# Patient Record
Sex: MALE | Race: WHITE | Employment: OTHER | URBAN - METROPOLITAN AREA
[De-identification: names, ages, dates, MRNs, and addresses within clinical notes are randomized per-mention and may not be internally consistent; named-entity substitution may affect disease eponyms.]

---

## 2017-01-02 ENCOUNTER — TELEPHONE (OUTPATIENT)
Dept: UROLOGY | Facility: CLINIC | Age: 67
End: 2017-01-02

## 2017-01-02 NOTE — TELEPHONE ENCOUNTER
Pt calling stating that jesus has been trying to contact us to verify a medication. As of today, I see no fax and have received no call from Jesus about a Rx. I have asked the pt to speak with his pharmacy as he could have spoken to one of our others nurses, or, have them contact us directly so we can assist him. Carola Parker,A

## 2017-03-03 ENCOUNTER — TRANSFERRED RECORDS (OUTPATIENT)
Dept: FAMILY MEDICINE | Facility: CLINIC | Age: 67
End: 2017-03-03

## 2017-04-25 DIAGNOSIS — R09.81 CONGESTION OF PARANASAL SINUS: ICD-10-CM

## 2017-04-25 RX ORDER — FLUTICASONE PROPIONATE 50 MCG
1-2 SPRAY, SUSPENSION (ML) NASAL DAILY
Qty: 20 G | Refills: 3 | Status: SHIPPED | OUTPATIENT
Start: 2017-04-25 | End: 2018-01-18

## 2017-05-23 ENCOUNTER — OFFICE VISIT (OUTPATIENT)
Dept: FAMILY MEDICINE | Facility: CLINIC | Age: 67
End: 2017-05-23

## 2017-05-23 DIAGNOSIS — L03.317 CELLULITIS OF BUTTOCK: Primary | ICD-10-CM

## 2017-05-23 PROCEDURE — 99213 OFFICE O/P EST LOW 20 MIN: CPT | Performed by: FAMILY MEDICINE

## 2017-05-23 NOTE — MR AVS SNAPSHOT
After Visit Summary   5/23/2017    Hugo Prince    MRN: [id number]           Patient Information     Date Of Birth          1950        Visit Information        Provider Department      5/23/2017 9:30 AM Pascual Roche MD [facility]        Today's Diagnoses     Cellulitis of buttock    -  1       Follow-ups after your visit        Who to contact     If you have questions or need follow up information about today's clinic visit or your schedule please contact [facility] directly at [phone].  Normal or non-critical lab and imaging results will be communicated to you by [employer], letter or phone within 4 business days after the clinic has received the results. If you do not hear from us within 7 days, please contact the clinic through Spring[employer] or phone. If you have a critical or abnormal lab result, we will notify you by phone as soon as possible.  Submit refill requests through [employer] or call your pharmacy and they will forward the refill request to us. Please allow 3 business days for your refill to be completed.          Additional Information About Your Visit        MyChart Information     [employer] gives you secure access to your electronic health record. If you see a primary care provider, you can also send messages to your care team and make appointments. If you have questions, please call your primary care clinic.  If you do not have a primary care provider, please call [phone] and they will assist you.        Care EveryWhere ID     This is your Care EveryWhere ID. This could be used by other organizations to access your [place] medical records  [id number]        Your Vitals Were     Pulse                   94            Blood Pressure from Last 3 Encounters:   05/24/17 142/70   12/22/16 (!) 144/92   03/23/16 152/80    Weight from Last 3 Encounters:   12/22/16 131.1 kg (289 lb)   03/23/16 130.6 kg (288 lb)   12/02/15 130.2 kg (287 lb)               Today, you had the following     No orders found for display       Primary Care Provider Office Phone # Fax #    Pascual Roche [phone]       [facility] [address]  [facility] [address]        Thank you!     Thank you for choosing [facility]  for your care. Our goal is always to provide you with excellent care. Hearing back from our patients is one way we can continue to improve our services. Please take a few minutes to complete the written survey that you may receive in the mail after your visit with us. Thank you!             Your Updated Medication List - Protect others around you: Learn how to safely use, store and throw away your medicines at www.disposemymeds.org.          This list is accurate as of: 5/23/17 11:59 PM.  Always use your most recent med list.                   Brand Name Dispense Instructions for use    doxycycline 100 MG tablet    VIBRA-TABS    10 tablet    Take 1 tablet (100 mg) by mouth 2 times daily       fluticasone 50 MCG/ACT spray    FLONASE    20 g    Spray 1-2 sprays into both nostrils daily       IBUPROFEN PO      Take 200 mg by mouth every 4 hours as needed for moderate pain       tadalafil 5 MG tablet    CIALIS    30 tablet    Take 1 tablet (5 mg) by mouth as needed for erectile dysfunction       TYLENOL EX ST ARTHRITIS PAIN 500 MG tablet   Generic drug:  acetaminophen      Take 1,000 mg by mouth 3 times daily

## 2017-05-24 VITALS — DIASTOLIC BLOOD PRESSURE: 70 MMHG | SYSTOLIC BLOOD PRESSURE: 142 MMHG | HEART RATE: 94 BPM

## 2017-05-24 RX ORDER — DOXYCYCLINE HYCLATE 100 MG
100 TABLET ORAL 2 TIMES DAILY
Qty: 10 TABLET | Refills: 0 | Status: ON HOLD | COMMUNITY
Start: 2017-05-24 | End: 2019-07-15

## 2017-05-24 NOTE — PROGRESS NOTES
3 days ago had wood tick on right buttock, now inflamed and sore.  OBJECTIVE: /70  Pulse 94 6 cm of redness and induration, tender right upper buttock  (L03.317) Cellulitis of buttock  (primary encounter diagnosis)  Comment:   Plan: doxycycline (VIBRA-TABS) 100 MG tablet

## 2017-07-24 ENCOUNTER — TRANSFERRED RECORDS (OUTPATIENT)
Dept: FAMILY MEDICINE | Facility: CLINIC | Age: 67
End: 2017-07-24

## 2017-07-28 ENCOUNTER — TRANSFERRED RECORDS (OUTPATIENT)
Dept: FAMILY MEDICINE | Facility: CLINIC | Age: 67
End: 2017-07-28

## 2017-08-11 ENCOUNTER — TRANSFERRED RECORDS (OUTPATIENT)
Dept: FAMILY MEDICINE | Facility: CLINIC | Age: 67
End: 2017-08-11

## 2017-08-29 ENCOUNTER — TRANSFERRED RECORDS (OUTPATIENT)
Dept: FAMILY MEDICINE | Facility: CLINIC | Age: 67
End: 2017-08-29

## 2017-09-15 ENCOUNTER — TRANSFERRED RECORDS (OUTPATIENT)
Dept: FAMILY MEDICINE | Facility: CLINIC | Age: 67
End: 2017-09-15

## 2017-10-18 ENCOUNTER — TRANSFERRED RECORDS (OUTPATIENT)
Dept: FAMILY MEDICINE | Facility: CLINIC | Age: 67
End: 2017-10-18

## 2017-10-24 ENCOUNTER — TRANSFERRED RECORDS (OUTPATIENT)
Dept: FAMILY MEDICINE | Facility: CLINIC | Age: 67
End: 2017-10-24

## 2017-10-30 DIAGNOSIS — Z87.442 HISTORY OF RENAL CALCULI: Primary | ICD-10-CM

## 2017-12-06 ENCOUNTER — TRANSFERRED RECORDS (OUTPATIENT)
Dept: FAMILY MEDICINE | Facility: CLINIC | Age: 67
End: 2017-12-06

## 2017-12-12 ENCOUNTER — TRANSFERRED RECORDS (OUTPATIENT)
Dept: FAMILY MEDICINE | Facility: CLINIC | Age: 67
End: 2017-12-12

## 2017-12-27 ENCOUNTER — OFFICE VISIT (OUTPATIENT)
Dept: UROLOGY | Facility: CLINIC | Age: 67
End: 2017-12-27
Payer: COMMERCIAL

## 2017-12-27 ENCOUNTER — HOSPITAL ENCOUNTER (OUTPATIENT)
Dept: GENERAL RADIOLOGY | Facility: CLINIC | Age: 67
Discharge: HOME | End: 2017-12-27
Attending: UROLOGY | Admitting: UROLOGY
Payer: MEDICARE

## 2017-12-27 VITALS
SYSTOLIC BLOOD PRESSURE: 124 MMHG | WEIGHT: 289 LBS | HEIGHT: 70 IN | BODY MASS INDEX: 41.37 KG/M2 | DIASTOLIC BLOOD PRESSURE: 68 MMHG | HEART RATE: 80 BPM

## 2017-12-27 DIAGNOSIS — N52.9 ERECTILE DYSFUNCTION, UNSPECIFIED ERECTILE DYSFUNCTION TYPE: ICD-10-CM

## 2017-12-27 DIAGNOSIS — N20.0 CALCULUS OF KIDNEY: ICD-10-CM

## 2017-12-27 DIAGNOSIS — Z87.442 HISTORY OF RENAL CALCULI: ICD-10-CM

## 2017-12-27 DIAGNOSIS — Z80.42 FAMILY HISTORY OF PROSTATE CANCER: Primary | ICD-10-CM

## 2017-12-27 PROCEDURE — 74010 XR KUB: CPT | Mod: 52

## 2017-12-27 PROCEDURE — 99214 OFFICE O/P EST MOD 30 MIN: CPT | Performed by: UROLOGY

## 2017-12-27 PROCEDURE — 36415 COLL VENOUS BLD VENIPUNCTURE: CPT | Performed by: UROLOGY

## 2017-12-27 PROCEDURE — 84153 ASSAY OF PSA TOTAL: CPT | Performed by: UROLOGY

## 2017-12-27 RX ORDER — VARDENAFIL HYDROCHLORIDE 10 MG/1
10 TABLET ORAL DAILY PRN
Qty: 12 TABLET | Refills: 11 | Status: ON HOLD | OUTPATIENT
Start: 2017-12-27 | End: 2019-07-17

## 2017-12-27 ASSESSMENT — PAIN SCALES - GENERAL: PAINLEVEL: MILD PAIN (2)

## 2017-12-27 NOTE — MR AVS SNAPSHOT
After Visit Summary   12/27/2017    Hugo Prince    MRN: [id number]           Patient Information     Date Of Birth          1950        Visit Information        Provider Department      12/27/2017 11:00 AM Dima Desai MD [facility] [place]        Today's Diagnoses     Family history of prostate cancer    -  1    Erectile dysfunction, unspecified erectile dysfunction type        Calculus of kidney           Follow-ups after your visit        Follow-up notes from your care team     Return in about 1 year (around 12/27/2018) for KUB, PSA, Physical Exam.      Future tests that were ordered for you today     Open Future Orders        Priority Expected Expires Ordered    XR KUB [[id number]] Routine 12/27/2017 12/27/2018 12/27/2017            Who to contact     If you have questions or need follow up information about today's clinic visit or your schedule please contact [facility] [place] directly at [phone].  Normal or non-critical lab and imaging results will be communicated to you by [employer], letter or phone within 4 business days after the clinic has received the results. If you do not hear from us within 7 days, please contact the clinic through [employer] or phone. If you have a critical or abnormal lab result, we will notify you by phone as soon as possible.  Submit refill requests through [email or web address] or call your pharmacy and they will forward the refill request to us. Please allow 3 business days for your refill to be completed.          Additional Information About Your Visit        MyChart Information     [email or web address] gives you secure access to your electronic health record. If you see a primary care provider, you can also send messages to your care team and make appointments. If you have questions, please call your primary care clinic.  If you do not have a primary care provider, please call [phone] and they will  "assist you.        Care EveryWhere ID     This is your Care EveryWhere ID. This could be used by other organizations to access your [place] medical records  [id number]        Your Vitals Were     Pulse Height BMI (Body Mass Index)             80 1.778 m (5' 10\") 41.47 kg/m2          Blood Pressure from Last 3 Encounters:   12/27/17 124/68   05/24/17 142/70   12/22/16 (!) 144/92    Weight from Last 3 Encounters:   12/27/17 131.1 kg (289 lb)   12/22/16 131.1 kg (289 lb)   03/23/16 130.6 kg (288 lb)              We Performed the Following     PSA Diag Urologic Phys [[id number]]          Today's Medication Changes          These changes are accurate as of: 12/27/17 11:34 AM.  If you have any questions, ask your nurse or doctor.               These medicines have changed or have updated prescriptions.        Dose/Directions    * LEVITRA PO   This may have changed:  Another medication with the same name was added. Make sure you understand how and when to take each.   Changed by:  Dima Desai MD        Dose:  10 mg   Take 10 mg by mouth   Refills:  0       * vardenafil 10 MG tablet   Commonly known as:  LEVITRA   This may have changed:  You were already taking a medication with the same name, and this prescription was added. Make sure you understand how and when to take each.   Used for:  Family history of prostate cancer, Erectile dysfunction, unspecified erectile dysfunction type   Changed by:  Dima Desai MD        Dose:  10 mg   Take 1 tablet (10 mg) by mouth daily as needed 60 min prior to sex. Do not use with nitroglycerin, terazosin or doxazosin.   Quantity:  12 tablet   Refills:  11       * Notice:  This list has 2 medication(s) that are the same as other medications prescribed for you. Read the directions carefully, and ask your doctor or other care provider to review them with you.         Where to get your medicines      Some of these will need a paper prescription and others can be bought " over the counter.  Ask your nurse if you have questions.     Bring a paper prescription for each of these medications     vardenafil 10 MG tablet                Primary Care Provider Office Phone # Fax #    Pascual Roche [phone] [address] [facility] [address]        Equal Access to Services     MARCO ANTONIO IVAN : Hadii aad ku hadasho Soomaali, waaxda luqadaha, qaybta kaalmada adeegyada, waxay idiin hayaan adeeg khrobsh la'shashan . So [facility] [phone].    ATENCIÓN: Si habla español, tiene a maldonado disposición servicios gratuitos de asistencia lingüística. Navid al [phone].    We comply with applicable federal civil rights laws and [place] laws. We do not discriminate on the basis of race, color, national origin, age, disability, sex, sexual orientation, or gender identity.            Thank you!     Thank you for choosing [facility] [place]  for your care. Our goal is always to provide you with excellent care. Hearing back from our patients is one way we can continue to improve our services. Please take a few minutes to complete the written survey that you may receive in the mail after your visit with us. Thank you!             Your Updated Medication List - Protect others around you: Learn how to safely use, store and throw away your medicines at www.disposemymeds.org.          This list is accurate as of: 12/27/17 11:34 AM.  Always use your most recent med list.                   Brand Name Dispense Instructions for use Diagnosis    doxycycline 100 MG tablet    VIBRA-TABS    10 tablet    Take 1 tablet (100 mg) by mouth 2 times daily    Cellulitis of buttock       fluticasone 50 MCG/ACT spray    FLONASE    20 g    Spray 1-2 sprays into both nostrils daily    Congestion of paranasal sinus       IBUPROFEN PO      Take 200 mg by mouth every 4 hours as needed for moderate pain        * LEVITRA PO      Take 10 mg by mouth        * vardenafil 10 MG  tablet    LEVITRA    12 tablet    Take 1 tablet (10 mg) by mouth daily as needed 60 min prior to sex. Do not use with nitroglycerin, terazosin or doxazosin.    Family history of prostate cancer, Erectile dysfunction, unspecified erectile dysfunction type       TYLENOL EX ST ARTHRITIS PAIN 500 MG tablet   Generic drug:  acetaminophen      Take 1,000 mg by mouth 3 times daily        * Notice:  This list has 2 medication(s) that are the same as other medications prescribed for you. Read the directions carefully, and ask your doctor or other care provider to review them with you.

## 2017-12-27 NOTE — NURSING NOTE
Chief Complaint   Patient presents with     Clinic Care Coordination - Follow-up     History of Prostate Cancer      Patricia Zuleta LPN

## 2017-12-27 NOTE — LETTER
12/27/2017       RE: Hugo Prince  [address]  [place] [address]     Dear Colleague,    Thank you for referring your patient, Hugo Prince, to the [facility] [place] at [facility]. Please see a copy of my visit note below.    History: This is a great pleasure to see this very pleasant 67-year-old gentleman in follow-up consultation today.  He has a significant previous history of recurrent urinary stone disease and there is a strong family history of prostate cancer.  At the present time his general health is stable.  It  The PSA today is 1.5.  He is no major voiding symptoms.  In the last year he had no further episodes of renal colic.  We know that he has a significant stone burden particularly in the right kidney.  KUB was repeated today.    Past Medical History:   Diagnosis Date     Atopic dermatitis      Esophageal reflux     Gastroesophageal reflux     Other and unspecified disc disorder of unspecified region     Intervertebral disc disorders     Personal history of colonic polyps     Colon polyps     S/P lateral meniscectomy of left knee      Tick bite 4/20/15    amoxicillin treated     Unspecified sleep apnea     Sleep apnea     Urinary calculus, unspecified     Renal stones     Vitamin D deficiency 9-27-09       Social History     Social History     Marital status:      Spouse name: N/A     Number of children: N/A     Years of education: N/A     Social History Main Topics     Smoking status: Passive Smoke Exposure - Never Smoker     Types: Cigars     Smokeless tobacco: Never Used      Comment: has cigars when it is warm out     Alcohol use 7.0 oz/week     14 drink(s) per week     Drug use: No     Sexual activity: Not Asked     Other Topics Concern     None     Social History Narrative       Past Surgical History:   Procedure Laterality Date     ARTHROPLASTY KNEE Left 1/7/2015    Procedure:  ARTHROPLASTY KNEE;  Surgeon: Agustin Saenz MD;  Location: [facility]     ARTHROSCOPY KNEE RT/LT  6-20-11     COLONOSCOPY  12/09     EXTRACORPOREAL SHOCK WAVE LITHOTRIPSY (ESWL)  1/20/2012    Procedure:EXTRACORPOREAL SHOCK WAVE LITHOTRIPSY (ESWL); LEFT EXTRACORPOREAL SHOCK WAVE LITHOTRIPSY ; Surgeon:ANJUM BUNDY; Location:[facility] OR     EXTRACORPOREAL SHOCK WAVE LITHOTRIPSY, CYSTOSCOPY, INSERT STENT URETER(S), COMBINED  2/3/2012    Procedure:COMBINED EXTRACORPOREAL SHOCK WAVE LITHOTRIPSY, CYSTOSCOPY, INSERT STENT URETER(S); CYSTOSCOPY, RIGHT STENT; Surgeon:ANJUM BUNDY; Location:[facility] OR     FUSION LUMBAR ANTERIOR, FUSION LUMBAR POSTERIOR ONE LEVEL, COMBINED  11-1-10    L4-5     LASER HOLMIUM LITHOTRIPSY URETER(S), INSERT STENT, COMBINED  2/7/2012    Procedure:COMBINED CYSTOSCOPY, URETEROSCOPY, LASER HOLMIUM LITHOTRIPSY URETER(S), INSERT STENT; CYSTOSCOPY, RIGHT URETEROSCOPY, RIGHT RETROGRADES,  STONE EXTRACTION WITH HOLMIUM LASER AND RIGHT URETERAL STENT EXCHANGE ; Surgeon:DEEPTI MELVIN; Location:[facility] OR     LITHOTRIPSY      Lithotrypsy     SEPTOPLASTY         Family History   Problem Relation Age of Onset     Breast Cancer Mother      Melanoma Father      Prostate Cancer Father      C.A.D. Paternal Grandfather 72         Current Outpatient Prescriptions:      Vardenafil HCl (LEVITRA PO), Take 10 mg by mouth, Disp: , Rfl:      vardenafil (LEVITRA) 10 MG tablet, Take 1 tablet (10 mg) by mouth daily as needed 60 min prior to sex. Do not use with nitroglycerin, terazosin or doxazosin., Disp: 12 tablet, Rfl: 11     doxycycline (VIBRA-TABS) 100 MG tablet, Take 1 tablet (100 mg) by mouth 2 times daily, Disp: 10 tablet, Rfl: 0     fluticasone (FLONASE) 50 MCG/ACT spray, Spray 1-2 sprays into both nostrils daily, Disp: 20 g, Rfl: 3     IBUPROFEN PO, Take 200 mg by mouth every 4 hours as needed for moderate pain, Disp: , Rfl:      acetaminophen (TYLENOL) 500 MG tablet, Take 1,000 mg by mouth 3 times  "daily , Disp: , Rfl:     10 point ROS of systems including Constitutional, Eyes, Respiratory, Cardiovascular, Gastroenterology, Genitourinary, Integumentary, Muscularskeletal, Psychiatric were all negative except for pertinent positives noted in my HPI.    Examination:   /68 (BP Location: Left arm)  Pulse 80  Ht 1.778 m (5' 10\")  Wt 131.1 kg (289 lb)  BMI 41.47 kg/m2  General Impression: Very pleasant gentleman in no acute distress, well-oriented in time place and person  Mental Status: Normal.  HEENT.  There is no clinical evidence of jaundice and mucous membranes are normal  Skin: Skin is normal to examination  Respiratory System: The respiratory cycle is normal  Lymph Nodes: Not examined  Back/Flank Tenderness: There is no flank tenderness  Cardiovascular System: Not examined  Abdominal Examination: Not examined  Extremities: Not examined  Genitial: Not examined  Rectal Examination: Good sphincter tone, normal perianal sensation.  Smooth rectal mucosa without hemorrhoids or fissures.  Smooth soft and mildly enlarged prostate without evidence of tenderness, bogginess or nodules.  Seminal vesicles.  Not palpable.  Perineum is otherwise normal to examination  Neurologic System: There are no focal abnormal clinical neurological signs in the central, or peripheral nervous systems    Impression: We discussed a number of issues.  1.  His PSA today is 1.5, it was 0.98, 1 year ago, there is a significant family history of prostate cancer, but digital rectal examination today is quite benign.  I am reassured and reassured the patient that there was minimal evidence of any sinister findings and the prostate at this time.  2.  He has a history of recurrent urinary stone disease, does have a significant stone burden particularly on the right kidney where there was a 1.5 cm stone in the lower pole calyx of the right kidney in addition to other smaller stones in both kidneys.  I have however carefully reviewed these " "films and see very little evidence of any change in size and location of the stones at the present time.  The patient has had shockwave treatment on a number of occasions in the past and is very keen not to have further treatment unless absolutely necessary.  We did discuss this in detail today.  I'm content to continue to observe the stones and repeat a KUB in a year although I given him Instructions to contact me promptly should he develop any significant symptoms such as flank pain or gross hematuria.  3.  He did have questions about erectile dysfunction and I was able to refill his prescription today for Levitra 10 mg on a p.r.n. basis.    I did discuss all of these issues very careful detail with the patient today.  I answered all his questions    Plan: 1 year for PSA, bladder scan, symptom score, examination and KUB    Time: 25 minutes with greater than 50% spent in discussion and consultation    \"This dictation was performed with voice recognition software and may contain errors,  omissions and inadvertent word substitution.\"    Again, thank you for allowing me to participate in the care of your patient.      Sincerely,    iDma Desai MD  "

## 2017-12-27 NOTE — PROGRESS NOTES
History: This is a great pleasure to see this very pleasant 67-year-old gentleman in follow-up consultation today.  He has a significant previous history of recurrent urinary stone disease and there is a strong family history of prostate cancer.  At the present time his general health is stable.  It  The PSA today is 1.5.  He is no major voiding symptoms.  In the last year he had no further episodes of renal colic.  We know that he has a significant stone burden particularly in the right kidney.  KUB was repeated today.    Past Medical History:   Diagnosis Date     Atopic dermatitis      Esophageal reflux     Gastroesophageal reflux     Other and unspecified disc disorder of unspecified region     Intervertebral disc disorders     Personal history of colonic polyps     Colon polyps     S/P lateral meniscectomy of left knee      Tick bite 4/20/15    amoxicillin treated     Unspecified sleep apnea     Sleep apnea     Urinary calculus, unspecified     Renal stones     Vitamin D deficiency 9-27-09       Social History     Social History     Marital status:      Spouse name: N/A     Number of children: N/A     Years of education: N/A     Social History Main Topics     Smoking status: Passive Smoke Exposure - Never Smoker     Types: Cigars     Smokeless tobacco: Never Used      Comment: has cigars when it is warm out     Alcohol use 7.0 oz/week     14 drink(s) per week     Drug use: No     Sexual activity: Not Asked     Other Topics Concern     None     Social History Narrative       Past Surgical History:   Procedure Laterality Date     ARTHROPLASTY KNEE Left 1/7/2015    Procedure: ARTHROPLASTY KNEE;  Surgeon: Agustin Saenz MD;  Location:  OR     ARTHROSCOPY KNEE RT/LT  6-20-11     COLONOSCOPY  12/09     EXTRACORPOREAL SHOCK WAVE LITHOTRIPSY (ESWL)  1/20/2012    Procedure:EXTRACORPOREAL SHOCK WAVE LITHOTRIPSY (ESWL); LEFT EXTRACORPOREAL SHOCK WAVE LITHOTRIPSY ; Surgeon:ANJUM BUNDY; Location:  "OR     EXTRACORPOREAL SHOCK WAVE LITHOTRIPSY, CYSTOSCOPY, INSERT STENT URETER(S), COMBINED  2/3/2012    Procedure:COMBINED EXTRACORPOREAL SHOCK WAVE LITHOTRIPSY, CYSTOSCOPY, INSERT STENT URETER(S); CYSTOSCOPY, RIGHT STENT; Surgeon:ANJUM BUNDY; Location:[facility] OR     FUSION LUMBAR ANTERIOR, FUSION LUMBAR POSTERIOR ONE LEVEL, COMBINED  11-1-10    L4-5     LASER HOLMIUM LITHOTRIPSY URETER(S), INSERT STENT, COMBINED  2/7/2012    Procedure:COMBINED CYSTOSCOPY, URETEROSCOPY, LASER HOLMIUM LITHOTRIPSY URETER(S), INSERT STENT; CYSTOSCOPY, RIGHT URETEROSCOPY, RIGHT RETROGRADES,  STONE EXTRACTION WITH HOLMIUM LASER AND RIGHT URETERAL STENT EXCHANGE ; Surgeon:DEEPTI MELVIN; Location:[facility] OR     LITHOTRIPSY      Lithotrypsy     SEPTOPLASTY         Family History   Problem Relation Age of Onset     Breast Cancer Mother      Melanoma Father      Prostate Cancer Father      C.A.D. Paternal Grandfather 72         Current Outpatient Prescriptions:      Vardenafil HCl (LEVITRA PO), Take 10 mg by mouth, Disp: , Rfl:      vardenafil (LEVITRA) 10 MG tablet, Take 1 tablet (10 mg) by mouth daily as needed 60 min prior to sex. Do not use with nitroglycerin, terazosin or doxazosin., Disp: 12 tablet, Rfl: 11     doxycycline (VIBRA-TABS) 100 MG tablet, Take 1 tablet (100 mg) by mouth 2 times daily, Disp: 10 tablet, Rfl: 0     fluticasone (FLONASE) 50 MCG/ACT spray, Spray 1-2 sprays into both nostrils daily, Disp: 20 g, Rfl: 3     IBUPROFEN PO, Take 200 mg by mouth every 4 hours as needed for moderate pain, Disp: , Rfl:      acetaminophen (TYLENOL) 500 MG tablet, Take 1,000 mg by mouth 3 times daily , Disp: , Rfl:     10 point ROS of systems including Constitutional, Eyes, Respiratory, Cardiovascular, Gastroenterology, Genitourinary, Integumentary, Muscularskeletal, Psychiatric were all negative except for pertinent positives noted in my HPI.    Examination:   /68 (BP Location: Left arm)  Pulse 80  Ht 1.778 m (5' 10\")  " Wt 131.1 kg (289 lb)  BMI 41.47 kg/m2  General Impression: Very pleasant gentleman in no acute distress, well-oriented in time place and person  Mental Status: Normal.  HEENT.  There is no clinical evidence of jaundice and mucous membranes are normal  Skin: Skin is normal to examination  Respiratory System: The respiratory cycle is normal  Lymph Nodes: Not examined  Back/Flank Tenderness: There is no flank tenderness  Cardiovascular System: Not examined  Abdominal Examination: Not examined  Extremities: Not examined  Genitial: Not examined  Rectal Examination: Good sphincter tone, normal perianal sensation.  Smooth rectal mucosa without hemorrhoids or fissures.  Smooth soft and mildly enlarged prostate without evidence of tenderness, bogginess or nodules.  Seminal vesicles.  Not palpable.  Perineum is otherwise normal to examination  Neurologic System: There are no focal abnormal clinical neurological signs in the central, or peripheral nervous systems    Impression: We discussed a number of issues.  1.  His PSA today is 1.5, it was 0.98, 1 year ago, there is a significant family history of prostate cancer, but digital rectal examination today is quite benign.  I am reassured and reassured the patient that there was minimal evidence of any sinister findings and the prostate at this time.  2.  He has a history of recurrent urinary stone disease, does have a significant stone burden particularly on the right kidney where there was a 1.5 cm stone in the lower pole calyx of the right kidney in addition to other smaller stones in both kidneys.  I have however carefully reviewed these films and see very little evidence of any change in size and location of the stones at the present time.  The patient has had shockwave treatment on a number of occasions in the past and is very keen not to have further treatment unless absolutely necessary.  We did discuss this in detail today.  I'm content to continue to observe the  "stones and repeat a KUB in a year although I given him Instructions to contact me promptly should he develop any significant symptoms such as flank pain or gross hematuria.  3.  He did have questions about erectile dysfunction and I was able to refill his prescription today for Levitra 10 mg on a p.r.n. basis.    I did discuss all of these issues very careful detail with the patient today.  I answered all his questions    Plan: 1 year for PSA, bladder scan, symptom score, examination and KUB    Time: 25 minutes with greater than 50% spent in discussion and consultation    \"This dictation was performed with voice recognition software and may contain errors,  omissions and inadvertent word substitution.\"      "

## 2017-12-29 LAB — PSA SERPL-MCNC: 1.5 NG/ML (ref 0–4)

## 2018-01-18 DIAGNOSIS — R09.81 CONGESTION OF PARANASAL SINUS: ICD-10-CM

## 2018-01-18 NOTE — TELEPHONE ENCOUNTER
fluticasone (FLONASE) 50 MCG/ACT spray   LAST  Med check 12/2/15   last labs(for RX) 3/23/16  Next  appt scheduled =  None- due  Najma Nunn MA

## 2018-01-19 RX ORDER — FLUTICASONE PROPIONATE 50 MCG
SPRAY, SUSPENSION (ML) NASAL
Qty: 16 G | Refills: 0 | Status: SHIPPED | OUTPATIENT
Start: 2018-01-19 | End: 2018-03-14

## 2018-01-30 ENCOUNTER — TRANSFERRED RECORDS (OUTPATIENT)
Dept: FAMILY MEDICINE | Facility: CLINIC | Age: 68
End: 2018-01-30

## 2018-02-12 ENCOUNTER — TRANSFERRED RECORDS (OUTPATIENT)
Dept: FAMILY MEDICINE | Facility: CLINIC | Age: 68
End: 2018-02-12

## 2018-03-09 ENCOUNTER — TRANSFERRED RECORDS (OUTPATIENT)
Dept: FAMILY MEDICINE | Facility: CLINIC | Age: 68
End: 2018-03-09

## 2018-03-14 ENCOUNTER — OFFICE VISIT (OUTPATIENT)
Dept: FAMILY MEDICINE | Facility: CLINIC | Age: 68
End: 2018-03-14

## 2018-03-14 VITALS
RESPIRATION RATE: 16 BRPM | HEART RATE: 72 BPM | OXYGEN SATURATION: 96 % | SYSTOLIC BLOOD PRESSURE: 134 MMHG | DIASTOLIC BLOOD PRESSURE: 88 MMHG | WEIGHT: 299 LBS | BODY MASS INDEX: 42.9 KG/M2

## 2018-03-14 DIAGNOSIS — E53.8 VITAMIN B12 DEFICIENCY (NON ANEMIC): Primary | ICD-10-CM

## 2018-03-14 DIAGNOSIS — R09.81 CONGESTION OF PARANASAL SINUS: ICD-10-CM

## 2018-03-14 DIAGNOSIS — Z13.1 SCREENING FOR DIABETES MELLITUS: ICD-10-CM

## 2018-03-14 DIAGNOSIS — H61.22 IMPACTED CERUMEN OF LEFT EAR: ICD-10-CM

## 2018-03-14 DIAGNOSIS — Z11.59 ENCOUNTER FOR HCV SCREENING TEST FOR LOW RISK PATIENT: ICD-10-CM

## 2018-03-14 DIAGNOSIS — Z13.220 LIPID SCREENING: ICD-10-CM

## 2018-03-14 DIAGNOSIS — R73.09 ELEVATED GLUCOSE LEVEL: ICD-10-CM

## 2018-03-14 DIAGNOSIS — E66.01 MORBID OBESITY (H): ICD-10-CM

## 2018-03-14 PROCEDURE — 36415 COLL VENOUS BLD VENIPUNCTURE: CPT | Performed by: FAMILY MEDICINE

## 2018-03-14 PROCEDURE — 69209 REMOVE IMPACTED EAR WAX UNI: CPT | Performed by: FAMILY MEDICINE

## 2018-03-14 PROCEDURE — 99213 OFFICE O/P EST LOW 20 MIN: CPT | Mod: 25 | Performed by: FAMILY MEDICINE

## 2018-03-14 RX ORDER — FLUTICASONE PROPIONATE 50 MCG
SPRAY, SUSPENSION (ML) NASAL
Qty: 16 G | Refills: 0 | Status: SHIPPED | OUTPATIENT
Start: 2018-03-14 | End: 2018-06-29

## 2018-03-14 NOTE — MR AVS SNAPSHOT
After Visit Summary   3/14/2018    Hugo Prince    MRN: [id number]           Patient Information     Date Of Birth          1950        Visit Information        Provider Department      3/14/2018 8:15 AM Pascual Roche MD [facility]        Today's Diagnoses     Vitamin B12 deficiency (non anemic)    -  1    Congestion of paranasal sinus        Screening for diabetes mellitus        Lipid screening        Encounter for HCV screening test for low risk patient        Impacted cerumen of left ear        Morbid obesity (H)           Follow-ups after your visit        Who to contact     If you have questions or need follow up information about today's clinic visit or your schedule please contact [facility] directly at [phone].  Normal or non-critical lab and imaging results will be communicated to you by [employer], letter or phone within 4 business days after the clinic has received the results. If you do not hear from us within 7 days, please contact the clinic through [employer] or phone. If you have a critical or abnormal lab result, we will notify you by phone as soon as possible.  Submit refill requests through [employer] or call your pharmacy and they will forward the refill request to us. Please allow 3 business days for your refill to be completed.          Additional Information About Your Visit        MyChart Information     [employer] gives you secure access to your electronic health record. If you see a primary care provider, you can also send messages to your care team and make appointments. If you have questions, please call your primary care clinic.  If you do not have a primary care provider, please call [phone] and they will assist you.        Care EveryWhere ID     This is your Care EveryWhere ID. This could be used by other organizations to access your [facility] medical records  [id number]        Your Vitals Were     Pulse Respirations Pulse  Oximetry BMI (Body Mass Index)          72 16 96% 42.9 kg/m2         Blood Pressure from Last 3 Encounters:   03/14/18 134/88   12/27/17 124/68   05/24/17 142/70    Weight from Last 3 Encounters:   03/14/18 135.6 kg (299 lb)   12/27/17 131.1 kg (289 lb)   12/22/16 131.1 kg (289 lb)              We Performed the Following     Glucose  Serum ([facility])     HCV Antibody ([facility])     Lipid Panel ([facility])     Removal Impacted Cerumen Irrigation Unilateral ([facility])     VENOUS COLLECTION     Vitamin B12 ([facility])          Today's Medication Changes          These changes are accurate as of 3/14/18 12:28 PM.  If you have any questions, ask your nurse or doctor.               These medicines have changed or have updated prescriptions.        Dose/Directions    fluticasone 50 MCG/ACT spray   Commonly known as:  FLONASE   This may have changed:  See the new instructions.   Used for:  Congestion of paranasal sinus   Changed by:  Pascual Roche MD        SPRAY 1-2 SPRAYS INTO BOTH NOSTRILS DAILY   Quantity:  16 g   Refills:  0            Where to get your medicines      These medications were sent to [id number] [address]  [address], [place] [address]     Phone:  [phone]     fluticasone 50 MCG/ACT spray                Primary Care Provider Office Phone # Fax #    Pascual Roche [phone] [address] [facility] [address]        Equal Access to Services     MARCO ANTONIO IVAN AH: Hadii aad ku hadasho Soomaali, waaxda luqadaha, qaybta kaalmada adeegyada, waxay idiin hayshashan ky hardy . So [facility] [phone].    ATENCIÓN: Si habla español, tiene a maldonado disposición servicios gratuitos de asistencia lingüística. Llame al [phone].    We comply with applicable federal civil rights laws and [place] laws. We do not discriminate on the basis of race, color, national origin, age, disability, sex, sexual orientation, or gender  identity.            Thank you!     Thank you for choosing [facility]  for your care. Our goal is always to provide you with excellent care. Hearing back from our patients is one way we can continue to improve our services. Please take a few minutes to complete the written survey that you may receive in the mail after your visit with us. Thank you!             Your Updated Medication List - Protect others around you: Learn how to safely use, store and throw away your medicines at www.disposemymeds.org.          This list is accurate as of 3/14/18 12:28 PM.  Always use your most recent med list.                   Brand Name Dispense Instructions for use Diagnosis    doxycycline 100 MG tablet    VIBRA-TABS    10 tablet    Take 1 tablet (100 mg) by mouth 2 times daily    Cellulitis of buttock       fluticasone 50 MCG/ACT spray    FLONASE    16 g    SPRAY 1-2 SPRAYS INTO BOTH NOSTRILS DAILY    Congestion of paranasal sinus       IBUPROFEN PO      Take 200 mg by mouth every 4 hours as needed for moderate pain        * LEVITRA PO      Take 10 mg by mouth        * vardenafil 10 MG tablet    LEVITRA    12 tablet    Take 1 tablet (10 mg) by mouth daily as needed 60 min prior to sex. Do not use with nitroglycerin, terazosin or doxazosin.    Family history of prostate cancer, Erectile dysfunction, unspecified erectile dysfunction type       TYLENOL EX ST ARTHRITIS PAIN 500 MG tablet   Generic drug:  acetaminophen      Take 1,000 mg by mouth 3 times daily        * Notice:  This list has 2 medication(s) that are the same as other medications prescribed for you. Read the directions carefully, and ask your doctor or other care provider to review them with you.

## 2018-03-14 NOTE — PROGRESS NOTES
Here for screening.   Feeling well, but trying to lose weight. He has lost 8# since starting a month ago. History of slightly elevated glucoses in the past.   He has had low B12 in past, on replacement.   Left ear is plugged.  Patient Active Problem List   Diagnosis     Kidney stone     Sleep apnea     Impacted cerumen     ED (erectile dysfunction)     Preventative health care     Health Care Home     GERD (gastroesophageal reflux disease)     Elevated cholesterol     S/P total knee replacement     Dermatitis     Physical deconditioning     Knee pain     DVT prophylaxis     DJD (degenerative joint disease) of knee     Anemia due to blood loss, acute     Edema     S/P knee surgery     Chronic pharyngitis and nasopharyngitis(472)     Morbid obesity (H)      Current Outpatient Prescriptions   Medication     fluticasone (FLONASE) 50 MCG/ACT spray     Vardenafil HCl (LEVITRA PO)     vardenafil (LEVITRA) 10 MG tablet     doxycycline (VIBRA-TABS) 100 MG tablet     IBUPROFEN PO     acetaminophen (TYLENOL) 500 MG tablet     No current facility-administered medications for this visit.        ROS denies CP, SOB.  /88  Pulse 72  Resp 16  Wt 135.6 kg (299 lb)  SpO2 96%  BMI 42.9 kg/m2 NAD he is alert, insightful. Good mood. Left EAC occluded with cerumen.  (E53.8) Vitamin B12 deficiency (non anemic)  (primary encounter diagnosis)  Comment:   Plan: Vitamin B12 ([facility]), VENOUS COLLECTION            (R09.81) Congestion of paranasal sinus  Comment:   Plan: fluticasone (FLONASE) 50 MCG/ACT spray            (Z13.1) Screening for diabetes mellitus  Comment:   Plan: Glucose  Serum ([facility]), VENOUS COLLECTION            (Z13.220) Lipid screening  Comment:   Plan: Lipid Panel ([facility])            (Z11.59) Encounter for HCV screening test for low risk patient  Comment:   Plan: HCV Antibody ([facility])            (H61.22) Impacted cerumen of left ear  Comment:   Plan: Removal Impacted Cerumen Irrigation Unilateral         (Ear  [facility]minder)            (E66.01) Morbid obesity (H)  Comment:   Plan: continue with diet and increased walking

## 2018-03-15 LAB
CHOLEST SERPL-MCNC: 211 MG/DL (ref 100–199)
GLUCOSE SERPL-MCNC: 123 MG/DL (ref 65–99)
HCV AB SERPL QL IA: <0.1 S/CO RATIO (ref 0–0.9)
HDLC SERPL-MCNC: 40 MG/DL
LDL/HDL RATIO: 3.3 RATIO UNITS (ref 0–3.6)
LDLC SERPL CALC-MCNC: 132 MG/DL (ref 0–99)
TRIGL SERPL-MCNC: 193 MG/DL (ref 0–149)
VIT B12 SERPL-MCNC: 777 PG/ML (ref 232–1245)
VLDLC SERPL CALC-MCNC: 39 MG/DL (ref 5–40)

## 2018-03-16 ENCOUNTER — TRANSFERRED RECORDS (OUTPATIENT)
Dept: FAMILY MEDICINE | Facility: CLINIC | Age: 68
End: 2018-03-16

## 2018-03-20 LAB — HBA1C MFR BLD: 5.9 % (ref 4.8–5.6)

## 2018-03-20 NOTE — PROGRESS NOTES
These results are fine, mildly elevated glucose, should be rechecked in a year. Let me know if you have questions.  Dr. Roche

## 2018-06-29 DIAGNOSIS — R09.81 CONGESTION OF PARANASAL SINUS: ICD-10-CM

## 2018-06-29 RX ORDER — FLUTICASONE PROPIONATE 50 MCG
SPRAY, SUSPENSION (ML) NASAL
Qty: 16 G | Refills: 0 | Status: SHIPPED | OUTPATIENT
Start: 2018-06-29 | End: 2018-09-14

## 2018-06-29 NOTE — TELEPHONE ENCOUNTER
fluticasone (FLONASE) 50 MCG/ACT spray   LAST  Med check 3/14/18   last labs(for RX) 3/19/18  Next  appt scheduled =  none  Najma Nunn MA

## 2018-09-12 ENCOUNTER — TRANSFERRED RECORDS (OUTPATIENT)
Dept: FAMILY MEDICINE | Facility: CLINIC | Age: 68
End: 2018-09-12

## 2018-09-14 DIAGNOSIS — R09.81 CONGESTION OF PARANASAL SINUS: ICD-10-CM

## 2018-09-14 RX ORDER — FLUTICASONE PROPIONATE 50 MCG
SPRAY, SUSPENSION (ML) NASAL
Qty: 16 G | Refills: 0 | Status: SHIPPED | OUTPATIENT
Start: 2018-09-14 | End: 2018-10-13

## 2018-09-19 ENCOUNTER — TRANSFERRED RECORDS (OUTPATIENT)
Dept: FAMILY MEDICINE | Facility: CLINIC | Age: 68
End: 2018-09-19

## 2018-09-25 ENCOUNTER — TRANSFERRED RECORDS (OUTPATIENT)
Dept: FAMILY MEDICINE | Facility: CLINIC | Age: 68
End: 2018-09-25

## 2018-09-27 ENCOUNTER — OFFICE VISIT (OUTPATIENT)
Dept: FAMILY MEDICINE | Facility: CLINIC | Age: 68
End: 2018-09-27

## 2018-09-27 VITALS
RESPIRATION RATE: 16 BRPM | WEIGHT: 298 LBS | HEART RATE: 59 BPM | BODY MASS INDEX: 42.76 KG/M2 | SYSTOLIC BLOOD PRESSURE: 167 MMHG | DIASTOLIC BLOOD PRESSURE: 95 MMHG

## 2018-09-27 DIAGNOSIS — R03.0 ELEVATED BLOOD PRESSURE READING WITHOUT DIAGNOSIS OF HYPERTENSION: Primary | ICD-10-CM

## 2018-09-27 PROCEDURE — 99213 OFFICE O/P EST LOW 20 MIN: CPT | Performed by: NURSE PRACTITIONER

## 2018-09-27 RX ORDER — DIAZEPAM 10 MG
TABLET ORAL
Refills: 0 | COMMUNITY
Start: 2018-09-23 | End: 2019-06-05

## 2018-09-27 NOTE — PROGRESS NOTES
SUBJECTIVE:   Hugo Prince is a 68 year old male who presents to clinic today for the following health issues: Was having high BP at home on machine so came in. Recently had nerve root injection tues at [facility] and BP always goes up after that. /85 after injection at office. BP 20 points different than our machine at home. Doing PT 2x/week. Injection was above infusion point and it is working. No BP meds taken, only advil.       Hypertension Follow-up      Outpatient blood pressures are being checked at home and BID.  Results are 160-140/98-78. BP cuff wrong size, too small.     Low Salt Diet: not using salt- not monitoring salt       Amount of exercise or physical activity: 2-3 days/week for an average of 45-60 minutes- bikes, walks, fishes, gardens & yolie    Problems taking medications regularly: No    Medication side effects: none    Diet: eats yoguart, juice, fruit/vegetables regularly 3-4 servings, protein - daily. Coffee - fresh ground - 2 cups in am        Problem list and histories reviewed & adjusted, as indicated.  Additional history: as documented    BP Readings from Last 3 Encounters:   09/27/18 (!) 167/95   03/14/18 134/88   12/27/17 124/68    Wt Readings from Last 3 Encounters:   09/27/18 135.2 kg (298 lb)   03/14/18 135.6 kg (299 lb)   12/27/17 131.1 kg (289 lb)                    Reviewed and updated as needed this visit by clinical staff       Reviewed and updated as needed this visit by Provider         ROS:  Constitutional, HEENT, cardiovascular, pulmonary, gi and gu systems are negative, except as otherwise noted.    OBJECTIVE:     BP (!) 167/95 (BP Location: Right arm, Patient Position: Sitting, Cuff Size: Adult Regular)  Pulse 59  Resp 16  Wt 135.2 kg (298 lb)  BMI 42.76 kg/m2  Body mass index is 42.76 kg/(m^2).  GENERAL: healthy, alert and no distress  NECK: no adenopathy, no asymmetry, masses, or scars and thyroid normal to palpation  RESP: lungs clear to  auscultation - no rales, rhonchi or wheezes  CV: regular rate and rhythm, normal S1 S2, no S3 or S4, no murmur, click or rub, no peripheral edema and peripheral pulses strong  MS: no gross musculoskeletal defects noted, no edema  SKIN: no suspicious lesions or rashes  NEURO: Normal strength and tone, mentation intact and speech normal  PSYCH: mentation appears normal, affect normal/bright    Diagnostic Test Results:  none     ASSESSMENT/PLAN:     1. Elevated blood pressure reading without diagnosis of hypertension  Take BP on larger cuff always on left arm, if elevated BP at home let us know.       FUTURE APPOINTMENTS:       - Follow-up for annual visit or as needed  Work on weight loss  Regular exercise    DEMETRI Watson [facility]

## 2018-09-27 NOTE — MR AVS SNAPSHOT
After Visit Summary   9/27/2018    Hugo Prince    MRN: [id number]           Patient Information     Date Of Birth          1950        Visit Information        Provider Department      9/27/2018 10:15 AM Missy Patel APRN CNP [facility]        Today's Diagnoses     Elevated blood pressure reading without diagnosis of hypertension    -  1       Follow-ups after your visit        Follow-up notes from your care team     Return if symptoms worsen or fail to improve.      Who to contact     If you have questions or need follow up information about today's clinic visit or your schedule please contact [facility] directly at [phone].  Normal or non-critical lab and imaging results will be communicated to you by [employer], letter or phone within 4 business days after the clinic has received the results. If you do not hear from us within 7 days, please contact the clinic through [employer] or phone. If you have a critical or abnormal lab result, we will notify you by phone as soon as possible.  Submit refill requests through [employer] or call your pharmacy and they will forward the refill request to us. Please allow 3 business days for your refill to be completed.          Additional Information About Your Visit        MyChart Information     [employer] gives you secure access to your electronic health record. If you see a primary care provider, you can also send messages to your care team and make appointments. If you have questions, please call your primary care clinic.  If you do not have a primary care provider, please call [phone] and they will assist you.        Care EveryWhere ID     This is your Care EveryWhere ID. This could be used by other organizations to access your [facility] medical records  [id number]        Your Vitals Were     Pulse Respirations BMI (Body Mass Index)             59 16 42.76 kg/m2          Blood Pressure from Last 3 Encounters:    09/27/18 (!) 167/95   03/14/18 134/88   12/27/17 124/68    Weight from Last 3 Encounters:   09/27/18 135.2 kg (298 lb)   03/14/18 135.6 kg (299 lb)   12/27/17 131.1 kg (289 lb)              Today, you had the following     No orders found for display       Primary Care Provider Office Phone # Fax #    Pascual oRche [phone] [address]  [facility] [address]        Equal Access to Services     [facility]: Hadii aad ku hadasho Soomaali, waaxda luqadaha, qaybta kaalmada adeegyafrederick, zulma hardy . So [facility] [phone].    ATENCIÓN: Si habla español, tiene a maldonado disposición servicios gratuitos de asistencia lingüística. Llame al [phone].    We comply with applicable federal civil rights laws and [place] laws. We do not discriminate on the basis of race, color, national origin, age, disability, sex, sexual orientation, or gender identity.            Thank you!     Thank you for choosing [facility]  for your care. Our goal is always to provide you with excellent care. Hearing back from our patients is one way we can continue to improve our services. Please take a few minutes to complete the written survey that you may receive in the mail after your visit with us. Thank you!             Your Updated Medication List - Protect others around you: Learn how to safely use, store and throw away your medicines at www.disposemymeds.org.          This list is accurate as of 9/27/18 11:59 PM.  Always use your most recent med list.                   Brand Name Dispense Instructions for use Diagnosis    diazepam 10 MG tablet    VALIUM     For MRI  Procedure        doxycycline 100 MG tablet    VIBRA-TABS    10 tablet    Take 100 mg by mouth 2 times daily Chronic tick bites    Cellulitis of buttock       fluticasone 50 MCG/ACT spray    FLONASE    16 g    SPRAY 1-2 SPRAYS INTO BOTH NOSTRILS DAILY    Congestion of paranasal sinus       IBUPROFEN PO       Take 400 mg by mouth every 6 hours as needed for moderate pain        * LEVITRA PO      Take 10 mg by mouth        * vardenafil 10 MG tablet    LEVITRA    12 tablet    Take 1 tablet (10 mg) by mouth daily as needed 60 min prior to sex. Do not use with nitroglycerin, terazosin or doxazosin.    Family history of prostate cancer, Erectile dysfunction, unspecified erectile dysfunction type       order for DME      Equipment being ordered: CPAP        TYLENOL EX ST ARTHRITIS PAIN 500 MG tablet   Generic drug:  acetaminophen      Take 500 mg by mouth At Bedtime        UNABLE TO FIND      MEDICATION NAME: Nerve root injection  L3-4 Last done for back pain 9/25/18        * Notice:  This list has 2 medication(s) that are the same as other medications prescribed for you. Read the directions carefully, and ask your doctor or other care provider to review them with you.

## 2018-10-13 DIAGNOSIS — R09.81 CONGESTION OF PARANASAL SINUS: ICD-10-CM

## 2018-10-14 RX ORDER — FLUTICASONE PROPIONATE 50 MCG
SPRAY, SUSPENSION (ML) NASAL
Qty: 16 G | Refills: 0 | Status: SHIPPED | OUTPATIENT
Start: 2018-10-14 | End: 2019-04-22

## 2018-10-30 ENCOUNTER — TRANSFERRED RECORDS (OUTPATIENT)
Dept: FAMILY MEDICINE | Facility: CLINIC | Age: 68
End: 2018-10-30

## 2018-11-02 ENCOUNTER — TRANSFERRED RECORDS (OUTPATIENT)
Dept: FAMILY MEDICINE | Facility: CLINIC | Age: 68
End: 2018-11-02

## 2018-12-14 DIAGNOSIS — Z80.42 FAMILY HISTORY OF PROSTATE CANCER: Primary | ICD-10-CM

## 2018-12-14 DIAGNOSIS — Z85.46 PERSONAL HISTORY OF MALIGNANT NEOPLASM OF PROSTATE: ICD-10-CM

## 2018-12-14 DIAGNOSIS — Z12.5 SCREENING FOR PROSTATE CANCER: ICD-10-CM

## 2018-12-14 DIAGNOSIS — N20.0 KIDNEY STONE: ICD-10-CM

## 2018-12-17 ENCOUNTER — OFFICE VISIT (OUTPATIENT)
Dept: UROLOGY | Facility: CLINIC | Age: 68
End: 2018-12-17
Payer: COMMERCIAL

## 2018-12-17 ENCOUNTER — HOSPITAL ENCOUNTER (OUTPATIENT)
Dept: GENERAL RADIOLOGY | Facility: CLINIC | Age: 68
Discharge: HOME | End: 2018-12-17
Attending: UROLOGY | Admitting: UROLOGY
Payer: MEDICARE

## 2018-12-17 VITALS
SYSTOLIC BLOOD PRESSURE: 132 MMHG | DIASTOLIC BLOOD PRESSURE: 80 MMHG | HEIGHT: 70 IN | WEIGHT: 300 LBS | OXYGEN SATURATION: 95 % | HEART RATE: 64 BPM | BODY MASS INDEX: 42.95 KG/M2

## 2018-12-17 DIAGNOSIS — N20.0 KIDNEY STONE: ICD-10-CM

## 2018-12-17 DIAGNOSIS — Z85.46 PERSONAL HISTORY OF MALIGNANT NEOPLASM OF PROSTATE: ICD-10-CM

## 2018-12-17 DIAGNOSIS — N20.0 CALCULUS OF KIDNEY: ICD-10-CM

## 2018-12-17 DIAGNOSIS — Z80.42 FAMILY HISTORY OF PROSTATE CANCER: ICD-10-CM

## 2018-12-17 LAB
ALBUMIN UR-MCNC: NEGATIVE MG/DL
ANION GAP SERPL CALCULATED.3IONS-SCNC: 7 MMOL/L (ref 3–14)
APPEARANCE UR: CLEAR
BILIRUB UR QL STRIP: NEGATIVE
BUN SERPL-MCNC: 21 MG/DL (ref 7–30)
CALCIUM SERPL-MCNC: 9.3 MG/DL (ref 8.5–10.1)
CHLORIDE SERPL-SCNC: 107 MMOL/L (ref 94–109)
CO2 SERPL-SCNC: 25 MMOL/L (ref 20–32)
COLOR UR AUTO: YELLOW
CREAT SERPL-MCNC: 1.09 MG/DL (ref 0.66–1.25)
GFR SERPL CREATININE-BSD FRML MDRD: 67 ML/MIN/1.7M2
GLUCOSE SERPL-MCNC: 113 MG/DL (ref 70–99)
GLUCOSE UR STRIP-MCNC: NEGATIVE MG/DL
HGB UR QL STRIP: ABNORMAL
KETONES UR STRIP-MCNC: ABNORMAL MG/DL
LEUKOCYTE ESTERASE UR QL STRIP: ABNORMAL
NITRATE UR QL: NEGATIVE
PH UR STRIP: 5.5 PH (ref 5–7)
POTASSIUM SERPL-SCNC: 4.5 MMOL/L (ref 3.4–5.3)
PSA SERPL-MCNC: 1.6 NG/ML (ref 0–4)
RESIDUAL VOLUME (RV) (EXTERNAL): 9
SODIUM SERPL-SCNC: 139 MMOL/L (ref 133–144)
SOURCE: ABNORMAL
SP GR UR STRIP: 1.02 (ref 1–1.03)
UROBILINOGEN UR STRIP-ACNC: 0.2 EU/DL (ref 0.2–1)

## 2018-12-17 PROCEDURE — 81003 URINALYSIS AUTO W/O SCOPE: CPT | Performed by: UROLOGY

## 2018-12-17 PROCEDURE — 84153 ASSAY OF PSA TOTAL: CPT | Performed by: UROLOGY

## 2018-12-17 PROCEDURE — 51798 US URINE CAPACITY MEASURE: CPT | Performed by: UROLOGY

## 2018-12-17 PROCEDURE — 99215 OFFICE O/P EST HI 40 MIN: CPT | Mod: 25 | Performed by: UROLOGY

## 2018-12-17 PROCEDURE — 36415 COLL VENOUS BLD VENIPUNCTURE: CPT | Performed by: UROLOGY

## 2018-12-17 PROCEDURE — 74019 RADEX ABDOMEN 2 VIEWS: CPT

## 2018-12-17 PROCEDURE — 80048 BASIC METABOLIC PNL TOTAL CA: CPT | Performed by: UROLOGY

## 2018-12-17 RX ORDER — CYANOCOBALAMIN 1000 UG/ML
1 INJECTION, SOLUTION INTRAMUSCULAR; SUBCUTANEOUS
Status: ON HOLD | COMMUNITY
End: 2019-07-15

## 2018-12-17 RX ORDER — VARDENAFIL HYDROCHLORIDE 10 MG/1
10 TABLET ORAL DAILY PRN
Qty: 12 TABLET | Refills: 11 | Status: SHIPPED | OUTPATIENT
Start: 2018-12-17 | End: 2019-05-08

## 2018-12-17 RX ORDER — TAMSULOSIN HYDROCHLORIDE 0.4 MG/1
0.4 CAPSULE ORAL DAILY
Qty: 30 CAPSULE | Refills: 1 | Status: SHIPPED | OUTPATIENT
Start: 2018-12-17 | End: 2019-07-02

## 2018-12-17 ASSESSMENT — MIFFLIN-ST. JEOR: SCORE: 2127.04

## 2018-12-17 ASSESSMENT — PAIN SCALES - GENERAL: PAINLEVEL: NO PAIN (0)

## 2018-12-17 NOTE — PROGRESS NOTES
History: It is a great pleasure to see this pleasant 68-year-old gentleman in follow-up consultation today.  He has a number of significant clinical issues  1.  He has a history of urinary stone disease, with a significant stone burden, and he had a ureteroscopy about 3 years ago for an upper right ureteral stone.  We have been following other stones in both kidneys since then which have been stable so far.  He has no symptomatic complaints such as hematuria or flank pain at this time.  The most recent serum creatinine 3 years ago was normal.  2.  Family history of prostate cancer which is a very significant family history but so far we have found no evidence of any findings which would be of concern and I repeating the test today.  3.  He has a history of sexual dysfunction and we have been treating that with Levitra with success.  4.  He is now weighing 300 pounds.  5.  He has a significant history of low back problems and has had surgery on the spine on a number of occasions    Past Medical History:   Diagnosis Date     Atopic dermatitis      Esophageal reflux     Gastroesophageal reflux     Other and unspecified disc disorder of unspecified region     Intervertebral disc disorders     Personal history of colonic polyps     Colon polyps     S/P lateral meniscectomy of left knee      Tick bite 4/20/15    amoxicillin treated     Unspecified sleep apnea     Sleep apnea     Urinary calculus, unspecified     Renal stones     Vitamin D deficiency 9-27-09       Social History     Socioeconomic History     Marital status:      Spouse name: None     Number of children: None     Years of education: None     Highest education level: None   Social Needs     Financial resource strain: None     Food insecurity - worry: None     Food insecurity - inability: None     Transportation needs - medical: None     Transportation needs - non-medical: None   Occupational History     None   Tobacco Use     Smoking status: Passive  Smoke Exposure - Never Smoker     Smokeless tobacco: Never Used     Tobacco comment: has cigars when it is warm out   Substance and Sexual Activity     Alcohol use: Yes     Alcohol/week: 7.0 oz     Types: 14 Standard drinks or equivalent per week     Drug use: No     Sexual activity: None   Other Topics Concern     Parent/sibling w/ CABG, MI or angioplasty before 65F 55M? Not Asked   Social History Narrative     None       Past Surgical History:   Procedure Laterality Date     ARTHROPLASTY KNEE Left 1/7/2015    Procedure: ARTHROPLASTY KNEE;  Surgeon: Agustin Saenz MD;  Location: [facility]     ARTHROSCOPY KNEE RT/LT  6-20-11     COLONOSCOPY  12/09     CYSTOSCOPY       EXTRACORPOREAL SHOCK WAVE LITHOTRIPSY (ESWL)  1/20/2012    Procedure:EXTRACORPOREAL SHOCK WAVE LITHOTRIPSY (ESWL); LEFT EXTRACORPOREAL SHOCK WAVE LITHOTRIPSY ; Surgeon:ANJUM BUNDY; Location:[facility] OR     EXTRACORPOREAL SHOCK WAVE LITHOTRIPSY, CYSTOSCOPY, INSERT STENT URETER(S), COMBINED  2/3/2012    Procedure:COMBINED EXTRACORPOREAL SHOCK WAVE LITHOTRIPSY, CYSTOSCOPY, INSERT STENT URETER(S); CYSTOSCOPY, RIGHT STENT; Surgeon:ANJUM BUNDY; Location:[facility] OR     FUSION LUMBAR ANTERIOR, FUSION LUMBAR POSTERIOR ONE LEVEL, COMBINED  11-1-10    L4-5     LASER HOLMIUM LITHOTRIPSY URETER(S), INSERT STENT, COMBINED  2/7/2012    Procedure:COMBINED CYSTOSCOPY, URETEROSCOPY, LASER HOLMIUM LITHOTRIPSY URETER(S), INSERT STENT; CYSTOSCOPY, RIGHT URETEROSCOPY, RIGHT RETROGRADES,  STONE EXTRACTION WITH HOLMIUM LASER AND RIGHT URETERAL STENT EXCHANGE ; Surgeon:DEEPTI MELVIN; Location:[facility] OR     LITHOTRIPSY      Lithotrypsy     SEPTOPLASTY       VASECTOMY         Family History   Problem Relation Age of Onset     Melanoma Father      Prostate Cancer Father      Breast Cancer Mother      C.A.D. Paternal Grandfather 72         Current Outpatient Medications:      acetaminophen (TYLENOL) 500 MG tablet, Take 500 mg by mouth At Bedtime , Disp: , Rfl:  "     fluticasone (FLONASE) 50 MCG/ACT spray, SPRAY 1-2 SPRAYS INTO BOTH NOSTRILS DAILY., Disp: 16 g, Rfl: 0     tamsulosin (FLOMAX) 0.4 MG capsule, Take 1 capsule (0.4 mg) by mouth daily, Disp: 30 capsule, Rfl: 1     vardenafil (LEVITRA) 10 MG tablet, Take 1 tablet (10 mg) by mouth daily as needed, Disp: 12 tablet, Rfl: 11     vitamin B-12 (CYANOCOBALAMIN) 1000 MCG/ML injection, Inject 1 mL into the muscle every 30 days, Disp: , Rfl:      diazepam (VALIUM) 10 MG tablet, For MRI  Procedure, Disp: , Rfl: 0     doxycycline (VIBRA-TABS) 100 MG tablet, Take 100 mg by mouth 2 times daily Chronic tick bites, Disp: 10 tablet, Rfl: 0     IBUPROFEN PO, Take 400 mg by mouth every 6 hours as needed for moderate pain , Disp: , Rfl:      order for DME, Equipment being ordered: CPAP, Disp: , Rfl:      UNABLE TO FIND, MEDICATION NAME: Nerve root injection  L3-4 Last done for back pain 9/25/18, Disp: , Rfl:      vardenafil (LEVITRA) 10 MG tablet, Take 1 tablet (10 mg) by mouth daily as needed 60 min prior to sex. Do not use with nitroglycerin, terazosin or doxazosin. (Patient not taking: Reported on 12/17/2018), Disp: 12 tablet, Rfl: 11     Vardenafil HCl (LEVITRA PO), Take 10 mg by mouth, Disp: , Rfl:     10 point ROS of systems including Constitutional, Eyes, Respiratory, Cardiovascular, Gastroenterology, Genitourinary, Integumentary, Muscularskeletal, Psychiatric were all negative except for pertinent positives noted in my HPI.    Examination:   /80 (BP Location: Left arm, Patient Position: Sitting, Cuff Size: Adult Large)   Pulse 64   Ht 1.778 m (5' 10\")   Wt 136.1 kg (300 lb)   SpO2 95%   BMI 43.05 kg/m    General Impression: Pleasant gentleman in no acute distress, well oriented in time place and person  Mental Status: Normal.  HEENT.  There is no clinical evidence of jaundice in the mucous membranes are normal  Skin: The skin is otherwise normal to examination  Respiratory System: The respiratory cycle is " normal  Lymph Nodes: Not examined  Back/Flank Tenderness: There is no back or flank tenderness  Cardiovascular System: There is no significant pitting peripheral edema  Abdominal Examination: Moderately obese abdomen with no other remarkable findings  Extremities: Extremities otherwise unremarkable  Genitial: Not examined  Rectal Examination: Good sphincter tone, normal perianal sensation.  Smooth rectal mucosa without hemorrhoids or fissures.  Smooth soft mildly enlarged prostate but without evidence of tenderness, bogginess or nodules.  Seminal vesicles.  Not palpable.  Perineum otherwise normal to examination  Neurologic System: There are no focal abnormal clinical neurological signs in the central, or peripheral nervous systems    Impression: Situation.  I have carefully reviewed his recent KUB, and compared this to previous x-rays.  This has not yet been reported but my review indicates that has been an enlargement which is quite small of the most significant stone in the lower pole of the right kidney is also other stones in each kidney appear to be a little larger as well.  The stone burden is significant as the largest stone is 1.6 cm in diameter.  I like to arrange for a CT scan abdomen and pelvis without contrast and I did check a serum creatinine to reassess the situation at this point.  If the stones are enlarging we may need to consider some form of intervention.  The patient has been seen and followed by a nephrologist.  With regard to intervention, ESWL may be a challenge because of his weight which is 300 pounds, ureteroscopy would probably not be indicated because of the rather high stone burden particular in the right kidney and we may need to consider PCNL at least for the right kidney.  I did have a careful discussion with him about all of these issues.  We will reassess the situation with the CT scan of the serum creatinine and then have further discussions about the situation.  2.  There is a  "significant family history of prostate cancer, however, the PSA as noted below  Results for AZEEM MONTERO (MRN [id number]) as of 12/17/2018 11:04   Ref. Range 12/22/2016 14:14 12/27/2017 10:53 12/17/2018 09:13   PSA Diag Urologic Phys Latest Ref Range: 0.00 - 4.00 ng/mL 0.97 1.50 1.60   Is low and stable.  Digital rectal examination indicates slightly enlarged but benign feeling prostate.  These results are very reassuring.  Given the family history which we should continue to monitor the prostate gland with an annual PSA but I am reassuring the patient that all signs point to very little evidence of active prostate cancer at this time.  3.  We discussed his needs regarding sexual dysfunction and Levitra has been very satisfactory.  I am refilling Levitra 10 mg to be taken on a as needed basis today.  4.  We did have a discussion about his weight which is of concern to me because it really makes it very hard to consider ESWL which may be a good option for treatment of stones if we decide any treatment.  He will give this consideration.    I did have a very careful discussion about all of these issues today.  I will be meeting again in the near future following the CT scan.  I answered many questions    Plan: CT scan abdomen and pelvis without contrast and serum creatinine (basic panel) and see me after    Time: 40 minutes.  Greater than 50% spent in discussion and consultation bladder number of key urologic issues as noted above with extensive discussion    \"This dictation was performed with voice recognition software and may contain errors,  omissions and inadvertent word substitution.\"    "

## 2018-12-17 NOTE — LETTER
12/17/2018       RE: Hugo Prince  [address]  [facility] [address]     Dear Colleague,    Thank you for referring your patient, Hugo Prince, to the [facility] at [facility]. Please see a copy of my visit note below.    History: It is a great pleasure to see this pleasant 68-year-old gentleman in follow-up consultation today.  He has a number of significant clinical issues  1.  He has a history of urinary stone disease, with a significant stone burden, and he had a ureteroscopy about 3 years ago for an upper right ureteral stone.  We have been following other stones in both kidneys since then which have been stable so far.  He has no symptomatic complaints such as hematuria or flank pain at this time.  The most recent serum creatinine 3 years ago was normal.  2.  Family history of prostate cancer which is a very significant family history but so far we have found no evidence of any findings which would be of concern and I repeating the test today.  3.  He has a history of sexual dysfunction and we have been treating that with Levitra with success.  4.  He is now weighing 300 pounds.  5.  He has a significant history of low back problems and has had surgery on the spine on a number of occasions    Past Medical History:   Diagnosis Date     Atopic dermatitis      Esophageal reflux     Gastroesophageal reflux     Other and unspecified disc disorder of unspecified region     Intervertebral disc disorders     Personal history of colonic polyps     Colon polyps     S/P lateral meniscectomy of left knee      Tick bite 4/20/15    amoxicillin treated     Unspecified sleep apnea     Sleep apnea     Urinary calculus, unspecified     Renal stones     Vitamin D deficiency 9-27-09       Social History     Socioeconomic History     Marital status:      Spouse name: None     Number of children: None     Years of  education: None     Highest education level: None   Social Needs     Financial resource strain: None     Food insecurity - worry: None     Food insecurity - inability: None     Transportation needs - medical: None     Transportation needs - non-medical: None   Occupational History     None   Tobacco Use     Smoking status: Passive Smoke Exposure - Never Smoker     Smokeless tobacco: Never Used     Tobacco comment: has cigars when it is warm out   Substance and Sexual Activity     Alcohol use: Yes     Alcohol/week: 7.0 oz     Types: 14 Standard drinks or equivalent per week     Drug use: No     Sexual activity: None   Other Topics Concern     Parent/sibling w/ CABG, MI or angioplasty before 65F 55M? Not Asked   Social History Narrative     None       Past Surgical History:   Procedure Laterality Date     ARTHROPLASTY KNEE Left 1/7/2015    Procedure: ARTHROPLASTY KNEE;  Surgeon: Agustin Saenz MD;  Location:  OR     ARTHROSCOPY KNEE RT/LT  6-20-11     COLONOSCOPY  12/09     CYSTOSCOPY       EXTRACORPOREAL SHOCK WAVE LITHOTRIPSY (ESWL)  1/20/2012    Procedure:EXTRACORPOREAL SHOCK WAVE LITHOTRIPSY (ESWL); LEFT EXTRACORPOREAL SHOCK WAVE LITHOTRIPSY ; Surgeon:ANJUM BUNDY; Location: OR     EXTRACORPOREAL SHOCK WAVE LITHOTRIPSY, CYSTOSCOPY, INSERT STENT URETER(S), COMBINED  2/3/2012    Procedure:COMBINED EXTRACORPOREAL SHOCK WAVE LITHOTRIPSY, CYSTOSCOPY, INSERT STENT URETER(S); CYSTOSCOPY, RIGHT STENT; Surgeon:ANJUM BUNDY; Location: OR     FUSION LUMBAR ANTERIOR, FUSION LUMBAR POSTERIOR ONE LEVEL, COMBINED  11-1-10    L4-5     LASER HOLMIUM LITHOTRIPSY URETER(S), INSERT STENT, COMBINED  2/7/2012    Procedure:COMBINED CYSTOSCOPY, URETEROSCOPY, LASER HOLMIUM LITHOTRIPSY URETER(S), INSERT STENT; CYSTOSCOPY, RIGHT URETEROSCOPY, RIGHT RETROGRADES,  STONE EXTRACTION WITH HOLMIUM LASER AND RIGHT URETERAL STENT EXCHANGE ; Surgeon:DEEPTI MELVIN; Location: OR     LITHOTRIPSY       "Lithotrypsy     SEPTOPLASTY       VASECTOMY       Family History   Problem Relation Age of Onset     Melanoma Father      Prostate Cancer Father      Breast Cancer Mother      C.A.D. Paternal Grandfather 72     Current Outpatient Medications:      acetaminophen (TYLENOL) 500 MG tablet, Take 500 mg by mouth At Bedtime , Disp: , Rfl:      fluticasone (FLONASE) 50 MCG/ACT spray, SPRAY 1-2 SPRAYS INTO BOTH NOSTRILS DAILY., Disp: 16 g, Rfl: 0     tamsulosin (FLOMAX) 0.4 MG capsule, Take 1 capsule (0.4 mg) by mouth daily, Disp: 30 capsule, Rfl: 1     vardenafil (LEVITRA) 10 MG tablet, Take 1 tablet (10 mg) by mouth daily as needed, Disp: 12 tablet, Rfl: 11     vitamin B-12 (CYANOCOBALAMIN) 1000 MCG/ML injection, Inject 1 mL into the muscle every 30 days, Disp: , Rfl:      diazepam (VALIUM) 10 MG tablet, For MRI  Procedure, Disp: , Rfl: 0     doxycycline (VIBRA-TABS) 100 MG tablet, Take 100 mg by mouth 2 times daily Chronic tick bites, Disp: 10 tablet, Rfl: 0     IBUPROFEN PO, Take 400 mg by mouth every 6 hours as needed for moderate pain , Disp: , Rfl:      order for DME, Equipment being ordered: CPAP, Disp: , Rfl:      UNABLE TO FIND, MEDICATION NAME: Nerve root injection  L3-4 Last done for back pain 9/25/18, Disp: , Rfl:      vardenafil (LEVITRA) 10 MG tablet, Take 1 tablet (10 mg) by mouth daily as needed 60 min prior to sex. Do not use with nitroglycerin, terazosin or doxazosin. (Patient not taking: Reported on 12/17/2018), Disp: 12 tablet, Rfl: 11     Vardenafil HCl (LEVITRA PO), Take 10 mg by mouth, Disp: , Rfl:     Examination:   /80 (BP Location: Left arm, Patient Position: Sitting, Cuff Size: Adult Large)   Pulse 64   Ht 1.778 m (5' 10\")   Wt 136.1 kg (300 lb)   SpO2 95%   BMI 43.05 kg/m     General Impression: Pleasant gentleman in no acute distress, well oriented in time place and person  Mental Status: Normal.  HEENT.  There is no clinical evidence of jaundice in the mucous membranes are " normal  Skin: The skin is otherwise normal to examination  Respiratory System: The respiratory cycle is normal  Lymph Nodes: Not examined  Back/Flank Tenderness: There is no back or flank tenderness  Cardiovascular System: There is no significant pitting peripheral edema  Abdominal Examination: Moderately obese abdomen with no other remarkable findings  Extremities: Extremities otherwise unremarkable  Genitial: Not examined  Rectal Examination: Good sphincter tone, normal perianal sensation.  Smooth rectal mucosa without hemorrhoids or fissures.  Smooth soft mildly enlarged prostate but without evidence of tenderness, bogginess or nodules.  Seminal vesicles.  Not palpable.  Perineum otherwise normal to examination  Neurologic System: There are no focal abnormal clinical neurological signs in the central, or peripheral nervous systems    Impression: Situation.  I have carefully reviewed his recent KUB, and compared this to previous x-rays.  This has not yet been reported but my review indicates that has been an enlargement which is quite small of the most significant stone in the lower pole of the right kidney is also other stones in each kidney appear to be a little larger as well.  The stone burden is significant as the largest stone is 1.6 cm in diameter.  I like to arrange for a CT scan abdomen and pelvis without contrast and I did check a serum creatinine to reassess the situation at this point.  If the stones are enlarging we may need to consider some form of intervention.  The patient has been seen and followed by a nephrologist.  With regard to intervention, ESWL may be a challenge because of his weight which is 300 pounds, ureteroscopy would probably not be indicated because of the rather high stone burden particular in the right kidney and we may need to consider PCNL at least for the right kidney.  I did have a careful discussion with him about all of these issues.  We will reassess the situation with the  "CT scan of the serum creatinine and then have further discussions about the situation.  2.  There is a significant family history of prostate cancer, however, the PSA as noted below  Results for AZEEM MONTERO (MRN [id number]) as of 12/17/2018 11:04   Ref. Range 12/22/2016 14:14 12/27/2017 10:53 12/17/2018 09:13   PSA Diag Urologic Phys Latest Ref Range: 0.00 - 4.00 ng/mL 0.97 1.50 1.60   Is low and stable.  Digital rectal examination indicates slightly enlarged but benign feeling prostate.  These results are very reassuring.  Given the family history which we should continue to monitor the prostate gland with an annual PSA but I am reassuring the patient that all signs point to very little evidence of active prostate cancer at this time.  3.  We discussed his needs regarding sexual dysfunction and Levitra has been very satisfactory.  I am refilling Levitra 10 mg to be taken on a as needed basis today.  4.  We did have a discussion about his weight which is of concern to me because it really makes it very hard to consider ESWL which may be a good option for treatment of stones if we decide any treatment.  He will give this consideration.    I did have a very careful discussion about all of these issues today.  I will be meeting again in the near future following the CT scan.  I answered many questions    Plan: CT scan abdomen and pelvis without contrast and serum creatinine (basic panel) and see me after    Time: 40 minutes.  Greater than 50% spent in discussion and consultation bladder number of key urologic issues as noted above with extensive discussion    \"This dictation was performed with voice recognition software and may contain errors,  omissions and inadvertent word substitution.\"    Again, thank you for allowing me to participate in the care of your patient.      Sincerely,    Dima Desai MD      "

## 2018-12-17 NOTE — NURSING NOTE
Chief Complaint   Patient presents with     Clinic Care Coordination - Follow-up     Pt here for same day PSA   PVR is mL  Eunice Grissom, CECE

## 2019-01-04 DIAGNOSIS — N20.0 KIDNEY STONE: Primary | ICD-10-CM

## 2019-01-08 ENCOUNTER — OFFICE VISIT (OUTPATIENT)
Dept: UROLOGY | Facility: CLINIC | Age: 69
End: 2019-01-08
Payer: MEDICARE

## 2019-01-08 ENCOUNTER — HOSPITAL ENCOUNTER (OUTPATIENT)
Dept: CT IMAGING | Facility: CLINIC | Age: 69
Discharge: HOME | End: 2019-01-08
Attending: UROLOGY | Admitting: UROLOGY
Payer: MEDICARE

## 2019-01-08 VITALS
DIASTOLIC BLOOD PRESSURE: 80 MMHG | HEIGHT: 70 IN | WEIGHT: 300 LBS | OXYGEN SATURATION: 94 % | BODY MASS INDEX: 42.95 KG/M2 | HEART RATE: 75 BPM | SYSTOLIC BLOOD PRESSURE: 140 MMHG

## 2019-01-08 DIAGNOSIS — N20.0 KIDNEY STONE: Primary | ICD-10-CM

## 2019-01-08 DIAGNOSIS — Z85.46 PERSONAL HISTORY OF MALIGNANT NEOPLASM OF PROSTATE: ICD-10-CM

## 2019-01-08 DIAGNOSIS — N20.0 KIDNEY STONE: ICD-10-CM

## 2019-01-08 PROCEDURE — 74176 CT ABD & PELVIS W/O CONTRAST: CPT

## 2019-01-08 PROCEDURE — 99213 OFFICE O/P EST LOW 20 MIN: CPT | Performed by: UROLOGY

## 2019-01-08 ASSESSMENT — PAIN SCALES - GENERAL: PAINLEVEL: NO PAIN (0)

## 2019-01-08 ASSESSMENT — MIFFLIN-ST. JEOR: SCORE: 2127.04

## 2019-01-08 NOTE — NURSING NOTE
Chief Complaint   Patient presents with     RECHECK     Pt is here to review the imaging.      Cristin Mosher CMA

## 2019-01-08 NOTE — LETTER
1/8/2019       RE: Hugo Prince  [address]  [facility] [address]     Dear Colleague,    Thank you for referring your patient, Hugo Prince, to the [facility] at [facility]. Please see a copy of my visit note below.    It is a great pleasure to see this very pleasant 68-year-old gentleman in follow-up consultation today.  He has had a long history of recurrent urinary stone disease, and now has a significant stone burden on each kidney but is currently asymptomatic.  We have had discussions about management of the stones in the past and I decided to get a CT scan to determine the precise size of the stones in the location more accurately.  He does have a weight problem and is around 300 pounds which would preclude ESWL at the present time.  CT ABDOMEN AND PELVIS WITHOUT CONTRAST   1/8/2019 2:29 PM      HISTORY: Flank pain, stone disease suspected. Kidney stone. Personal  history of malignant neoplasm of prostate.     TECHNIQUE: Noncontrast CT abdomen and pelvis was performed. Radiation  dose for this scan was reduced using automated exposure control,  adjustment of the mA and/or kV according to patient size, or iterative  reconstruction technique.     COMPARISON: 1/30/2012     FINDINGS:   Lung bases: Lung bases are clear. No pleural effusion or pericardial  effusion.     Kidneys: Noncontrast images show multiple nonobstructing stones in the  inferior pole of the left kidney. There is a 7 mm stone, an 8 mm stone  and a 1 cm stone. Multiple nonobstructing stones are seen in the right  kidney, there is a 7 mm stone, 7 mm stone and 9 mm stone. No  hydronephrosis or hydroureter. No ureteral calculi. No bladder  calculi.     Abdomen: Evaluation of the solid organ parenchyma is limited without  the use of intravenous contrast. There is diffuse low-attenuation  throughout the liver, suggesting hepatic steatosis. The  unenhanced  appearance of the spleen, adrenal glands, gallbladder and pancreas  show no focal abnormality. No intrahepatic or extrahepatic biliary  dilatation. No intraperitoneal free air or free fluid.     Small and large bowel are normal in caliber without evidence of  obstruction. Bladder is normal. No abdominal or pelvic  lymphadenopathy. No abdominal aortic aneurysm.     Bones: No suspicious bony lesions. Postoperative changes of spinal  fusion at L4-L5.                                                                      IMPRESSION:  1. Bilateral nonobstructing renal calculi. The largest on the left  measures 1.0 cm. The largest on the right measures 0.9 cm.  2. No hydronephrosis or hydroureter. No ureteral or bladder calculi.  3. Diffuse low-attenuation throughout the liver, consistent with  hepatic steatosis.     DALE MCCLOUD, DO    I have carefully reviewed these films with the patient.  I am pleased to see that some of the stones are a little smaller than I had previously been led to believe by the KUB in the past.  There is no hydronephrosis.  There is no evidence of stones in the ureter or the bladder.    I carefully discussed the situation with the patient today and if we were considering treatment at the present time ESWL would still be precluded unless he can drop his weight a little.  Given the stone burden in the kidney, ureteroscopy with holmium laser will also be non-optimal for trying to remove all the stones in the kidney.  In my opinion if we were to consider surgery the best way to guarantee removing the stones were good by percutaneous nephrolithotomy which I did carefully explained to him though there would be challenges for him given his size even by doing PCNL in addition he would be lying prone on the operating table which may be would be part of an issue for the anesthesiologist although we have frequently done people of this size without major problems.  After we carefully  discussed this we had decided not to consider treatment of the stones at present as they are asymptomatic, I would like to repeat a KUB in 6 months MS is getting symptoms, but I have told him again about the possibility of reducing his weight to make ESWL more feasible if possible.  I discussed the entire situation with the patient in detail today.  I answered all his questions    Plan.  KUB in 6 months with examination    Time.  15 minutes with greater than 50% in discussion and consultation    Again, thank you for allowing me to participate in the care of your patient.      Sincerely,    Dima Desai MD

## 2019-01-08 NOTE — PROGRESS NOTES
It is a great pleasure to see this very pleasant 68-year-old gentleman in follow-up consultation today.  He has had a long history of recurrent urinary stone disease, and now has a significant stone burden on each kidney but is currently asymptomatic.  We have had discussions about management of the stones in the past and I decided to get a CT scan to determine the precise size of the stones in the location more accurately.  He does have a weight problem and is around 300 pounds which would preclude ESWL at the present time.  CT ABDOMEN AND PELVIS WITHOUT CONTRAST   1/8/2019 2:29 PM      HISTORY: Flank pain, stone disease suspected. Kidney stone. Personal  history of malignant neoplasm of prostate.     TECHNIQUE: Noncontrast CT abdomen and pelvis was performed. Radiation  dose for this scan was reduced using automated exposure control,  adjustment of the mA and/or kV according to patient size, or iterative  reconstruction technique.     COMPARISON: 1/30/2012     FINDINGS:   Lung bases: Lung bases are clear. No pleural effusion or pericardial  effusion.     Kidneys: Noncontrast images show multiple nonobstructing stones in the  inferior pole of the left kidney. There is a 7 mm stone, an 8 mm stone  and a 1 cm stone. Multiple nonobstructing stones are seen in the right  kidney, there is a 7 mm stone, 7 mm stone and 9 mm stone. No  hydronephrosis or hydroureter. No ureteral calculi. No bladder  calculi.     Abdomen: Evaluation of the solid organ parenchyma is limited without  the use of intravenous contrast. There is diffuse low-attenuation  throughout the liver, suggesting hepatic steatosis. The unenhanced  appearance of the spleen, adrenal glands, gallbladder and pancreas  show no focal abnormality. No intrahepatic or extrahepatic biliary  dilatation. No intraperitoneal free air or free fluid.     Small and large bowel are normal in caliber without evidence of  obstruction. Bladder is normal. No abdominal or  pelvic  lymphadenopathy. No abdominal aortic aneurysm.     Bones: No suspicious bony lesions. Postoperative changes of spinal  fusion at L4-L5.                                                                      IMPRESSION:  1. Bilateral nonobstructing renal calculi. The largest on the left  measures 1.0 cm. The largest on the right measures 0.9 cm.  2. No hydronephrosis or hydroureter. No ureteral or bladder calculi.  3. Diffuse low-attenuation throughout the liver, consistent with  hepatic steatosis.     DALE MCCLOUD, DO    I have carefully reviewed these films with the patient.  I am pleased to see that some of the stones are a little smaller than I had previously been led to believe by the KUB in the past.  There is no hydronephrosis.  There is no evidence of stones in the ureter or the bladder.    I carefully discussed the situation with the patient today and if we were considering treatment at the present time ESWL would still be precluded unless he can drop his weight a little.  Given the stone burden in the kidney, ureteroscopy with holmium laser will also be non-optimal for trying to remove all the stones in the kidney.  In my opinion if we were to consider surgery the best way to guarantee removing the stones were good by percutaneous nephrolithotomy which I did carefully explained to him though there would be challenges for him given his size even by doing PCNL in addition he would be lying prone on the operating table which may be would be part of an issue for the anesthesiologist although we have frequently done people of this size without major problems.  After we carefully discussed this we had decided not to consider treatment of the stones at present as they are asymptomatic, I would like to repeat a KUB in 6 months MS is getting symptoms, but I have told him again about the possibility of reducing his weight to make ESWL more feasible if possible.  I discussed the entire situation with the  patient in detail today.  I answered all his questions    Plan.  KUB in 6 months with examination    Time.  15 minutes with greater than 50% in discussion and consultation

## 2019-04-22 DIAGNOSIS — R09.81 CONGESTION OF PARANASAL SINUS: ICD-10-CM

## 2019-04-22 RX ORDER — FLUTICASONE PROPIONATE 50 MCG
SPRAY, SUSPENSION (ML) NASAL
Qty: 16 G | Refills: 0 | Status: ON HOLD | OUTPATIENT
Start: 2019-04-22 | End: 2019-07-15

## 2019-05-08 ENCOUNTER — OFFICE VISIT (OUTPATIENT)
Dept: FAMILY MEDICINE | Facility: CLINIC | Age: 69
End: 2019-05-08

## 2019-05-08 VITALS
WEIGHT: 303 LBS | OXYGEN SATURATION: 95 % | DIASTOLIC BLOOD PRESSURE: 86 MMHG | BODY MASS INDEX: 43.48 KG/M2 | HEART RATE: 67 BPM | RESPIRATION RATE: 16 BRPM | SYSTOLIC BLOOD PRESSURE: 144 MMHG

## 2019-05-08 DIAGNOSIS — E66.01 MORBID OBESITY (H): ICD-10-CM

## 2019-05-08 DIAGNOSIS — R06.02 SHORTNESS OF BREATH: ICD-10-CM

## 2019-05-08 DIAGNOSIS — J02.9 ACUTE SORE THROAT: Primary | ICD-10-CM

## 2019-05-08 PROCEDURE — 99214 OFFICE O/P EST MOD 30 MIN: CPT | Performed by: FAMILY MEDICINE

## 2019-05-08 RX ORDER — AMOXICILLIN 500 MG/1
500 CAPSULE ORAL 3 TIMES DAILY
Qty: 21 CAPSULE | Refills: 0 | Status: SHIPPED | OUTPATIENT
Start: 2019-05-08 | End: 2019-06-05

## 2019-05-08 NOTE — PROGRESS NOTES
SUBJECTIVE:  Hugo Prince is a 69 year old male with a chief complaint of sore throat.  Onset of symptoms was 5 day(s) ago.    Course of illness: sudden onset and constant.  Severity moderate  Current and Associated symptoms: chest congestion and tightness. Feels like when he's swallowing it burns and it's coming up his throat. Coughing from the burn in his esophagus.   Treatment measures tried include Ludens cough drops.  Predisposing factors include None.    Has been short of breath increasingly so lately, and is concerned that it's more than just being out of shape.   No major ongoing chest pain.   No past hx of htn meds, dm2, smoking but has been dx with elevated cholesterol in the past    Past Medical History:   Diagnosis Date     Atopic dermatitis      Esophageal reflux     Gastroesophageal reflux     Other and unspecified disc disorder of unspecified region     Intervertebral disc disorders     Personal history of colonic polyps     Colon polyps     S/P lateral meniscectomy of left knee      Tick bite 4/20/15    amoxicillin treated     Unspecified sleep apnea     Sleep apnea     Urinary calculus, unspecified     Renal stones     Vitamin D deficiency 9-27-09     Current Outpatient Medications   Medication Sig Dispense Refill     acetaminophen (TYLENOL) 500 MG tablet Take 500 mg by mouth At Bedtime        diazepam (VALIUM) 10 MG tablet For MRI  Procedure  0     doxycycline (VIBRA-TABS) 100 MG tablet Take 100 mg by mouth 2 times daily Chronic tick bites 10 tablet 0     fluticasone (FLONASE) 50 MCG/ACT nasal spray SPRAY 1-2 SPRAYS INTO BOTH NOSTRILS DAILY. 16 g 0     IBUPROFEN PO Take 400 mg by mouth every 6 hours as needed for moderate pain        order for DME Equipment being ordered: CPAP       tamsulosin (FLOMAX) 0.4 MG capsule Take 1 capsule (0.4 mg) by mouth daily 30 capsule 1     UNABLE TO FIND MEDICATION NAME: Nerve root injection   L3-4  Last done for back pain 9/25/18       vardenafil (LEVITRA)  10 MG tablet Take 1 tablet (10 mg) by mouth daily as needed 12 tablet 11     vardenafil (LEVITRA) 10 MG tablet Take 1 tablet (10 mg) by mouth daily as needed 60 min prior to sex. Do not use with nitroglycerin, terazosin or doxazosin. 12 tablet 11     Vardenafil HCl (LEVITRA PO) Take 10 mg by mouth       vitamin B-12 (CYANOCOBALAMIN) 1000 MCG/ML injection Inject 1 mL into the muscle every 30 days       Social History     Tobacco Use     Smoking status: Passive Smoke Exposure - Never Smoker     Smokeless tobacco: Never Used     Tobacco comment: has cigars when it is warm out   Substance Use Topics     Alcohol use: Yes     Alcohol/week: 7.0 oz     Types: 14 Standard drinks or equivalent per week       ROS:  CONSTITUTIONAL:NEGATIVE for fever, chills, change in weight  INTEGUMENTARY/SKIN: NEGATIVE for worrisome rashes, moles or lesions    OBJECTIVE:   /86   Pulse 67   Resp 16   Wt 137.4 kg (303 lb)   SpO2 95%   BMI 43.48 kg/m    GENERAL APPEARANCE: mild distress and over weight  EYES: EOMI,  PERRL, conjunctiva clear  HENT: TM's normal bilaterally and oral mucous membranes moist, marked erythema noted  NECK: supple, non-tender to palpation, no adenopathy noted  RESP: lungs clear to auscultation - no rales, rhonchi or wheezes  CV: regular rates and rhythm, normal S1 S2, no murmur noted  ABDOMEN:  soft, nontender, no HSM or masses and bowel sounds normal  SKIN: no suspicious lesions or rashes    ASSESSMENT:     PLAN:   1. Morbid obesity (H)  Must lose weight but will check heart first given sob and size      2. Acute sore throat  Will cover for strep  - amoxicillin (AMOXIL) 500 MG capsule; Take 1 capsule (500 mg) by mouth 3 times daily  Dispense: 21 capsule; Refill: 0    3. Shortness of breath    - NM Exercise stress test; Future   See orders in epic.   Symptomatic treat with gargles, lozenges, and OTC analgesic as needed. Follow-up with primary clinic if not improving.  Advisement given that patient will be  contagious for the next 24-48 hours after antibiotics initiated

## 2019-05-22 ENCOUNTER — TELEPHONE (OUTPATIENT)
Dept: FAMILY MEDICINE | Facility: CLINIC | Age: 69
End: 2019-05-22

## 2019-05-22 DIAGNOSIS — F40.240 CLAUSTROPHOBIA: Primary | ICD-10-CM

## 2019-05-22 RX ORDER — DIAZEPAM 5 MG
TABLET ORAL
Qty: 2 TABLET | Refills: 0 | COMMUNITY
Start: 2019-05-22 | End: 2019-06-05

## 2019-05-22 NOTE — TELEPHONE ENCOUNTER
Patient calls reporting could not complete nuclear stress test yesterday due to back pain and claustrophobia. Requests valium to take prior to test to tolerate.   Plan: per , ok for diazepam 5mg #2 tabs sig: take one tab 30 minutes prior to procedure. May repeat if needed.   Rx phoned to pharmacy and patient informed.  Sherry Case RN

## 2019-06-04 ENCOUNTER — HOSPITAL ENCOUNTER (OUTPATIENT)
Dept: CARDIOLOGY | Facility: CLINIC | Age: 69
Discharge: HOME | End: 2019-06-04
Attending: FAMILY MEDICINE | Admitting: FAMILY MEDICINE
Payer: MEDICARE

## 2019-06-04 VITALS — SYSTOLIC BLOOD PRESSURE: 154 MMHG | DIASTOLIC BLOOD PRESSURE: 84 MMHG

## 2019-06-04 DIAGNOSIS — R06.02 SHORTNESS OF BREATH: ICD-10-CM

## 2019-06-04 PROCEDURE — 25000128 H RX IP 250 OP 636: Performed by: INTERNAL MEDICINE

## 2019-06-04 PROCEDURE — 93018 CV STRESS TEST I&R ONLY: CPT | Performed by: INTERNAL MEDICINE

## 2019-06-04 PROCEDURE — 93017 CV STRESS TEST TRACING ONLY: CPT

## 2019-06-04 PROCEDURE — 93016 CV STRESS TEST SUPVJ ONLY: CPT | Performed by: INTERNAL MEDICINE

## 2019-06-04 PROCEDURE — 34300033 ZZH RX 343: Performed by: FAMILY MEDICINE

## 2019-06-04 PROCEDURE — 78452 HT MUSCLE IMAGE SPECT MULT: CPT | Mod: 26 | Performed by: INTERNAL MEDICINE

## 2019-06-04 PROCEDURE — A9502 TC99M TETROFOSMIN: HCPCS | Performed by: FAMILY MEDICINE

## 2019-06-04 RX ORDER — ACYCLOVIR 200 MG/1
0-1 CAPSULE ORAL
Status: DISCONTINUED | OUTPATIENT
Start: 2019-06-04 | End: 2019-06-05 | Stop reason: HOSPADM

## 2019-06-04 RX ORDER — ALBUTEROL SULFATE 90 UG/1
2 AEROSOL, METERED RESPIRATORY (INHALATION) EVERY 5 MIN PRN
Status: DISCONTINUED | OUTPATIENT
Start: 2019-06-04 | End: 2019-06-05 | Stop reason: HOSPADM

## 2019-06-04 RX ORDER — AMINOPHYLLINE 25 MG/ML
50-100 INJECTION, SOLUTION INTRAVENOUS
Status: DISCONTINUED | OUTPATIENT
Start: 2019-06-04 | End: 2019-06-05 | Stop reason: HOSPADM

## 2019-06-04 RX ORDER — REGADENOSON 0.08 MG/ML
0.4 INJECTION, SOLUTION INTRAVENOUS ONCE
Status: COMPLETED | OUTPATIENT
Start: 2019-06-04 | End: 2019-06-04

## 2019-06-04 RX ADMIN — TETROFOSMIN 9.76 MCI.: 1.38 INJECTION, POWDER, LYOPHILIZED, FOR SOLUTION INTRAVENOUS at 09:56

## 2019-06-04 RX ADMIN — TETROFOSMIN 30.5 MCI.: 1.38 INJECTION, POWDER, LYOPHILIZED, FOR SOLUTION INTRAVENOUS at 11:44

## 2019-06-04 RX ADMIN — REGADENOSON 0.4 MG: 0.08 INJECTION, SOLUTION INTRAVENOUS at 11:40

## 2019-06-05 ENCOUNTER — OFFICE VISIT (OUTPATIENT)
Dept: FAMILY MEDICINE | Facility: CLINIC | Age: 69
End: 2019-06-05

## 2019-06-05 VITALS
RESPIRATION RATE: 20 BRPM | BODY MASS INDEX: 43.33 KG/M2 | DIASTOLIC BLOOD PRESSURE: 82 MMHG | OXYGEN SATURATION: 96 % | SYSTOLIC BLOOD PRESSURE: 138 MMHG | HEART RATE: 75 BPM | WEIGHT: 302 LBS

## 2019-06-05 DIAGNOSIS — R07.89 ATYPICAL CHEST PAIN: Primary | ICD-10-CM

## 2019-06-05 PROCEDURE — 99213 OFFICE O/P EST LOW 20 MIN: CPT | Performed by: FAMILY MEDICINE

## 2019-06-05 NOTE — PROGRESS NOTES
SUBJECTIVE:  Hugo Prince is a 69 year old male who presents to the office with the CC of chest pain.  Patient complains of chest pain.  The pain is characterized as moderate tightness located left chest without radiation to the left arm. Symptoms began several week(s) ago, intermittent episodes lasting  A few minutes.  Pain is associated with no sob or cough.  Pain is exacerbated by exertion.  Pain is relieved by rest.  Cardiac risk factors: obesity    Past Medical History:   Diagnosis Date     Atopic dermatitis      Esophageal reflux     Gastroesophageal reflux     Other and unspecified disc disorder of unspecified region     Intervertebral disc disorders     Personal history of colonic polyps     Colon polyps     S/P lateral meniscectomy of left knee      Tick bite 4/20/15    amoxicillin treated     Unspecified sleep apnea     Sleep apnea     Urinary calculus, unspecified     Renal stones     Vitamin D deficiency 9-27-09         Current Outpatient Medications:      acetaminophen (TYLENOL) 500 MG tablet, Take 500 mg by mouth At Bedtime , Disp: , Rfl:      amoxicillin (AMOXIL) 500 MG capsule, Take 1 capsule (500 mg) by mouth 3 times daily, Disp: 21 capsule, Rfl: 0     diazepam (VALIUM) 10 MG tablet, For MRI  Procedure, Disp: , Rfl: 0     diazepam (VALIUM) 5 MG tablet, Take one tablet 30 minutes prior to procedure. May repeat if needed., Disp: 2 tablet, Rfl: 0     doxycycline (VIBRA-TABS) 100 MG tablet, Take 100 mg by mouth 2 times daily Chronic tick bites, Disp: 10 tablet, Rfl: 0     fluticasone (FLONASE) 50 MCG/ACT nasal spray, SPRAY 1-2 SPRAYS INTO BOTH NOSTRILS DAILY., Disp: 16 g, Rfl: 0     IBUPROFEN PO, Take 400 mg by mouth every 6 hours as needed for moderate pain , Disp: , Rfl:      order for DME, Equipment being ordered: CPAP, Disp: , Rfl:      tamsulosin (FLOMAX) 0.4 MG capsule, Take 1 capsule (0.4 mg) by mouth daily, Disp: 30 capsule, Rfl: 1     UNABLE TO FIND, MEDICATION NAME: Nerve root  injection  L3-4 Last done for back pain 9/25/18, Disp: , Rfl:      vardenafil (LEVITRA) 10 MG tablet, Take 1 tablet (10 mg) by mouth daily as needed 60 min prior to sex. Do not use with nitroglycerin, terazosin or doxazosin., Disp: 12 tablet, Rfl: 11     Vardenafil HCl (LEVITRA PO), Take 10 mg by mouth, Disp: , Rfl:      vitamin B-12 (CYANOCOBALAMIN) 1000 MCG/ML injection, Inject 1 mL into the muscle every 30 days, Disp: , Rfl:   No current facility-administered medications for this visit.     Social History     Tobacco Use     Smoking status: Passive Smoke Exposure - Never Smoker     Smokeless tobacco: Never Used     Tobacco comment: has cigars when it is warm out   Substance Use Topics     Alcohol use: Yes     Alcohol/week: 7.0 oz     Types: 14 Standard drinks or equivalent per week     ROS:CONSTITUTIONAL:NEGATIVE for fever, chills, change in weight  INTEGUMENTARY/SKIN: NEGATIVE for worrisome rashes, moles or lesions    EXAM:  /82   Pulse 75   Resp 20   Wt 137 kg (302 lb)   SpO2 96%   BMI 43.33 kg/m    GENERAL APPEARANCE: healthy, alert and no distress  EYES: EOMI,  PERRL, conjunctiva clear  HENT: ear canals and TM's normal.  Nose and mouth without ulcers, erythema or lesions  NECK: supple, nontender, no lymphadenopathy  RESP: lungs clear to auscultation - no rales, rhonchi or wheezes  CV: regular rates and rhythm, normal S1 S2, no murmur noted  NEURO: Normal strength and tone, sensory exam grossly normal,  normal speech and mentation  SKIN: no suspicious lesions or rashes     Assessment  / IMPRESSION  1. Atypical chest pain  Neg stress eval.    Reassure   Deconditioning likely    See back for increased sx etc

## 2019-07-02 ENCOUNTER — OFFICE VISIT (OUTPATIENT)
Dept: FAMILY MEDICINE | Facility: CLINIC | Age: 69
End: 2019-07-02

## 2019-07-02 VITALS
OXYGEN SATURATION: 94 % | SYSTOLIC BLOOD PRESSURE: 132 MMHG | RESPIRATION RATE: 20 BRPM | TEMPERATURE: 98.7 F | BODY MASS INDEX: 43.4 KG/M2 | HEART RATE: 93 BPM | WEIGHT: 302.5 LBS | DIASTOLIC BLOOD PRESSURE: 78 MMHG

## 2019-07-02 DIAGNOSIS — R05.9 COUGH: Primary | ICD-10-CM

## 2019-07-02 PROCEDURE — 71046 X-RAY EXAM CHEST 2 VIEWS: CPT | Performed by: FAMILY MEDICINE

## 2019-07-02 PROCEDURE — 99214 OFFICE O/P EST MOD 30 MIN: CPT | Performed by: FAMILY MEDICINE

## 2019-07-02 NOTE — PROGRESS NOTES
SUBJECTIVE:   Hugo Prince is a 69 year old male presenting with a chief complaint of runny nose, cough - non-productive, shortness of breath and chest pressure/pain.  Onset of symptoms was 8 week(s) ago.  Course of illness is same.    Severity moderate  Current and Associated symptoms: fatigue  Treatment measures tried include Tylenol/Ibuprofen.  Predisposing factors include sleep apnea.    Past Medical History:   Diagnosis Date     Atopic dermatitis      Esophageal reflux     Gastroesophageal reflux     Other and unspecified disc disorder of unspecified region     Intervertebral disc disorders     Personal history of colonic polyps     Colon polyps     S/P lateral meniscectomy of left knee      Tick bite 4/20/15    amoxicillin treated     Unspecified sleep apnea     Sleep apnea     Urinary calculus, unspecified     Renal stones     Vitamin D deficiency 9-27-09     Current Outpatient Medications   Medication Sig Dispense Refill     acetaminophen (TYLENOL) 500 MG tablet Take 500 mg by mouth At Bedtime        doxycycline (VIBRA-TABS) 100 MG tablet Take 100 mg by mouth 2 times daily Chronic tick bites 10 tablet 0     fluticasone (FLONASE) 50 MCG/ACT nasal spray SPRAY 1-2 SPRAYS INTO BOTH NOSTRILS DAILY. 16 g 0     IBUPROFEN PO Take 400 mg by mouth every 6 hours as needed for moderate pain        order for DME Equipment being ordered: CPAP       tamsulosin (FLOMAX) 0.4 MG capsule Take 1 capsule (0.4 mg) by mouth daily 30 capsule 1     UNABLE TO FIND MEDICATION NAME: Nerve root injection   L3-4  Last done for back pain 9/25/18       vardenafil (LEVITRA) 10 MG tablet Take 1 tablet (10 mg) by mouth daily as needed 60 min prior to sex. Do not use with nitroglycerin, terazosin or doxazosin. 12 tablet 11     Vardenafil HCl (LEVITRA PO) Take 10 mg by mouth       vitamin B-12 (CYANOCOBALAMIN) 1000 MCG/ML injection Inject 1 mL into the muscle every 30 days       Social History     Tobacco Use     Smoking status: Passive  Smoke Exposure - Never Smoker     Smokeless tobacco: Never Used     Tobacco comment: has cigars when it is warm out   Substance Use Topics     Alcohol use: Yes     Alcohol/week: 7.0 oz     Types: 14 Standard drinks or equivalent per week       ROS:  CONSTITUTIONAL:NEGATIVE  for sweats and weight loss  INTEGUMENTARY/SKIN: NEGATIVE for worrisome rashes, moles or lesions  EYES: NEGATIVE for vision changes or irritation  ENT/MOUTH: NEGATIVE for bleeding gums    OBJECTIVE:  Wt 137.2 kg (302 lb 8 oz)   BMI 43.40 kg/m    GENERAL APPEARANCE: mild distress and over weight  EYES: EOMI,  PERRL, conjunctiva clear  HENT: ear canals and TM's normal.  Nose and mouth without ulcers, erythema or lesions  NECK: supple, nontender, no lymphadenopathy  RESP: lungs clear to auscultation - no rales, rhonchi or wheezes  CV: regular rates and rhythm, normal S1 S2, no murmur noted  ABDOMEN:  soft, nontender, no HSM or masses and bowel sounds normal  NEURO: Normal strength and tone, sensory exam grossly normal,  normal speech and mentation  SKIN: no suspicious lesions or rashes    ASSESSMENT:  1. Cough  I have personally reviewed the images and find nothing acute.  Radiology to review xrays and I will communicate new findings  Will obtain PFTs  - X-ray Chest 2 vws*  - Referral to [facility] - Pulmonology     Greater than 25 minutes spent with patient discussing risks and benefits and management with possible outcomes regarding this issue with greater than 50% in counseling for medical decision making and coordination of care.

## 2019-07-08 ENCOUNTER — HOSPITAL ENCOUNTER (OUTPATIENT)
Dept: GENERAL RADIOLOGY | Facility: CLINIC | Age: 69
Discharge: HOME | End: 2019-07-08
Attending: UROLOGY | Admitting: UROLOGY
Payer: MEDICARE

## 2019-07-08 ENCOUNTER — OFFICE VISIT (OUTPATIENT)
Dept: UROLOGY | Facility: CLINIC | Age: 69
End: 2019-07-08
Payer: MEDICARE

## 2019-07-08 VITALS
HEART RATE: 70 BPM | OXYGEN SATURATION: 95 % | WEIGHT: 302 LBS | BODY MASS INDEX: 43.23 KG/M2 | SYSTOLIC BLOOD PRESSURE: 104 MMHG | DIASTOLIC BLOOD PRESSURE: 78 MMHG | HEIGHT: 70 IN

## 2019-07-08 DIAGNOSIS — N20.0 CALCULUS OF KIDNEY: Primary | ICD-10-CM

## 2019-07-08 DIAGNOSIS — N20.0 KIDNEY STONE: ICD-10-CM

## 2019-07-08 DIAGNOSIS — E66.01 MORBID OBESITY (H): ICD-10-CM

## 2019-07-08 PROCEDURE — 99214 OFFICE O/P EST MOD 30 MIN: CPT | Performed by: UROLOGY

## 2019-07-08 PROCEDURE — 74019 RADEX ABDOMEN 2 VIEWS: CPT

## 2019-07-08 ASSESSMENT — MIFFLIN-ST. JEOR: SCORE: 2131.11

## 2019-07-08 ASSESSMENT — PAIN SCALES - GENERAL: PAINLEVEL: NO PAIN (0)

## 2019-07-08 NOTE — PROGRESS NOTES
History: This is a great pleasure to see this very pleasant 69-year-old gentleman in follow-up consultation today  He has represented a significant clinical challenge.  He has a long history of recurrent urinary stone disease, and when last seen 6 months ago we had a significant stone burden in each kidney but currently not getting pain.  He does however have a history of problems with the lumbar spine having had a previous spinal fusion and ongoing patient of a spinal fusion and ongoing pain with sciatica.  He is not observed blood in the urine, there is no history of urine infection.  We did repeat a KUB today.  KUB July 8, 2019 8:51 AM      HISTORY: Kidney stone.                                                                      IMPRESSION: Respiratory motion artifact is noted. Bilateral flank  calcifications, though blurred, are probably unchanged from  12/17/2018. The largest is in the right lower pole and measures 1.6  cm. Lower pelvic calcifications adjacent to the midline also appear  unchanged and may be prostatic in origin.    This does show the stones in each kidney with a significant stone burden on the right side greater than the left.  The patient also weighs 302 pounds, but he has an active lifestyle and travels a lot  Past Medical History:   Diagnosis Date     Atopic dermatitis      Esophageal reflux     Gastroesophageal reflux     Other and unspecified disc disorder of unspecified region     Intervertebral disc disorders     Personal history of colonic polyps     Colon polyps     S/P lateral meniscectomy of left knee      Tick bite 4/20/15    amoxicillin treated     Unspecified sleep apnea     Sleep apnea     Urinary calculus, unspecified     Renal stones     Vitamin D deficiency 9-27-09       Social History     Socioeconomic History     Marital status:      Spouse name: None     Number of children: None     Years of education: None     Highest education level: None   Occupational History      None   Social Needs     Financial resource strain: None     Food insecurity:     Worry: None     Inability: None     Transportation needs:     Medical: None     Non-medical: None   Tobacco Use     Smoking status: Passive Smoke Exposure - Never Smoker     Smokeless tobacco: Never Used     Tobacco comment: has cigars when it is warm out   Substance and Sexual Activity     Alcohol use: Yes     Alcohol/week: 7.0 oz     Types: 14 Standard drinks or equivalent per week     Drug use: No     Sexual activity: None   Lifestyle     Physical activity:     Days per week: None     Minutes per session: None     Stress: None   Relationships     Social connections:     Talks on phone: None     Gets together: None     Attends Religion service: None     Active member of club or organization: None     Attends meetings of clubs or organizations: None     Relationship status: None     Intimate partner violence:     Fear of current or ex partner: None     Emotionally abused: None     Physically abused: None     Forced sexual activity: None   Other Topics Concern     Parent/sibling w/ CABG, MI or angioplasty before 65F 55M? Not Asked   Social History Narrative     None       Past Surgical History:   Procedure Laterality Date     ARTHROPLASTY KNEE Left 1/7/2015    Procedure: ARTHROPLASTY KNEE;  Surgeon: Agustin Saenz MD;  Location:  OR     ARTHROSCOPY KNEE RT/LT  6-20-11     COLONOSCOPY  12/09     CYSTOSCOPY       EXTRACORPOREAL SHOCK WAVE LITHOTRIPSY (ESWL)  1/20/2012    Procedure:EXTRACORPOREAL SHOCK WAVE LITHOTRIPSY (ESWL); LEFT EXTRACORPOREAL SHOCK WAVE LITHOTRIPSY ; Surgeon:ANJUM BUNDY; Location: OR     EXTRACORPOREAL SHOCK WAVE LITHOTRIPSY, CYSTOSCOPY, INSERT STENT URETER(S), COMBINED  2/3/2012    Procedure:COMBINED EXTRACORPOREAL SHOCK WAVE LITHOTRIPSY, CYSTOSCOPY, INSERT STENT URETER(S); CYSTOSCOPY, RIGHT STENT; Surgeon:ANJUM BUNDY; Location: OR     FUSION LUMBAR ANTERIOR, FUSION LUMBAR  POSTERIOR ONE LEVEL, COMBINED  11-1-10    L4-5     LASER HOLMIUM LITHOTRIPSY URETER(S), INSERT STENT, COMBINED  2/7/2012    Procedure:COMBINED CYSTOSCOPY, URETEROSCOPY, LASER HOLMIUM LITHOTRIPSY URETER(S), INSERT STENT; CYSTOSCOPY, RIGHT URETEROSCOPY, RIGHT RETROGRADES,  STONE EXTRACTION WITH HOLMIUM LASER AND RIGHT URETERAL STENT EXCHANGE ; Surgeon:DEEPTI MELVIN; Location:[facility]     LITHOTRIPSY      Lithotrypsy     SEPTOPLASTY       VASECTOMY         Family History   Problem Relation Age of Onset     Melanoma Father      Prostate Cancer Father      Cancer Father         neck cancer from radiation at work (urology)     Breast Cancer Mother      C.A.D. Paternal Grandfather 72         Current Outpatient Medications:      IBUPROFEN PO, Take 400 mg by mouth every 6 hours as needed for moderate pain , Disp: , Rfl:      vardenafil (LEVITRA) 10 MG tablet, Take 1 tablet (10 mg) by mouth daily as needed 60 min prior to sex. Do not use with nitroglycerin, terazosin or doxazosin., Disp: 12 tablet, Rfl: 11     Vardenafil HCl (LEVITRA PO), Take 10 mg by mouth, Disp: , Rfl:      vitamin B-12 (CYANOCOBALAMIN) 1000 MCG/ML injection, Inject 1 mL into the muscle every 30 days, Disp: , Rfl:      acetaminophen (TYLENOL) 500 MG tablet, Take 500 mg by mouth At Bedtime , Disp: , Rfl:      doxycycline (VIBRA-TABS) 100 MG tablet, Take 100 mg by mouth 2 times daily Chronic tick bites, Disp: 10 tablet, Rfl: 0     fluticasone (FLONASE) 50 MCG/ACT nasal spray, SPRAY 1-2 SPRAYS INTO BOTH NOSTRILS DAILY. (Patient not taking: Reported on 7/8/2019), Disp: 16 g, Rfl: 0     order for DME, Equipment being ordered: CPAP, Disp: , Rfl:      UNABLE TO FIND, MEDICATION NAME: Nerve root injection  L3-4 Last done for back pain 9/25/18, Disp: , Rfl:     10 point ROS of systems including Constitutional, Eyes, Respiratory, Cardiovascular, Gastroenterology, Genitourinary, Integumentary, Muscularskeletal, Psychiatric were all negative except for pertinent  "positives noted in my HPI.    Examination:   /78   Pulse 70   Ht 1.778 m (5' 10\")   Wt 137 kg (302 lb)   SpO2 95%   BMI 43.33 kg/m    General Impression: Very pleasant gentleman is not in distress today and is well oriented in time place and person  Mental Status: Normal.  HEENT.  There is no clinical evidence of jaundice in the mucous membranes are normal  Skin: Skin is otherwise normal to examination  Respiratory System: Respiratory cycle is normal  Lymph Nodes: Not examined  Back/Flank Tenderness: There is no flank tenderness over the kidneys  Cardiovascular System: There is no significant peripheral edema  Abdominal Examination: Grossly obese abdomen  Extremities: Extremities otherwise unremarkable  Genitial: Not examined  Rectal Examination: Not examined  Neurologic System: There are no focal abnormal clinical neurological signs and central, peripheral nerve systems    Impression: I had a very careful review of both the previous x-rays including previous KUB and CT scans as well as today's KUB.  We then had a lengthy discussion about how we should proceed.  The stone burden in the right kidney in particular is considerable.  He weighs 302 pounds  We had a discussion about how we would manage these should these need treating.  And in my opinion, both these weight would preclude ESWL on the right kidney and the location of the stones and volume of stones would be such that I would not consider ESWL as the best option.  In addition I would not consider ureteroscopy with holmium laser appropriate either for the same reasons excluding his weight.  If the stones in the right kidney are going to be treated he should have a percutaneous nephrolithotomy which would be feasible although somewhat challenging with amount of his weight.  On the left kidney the stone burden is less, and if he lost some weight it would be possible to consider ESWL for the stones in the left kidney at some point.    However after " "careful discussion it was clear that the stones are not causing symptoms in the moment, for that reason I do not think there is any urgency to initiate treatment and I would suggest we repeat a KUB in 6 months unless there is evidence of clinical symptoms developing before then.  I have briefed him in detail about all of the issues we discussed above.  I answered many questions    Plan: 6 months for KUB and examination    Time: This was an extended discussion today and 25 minutes was required for consultation and discussion    \"This dictation was performed with voice recognition software and may contain errors,  omissions and inadvertent word substitution.\"      "

## 2019-07-08 NOTE — LETTER
7/8/2019       RE: Hugo Prince  [address]  [place] [id number]     Dear Colleague,    Thank you for referring your patient, Hugo Prince, to the [facility] at [facility]. Please see a copy of my visit note below.    History: This is a great pleasure to see this very pleasant 69-year-old gentleman in follow-up consultation today  He has represented a significant clinical challenge.  He has a long history of recurrent urinary stone disease, and when last seen 6 months ago we had a significant stone burden in each kidney but currently not getting pain.  He does however have a history of problems with the lumbar spine having had a previous spinal fusion and ongoing patient of a spinal fusion and ongoing pain with sciatica.  He is not observed blood in the urine, there is no history of urine infection.  We did repeat a KUB today.  KUB July 8, 2019 8:51 AM      HISTORY: Kidney stone.                                                                      IMPRESSION: Respiratory motion artifact is noted. Bilateral flank  calcifications, though blurred, are probably unchanged from  12/17/2018. The largest is in the right lower pole and measures 1.6  cm. Lower pelvic calcifications adjacent to the midline also appear  unchanged and may be prostatic in origin.    This does show the stones in each kidney with a significant stone burden on the right side greater than the left.  The patient also weighs 302 pounds, but he has an active lifestyle and travels a lot  Past Medical History:   Diagnosis Date     Atopic dermatitis      Esophageal reflux     Gastroesophageal reflux     Other and unspecified disc disorder of unspecified region     Intervertebral disc disorders     Personal history of colonic polyps     Colon polyps     S/P lateral meniscectomy of left knee      Tick bite 4/20/15    amoxicillin treated     Unspecified  sleep apnea     Sleep apnea     Urinary calculus, unspecified     Renal stones     Vitamin D deficiency 9-27-09       Social History     Socioeconomic History     Marital status:      Spouse name: None     Number of children: None     Years of education: None     Highest education level: None   Occupational History     None   Social Needs     Financial resource strain: None     Food insecurity:     Worry: None     Inability: None     Transportation needs:     Medical: None     Non-medical: None   Tobacco Use     Smoking status: Passive Smoke Exposure - Never Smoker     Smokeless tobacco: Never Used     Tobacco comment: has cigars when it is warm out   Substance and Sexual Activity     Alcohol use: Yes     Alcohol/week: 7.0 oz     Types: 14 Standard drinks or equivalent per week     Drug use: No     Sexual activity: None   Lifestyle     Physical activity:     Days per week: None     Minutes per session: None     Stress: None   Relationships     Social connections:     Talks on phone: None     Gets together: None     Attends Restorationist service: None     Active member of club or organization: None     Attends meetings of clubs or organizations: None     Relationship status: None     Intimate partner violence:     Fear of current or ex partner: None     Emotionally abused: None     Physically abused: None     Forced sexual activity: None   Other Topics Concern     Parent/sibling w/ CABG, MI or angioplasty before 65F 55M? Not Asked   Social History Narrative     None       Past Surgical History:   Procedure Laterality Date     ARTHROPLASTY KNEE Left 1/7/2015    Procedure: ARTHROPLASTY KNEE;  Surgeon: Agustin Saenz MD;  Location: [facility] OR     ARTHROSCOPY KNEE RT/LT  6-20-11     COLONOSCOPY  12/09     CYSTOSCOPY       EXTRACORPOREAL SHOCK WAVE LITHOTRIPSY (ESWL)  1/20/2012    Procedure:EXTRACORPOREAL SHOCK WAVE LITHOTRIPSY (ESWL); LEFT EXTRACORPOREAL SHOCK WAVE LITHOTRIPSY ; Surgeon:ANJUM BUNDY;  Location:[facility] OR     EXTRACORPOREAL SHOCK WAVE LITHOTRIPSY, CYSTOSCOPY, INSERT STENT URETER(S), COMBINED  2/3/2012    Procedure:COMBINED EXTRACORPOREAL SHOCK WAVE LITHOTRIPSY, CYSTOSCOPY, INSERT STENT URETER(S); CYSTOSCOPY, RIGHT STENT; Surgeon:ANJUM BUNDY; Location:[facility] OR     FUSION LUMBAR ANTERIOR, FUSION LUMBAR POSTERIOR ONE LEVEL, COMBINED  11-1-10    L4-5     LASER HOLMIUM LITHOTRIPSY URETER(S), INSERT STENT, COMBINED  2/7/2012    Procedure:COMBINED CYSTOSCOPY, URETEROSCOPY, LASER HOLMIUM LITHOTRIPSY URETER(S), INSERT STENT; CYSTOSCOPY, RIGHT URETEROSCOPY, RIGHT RETROGRADES,  STONE EXTRACTION WITH HOLMIUM LASER AND RIGHT URETERAL STENT EXCHANGE ; Surgeon:DEEPTI MELVIN; Location:[facility] OR     LITHOTRIPSY      Lithotrypsy     SEPTOPLASTY       VASECTOMY         Family History   Problem Relation Age of Onset     Melanoma Father      Prostate Cancer Father      Cancer Father         neck cancer from radiation at work (urology)     Breast Cancer Mother      C.A.D. Paternal Grandfather 72         Current Outpatient Medications:      IBUPROFEN PO, Take 400 mg by mouth every 6 hours as needed for moderate pain , Disp: , Rfl:      vardenafil (LEVITRA) 10 MG tablet, Take 1 tablet (10 mg) by mouth daily as needed 60 min prior to sex. Do not use with nitroglycerin, terazosin or doxazosin., Disp: 12 tablet, Rfl: 11     Vardenafil HCl (LEVITRA PO), Take 10 mg by mouth, Disp: , Rfl:      vitamin B-12 (CYANOCOBALAMIN) 1000 MCG/ML injection, Inject 1 mL into the muscle every 30 days, Disp: , Rfl:      acetaminophen (TYLENOL) 500 MG tablet, Take 500 mg by mouth At Bedtime , Disp: , Rfl:      doxycycline (VIBRA-TABS) 100 MG tablet, Take 100 mg by mouth 2 times daily Chronic tick bites, Disp: 10 tablet, Rfl: 0     fluticasone (FLONASE) 50 MCG/ACT nasal spray, SPRAY 1-2 SPRAYS INTO BOTH NOSTRILS DAILY. (Patient not taking: Reported on 7/8/2019), Disp: 16 g, Rfl: 0     order for DME, Equipment being ordered: CPAP, Disp:  ", Rfl:      UNABLE TO FIND, MEDICATION NAME: Nerve root injection  L3-4 Last done for back pain 9/25/18, Disp: , Rfl:     10 point ROS of systems including Constitutional, Eyes, Respiratory, Cardiovascular, Gastroenterology, Genitourinary, Integumentary, Muscularskeletal, Psychiatric were all negative except for pertinent positives noted in my HPI.    Examination:   /78   Pulse 70   Ht 1.778 m (5' 10\")   Wt 137 kg (302 lb)   SpO2 95%   BMI 43.33 kg/m     General Impression: Very pleasant gentleman is not in distress today and is well oriented in time place and person  Mental Status: Normal.  HEENT.  There is no clinical evidence of jaundice in the mucous membranes are normal  Skin: Skin is otherwise normal to examination  Respiratory System: Respiratory cycle is normal  Lymph Nodes: Not examined  Back/Flank Tenderness: There is no flank tenderness over the kidneys  Cardiovascular System: There is no significant peripheral edema  Abdominal Examination: Grossly obese abdomen  Extremities: Extremities otherwise unremarkable  Genitial: Not examined  Rectal Examination: Not examined  Neurologic System: There are no focal abnormal clinical neurological signs and central, peripheral nerve systems    Impression: I had a very careful review of both the previous x-rays including previous KUB and CT scans as well as today's KUB.  We then had a lengthy discussion about how we should proceed.  The stone burden in the right kidney in particular is considerable.  He weighs 302 pounds  We had a discussion about how we would manage these should these need treating.  And in my opinion, both these weight would preclude ESWL on the right kidney and the location of the stones and volume of stones would be such that I would not consider ESWL as the best option.  In addition I would not consider ureteroscopy with holmium laser appropriate either for the same reasons excluding his weight.  If the stones in the right kidney are " "going to be treated he should have a percutaneous nephrolithotomy which would be feasible although somewhat challenging with amount of his weight.  On the left kidney the stone burden is less, and if he lost some weight it would be possible to consider ESWL for the stones in the left kidney at some point.    However after careful discussion it was clear that the stones are not causing symptoms in the moment, for that reason I do not think there is any urgency to initiate treatment and I would suggest we repeat a KUB in 6 months unless there is evidence of clinical symptoms developing before then.  I have briefed him in detail about all of the issues we discussed above.  I answered many questions    Plan: 6 months for KUB and examination    Time: This was an extended discussion today and 25 minutes was required for consultation and discussion    \"This dictation was performed with voice recognition software and may contain errors,  omissions and inadvertent word substitution.\"        Again, thank you for allowing me to participate in the care of your patient.      Sincerely,    Dima Desai MD      "

## 2019-07-08 NOTE — NURSING NOTE
"Chief Complaint   Patient presents with     Kidney Stone Related     Pt is here due to kidney stone had KUB prior       Patient Active Problem List   Diagnosis     Kidney stone     Sleep apnea     ED (erectile dysfunction)     Health Care Home     GERD (gastroesophageal reflux disease)     Elevated cholesterol     S/P total knee replacement     Dermatitis     Physical deconditioning     Knee pain     DJD (degenerative joint disease) of knee     Anemia due to blood loss, acute     Edema     S/P knee surgery     Chronic pharyngitis and nasopharyngitis(472)     Morbid obesity (H)       Allergies   Allergen Reactions     Chlorthalidone GI Disturbance     Compazine Other (See Comments)     PSYCHOTIC     Erythromycin Nausea and Vomiting     Flomax [Tamsulosin Hcl]      FLUSHING, NASAl CONGESTION     Prochlorperazine      LW Reaction: anxiety     Seasonal Allergies        /78   Pulse 70   Ht 1.778 m (5' 10\")   Wt 137 kg (302 lb)   SpO2 95%   BMI 43.33 kg/m            Xu Russell, EMT-B  7/8/2019         "

## 2019-07-09 ENCOUNTER — TRANSFERRED RECORDS (OUTPATIENT)
Dept: FAMILY MEDICINE | Facility: CLINIC | Age: 69
End: 2019-07-09

## 2019-07-11 ENCOUNTER — TELEPHONE (OUTPATIENT)
Dept: FAMILY MEDICINE | Facility: CLINIC | Age: 69
End: 2019-07-11

## 2019-07-11 DIAGNOSIS — R06.00 DYSPNEA: ICD-10-CM

## 2019-07-11 DIAGNOSIS — R06.09 DOE (DYSPNEA ON EXERTION): Primary | ICD-10-CM

## 2019-07-11 NOTE — TELEPHONE ENCOUNTER
reviewed PFT's done 7/9/19 which are essentially normal. Given patient's continued symptoms of CABALLERO and at time waking during night with dyspnea,  recommends CTA to rule out PE as well as cardiology consult. Did have negative nuclear stress test 6/4/19.   Patient informed and referrals faxed to [facility] and [facility]. They will call patient to schedule but also gave patient their numbers.  Sherry Case RN       yes

## 2019-07-15 ENCOUNTER — APPOINTMENT (OUTPATIENT)
Dept: CARDIOLOGY | Facility: CLINIC | Age: 69
DRG: 247 | End: 2019-07-15
Attending: INTERNAL MEDICINE
Payer: MEDICARE

## 2019-07-15 ENCOUNTER — HOSPITAL ENCOUNTER (INPATIENT)
Facility: CLINIC | Age: 69
LOS: 2 days | Discharge: HOME | DRG: 247 | End: 2019-07-17
Attending: EMERGENCY MEDICINE | Admitting: HOSPITALIST
Payer: MEDICARE

## 2019-07-15 ENCOUNTER — APPOINTMENT (OUTPATIENT)
Dept: CT IMAGING | Facility: CLINIC | Age: 69
DRG: 247 | End: 2019-07-15
Attending: EMERGENCY MEDICINE
Payer: MEDICARE

## 2019-07-15 DIAGNOSIS — I21.4 NSTEMI (NON-ST ELEVATED MYOCARDIAL INFARCTION) (H): Primary | ICD-10-CM

## 2019-07-15 DIAGNOSIS — R07.9 CHEST PAIN, UNSPECIFIED TYPE: ICD-10-CM

## 2019-07-15 DIAGNOSIS — N20.1 URETERAL STONE: ICD-10-CM

## 2019-07-15 LAB
ALBUMIN UR-MCNC: 10 MG/DL
ANION GAP SERPL CALCULATED.3IONS-SCNC: 11 MMOL/L (ref 3–14)
APPEARANCE UR: CLEAR
BASOPHILS # BLD AUTO: 0 10E9/L (ref 0–0.2)
BASOPHILS NFR BLD AUTO: 0.3 %
BILIRUB UR QL STRIP: NEGATIVE
BUN SERPL-MCNC: 20 MG/DL (ref 7–30)
CALCIUM SERPL-MCNC: 8.9 MG/DL (ref 8.5–10.1)
CHLORIDE SERPL-SCNC: 105 MMOL/L (ref 94–109)
CHOLEST SERPL-MCNC: 191 MG/DL
CO2 SERPL-SCNC: 24 MMOL/L (ref 20–32)
COLOR UR AUTO: YELLOW
CREAT SERPL-MCNC: 0.99 MG/DL (ref 0.66–1.25)
DIFFERENTIAL METHOD BLD: NORMAL
EOSINOPHIL # BLD AUTO: 0.2 10E9/L (ref 0–0.7)
EOSINOPHIL NFR BLD AUTO: 2.7 %
ERYTHROCYTE [DISTWIDTH] IN BLOOD BY AUTOMATED COUNT: 12.7 % (ref 10–15)
GFR SERPL CREATININE-BSD FRML MDRD: 76 ML/MIN/{1.73_M2}
GLUCOSE SERPL-MCNC: 152 MG/DL (ref 70–99)
GLUCOSE UR STRIP-MCNC: NEGATIVE MG/DL
HCT VFR BLD AUTO: 48.5 % (ref 40–53)
HDLC SERPL-MCNC: 35 MG/DL
HGB BLD-MCNC: 16.5 G/DL (ref 13.3–17.7)
HGB UR QL STRIP: ABNORMAL
IMM GRANULOCYTES # BLD: 0.1 10E9/L (ref 0–0.4)
IMM GRANULOCYTES NFR BLD: 0.9 %
INTERPRETATION ECG - MUSE: NORMAL
KETONES UR STRIP-MCNC: NEGATIVE MG/DL
LDLC SERPL CALC-MCNC: 133 MG/DL
LEUKOCYTE ESTERASE UR QL STRIP: NEGATIVE
LMWH PPP CHRO-ACNC: 0.24 IU/ML
LYMPHOCYTES # BLD AUTO: 3 10E9/L (ref 0.8–5.3)
LYMPHOCYTES NFR BLD AUTO: 38.9 %
MCH RBC QN AUTO: 32.6 PG (ref 26.5–33)
MCHC RBC AUTO-ENTMCNC: 34 G/DL (ref 31.5–36.5)
MCV RBC AUTO: 96 FL (ref 78–100)
MONOCYTES # BLD AUTO: 0.7 10E9/L (ref 0–1.3)
MONOCYTES NFR BLD AUTO: 8.9 %
MUCOUS THREADS #/AREA URNS LPF: PRESENT /LPF
NEUTROPHILS # BLD AUTO: 3.7 10E9/L (ref 1.6–8.3)
NEUTROPHILS NFR BLD AUTO: 48.3 %
NITRATE UR QL: NEGATIVE
NONHDLC SERPL-MCNC: 156 MG/DL
NRBC # BLD AUTO: 0 10*3/UL
NRBC BLD AUTO-RTO: 0 /100
PH UR STRIP: 5.5 PH (ref 5–7)
PLATELET # BLD AUTO: 202 10E9/L (ref 150–450)
POTASSIUM SERPL-SCNC: 3.7 MMOL/L (ref 3.4–5.3)
RBC # BLD AUTO: 5.06 10E12/L (ref 4.4–5.9)
RBC #/AREA URNS AUTO: 8 /HPF (ref 0–2)
SODIUM SERPL-SCNC: 140 MMOL/L (ref 133–144)
SOURCE: ABNORMAL
SP GR UR STRIP: 1.01 (ref 1–1.03)
SQUAMOUS #/AREA URNS AUTO: <1 /HPF (ref 0–1)
TRIGL SERPL-MCNC: 113 MG/DL
TROPONIN I SERPL-MCNC: 0.02 UG/L (ref 0–0.04)
TROPONIN I SERPL-MCNC: 10.66 UG/L (ref 0–0.04)
TROPONIN I SERPL-MCNC: 12.36 UG/L (ref 0–0.04)
TROPONIN I SERPL-MCNC: 3.6 UG/L (ref 0–0.04)
TROPONIN I SERPL-MCNC: 6.83 UG/L (ref 0–0.04)
UROBILINOGEN UR STRIP-MCNC: NORMAL MG/DL (ref 0–2)
WBC # BLD AUTO: 7.7 10E9/L (ref 4–11)
WBC #/AREA URNS AUTO: 1 /HPF (ref 0–5)

## 2019-07-15 PROCEDURE — 84484 ASSAY OF TROPONIN QUANT: CPT | Performed by: EMERGENCY MEDICINE

## 2019-07-15 PROCEDURE — 25000132 ZZH RX MED GY IP 250 OP 250 PS 637: Mod: GY | Performed by: HOSPITALIST

## 2019-07-15 PROCEDURE — 80048 BASIC METABOLIC PNL TOTAL CA: CPT | Performed by: EMERGENCY MEDICINE

## 2019-07-15 PROCEDURE — 25000125 ZZHC RX 250: Performed by: EMERGENCY MEDICINE

## 2019-07-15 PROCEDURE — 85025 COMPLETE CBC W/AUTO DIFF WBC: CPT | Performed by: EMERGENCY MEDICINE

## 2019-07-15 PROCEDURE — 25000132 ZZH RX MED GY IP 250 OP 250 PS 637: Mod: GY | Performed by: INTERNAL MEDICINE

## 2019-07-15 PROCEDURE — 99221 1ST HOSP IP/OBS SF/LOW 40: CPT | Performed by: UROLOGY

## 2019-07-15 PROCEDURE — 93005 ELECTROCARDIOGRAM TRACING: CPT

## 2019-07-15 PROCEDURE — 36415 COLL VENOUS BLD VENIPUNCTURE: CPT | Performed by: INTERNAL MEDICINE

## 2019-07-15 PROCEDURE — G0378 HOSPITAL OBSERVATION PER HR: HCPCS

## 2019-07-15 PROCEDURE — 99223 1ST HOSP IP/OBS HIGH 75: CPT | Performed by: HOSPITALIST

## 2019-07-15 PROCEDURE — 25000132 ZZH RX MED GY IP 250 OP 250 PS 637: Mod: GY | Performed by: EMERGENCY MEDICINE

## 2019-07-15 PROCEDURE — 99285 EMERGENCY DEPT VISIT HI MDM: CPT | Mod: 25

## 2019-07-15 PROCEDURE — 84484 ASSAY OF TROPONIN QUANT: CPT | Performed by: HOSPITALIST

## 2019-07-15 PROCEDURE — 93010 ELECTROCARDIOGRAM REPORT: CPT | Performed by: INTERNAL MEDICINE

## 2019-07-15 PROCEDURE — 99207 ZZC NON-BILLABLE SERV PER CHARTING: CPT | Performed by: INTERNAL MEDICINE

## 2019-07-15 PROCEDURE — 81001 URINALYSIS AUTO W/SCOPE: CPT | Performed by: HOSPITALIST

## 2019-07-15 PROCEDURE — 25500064 ZZH RX 255 OP 636: Performed by: HOSPITALIST

## 2019-07-15 PROCEDURE — 99223 1ST HOSP IP/OBS HIGH 75: CPT | Mod: 25 | Performed by: INTERNAL MEDICINE

## 2019-07-15 PROCEDURE — 21000001 ZZH R&B HEART CARE

## 2019-07-15 PROCEDURE — 99207 ZZC CDG-CODE CATEGORY CHANGED: CPT | Performed by: HOSPITALIST

## 2019-07-15 PROCEDURE — 84484 ASSAY OF TROPONIN QUANT: CPT | Performed by: INTERNAL MEDICINE

## 2019-07-15 PROCEDURE — 80061 LIPID PANEL: CPT | Performed by: HOSPITALIST

## 2019-07-15 PROCEDURE — 40000264 ECHOCARDIOGRAM COMPLETE

## 2019-07-15 PROCEDURE — 71260 CT THORAX DX C+: CPT

## 2019-07-15 PROCEDURE — 36415 COLL VENOUS BLD VENIPUNCTURE: CPT | Performed by: HOSPITALIST

## 2019-07-15 PROCEDURE — 25000128 H RX IP 250 OP 636: Performed by: EMERGENCY MEDICINE

## 2019-07-15 PROCEDURE — 74177 CT ABD & PELVIS W/CONTRAST: CPT

## 2019-07-15 PROCEDURE — 93306 TTE W/DOPPLER COMPLETE: CPT | Mod: 26 | Performed by: INTERNAL MEDICINE

## 2019-07-15 PROCEDURE — 85520 HEPARIN ASSAY: CPT | Performed by: INTERNAL MEDICINE

## 2019-07-15 PROCEDURE — 25000128 H RX IP 250 OP 636: Performed by: HOSPITALIST

## 2019-07-15 RX ORDER — ACETAMINOPHEN 325 MG/1
650 TABLET ORAL EVERY 4 HOURS PRN
Status: DISCONTINUED | OUTPATIENT
Start: 2019-07-15 | End: 2019-07-17 | Stop reason: HOSPADM

## 2019-07-15 RX ORDER — METOPROLOL TARTRATE 25 MG/1
25 TABLET, FILM COATED ORAL ONCE
Status: COMPLETED | OUTPATIENT
Start: 2019-07-15 | End: 2019-07-15

## 2019-07-15 RX ORDER — ATORVASTATIN CALCIUM 40 MG/1
40 TABLET, FILM COATED ORAL EVERY EVENING
Status: DISCONTINUED | OUTPATIENT
Start: 2019-07-15 | End: 2019-07-17 | Stop reason: HOSPADM

## 2019-07-15 RX ORDER — LORAZEPAM 0.5 MG/1
0.5 TABLET ORAL
Status: DISCONTINUED | OUTPATIENT
Start: 2019-07-15 | End: 2019-07-16 | Stop reason: HOSPADM

## 2019-07-15 RX ORDER — ACETAMINOPHEN 650 MG/1
650 SUPPOSITORY RECTAL EVERY 4 HOURS PRN
Status: DISCONTINUED | OUTPATIENT
Start: 2019-07-15 | End: 2019-07-17 | Stop reason: HOSPADM

## 2019-07-15 RX ORDER — HYDROMORPHONE HYDROCHLORIDE 1 MG/ML
0.5 INJECTION, SOLUTION INTRAMUSCULAR; INTRAVENOUS; SUBCUTANEOUS ONCE
Status: DISCONTINUED | OUTPATIENT
Start: 2019-07-15 | End: 2019-07-17 | Stop reason: HOSPADM

## 2019-07-15 RX ORDER — METOPROLOL TARTRATE 25 MG/1
25 TABLET, FILM COATED ORAL 2 TIMES DAILY
Status: DISCONTINUED | OUTPATIENT
Start: 2019-07-15 | End: 2019-07-15

## 2019-07-15 RX ORDER — HYDROCODONE BITARTRATE AND ACETAMINOPHEN 5; 325 MG/1; MG/1
1 TABLET ORAL EVERY 6 HOURS PRN
Status: DISCONTINUED | OUTPATIENT
Start: 2019-07-15 | End: 2019-07-16

## 2019-07-15 RX ORDER — LORAZEPAM 2 MG/ML
0.5 INJECTION INTRAMUSCULAR
Status: DISCONTINUED | OUTPATIENT
Start: 2019-07-15 | End: 2019-07-16 | Stop reason: HOSPADM

## 2019-07-15 RX ORDER — ASPIRIN 81 MG/1
162 TABLET, CHEWABLE ORAL ONCE
Status: COMPLETED | OUTPATIENT
Start: 2019-07-15 | End: 2019-07-15

## 2019-07-15 RX ORDER — POTASSIUM CHLORIDE 1500 MG/1
20 TABLET, EXTENDED RELEASE ORAL
Status: COMPLETED | OUTPATIENT
Start: 2019-07-15 | End: 2019-07-15

## 2019-07-15 RX ORDER — NITROGLYCERIN 0.4 MG/1
0.4 TABLET SUBLINGUAL EVERY 5 MIN PRN
Status: DISCONTINUED | OUTPATIENT
Start: 2019-07-15 | End: 2019-07-17 | Stop reason: HOSPADM

## 2019-07-15 RX ORDER — ASPIRIN 81 MG/1
81 TABLET ORAL DAILY
Status: DISCONTINUED | OUTPATIENT
Start: 2019-07-16 | End: 2019-07-17 | Stop reason: HOSPADM

## 2019-07-15 RX ORDER — OMEGA-3 FATTY ACIDS/FISH OIL 300-1000MG
400 CAPSULE ORAL 2 TIMES DAILY
Status: ON HOLD | COMMUNITY
End: 2019-07-17

## 2019-07-15 RX ORDER — NALOXONE HYDROCHLORIDE 0.4 MG/ML
.1-.4 INJECTION, SOLUTION INTRAMUSCULAR; INTRAVENOUS; SUBCUTANEOUS
Status: DISCONTINUED | OUTPATIENT
Start: 2019-07-15 | End: 2019-07-17 | Stop reason: HOSPADM

## 2019-07-15 RX ORDER — LIDOCAINE 40 MG/G
CREAM TOPICAL
Status: DISCONTINUED | OUTPATIENT
Start: 2019-07-15 | End: 2019-07-17 | Stop reason: HOSPADM

## 2019-07-15 RX ORDER — LIDOCAINE 40 MG/G
CREAM TOPICAL
Status: DISCONTINUED | OUTPATIENT
Start: 2019-07-15 | End: 2019-07-16

## 2019-07-15 RX ORDER — SODIUM CHLORIDE 9 MG/ML
INJECTION, SOLUTION INTRAVENOUS CONTINUOUS
Status: DISCONTINUED | OUTPATIENT
Start: 2019-07-15 | End: 2019-07-16 | Stop reason: HOSPADM

## 2019-07-15 RX ORDER — NITROGLYCERIN 0.4 MG/1
0.4 TABLET SUBLINGUAL EVERY 5 MIN PRN
Status: DISCONTINUED | OUTPATIENT
Start: 2019-07-15 | End: 2019-07-15

## 2019-07-15 RX ORDER — ALUMINA, MAGNESIA, AND SIMETHICONE 2400; 2400; 240 MG/30ML; MG/30ML; MG/30ML
30 SUSPENSION ORAL EVERY 4 HOURS PRN
Status: DISCONTINUED | OUTPATIENT
Start: 2019-07-15 | End: 2019-07-17 | Stop reason: HOSPADM

## 2019-07-15 RX ORDER — METOPROLOL TARTRATE 50 MG
50 TABLET ORAL 2 TIMES DAILY
Status: DISCONTINUED | OUTPATIENT
Start: 2019-07-15 | End: 2019-07-16

## 2019-07-15 RX ORDER — IOPAMIDOL 755 MG/ML
80 INJECTION, SOLUTION INTRAVASCULAR ONCE
Status: COMPLETED | OUTPATIENT
Start: 2019-07-15 | End: 2019-07-15

## 2019-07-15 RX ADMIN — Medication 5800 UNITS: at 09:04

## 2019-07-15 RX ADMIN — LIDOCAINE HYDROCHLORIDE 30 ML: 20 SOLUTION ORAL; TOPICAL at 02:57

## 2019-07-15 RX ADMIN — HUMAN ALBUMIN MICROSPHERES AND PERFLUTREN 5 ML: 10; .22 INJECTION, SOLUTION INTRAVENOUS at 14:30

## 2019-07-15 RX ADMIN — NITROGLYCERIN 0.4 MG: 0.4 TABLET SUBLINGUAL at 08:09

## 2019-07-15 RX ADMIN — HEPARIN SODIUM 1200 UNITS/HR: 10000 INJECTION, SOLUTION INTRAVENOUS at 23:12

## 2019-07-15 RX ADMIN — ACETAMINOPHEN 650 MG: 325 TABLET, FILM COATED ORAL at 13:09

## 2019-07-15 RX ADMIN — ATORVASTATIN CALCIUM 40 MG: 40 TABLET, FILM COATED ORAL at 19:36

## 2019-07-15 RX ADMIN — NITROGLYCERIN 0.4 MG: 0.4 TABLET SUBLINGUAL at 02:41

## 2019-07-15 RX ADMIN — METOPROLOL TARTRATE 25 MG: 25 TABLET, FILM COATED ORAL at 09:05

## 2019-07-15 RX ADMIN — SODIUM CHLORIDE 100 ML: 9 INJECTION, SOLUTION INTRAVENOUS at 03:25

## 2019-07-15 RX ADMIN — HEPARIN SODIUM 1200 UNITS/HR: 10000 INJECTION, SOLUTION INTRAVENOUS at 09:03

## 2019-07-15 RX ADMIN — METOPROLOL TARTRATE 50 MG: 50 TABLET ORAL at 23:19

## 2019-07-15 RX ADMIN — ACETAMINOPHEN 650 MG: 325 TABLET, FILM COATED ORAL at 19:36

## 2019-07-15 RX ADMIN — METOPROLOL TARTRATE 25 MG: 25 TABLET ORAL at 13:08

## 2019-07-15 RX ADMIN — ASPIRIN 81 MG 162 MG: 81 TABLET ORAL at 06:04

## 2019-07-15 RX ADMIN — POTASSIUM CHLORIDE 20 MEQ: 1500 TABLET, EXTENDED RELEASE ORAL at 20:58

## 2019-07-15 RX ADMIN — IOPAMIDOL 80 ML: 755 INJECTION, SOLUTION INTRAVENOUS at 03:25

## 2019-07-15 RX ADMIN — NITROGLYCERIN 0.4 MG: 0.4 TABLET SUBLINGUAL at 02:47

## 2019-07-15 ASSESSMENT — ENCOUNTER SYMPTOMS: SHORTNESS OF BREATH: 1

## 2019-07-15 ASSESSMENT — MIFFLIN-ST. JEOR
SCORE: 2131.11
SCORE: 2122.95

## 2019-07-15 ASSESSMENT — ACTIVITIES OF DAILY LIVING (ADL): ADLS_ACUITY_SCORE: 11

## 2019-07-15 NOTE — PLAN OF CARE
Pt A/Ox4. VSS on RA. C/o non radiating chest pain at 2/10, pt states it has remained at a 2/10 since receiving nitroglycerin in the ED. Denies SOB. PIV SL. Up independently.

## 2019-07-15 NOTE — H&P
"[facility]    History and Physical  Hospitalist       Date of Admission:  7/15/2019  Date of Service (when I saw the patient): 07/15/19    ASSESSMENT  Hugo Prince is a markedly pleasant 69 year old gentleman former smoker with morbid obesity, RAY on CPAP, GERD, prior spinal fusion surgery, and chronic recurrent nephrolithiasis who presents with chest pain and dyspnea.    PLAN    1) Chest pain and dyspnea: By history the pain is concerning for angina. We note that he had a Lexiscan 6/4/2019 done by his PCP for the pain that did not show clear evidence for reversible ischemia; he reportedly has also had recent spirometry testing through [facility] that showed only mild restrictive impairment Today's CT did not show PE. He could have ACS, coronary vasospasm, or severe GERD. The ureteral stone seen on today's CT seems less likely as a cause of chest pain.    -- Observation. Telemetry, serial enzymes, TTE. If these are negative, unsure if repeat stress test would be of benefit; he has an outpatient appointment scheduled to establish care with a Cardiologist in two days and it might be reasonable to let him keep that if symptoms resolved and he rules out for ACS here. Otherwise if he develops myonecrosis, would consult Cardiology.    2) Obstructive ureterolithiasis: Of note, he follows with Dr. Desai of Urology for chronic ongoing bilateral nephrolithiasis, which has required 9 prior lithotripsies. He just saw Dr. Desai on 7/8/2019 and had a KUB done and reviewed, with recommendations for visualised chronic stone burden as follows:     \"Impression: I had a very careful review of both the previous x-rays including previous KUB and CT scans as well as today's KUB. We then had a lengthy discussion about how we should proceed. The stone burden in the right kidney in particular is considerable. He weighs 302 pounds We had a discussion about how we would manage these should these need treating.  And " "in my opinion, both these weight would preclude ESWL on the right kidney and the location of the stones and volume of stones would be such that I would not consider ESWL as the best option.  In addition I would not consider ureteroscopy with holmium laser appropriate either for the same reasons excluding his weight. If the stones in the right kidney are going to be treated he should have a percutaneous nephrolithotomy which would be feasible although somewhat challenging with amount of his weight. On the left kidney the stone burden is less, and if he lost some weight it would be possible to consider ESWL for the stones in the left kidney at some point.     However after careful discussion it was clear that the stones are not causing symptoms in the moment, for that reason I do not think there is any urgency to initiate treatment and I would suggest we repeat a KUB in 6 months unless there is evidence of clinical symptoms developing before then.  I have briefed him in detail about all of the issues we discussed above. I answered many questions\"     Now on today's CT he has a 0.8 mm left sided ureteral stone causing hydronephrosis; non-obstructing stones were seen on prior CT 1/2019. At present he denies any symptoms of ureterolithiasis. He would like to discuss this finding further and I have placed Urology consultation while he is here for further guidance. Will also check a UA.    3) RAY: CPAP continued    4) Fatty liver: VARGAS suspected. I have not yet had a chance to discuss this finding with him; he might benefit from GI referral at discharge    5) We note as well that he takes Doxycycline at home for chronic tick bites BID    Chief Complaint   Chest pain    History is obtained from the patient, his wife at the bedside, and the ED physician whom I have spoken with    History of Present Illness   Hugo Prince is a very pleasant 69 year old gentleman who presents with chest pain that woke him from sleep " "this morning, sharp, severe, centrally located, constant in onset, radiating down both arms. He came to the ED where after 3 nitroglycerin tablets the pain resolved. It was associated with dyspnea. He adds that he has recently developed new onset exertional dyspnea over the winter, beginning while turkey hunting; this has progressed in severity over the past couple of months and with the dyspnea he has noticed intermittent chest pain, that would resolve with rest, less severe than tonight's episode which did not resolve spontaneously. He denies nausea with these episodes and denies diaphoresis, sour brash, change in chronic cough, worsening fatigue, leg swelling, or weight gain, or fever, chills, or any back or flank pain, dysuria, hematuria, or any other acute complaints.    In the ED, Blood pressure 116/82, pulse 90, temperature 98.4  F (36.9  C), temperature source Oral, resp. rate 11, height 1.778 m (5' 10\"), weight 137 kg (302 lb), SpO2 92 %.    CBC and BMP were within normal reference range and Troponin was 0.019. EKG showed NSR with occasional ectopy and no ST segment elevations. CT PA showed:    \"IMPRESSION:  1. There is no pulmonary embolus, aortic aneurysm or dissection.  2. There is a 0.8 cm left ureteropelvic junction stone causing mild  hydronephrosis.   3. Multiple bilateral renal stones.   4. Fatty infiltration of the liver. \"    PHYSICAL EXAM  Blood pressure 116/82, pulse 90, temperature 98.4  F (36.9  C), temperature source Oral, resp. rate 11, height 1.778 m (5' 10\"), weight 137 kg (302 lb), SpO2 92 %.    Constitutional: Alert and oriented to person, place and time; no apparent distress  HEENT: normocephalic moist mucus membranes  Respiratory: lungs clear to auscultation bilaterally  Cardiovascular: regular S1 S2   GI: abdomen soft non tender non distended bowel sounds positive  Lymph/Hematologic: no pallor, no cervical lymphadenopathy  Skin: no rash, good turgor  Musculoskeletal: mild to moderate " bilateral LE edema  Neurologic: extra-ocular muscles intact; moves all four extremities  Psychiatric: appropriate affect, insight and judgment     DVT Prophylaxis: Pneumatic Compression Devices  Code Status: Full Code    Disposition: Expected discharge in 0-2 days    Ok Hernandez MD    Past Medical History    I have reviewed this patient's medical history and updated it with pertinent information if needed.   Past Medical History:   Diagnosis Date     Atopic dermatitis      Esophageal reflux     Gastroesophageal reflux     Other and unspecified disc disorder of unspecified region     Intervertebral disc disorders     Personal history of colonic polyps     Colon polyps     S/P lateral meniscectomy of left knee      Tick bite 4/20/15    amoxicillin treated     Unspecified sleep apnea     Sleep apnea     Urinary calculus, unspecified     Renal stones     Vitamin D deficiency 9-27-09       Past Surgical History   I have reviewed this patient's surgical history and updated it with pertinent information if needed.  Past Surgical History:   Procedure Laterality Date     ARTHROPLASTY KNEE Left 1/7/2015    Procedure: ARTHROPLASTY KNEE;  Surgeon: Agustin Saenz MD;  Location:  OR     ARTHROSCOPY KNEE RT/LT  6-20-11     COLONOSCOPY  12/09     CYSTOSCOPY       EXTRACORPOREAL SHOCK WAVE LITHOTRIPSY (ESWL)  1/20/2012    Procedure:EXTRACORPOREAL SHOCK WAVE LITHOTRIPSY (ESWL); LEFT EXTRACORPOREAL SHOCK WAVE LITHOTRIPSY ; Surgeon:ANJUM BUNDY; Location: OR     EXTRACORPOREAL SHOCK WAVE LITHOTRIPSY, CYSTOSCOPY, INSERT STENT URETER(S), COMBINED  2/3/2012    Procedure:COMBINED EXTRACORPOREAL SHOCK WAVE LITHOTRIPSY, CYSTOSCOPY, INSERT STENT URETER(S); CYSTOSCOPY, RIGHT STENT; Surgeon:ANJUM BUNDY; Location: OR     FUSION LUMBAR ANTERIOR, FUSION LUMBAR POSTERIOR ONE LEVEL, COMBINED  11-1-10    L4-5     LASER HOLMIUM LITHOTRIPSY URETER(S), INSERT STENT, COMBINED  2/7/2012    Procedure:COMBINED  CYSTOSCOPY, URETEROSCOPY, LASER HOLMIUM LITHOTRIPSY URETER(S), INSERT STENT; CYSTOSCOPY, RIGHT URETEROSCOPY, RIGHT RETROGRADES,  STONE EXTRACTION WITH HOLMIUM LASER AND RIGHT URETERAL STENT EXCHANGE ; Surgeon:DEEPTI MELVIN; Location: OR     LITHOTRIPSY      Lithotrypsy     SEPTOPLASTY       VASECTOMY         Prior to Admission Medications   Prior to Admission Medications   Prescriptions Last Dose Informant Patient Reported? Taking?   IBUPROFEN PO   Yes No   Sig: Take 400 mg by mouth every 6 hours as needed for moderate pain    UNABLE TO FIND   Yes No   Sig: MEDICATION NAME: Nerve root injection   L3-4  Last done for back pain 9/25/18   Vardenafil HCl (LEVITRA PO)   Yes No   Sig: Take 10 mg by mouth   acetaminophen (TYLENOL) 500 MG tablet   Yes No   Sig: Take 500 mg by mouth At Bedtime    doxycycline (VIBRA-TABS) 100 MG tablet   Yes No   Sig: Take 100 mg by mouth 2 times daily Chronic tick bites   fluticasone (FLONASE) 50 MCG/ACT nasal spray   No No   Sig: SPRAY 1-2 SPRAYS INTO BOTH NOSTRILS DAILY.   Patient not taking: Reported on 7/8/2019   order for DME   Yes No   Sig: Equipment being ordered: CPAP   vardenafil (LEVITRA) 10 MG tablet   No No   Sig: Take 1 tablet (10 mg) by mouth daily as needed 60 min prior to sex. Do not use with nitroglycerin, terazosin or doxazosin.   vitamin B-12 (CYANOCOBALAMIN) 1000 MCG/ML injection   Yes No   Sig: Inject 1 mL into the muscle every 30 days      Facility-Administered Medications: None     Allergies   Allergies   Allergen Reactions     Chlorthalidone GI Disturbance     Compazine Other (See Comments)     PSYCHOTIC     Erythromycin Nausea and Vomiting     Flomax [Tamsulosin Hcl]      FLUSHING, NASAl CONGESTION     Prochlorperazine      LW Reaction: anxiety     Seasonal Allergies        Social History   I have reviewed this patient's social history and updated it with pertinent information if needed. Gastonnarayanjasmeet Prince  reports that he is a non-smoker but has been  exposed to tobacco smoke. He has never used smokeless tobacco. He reports that he drinks about 7.0 oz of alcohol per week. He reports that he does not use drugs.    Family History   Family history assessed and, except as above, is non-contributory.    Family History   Problem Relation Age of Onset     Melanoma Father      Prostate Cancer Father      Cancer Father         neck cancer from radiation at work (urology)     Breast Cancer Mother      C.A.D. Paternal Grandfather 72       Review of Systems   The 10 point Review of Systems is negative other than noted in the HPI or here.     Primary Care Physician   Juan De La Vega    Data   Labs Ordered and Resulted from Time of ED Arrival Up to the Time of Departure from the ED   BASIC METABOLIC PANEL - Abnormal; Notable for the following components:       Result Value    Glucose 152 (*)     All other components within normal limits   CBC WITH PLATELETS DIFFERENTIAL   TROPONIN I       Data reviewed today:  I personally reviewed the EKG tracing showing NSR, PVC's.    Recent Results (from the past 24 hour(s))   CT Chest/Abdomen/Pelvis w Contrast    Narrative    CT CHEST/ABDOMEN/PELVIS WITH CONTRAST  7/15/2019 3:30 AM    HISTORY:  Chest pain, shortness of breath; prioritize as pulmonary  embolism, but also capture aorta please.    TECHNIQUE: CT scan obtained of the chest, abdomen, and pelvis with  oral and IV contrast, 80 mL Isovue-370 injected. Radiation dose for  this scan was reduced using automated exposure control, adjustment of  the mA and/or kV according to patient size, or iterative  reconstruction technique.    COMPARISON:  1/8/2019.    FINDINGS:    Chest: Evaluation of the pulmonary arterial system shows no evidence  of embolus. There is no aortic aneurysm or dissection. The heart size  is normal. There is no mediastinal, hilar or axillary lymph node  enlargement. There is a small calcified granuloma in the right lung  base posteriorly. Mild dependent atelectasis  bilaterally. No  pneumothorax or pleural effusion.    ABDOMEN: There is diffuse fatty infiltration of the liver. The spleen,  gallbladder, pancreas and adrenal glands are normal in appearance.  There is a 0.8 cm stone at the left ureteropelvic junction causing  mild dilatation of the left renal collecting system. There are several  stones within both kidneys. There is no abdominal or pelvic lymph node  enlargement.    Pelvis: There is no bowel obstruction or inflammation. No free  intraperitoneal gas or fluid. Postoperative changes in the lumbar  spine. Degenerative disease throughout the spine.      Impression    IMPRESSION:  1. There is no pulmonary embolus, aortic aneurysm or dissection.  2. There is a 0.8 cm left ureteropelvic junction stone causing mild  hydronephrosis.   3. Multiple bilateral renal stones.   4. Fatty infiltration of the liver.

## 2019-07-15 NOTE — Clinical Note
The first balloon was inserted into the left anterior descending.Max pressure = 6 jose. Total duration = 25 seconds.     Max pressure = 6 jose. Total duration = 21 seconds.    Balloon reinflated a second time: Max pressure = 6 jose. Total duration = 21 seconds.

## 2019-07-15 NOTE — PLAN OF CARE
Tele: SD/SB. BP elevated- metoprolol started. Troponin continues to rise. Cardiology aware. Plan for angio in AM. Echo completed. Will continue to monitor.

## 2019-07-15 NOTE — PROGRESS NOTES
"Pt seen and examined. Seen by my Dr Hernandez earlier in the morning. H/P, labs, vital signs and orders reviewed. Agree with his A/P.  69-year-old male with history of obstructive sleep apnea, gastroesophageal reflux disease, prior spinal fusion surgery and recurrent nephrolithiasis presented to the emergency room with chest pain.  Also endorses some dyspnea on exertion stating back to April.  Pain was described as retrosternal, sharp, with radiation to both arms, relieved with nitroglycerin.    Initial troponin was negative however repeat at 4 hours now at 3.66  Endorses chest pain at 2/10 with mild dyspnea    Vital signs:  Temp: 97.6  F (36.4  C) Temp src: Oral BP: 142/89 Pulse: 68 Heart Rate: 70 Resp: 16 SpO2: 94 % O2 Device: None (Room air)   Height: 177.8 cm (5' 10\") Weight: 136.2 kg (300 lb 3.2 oz)  Estimated body mass index is 43.07 kg/m  as calculated from the following:    Height as of this encounter: 1.778 m (5' 10\").    Weight as of this encounter: 136.2 kg (300 lb 3.2 oz).  In general he appears comfortable  Cardia vascular exam: Regular rate and rhythm, no murmur  Respiratory: Clear to auscultation bilaterally with normal effort of breathing    Impression/plan:  Non-ST elevation MI  -Start heparin drip with bolus  -Continue aspirin  -Serial troponin until peak  -Echocardiogram this morning  -Cardiology consult this morning  -Transfer to heart center  -Start metoprolol and Lipitor  -Check lipid panel.    Obstructive ureterolithiasis:  -Await urology evaluation.    RAY:   -CPAP continued       "

## 2019-07-15 NOTE — ED NOTES
"[facility]  ED Nurse Handoff Report    ED Chief complaint: Chest Pain (Sharp midsternal CP for the last 20 minutes with rad. to bilat. arms. Concurrent SOB feeling. )      ED Diagnosis:   Final diagnoses:   Chest pain, unspecified type   Ureteral stone       Code Status: Full Code    Allergies:   Allergies   Allergen Reactions     Chlorthalidone GI Disturbance     Compazine Other (See Comments)     PSYCHOTIC     Erythromycin Nausea and Vomiting     Flomax [Tamsulosin Hcl]      FLUSHING, NASAl CONGESTION     Prochlorperazine      LW Reaction: anxiety     Seasonal Allergies        Activity level - Baseline/Home:  Independent  Activity Level - Current:   Independent    Patient's Preferred language: english     Needed?: No    Isolation: No  Infection: Not Applicable  Bariatric?: Yes    Vital Signs:   Vitals:    07/15/19 0238 07/15/19 0242 07/15/19 0245 07/15/19 0255   BP: (!) 179/97 (!) 175/97 (!) 146/99 116/82   Pulse:  80 88 90   Resp: 18 10 12 11   Temp: 98.4  F (36.9  C)      TempSrc: Oral      SpO2: 95% 90% 93% 92%   Weight: 137 kg (302 lb)      Height: 1.778 m (5' 10\")          Cardiac Rhythm: ,        Pain level: 0-10 Pain Scale: 8    Is this patient confused?: No   Does this patient have a guardian?  No         If yes, is there guardianship documents in the Epic \"Code/ACP\" activity?  N/A         Guardian Notified?  N/A  [place] - Suicide Severity Rating Scale Completed?  Yes  If yes, what color did the patient score?  White    Patient Report: Initial Complaint: pt woke up from sleep with sharp midsternal cp radiating down bilateral arms.  Focused Assessment: midsternal cp with radiation down both arms, sob - lungs clear to auscultation  Tests Performed: labs, CT chest/abd/pelvis  Abnormal Results:   Treatments provided: 2 ntg sl and gi cocktail which relieved the chest pain. Pt still c/o little discomfort to \"left bicep\"    Family Comments: wife at bedside    OBS brochure/video " discussed/provided to patient/family: N/A              Name of person given brochure if not patient: n/a              Relationship to patient: n/a    ED Medications:   Medications   nitroGLYcerin (NITROSTAT) sublingual tablet 0.4 mg (0.4 mg Sublingual Given 7/15/19 0247)   HYDROmorphone (PF) (DILAUDID) injection 0.5 mg (has no administration in time range)   lidocaine HCl (XYLOCAINE) 2 % 15 mL, alum & mag hydroxide-simethicone (MYLANTA ES/MAALOX  ES) 15 mL GI Cocktail (30 mLs Oral Given 7/15/19 0257)   iopamidol (ISOVUE-370) solution 80 mL (80 mLs Intravenous Given 7/15/19 0325)   CT scan flush (100 mLs Intravenous Given 7/15/19 0325)       Drips infusing?:  No    For the majority of the shift this patient was Green.   Interventions performed were reassurance and information.    Severe Sepsis OR Septic Shock Diagnosis Present: No    To be done/followed up on inpatient unit:  n/a    ED NURSE PHONE NUMBER: [phone]

## 2019-07-15 NOTE — PROVIDER NOTIFICATION
Pt's troponin level elevated to 12.35 now remains asymptomatic. Spoke with Dr. Zhao, only repeat EKG if he becomes symptomatic. We draw another troponin in 4 hours. Will continue to monitor closely.

## 2019-07-15 NOTE — Clinical Note
Stent deployed in the proximal left anterior descending. Max pressure = 11 jose. Total duration = 25 seconds.

## 2019-07-15 NOTE — PHARMACY-ADMISSION MEDICATION HISTORY
Admission medication history interview status for the 7/15/2019  admission is complete. See EPIC admission navigator for prior to admission medications     Medication history source reliability:Good    Actions taken by pharmacist (provider contacted, etc): Patient interview.     Additional medication history information not noted on PTA med list :None    Medication reconciliation/reorder completed by provider prior to medication history? Yes    Time spent in this activity: 15 minutes    Prior to Admission medications    Medication Sig Last Dose Taking? Auth Provider   ibuprofen (ADVIL/MOTRIN) 200 MG capsule Take 400 mg by mouth 2 times daily 7/14/2019 at Unknown time Yes Unknown, Entered By History   vardenafil (LEVITRA) 10 MG tablet Take 1 tablet (10 mg) by mouth daily as needed 60 min prior to sex. Do not use with nitroglycerin, terazosin or doxazosin. Past Month at Unknown time Yes Dima Desai MD   vitamin B-12 (CYANOCOBALAMIN) 1000 MCG tablet Take 1,000 mcg by mouth daily Past Week at Unknown time Yes Unknown, Entered By History   order for DME Equipment being ordered: CPAP   Reported, Patient

## 2019-07-15 NOTE — PLAN OF CARE
Pt AOx4, up independently. VSS ex HTN. Trop 3.59. Spoke with MD- ordered echo, cardiology consult, tx to CICU. Report given to HC RN. Pt rated chest pain mild 2/10, not radiating. Nitroglycerin given x1 with relief. Pt transferred to  at 0830 with transport and personal belongings, family updated on room change.

## 2019-07-15 NOTE — Clinical Note
The first balloon was inserted into the left anterior descending and proximal left anterior descending.Max pressure = 14 jose. Total duration = 15 seconds.     Max pressure = 14 jose. Total duration = 16 seconds.    Balloon reinflated a second time: Max pressure = 14 jose. Total duration = 16 seconds.

## 2019-07-15 NOTE — ED NOTES
RECEIVING UNIT ED HANDOFF REVIEW    ED Nurse Handoff Report was reviewed by: Jennifer Sandoval on July 15, 2019 at 4:59 AM

## 2019-07-15 NOTE — CONSULTS
[facility]    Cardiology Consultation     Date of Admission:  7/15/2019  Date of Consult (When I saw the patient): 07/15/19    Assessment & Plan   Hugo Prince is a 69 year old male who was admitted on 7/15/2019.    1-non-ST elevation myocardial infarction.  Has been symptom-free since treated in the emergency room.  EKG suggest lateral location.  Troponin rise very mild.  Has had unstable angina the last few weeks.    2-recurrent renal stones, has partial obstruction with a little bit of hydronephrosis currently and being seen by urology.  May well need the stone treated probably should have his acute coronary syndrome evaluated and treated first.    Recommendation  1-coronary angiography and possible intervention this admission.  I discussed the risks indications alternatives and benefits with the patient today and he wants to proceed.  We will likely have to wait until tomorrow as there are large volume of cases already on the schedule today and he is currently symptom-free.  He should continue on anticoagulation with heparin in the meantime as well as beta-blocker and aspirin.  Would add nitroglycerin infusion and eptifibatide if recurrent symptoms on this regimen and then proceed at that time with angiography.  Will assess echocardiogram for LV function today also.    Pascual Zhao M.D.    Primary Care Physician   J[facility]    Reason for Consult   Reason for consult: I was asked by Dr. Fay to evaluate this patient for chest pain with elevated troponins.    History of Present Illness   Hugo Prince is a 69 year old male who presents with prolonged episode of severe chest pain.  He noted the onset of limiting exertional dyspnea and some chest discomfort early summer.  He was having a difficult time doing turkey hunting or flyfishing because of the symptoms.  At that time he actually proceeded onto a stress study with a nuclear perfusion study showing normal wall  motion and no perfusion defects.  However symptoms persisted and steadily worsened until in the last week they were occurring with mild exertion although not at rest.  However last night he woke up from sleep with the most severe symptoms he has had with marked chest pain in addition to his usual shortness of breath.  On presentation to the emergency room an EKG did not show ST elevation.  He was treated medically including nitroglycerin and his symptoms resolved.  He thinks the episode lasted as long as an hour.  It is not recurred since then.  I reviewed the  EKG from the ED.  This showed mild ST depression in leads 1, AV L and lead two.  On this morning's EKG these have resolved.  He has sleep apnea and uses CPAP at night.  He has had no orthopnea, PND, edema.  Denies palpitations dizziness or syncope.  Has history of recurrent renal stones and has had multiple lithotripsies and had to have ureteral stents in the past.  Presentation a CT scan did show evidence of partial obstruction with some mild hydronephrosis.  The urologist consult is not available yet but he was told he might require stone removal for that stone.  No gross hematuria.  No other bleeding history.  No trauma history, GI bleeding history, focal neurologic symptoms, claudication.    Patient Active Problem List   Diagnosis     Kidney stone     Sleep apnea     ED (erectile dysfunction)     Health Care Home     GERD (gastroesophageal reflux disease)     Elevated cholesterol     S/P total knee replacement     Dermatitis     Physical deconditioning     Knee pain     DJD (degenerative joint disease) of knee     Anemia due to blood loss, acute     Edema     S/P knee surgery     Chronic pharyngitis and nasopharyngitis(472)     Morbid obesity (H)     NSTEMI (non-ST elevated myocardial infarction) (H)       Past Medical History   I have reviewed this patient's medical history and updated it with pertinent information if needed.   Past Medical History:    Diagnosis Date     Atopic dermatitis      Esophageal reflux     Gastroesophageal reflux     Other and unspecified disc disorder of unspecified region     Intervertebral disc disorders     Personal history of colonic polyps     Colon polyps     S/P lateral meniscectomy of left knee      Tick bite 4/20/15    amoxicillin treated     Unspecified sleep apnea     Sleep apnea     Urinary calculus, unspecified     Renal stones     Vitamin D deficiency 9-27-09       Past Surgical History   I have reviewed this patient's surgical history and updated it with pertinent information if needed.  Past Surgical History:   Procedure Laterality Date     ARTHROPLASTY KNEE Left 1/7/2015    Procedure: ARTHROPLASTY KNEE;  Surgeon: Agustin Saenz MD;  Location:  OR     ARTHROSCOPY KNEE RT/LT  6-20-11     COLONOSCOPY  12/09     CYSTOSCOPY       EXTRACORPOREAL SHOCK WAVE LITHOTRIPSY (ESWL)  1/20/2012    Procedure:EXTRACORPOREAL SHOCK WAVE LITHOTRIPSY (ESWL); LEFT EXTRACORPOREAL SHOCK WAVE LITHOTRIPSY ; Surgeon:ANJUM BUNDY; Location: OR     EXTRACORPOREAL SHOCK WAVE LITHOTRIPSY, CYSTOSCOPY, INSERT STENT URETER(S), COMBINED  2/3/2012    Procedure:COMBINED EXTRACORPOREAL SHOCK WAVE LITHOTRIPSY, CYSTOSCOPY, INSERT STENT URETER(S); CYSTOSCOPY, RIGHT STENT; Surgeon:ANJUM BUNDY; Location: OR     FUSION LUMBAR ANTERIOR, FUSION LUMBAR POSTERIOR ONE LEVEL, COMBINED  11-1-10    L4-5     LASER HOLMIUM LITHOTRIPSY URETER(S), INSERT STENT, COMBINED  2/7/2012    Procedure:COMBINED CYSTOSCOPY, URETEROSCOPY, LASER HOLMIUM LITHOTRIPSY URETER(S), INSERT STENT; CYSTOSCOPY, RIGHT URETEROSCOPY, RIGHT RETROGRADES,  STONE EXTRACTION WITH HOLMIUM LASER AND RIGHT URETERAL STENT EXCHANGE ; Surgeon:DEEPTI MELVIN; Location: OR     LITHOTRIPSY      Lithotrypsy     SEPTOPLASTY       VASECTOMY         Prior to Admission Medications   Prior to Admission Medications   Prescriptions Last Dose Informant Patient Reported? Taking?    ibuprofen (ADVIL/MOTRIN) 200 MG capsule 7/14/2019 at Unknown time Self Yes Yes   Sig: Take 400 mg by mouth 2 times daily   order for DME  Self Yes No   Sig: Equipment being ordered: CPAP   vardenafil (LEVITRA) 10 MG tablet Past Month at Unknown time Self No Yes   Sig: Take 1 tablet (10 mg) by mouth daily as needed 60 min prior to sex. Do not use with nitroglycerin, terazosin or doxazosin.   vitamin B-12 (CYANOCOBALAMIN) 1000 MCG tablet Past Week at Unknown time Self Yes Yes   Sig: Take 1,000 mcg by mouth daily      Facility-Administered Medications: None     Current Facility-Administered Medications   Medication Dose Route Frequency     [START ON 7/16/2019] aspirin  81 mg Oral Daily     atorvastatin  40 mg Oral QPM     HYDROmorphone  0.5 mg Intravenous Once     metoprolol tartrate  25 mg Oral BID     sodium chloride (PF)  3 mL Intracatheter Q8H     Current Facility-Administered Medications   Medication Last Rate     HEParin 1,200 Units/hr (07/15/19 0903)     Allergies   Allergies   Allergen Reactions     Chlorthalidone GI Disturbance     Compazine Other (See Comments)     PSYCHOTIC     Erythromycin Nausea and Vomiting     Flomax [Tamsulosin Hcl]      FLUSHING, NASAl CONGESTION     Prochlorperazine      LW Reaction: anxiety     Seasonal Allergies        Social History    reports that he is a non-smoker but has been exposed to tobacco smoke. He has never used smokeless tobacco. He reports that he drinks about 7.0 oz of alcohol per week. He reports that he does not use drugs.    Family History   Family History   Problem Relation Age of Onset     Melanoma Father      Prostate Cancer Father      Cancer Father         neck cancer from radiation at work (urology)     Breast Cancer Mother      C.A.D. Paternal Grandfather 72       Review of Systems   The 10 point Review of Systems is negative other than noted in the HPI or here.     Physical Exam   Vital Signs with Ranges  Temp:  [95.8  F (35.4  C)-98.4  F (36.9  C)] 97.5  " F (36.4  C)  Pulse:  [68-90] 68  Heart Rate:  [70-89] 70  Resp:  [10-18] 11  BP: (116-183)/(82-99) 183/97  SpO2:  [90 %-98 %] 98 %  Vitals:    07/15/19 0238 07/15/19 0600   Weight: 137 kg (302 lb) 136.2 kg (300 lb 3.2 oz)     I/O last 3 completed shifts:  In: -   Out: 125 [Urine:125]    Vitals: BP (!) 183/97 (BP Location: Right arm)   Pulse 68   Temp 97.5  F (36.4  C) (Oral)   Resp 11   Ht 1.778 m (5' 10\")   Wt 136.2 kg (300 lb 3.2 oz)   SpO2 98%   BMI 43.07 kg/m      Constitutional: Normally developed, overweight, no respiratory or other distress.  Stable vital signs    Skin: Grossly clear.  Well-healed previous surgical incisions    Head/Eyes/ENT:   No cyanosis, icterus, pallor, trauma.  Mallampati 3    Neck:   Supple, normal carotid upstrokes without bruits.  No mass    Chest:   Lungs are clear with normal air movement.    Cardiac: Distant heart sounds with regular rhythm without murmur rub gallop Collyer JVD    Abdomen:   Obese, nondistended, nontender without bruit or organomegaly     Vascular: Pulses are intact and symmetrical.  No femoral bruits     Extremities and Back:   No cyanosis, erythema, edema.  Status post left TKA with healed incision     Neurological:   Alert and oriented x3.  Pupils equal.  Extraocular motions intact.  Symmetrical facies and normal speech.  Upper and lower motor strength and tone grossly intact.  No focal findings.    Recent Labs   Lab 07/15/19  0920 07/15/19  0638 07/15/19  0235   TROPI 6.825* 3.599* 0.019       Recent Labs   Lab 07/15/19  0920 07/15/19  0638 07/15/19  0235   WBC  --   --  7.7   HGB  --   --  16.5   MCV  --   --  96   PLT  --   --  202   NA  --   --  140   POTASSIUM  --   --  3.7   CHLORIDE  --   --  105   CO2  --   --  24   BUN  --   --  20   CR  --   --  0.99   GFRESTIMATED  --   --  76   GFRESTBLACK  --   --  88   ANIONGAP  --   --  11   NATHALIA  --   --  8.9   GLC  --   --  152*   TROPI 6.825* 3.599* 0.019       Imaging:  Recent Results (from the past 48 " hour(s))   CT Chest/Abdomen/Pelvis w Contrast    Narrative    CT CHEST/ABDOMEN/PELVIS WITH CONTRAST  7/15/2019 3:30 AM    HISTORY:  Chest pain, shortness of breath; prioritize as pulmonary  embolism, but also capture aorta please.    TECHNIQUE: CT scan obtained of the chest, abdomen, and pelvis with  oral and IV contrast, 80 mL Isovue-370 injected. Radiation dose for  this scan was reduced using automated exposure control, adjustment of  the mA and/or kV according to patient size, or iterative  reconstruction technique.    COMPARISON:  1/8/2019.    FINDINGS:    Chest: Evaluation of the pulmonary arterial system shows no evidence  of embolus. There is no aortic aneurysm or dissection. The heart size  is normal. There is no mediastinal, hilar or axillary lymph node  enlargement. There is a small calcified granuloma in the right lung  base posteriorly. Mild dependent atelectasis bilaterally. No  pneumothorax or pleural effusion.    ABDOMEN: There is diffuse fatty infiltration of the liver. The spleen,  gallbladder, pancreas and adrenal glands are normal in appearance.  There is a 0.8 cm stone at the left ureteropelvic junction causing  mild dilatation of the left renal collecting system. There are several  stones within both kidneys. There is no abdominal or pelvic lymph node  enlargement.    Pelvis: There is no bowel obstruction or inflammation. No free  intraperitoneal gas or fluid. Postoperative changes in the lumbar  spine. Degenerative disease throughout the spine.      Impression    IMPRESSION:  1. There is no pulmonary embolus, aortic aneurysm or dissection.  2. There is a 0.8 cm left ureteropelvic junction stone causing mild  hydronephrosis.   3. Multiple bilateral renal stones.   4. Fatty infiltration of the liver.     CINTHYA WILKINS MD       Echo:  No results found for this or any previous visit (from the past 4320 hour(s)).

## 2019-07-15 NOTE — ED PROVIDER NOTES
History     Chief Complaint:  Chest Pain     HPI   Hugo Prince is a 69 year old male with a history of esophageal reflux who presents with chest pain. The patient states that he woke up from sleep about 20 minutes ago with mid sternal chest pain that radiates down both of his arms. The patient state the pain is continuous and generally painful stating he does not know how to describe it The patient also notes shortness of breath. The patient notes he first started shortness of breath in May while fly fishing and has had a NM Lexiscan and KUB Xray on 07/08, results below. The patient did note his blood pressure was high during his KUB imaging though he denies a history of hypertension. The patient denies leg swelling or pain. The patient did take a 4 hour car ride yesterday.     KUB XR 7/8  Respiratory motion artifact is noted. Bilateral flank  calcifications, though blurred, are probably unchanged from  12/17/2018. The largest is in the right lower pole and measures 1.6  cm. Lower pelvic calcifications adjacent to the midline also appear  unchanged and may be prostatic in origin.    06/04 NM MPI w Lexiscan   1.  Myocardial perfusion imaging using single isotope technique  demonstrated probably normal perfusion.   2. Gated images demonstrated borderline dilated LV cavity size with  normal LV systolic function.  The left ventricular systolic function  is 61%.  3. Compared to the prior study from no prior study .    Allergies:  Chlorthalidone  Compazine  Erythromycin  Flomax  Prochlorperazine  Seasonal allergies      Medications:    Omeprazole  Vardenafil    Past Medical History:    Obesity   Esophageal reflux  Tick bite  Sleep apnea   Colonic polyps  Urinary calculus   Degenerative joint disease   Erectile dysfunction     Past Surgical History:    Arthroplasty knee  Shock wave lithotripsy x2  Fusion lumbar anterior l4-l5  Septoplasty  Vasectomy     Family History:    Father: melanoma, prostate  "cancer  Mother: breast cancer     Social History:  Smoking Status: passive smoker and former smoker  Smokeless Tobacco: Never Used  Alcohol Use: Positive  Drug use: No  Marital Status:       Review of Systems   Respiratory: Positive for shortness of breath.    Cardiovascular: Positive for chest pain. Negative for leg swelling.   All other systems reviewed and are negative.    Physical Exam     Patient Vitals for the past 24 hrs:   BP Temp Temp src Pulse Heart Rate Resp SpO2 Height Weight   07/15/19 0255 116/82 -- -- 90 84 11 92 % -- --   07/15/19 0245 (!) 146/99 -- -- 88 89 12 93 % -- --   07/15/19 0242 (!) 175/97 -- -- 80 76 10 90 % -- --   07/15/19 0238 (!) 179/97 98.4  F (36.9  C) Oral -- 78 18 95 % 1.778 m (5' 10\") 137 kg (302 lb)     Physical Exam  General: Resting on the gurney, appears very uncomfortable  Head:  The scalp, face, and head appear normal  Mouth/Throat: Mucus membranes are moist  CV:  Tachycardic    Normal S1 and S2  No pathological murmur   Resp:  Breath sounds clear and equal bilaterally    Non-labored, no retractions or accessory muscle use    No coarseness    No wheezing   GI:  Abdomen is soft, no rigidity    No tenderness to palpation  MS:  Normal motor assessment of all extremities.    Good capillary refill noted.    Bilateral lower extremity edema is present  Skin:   No rash or lesions noted. Slightly diaphoretic.   Neuro:   Speech is normal and fluent. No apparent deficit.  Psych: Awake. Alert.  Normal affect.      Appropriate interactions.  Emergency Department Course   ECG:  ECG taken at 0219  Sinus rhythm with occasional ventricular complexes  Otherwise normal EKG  Rate 82 bpm. NH interval 192 ms. QRS duration 86 ms. QT/QTc 406/474 ms. P-R-T axes 50 0 49.    Imaging:  Radiology findings were communicated with the patient who voiced understanding of the findings.    CT Chest/Abdomen/Pelvis w Contrast  1. There is no pulmonary embolus, aortic aneurysm or dissection.  2. There is " a 0.8 cm left ureteropelvic junction stone causing mild  hydronephrosis.   3. Multiple bilateral renal stones.   4. Fatty infiltration of the liver.   Reading per radiology     Laboratory:  Laboratory findings were communicated with the patient who voiced understanding of the findings.    CBC: WBC 7.7, HGB 16.5,   BMP: glucose 152(H) o/w WNL (Creatinine 0.99)  Troponin (Collected 0235): 0.019    Interventions:  0241 Nitroglycerin 0.4 mg Sublingual   0247 Nitroglycerin 0.4 mg Sublingual   0257 GI Cocktail 30 mL Oral    Emergency Department Course:  Nursing notes and vitals reviewed.    0223 I performed an exam of the patient as documented above.     0235 IV was inserted and blood was drawn for laboratory testing, results above.    0307 The patient was sent for a CT Chest Abdomen Pelvis while in the emergency department, results above.     0425 I returned to update the patient.     0442 I spoke with Dr. Hernandez of the Hospitalist service from [facility] regarding patient's presentation, findings, and plan of care.    0450 Findings and plan explained to the Patient who consents to admission. Discussed the patient with Dr. Hernandez, who will admit the patient to an observation bed for further monitoring, evaluation, and treatment.    Impression & Plan    Medical Decision Making:  Hugo Prince is a 69 year old male who presents to the emergency department today for evaluation of chest pain. Details of the patient's history can be seen in the HPI. Differential diagnosis included ACS, dissection, pneumothorax, pneumonia, PE, costochondritis, pericarditis, panic attack, gastric reflux, amongst others. Workup in the ED yielded no acute abnormalities with CBC or BMP. Initial troponin returned negative. ECG did not show any evidence of STEMI. CT Chest Abdomen Pelvis 0.8 cm left ureteropelvic junction stone causing mild hydronephrosis though no signs of pulmonary embolism or other cardiac pathology.   Interventions here in the ED included Nitroglycerin and GI Cocktail with little relief of pain, but later ntg did resolve pain entirely. Due to the patient's past medical history and heart score, they would benefit from furhter cardiac rule out as well as for evalution by urology for his newly obstructive s tone.  They will be placed on continuous cardiac monitoring, serial troponins, ACS rule out, and urology consult. I consulted with Dr. Hernandez who agreed to admit the patient into the hospital for further management and care. All of the findings as noted above were discussed in great detail with the patient. All questions answered prior to admission. They were in agreement with the treatment plan as stated above.     Diagnosis:    ICD-10-CM    1. Chest pain, unspecified type R07.9    2. Ureteral stone N20.1      Disposition:   The patient is admitted into the care of Dr. Hernandez.    Scribe Disclosure:  SARAH Mitali Alexandraion, am serving as a scribe at 2:25 AM on 7/15/2019 to document services personally performed by Edith Calero MD based on my observations and the provider's statements to me.   EMERGENCY DEPARTMENT       Edith Calero MD  07/18/19 [id number]

## 2019-07-15 NOTE — CONSULTS
Urology Consult History and Physical    Name: Hugo Prince    MRN: [id number]   YOB: 1950       We were asked to see Hugo Prince at the request of Dr. Fay for evaluation and treatment of bilateral nephrolithiasis.          Chief Complaint:   Chest pain    History is obtained from the patient and the medical record.          History of Present Illness:   Hugo Prince is a 69 year old male who presented to the emergency room overnight with severe chest pain.  His EKG did not show any ST elevation however he was noted to have an elevated troponin.  His chest pain improved with administration of nitroglycerin.  He has a long history of nephrolithiasis and has followed with my partner Dr. Desai whom he last saw on July 8.  CT scan in the emergency room does reveal significant bilateral nephrolithiasis with an 8 mm stone at the left UPJ causing mild hydronephrosis.  He denies significant left flank pain, hematuria, fever, chills, nausea, or vomiting.           Past Medical History:     Past Medical History:   Diagnosis Date     Atopic dermatitis      Esophageal reflux     Gastroesophageal reflux     Other and unspecified disc disorder of unspecified region     Intervertebral disc disorders     Personal history of colonic polyps     Colon polyps     S/P lateral meniscectomy of left knee      Tick bite 4/20/15    amoxicillin treated     Unspecified sleep apnea     Sleep apnea     Urinary calculus, unspecified     Renal stones     Vitamin D deficiency 9-27-09            Past Surgical History:     Past Surgical History:   Procedure Laterality Date     ARTHROPLASTY KNEE Left 1/7/2015    Procedure: ARTHROPLASTY KNEE;  Surgeon: Agustin Saenz MD;  Location: [facility]     ARTHROSCOPY KNEE RT/LT  6-20-11     COLONOSCOPY  12/09     CYSTOSCOPY       EXTRACORPOREAL SHOCK WAVE LITHOTRIPSY (ESWL)  1/20/2012    Procedure:EXTRACORPOREAL SHOCK WAVE LITHOTRIPSY (ESWL); LEFT EXTRACORPOREAL  SHOCK WAVE LITHOTRIPSY ; Surgeon:ANJUM BUNDY; Location:[facility]     EXTRACORPOREAL SHOCK WAVE LITHOTRIPSY, CYSTOSCOPY, INSERT STENT URETER(S), COMBINED  2/3/2012    Procedure:COMBINED EXTRACORPOREAL SHOCK WAVE LITHOTRIPSY, CYSTOSCOPY, INSERT STENT URETER(S); CYSTOSCOPY, RIGHT STENT; Surgeon:ANJUM BUNDY; Location:[facility]     FUSION LUMBAR ANTERIOR, FUSION LUMBAR POSTERIOR ONE LEVEL, COMBINED  11-1-10    L4-5     LASER HOLMIUM LITHOTRIPSY URETER(S), INSERT STENT, COMBINED  2/7/2012    Procedure:COMBINED CYSTOSCOPY, URETEROSCOPY, LASER HOLMIUM LITHOTRIPSY URETER(S), INSERT STENT; CYSTOSCOPY, RIGHT URETEROSCOPY, RIGHT RETROGRADES,  STONE EXTRACTION WITH HOLMIUM LASER AND RIGHT URETERAL STENT EXCHANGE ; Surgeon:DEEPTI MELVIN; Location:[facility]     LITHOTRIPSY      Lithotrypsy     SEPTOPLASTY       VASECTOMY              Social History:     Social History     Tobacco Use     Smoking status: Passive Smoke Exposure - Never Smoker     Smokeless tobacco: Never Used     Tobacco comment: has cigars when it is warm out   Substance Use Topics     Alcohol use: Yes     Alcohol/week: 7.0 oz     Types: 14 Standard drinks or equivalent per week            Family History:     Family History   Problem Relation Age of Onset     Melanoma Father      Prostate Cancer Father      Cancer Father         neck cancer from radiation at work (urology)     Breast Cancer Mother      C.A.D. Paternal Grandfather 72              Allergies:     Allergies   Allergen Reactions     Chlorthalidone GI Disturbance     Compazine Other (See Comments)     PSYCHOTIC     Erythromycin Nausea and Vomiting     Flomax [Tamsulosin Hcl]      FLUSHING, NASAl CONGESTION     Prochlorperazine      LW Reaction: anxiety     Seasonal Allergies             Medications:     Current Facility-Administered Medications   Medication     acetaminophen (TYLENOL) Suppository 650 mg     acetaminophen (TYLENOL) tablet 650 mg     alum & mag hydroxide-simethicone  "(MYLANTA ES/MAALOX  ES) suspension 30 mL     [START ON 7/16/2019] aspirin EC tablet 81 mg     atorvastatin (LIPITOR) tablet 40 mg     heparin infusion 25,000 units in 0.45% NaCl 250 mL     HYDROmorphone (PF) (DILAUDID) injection 0.5 mg     lidocaine (LMX4) cream     lidocaine 1 % 0.1-1 mL     metoprolol tartrate (LOPRESSOR) tablet 25 mg     naloxone (NARCAN) injection 0.1-0.4 mg     nitroGLYcerin (NITROSTAT) sublingual tablet 0.4 mg     sodium chloride (PF) 0.9% PF flush 3 mL     sodium chloride (PF) 0.9% PF flush 3 mL             Review of Systems:    ROS: 10 point ROS neg other than the symptoms noted above in the HPI.          Physical Exam:     Patient Vitals for the past 24 hrs:   BP Temp Temp src Pulse Heart Rate Resp SpO2 Height Weight   07/15/19 0851 (!) 183/97 97.5  F (36.4  C) Oral -- 70 11 98 % -- --   07/15/19 0832 142/89 97.6  F (36.4  C) Oral 68 -- 16 94 % -- --   07/15/19 0600 -- -- -- -- -- -- -- -- 136.2 kg (300 lb 3.2 oz)   07/15/19 0537 155/86 95.8  F (35.4  C) Oral -- 70 16 94 % -- --   07/15/19 0255 116/82 -- -- 90 84 11 92 % -- --   07/15/19 0245 (!) 146/99 -- -- 88 89 12 93 % -- --   07/15/19 0242 (!) 175/97 -- -- 80 76 10 90 % -- --   07/15/19 0238 (!) 179/97 98.4  F (36.9  C) Oral -- 78 18 95 % 1.778 m (5' 10\") 137 kg (302 lb)     General: age-appropriate appearing male in NAD  HEENT: Head AT/NC, EOMI, CN Grossly intact  Lungs: no respiratory distress, or pursed lip breathing  Heart: No obvious jugular venous distension present  Back: no bony midline tenderness, no CVAT bilaterally.  Abdomen: soft, obesely-distended, non-tender. No organomegaly  : No costovertebral tenderness bilaterally   Lymph: no palpable inguinal lymphadenopathy.  LE: no edema. pneumoboots in place.  Musculoskeltal: extremities normal, no peripheral edema  Skin: no suspicious lesions or rashes  Neuro: Alert, oriented, speech and mentation normal;  moving all 4 extremities equally.  Psych: affect and mood normal         "  Data:   All laboratory data reviewed:    Recent Labs   Lab 07/15/19  0235   WBC 7.7   HGB 16.5        Recent Labs   Lab 07/15/19  0235      POTASSIUM 3.7   CHLORIDE 105   CO2 24   BUN 20   CR 0.99   *   NATHALIA 8.9     Recent Labs   Lab 07/15/19  0540   COLOR Yellow   APPEARANCE Clear   URINEGLC Negative   URINEBILI Negative   URINEKETONE Negative   SG 1.015   URINEPH 5.5   PROTEIN 10*   NITRITE Negative   LEUKEST Negative   RBCU 8*   WBCU 1       All pertinent imaging reviewed:    All imaging studies reviewed by me.  I personally reviewed these imaging films.  A formal report from radiology will follow.    FINDINGS:     Chest: Evaluation of the pulmonary arterial system shows no evidence  of embolus. There is no aortic aneurysm or dissection. The heart size  is normal. There is no mediastinal, hilar or axillary lymph node  enlargement. There is a small calcified granuloma in the right lung  base posteriorly. Mild dependent atelectasis bilaterally. No  pneumothorax or pleural effusion.     ABDOMEN: There is diffuse fatty infiltration of the liver. The spleen,  gallbladder, pancreas and adrenal glands are normal in appearance.  There is a 0.8 cm stone at the left ureteropelvic junction causing  mild dilatation of the left renal collecting system. There are several  stones within both kidneys. There is no abdominal or pelvic lymph node  enlargement.     Pelvis: There is no bowel obstruction or inflammation. No free  intraperitoneal gas or fluid. Postoperative changes in the lumbar  spine. Degenerative disease throughout the spine.                                                                      IMPRESSION:  1. There is no pulmonary embolus, aortic aneurysm or dissection.  2. There is a 0.8 cm left ureteropelvic junction stone causing mild  hydronephrosis.   3. Multiple bilateral renal stones.   4. Fatty infiltration of the liver.              Impression and Plan:   Impression:   69 year old man  admitted with non-ST elevation MI and bilateral nephrolithiasis      Plan:   Non-ST elevation MI  - Treatment for this clearly takes priority over nephrolithiasis  - No contraindication for full anti-coagulation or dual anti-platelet therapy from Urology standpoint     Bilateral nephrolithiasis with 8mm LEFT UPJ stone   - We discussed that this LEFT stone is causing very mild hydronephrosis  - He currently has not sign of infection with non-concerning U/A and no leukocytosis   - He may benefit from a ureteral stent placement, however this is not urgent and can occur following treatment of his MI     Ariel Ochoa   Urology  [facility]  Clinic Phone [phone]

## 2019-07-16 ENCOUNTER — HOSPITAL ENCOUNTER (OUTPATIENT)
Facility: CLINIC | Age: 69
End: 2019-07-16

## 2019-07-16 ENCOUNTER — SURGERY (OUTPATIENT)
Age: 69
End: 2019-07-16
Payer: MEDICARE

## 2019-07-16 LAB — LMWH PPP CHRO-ACNC: 0.15 IU/ML

## 2019-07-16 PROCEDURE — 99233 SBSQ HOSP IP/OBS HIGH 50: CPT | Performed by: HOSPITALIST

## 2019-07-16 PROCEDURE — 25000132 ZZH RX MED GY IP 250 OP 250 PS 637: Mod: GY | Performed by: INTERNAL MEDICINE

## 2019-07-16 PROCEDURE — C1894 INTRO/SHEATH, NON-LASER: HCPCS | Performed by: INTERNAL MEDICINE

## 2019-07-16 PROCEDURE — 93005 ELECTROCARDIOGRAM TRACING: CPT

## 2019-07-16 PROCEDURE — 25000132 ZZH RX MED GY IP 250 OP 250 PS 637: Mod: GY | Performed by: PHYSICIAN ASSISTANT

## 2019-07-16 PROCEDURE — 85520 HEPARIN ASSAY: CPT | Performed by: INTERNAL MEDICINE

## 2019-07-16 PROCEDURE — 027034Z DILATION OF CORONARY ARTERY, ONE ARTERY WITH DRUG-ELUTING INTRALUMINAL DEVICE, PERCUTANEOUS APPROACH: ICD-10-PCS | Performed by: INTERNAL MEDICINE

## 2019-07-16 PROCEDURE — 99232 SBSQ HOSP IP/OBS MODERATE 35: CPT | Mod: 25 | Performed by: INTERNAL MEDICINE

## 2019-07-16 PROCEDURE — 99153 MOD SED SAME PHYS/QHP EA: CPT | Performed by: INTERNAL MEDICINE

## 2019-07-16 PROCEDURE — 21000001 ZZH R&B HEART CARE

## 2019-07-16 PROCEDURE — C1769 GUIDE WIRE: HCPCS | Performed by: INTERNAL MEDICINE

## 2019-07-16 PROCEDURE — 93454 CORONARY ARTERY ANGIO S&I: CPT | Performed by: INTERNAL MEDICINE

## 2019-07-16 PROCEDURE — C1887 CATHETER, GUIDING: HCPCS | Performed by: INTERNAL MEDICINE

## 2019-07-16 PROCEDURE — C9600 PERC DRUG-EL COR STENT SING: HCPCS | Performed by: INTERNAL MEDICINE

## 2019-07-16 PROCEDURE — 27210794 ZZH OR GENERAL SUPPLY STERILE: Performed by: INTERNAL MEDICINE

## 2019-07-16 PROCEDURE — C1874 STENT, COATED/COV W/DEL SYS: HCPCS | Performed by: INTERNAL MEDICINE

## 2019-07-16 PROCEDURE — 25000128 H RX IP 250 OP 636: Performed by: INTERNAL MEDICINE

## 2019-07-16 PROCEDURE — B2151ZZ FLUOROSCOPY OF LEFT HEART USING LOW OSMOLAR CONTRAST: ICD-10-PCS | Performed by: INTERNAL MEDICINE

## 2019-07-16 PROCEDURE — C1725 CATH, TRANSLUMIN NON-LASER: HCPCS | Performed by: INTERNAL MEDICINE

## 2019-07-16 PROCEDURE — 25800030 ZZH RX IP 258 OP 636: Performed by: INTERNAL MEDICINE

## 2019-07-16 PROCEDURE — 99152 MOD SED SAME PHYS/QHP 5/>YRS: CPT | Performed by: INTERNAL MEDICINE

## 2019-07-16 PROCEDURE — 36415 COLL VENOUS BLD VENIPUNCTURE: CPT | Performed by: INTERNAL MEDICINE

## 2019-07-16 PROCEDURE — 92928 PRQ TCAT PLMT NTRAC ST 1 LES: CPT | Mod: LD | Performed by: INTERNAL MEDICINE

## 2019-07-16 PROCEDURE — 40000852 ZZH STATISTIC HEART CATH LAB OR EP LAB

## 2019-07-16 PROCEDURE — 93571 IV DOP VEL&/PRESS C FLO 1ST: CPT | Performed by: INTERNAL MEDICINE

## 2019-07-16 PROCEDURE — 93010 ELECTROCARDIOGRAM REPORT: CPT | Performed by: INTERNAL MEDICINE

## 2019-07-16 PROCEDURE — 85347 COAGULATION TIME ACTIVATED: CPT

## 2019-07-16 PROCEDURE — 93571 IV DOP VEL&/PRESS C FLO 1ST: CPT | Mod: 26 | Performed by: INTERNAL MEDICINE

## 2019-07-16 PROCEDURE — 93454 CORONARY ARTERY ANGIO S&I: CPT | Mod: 26 | Performed by: INTERNAL MEDICINE

## 2019-07-16 PROCEDURE — 25000125 ZZHC RX 250: Performed by: INTERNAL MEDICINE

## 2019-07-16 DEVICE — STENT SYNERGY DRUG ELUTING 3.50X28MM  H7493926028350: Type: IMPLANTABLE DEVICE | Status: FUNCTIONAL

## 2019-07-16 RX ORDER — ATROPINE SULFATE 0.1 MG/ML
0.5 INJECTION INTRAVENOUS EVERY 5 MIN PRN
Status: ACTIVE | OUTPATIENT
Start: 2019-07-16 | End: 2019-07-17

## 2019-07-16 RX ORDER — LISINOPRIL 5 MG/1
5 TABLET ORAL DAILY
Status: DISCONTINUED | OUTPATIENT
Start: 2019-07-16 | End: 2019-07-17 | Stop reason: HOSPADM

## 2019-07-16 RX ORDER — SODIUM CHLORIDE 9 MG/ML
INJECTION, SOLUTION INTRAVENOUS CONTINUOUS
Status: ACTIVE | OUTPATIENT
Start: 2019-07-16 | End: 2019-07-16

## 2019-07-16 RX ORDER — FENTANYL CITRATE 50 UG/ML
INJECTION, SOLUTION INTRAMUSCULAR; INTRAVENOUS
Status: DISCONTINUED | OUTPATIENT
Start: 2019-07-16 | End: 2019-07-16 | Stop reason: HOSPADM

## 2019-07-16 RX ORDER — NALOXONE HYDROCHLORIDE 0.4 MG/ML
.2-.4 INJECTION, SOLUTION INTRAMUSCULAR; INTRAVENOUS; SUBCUTANEOUS
Status: ACTIVE | OUTPATIENT
Start: 2019-07-16 | End: 2019-07-17

## 2019-07-16 RX ORDER — NITROGLYCERIN 0.4 MG/1
0.4 TABLET SUBLINGUAL EVERY 5 MIN PRN
Status: DISCONTINUED | OUTPATIENT
Start: 2019-07-16 | End: 2019-07-16

## 2019-07-16 RX ORDER — ARGATROBAN 1 MG/ML
150 INJECTION, SOLUTION INTRAVENOUS
Status: DISCONTINUED | OUTPATIENT
Start: 2019-07-16 | End: 2019-07-16 | Stop reason: HOSPADM

## 2019-07-16 RX ORDER — DOBUTAMINE HYDROCHLORIDE 200 MG/100ML
2-20 INJECTION INTRAVENOUS CONTINUOUS PRN
Status: DISCONTINUED | OUTPATIENT
Start: 2019-07-16 | End: 2019-07-16 | Stop reason: HOSPADM

## 2019-07-16 RX ORDER — ASPIRIN 81 MG/1
81 TABLET ORAL DAILY
Status: DISCONTINUED | OUTPATIENT
Start: 2019-07-16 | End: 2019-07-16

## 2019-07-16 RX ORDER — METOPROLOL TARTRATE 25 MG/1
25 TABLET, FILM COATED ORAL 2 TIMES DAILY
Status: DISCONTINUED | OUTPATIENT
Start: 2019-07-16 | End: 2019-07-17 | Stop reason: HOSPADM

## 2019-07-16 RX ORDER — VERAPAMIL HYDROCHLORIDE 2.5 MG/ML
INJECTION, SOLUTION INTRAVENOUS
Status: DISCONTINUED | OUTPATIENT
Start: 2019-07-16 | End: 2019-07-16 | Stop reason: HOSPADM

## 2019-07-16 RX ORDER — EPTIFIBATIDE 2 MG/ML
180 INJECTION, SOLUTION INTRAVENOUS EVERY 10 MIN PRN
Status: DISCONTINUED | OUTPATIENT
Start: 2019-07-16 | End: 2019-07-16 | Stop reason: HOSPADM

## 2019-07-16 RX ORDER — EPTIFIBATIDE 2 MG/ML
15 INJECTION, SOLUTION INTRAVENOUS CONTINUOUS PRN
Status: DISCONTINUED | OUTPATIENT
Start: 2019-07-16 | End: 2019-07-16 | Stop reason: HOSPADM

## 2019-07-16 RX ORDER — NITROGLYCERIN 5 MG/ML
VIAL (ML) INTRAVENOUS
Status: DISCONTINUED | OUTPATIENT
Start: 2019-07-16 | End: 2019-07-16 | Stop reason: HOSPADM

## 2019-07-16 RX ORDER — FLUMAZENIL 0.1 MG/ML
0.2 INJECTION, SOLUTION INTRAVENOUS
Status: ACTIVE | OUTPATIENT
Start: 2019-07-16 | End: 2019-07-17

## 2019-07-16 RX ORDER — HEPARIN SODIUM 1000 [USP'U]/ML
INJECTION, SOLUTION INTRAVENOUS; SUBCUTANEOUS
Status: DISCONTINUED | OUTPATIENT
Start: 2019-07-16 | End: 2019-07-16 | Stop reason: HOSPADM

## 2019-07-16 RX ORDER — FENTANYL CITRATE 50 UG/ML
25-50 INJECTION, SOLUTION INTRAMUSCULAR; INTRAVENOUS
Status: ACTIVE | OUTPATIENT
Start: 2019-07-16 | End: 2019-07-17

## 2019-07-16 RX ORDER — NOREPINEPHRINE BITARTRATE 0.06 MG/ML
.03-.4 INJECTION, SOLUTION INTRAVENOUS CONTINUOUS PRN
Status: DISCONTINUED | OUTPATIENT
Start: 2019-07-16 | End: 2019-07-16 | Stop reason: HOSPADM

## 2019-07-16 RX ORDER — DOPAMINE HYDROCHLORIDE 160 MG/100ML
2-20 INJECTION, SOLUTION INTRAVENOUS CONTINUOUS PRN
Status: DISCONTINUED | OUTPATIENT
Start: 2019-07-16 | End: 2019-07-16 | Stop reason: HOSPADM

## 2019-07-16 RX ORDER — IOPAMIDOL 755 MG/ML
INJECTION, SOLUTION INTRAVASCULAR
Status: DISCONTINUED | OUTPATIENT
Start: 2019-07-16 | End: 2019-07-16 | Stop reason: HOSPADM

## 2019-07-16 RX ORDER — HYDROCODONE BITARTRATE AND ACETAMINOPHEN 5; 325 MG/1; MG/1
1-2 TABLET ORAL EVERY 4 HOURS PRN
Status: DISCONTINUED | OUTPATIENT
Start: 2019-07-16 | End: 2019-07-17 | Stop reason: HOSPADM

## 2019-07-16 RX ORDER — NITROGLYCERIN 20 MG/100ML
.07-1.46 INJECTION INTRAVENOUS CONTINUOUS PRN
Status: DISCONTINUED | OUTPATIENT
Start: 2019-07-16 | End: 2019-07-16 | Stop reason: HOSPADM

## 2019-07-16 RX ORDER — ACETAMINOPHEN 325 MG/1
650 TABLET ORAL EVERY 4 HOURS PRN
Status: DISCONTINUED | OUTPATIENT
Start: 2019-07-16 | End: 2019-07-16

## 2019-07-16 RX ORDER — NALOXONE HYDROCHLORIDE 0.4 MG/ML
.1-.4 INJECTION, SOLUTION INTRAMUSCULAR; INTRAVENOUS; SUBCUTANEOUS
Status: DISCONTINUED | OUTPATIENT
Start: 2019-07-16 | End: 2019-07-16

## 2019-07-16 RX ORDER — ARGATROBAN 1 MG/ML
350 INJECTION, SOLUTION INTRAVENOUS
Status: DISCONTINUED | OUTPATIENT
Start: 2019-07-16 | End: 2019-07-16 | Stop reason: HOSPADM

## 2019-07-16 RX ADMIN — ATORVASTATIN CALCIUM 40 MG: 40 TABLET, FILM COATED ORAL at 22:09

## 2019-07-16 RX ADMIN — FENTANYL CITRATE 50 MCG: 50 INJECTION, SOLUTION INTRAMUSCULAR; INTRAVENOUS at 14:47

## 2019-07-16 RX ADMIN — MIDAZOLAM 1 MG: 1 INJECTION INTRAMUSCULAR; INTRAVENOUS at 14:36

## 2019-07-16 RX ADMIN — FENTANYL CITRATE 50 MCG: 50 INJECTION, SOLUTION INTRAMUSCULAR; INTRAVENOUS at 14:16

## 2019-07-16 RX ADMIN — SODIUM CHLORIDE: 9 INJECTION, SOLUTION INTRAVENOUS at 06:54

## 2019-07-16 RX ADMIN — IOPAMIDOL 85 ML: 755 INJECTION, SOLUTION INTRAVENOUS at 15:00

## 2019-07-16 RX ADMIN — MIDAZOLAM 1 MG: 1 INJECTION INTRAMUSCULAR; INTRAVENOUS at 14:39

## 2019-07-16 RX ADMIN — METOPROLOL TARTRATE 50 MG: 50 TABLET ORAL at 08:25

## 2019-07-16 RX ADMIN — FENTANYL CITRATE 50 MCG: 50 INJECTION, SOLUTION INTRAMUSCULAR; INTRAVENOUS at 14:36

## 2019-07-16 RX ADMIN — VERAPAMIL HYDROCHLORIDE 2.5 MG: 2.5 INJECTION, SOLUTION INTRAVENOUS at 14:16

## 2019-07-16 RX ADMIN — HEPARIN SODIUM 5000 UNITS: 1000 INJECTION, SOLUTION INTRAVENOUS; SUBCUTANEOUS at 14:34

## 2019-07-16 RX ADMIN — FENTANYL CITRATE 50 MCG: 50 INJECTION, SOLUTION INTRAMUSCULAR; INTRAVENOUS at 14:18

## 2019-07-16 RX ADMIN — HEPARIN SODIUM 5000 UNITS: 1000 INJECTION, SOLUTION INTRAVENOUS; SUBCUTANEOUS at 14:18

## 2019-07-16 RX ADMIN — MIDAZOLAM 1 MG: 1 INJECTION INTRAMUSCULAR; INTRAVENOUS at 14:18

## 2019-07-16 RX ADMIN — MIDAZOLAM 1 MG: 1 INJECTION INTRAMUSCULAR; INTRAVENOUS at 14:16

## 2019-07-16 RX ADMIN — HEPARIN SODIUM 2000 UNITS: 1000 INJECTION, SOLUTION INTRAVENOUS; SUBCUTANEOUS at 14:49

## 2019-07-16 RX ADMIN — NITROGLYCERIN 200 MCG: 5 INJECTION, SOLUTION INTRAVENOUS at 14:17

## 2019-07-16 RX ADMIN — TICAGRELOR 180 MG: 90 TABLET ORAL at 14:30

## 2019-07-16 RX ADMIN — LORAZEPAM 0.5 MG: 0.5 TABLET ORAL at 03:36

## 2019-07-16 RX ADMIN — FENTANYL CITRATE 50 MCG: 50 INJECTION, SOLUTION INTRAMUSCULAR; INTRAVENOUS at 14:54

## 2019-07-16 RX ADMIN — ASPIRIN 325 MG: 325 TABLET, DELAYED RELEASE ORAL at 08:29

## 2019-07-16 RX ADMIN — LISINOPRIL 5 MG: 5 TABLET ORAL at 16:24

## 2019-07-16 RX ADMIN — METOPROLOL TARTRATE 25 MG: 25 TABLET ORAL at 22:09

## 2019-07-16 RX ADMIN — ACETAMINOPHEN 650 MG: 325 TABLET, FILM COATED ORAL at 22:10

## 2019-07-16 RX ADMIN — ACETAMINOPHEN 650 MG: 325 TABLET, FILM COATED ORAL at 08:25

## 2019-07-16 RX ADMIN — NITROGLYCERIN 300 MCG: 5 INJECTION, SOLUTION INTRAVENOUS at 14:47

## 2019-07-16 RX ADMIN — SODIUM CHLORIDE: 9 INJECTION, SOLUTION INTRAVENOUS at 16:24

## 2019-07-16 RX ADMIN — FENTANYL CITRATE 50 MCG: 50 INJECTION, SOLUTION INTRAMUSCULAR; INTRAVENOUS at 14:44

## 2019-07-16 ASSESSMENT — ACTIVITIES OF DAILY LIVING (ADL)
FALL_HISTORY_WITHIN_LAST_SIX_MONTHS: YES
BATHING: 0-->INDEPENDENT
TOILETING: 0-->INDEPENDENT
COGNITION: 0 - NO COGNITION ISSUES REPORTED
SWALLOWING: 0-->SWALLOWS FOODS/LIQUIDS WITHOUT DIFFICULTY
NUMBER_OF_TIMES_PATIENT_HAS_FALLEN_WITHIN_LAST_SIX_MONTHS: 3
RETIRED_COMMUNICATION: 0-->UNDERSTANDS/COMMUNICATES WITHOUT DIFFICULTY
AMBULATION: 0-->INDEPENDENT
DRESS: 0-->INDEPENDENT
TRANSFERRING: 0-->INDEPENDENT
RETIRED_EATING: 0-->INDEPENDENT

## 2019-07-16 ASSESSMENT — MIFFLIN-ST. JEOR: SCORE: 2129.75

## 2019-07-16 NOTE — PROGRESS NOTES
[facility] [place]    Hospitalist Progress Note      Assessment & Plan   Hugo Prince is a 69 year old male who was admitted on 7/15/2019. He presented to the ER with severe chest pain which was concerning for angina and was admitted to the hospitalist service. Initial troponin was within normal limits in the ER, second troponin elevated to 3.599. Cardiology was consulted and he was treated for NSTEMI.    Acute NSTEMI.  - Troponin peaked at 12.355 on the afternoon of admission.  -  He has not had any chest pain since admission.  -  Cardiology following, planning angiogram this afternoon.  -  Continue aspirin and atorvastatin.  -  Metoprolol decreased to 25 mg PO BID due to bradycardia.  -  Continue anticoagulation with IV heparin.  -  Further management pending results of angiogram.  -  Discussed timing of procedure with him to the best of my knowledge and encouraged him to have the nurses contact me at any time if he has any unanswered questions or concerns about communication.    Dyslipidemia.  - Total cholesterol 191, triglycerides 113, , HDL 35.  - Started on atorvastatin during this hospitalization, continue the same.    Obstructive left ureterolithiasis.  Bilateral nephrolithiasis.  -  Has a long history of renal stones requiring multiple interventions in the past. Followed by Dr. Desai in Urology clinic as an outpatient.  -  CT scan at the time of admission shows a left ureteropelvic junction 0.8 cm stone causing mild hydronephrosis.  - Urology consulted. May benefit from left ureteral stent placement, but would wait until after cardiac disease has been evaluated and treated.  - Recheck creatinine in AM.    RAY.  - CPAP at night.    Fatty liver.  - Noted on CT scan.  - Follow-up outpatient with primary care provider.    DVT Prophylaxis: IV heparin  Code Status: Full Code  Expected discharge: pending results of angiogram, recommended to prior living arrangement once results of  angiogram available..    Vinayak Samaniego MD  Text Page  (7am - 6pm, M-F)    Interval History   Hugo Prince feels OK this morning. Has not had any further chest pain while in the hospital. Denies fevers, shortness of breath, nausea, abdominal pain. Had some anxiety overnight, improved after getting a dose of lorazepam. Frustrated about the communication regarding the timing of his angiogram.    -Data reviewed today: I reviewed all new labs and imaging results over the last 24 hours. I personally reviewed no images or EKG's today.    Physical Exam   Temp: 98.3  F (36.8  C) Temp src: Oral BP: 152/88 Pulse: 55 Heart Rate: 52 Resp: 15 SpO2: 95 % O2 Device: None (Room air)    Vitals:    07/15/19 0238 07/15/19 0600 07/16/19 0336   Weight: 137 kg (302 lb) 136.2 kg (300 lb 3.2 oz) 136.9 kg (301 lb 11.2 oz)     Vital Signs with Ranges  Temp:  [98.3  F (36.8  C)-98.6  F (37  C)] 98.3  F (36.8  C)  Pulse:  [55-65] 55  Heart Rate:  [47-77] 52  Resp:  [9-25] 15  BP: (137-161)/(74-91) 152/88  SpO2:  [92 %-95 %] 95 %  I/O last 3 completed shifts:  In: 600 [P.O.:600]  Out: 1200 [Urine:1200]    Constitutional: Awake, alert, cooperative, no apparent distress, sitting up in a chair  Respiratory: Clear to auscultation bilaterally, no crackles or wheezing  Cardiovascular: Regular rate and rhythm, normal S1 and S2, and no murmur noted  GI: Normal bowel sounds, soft, non-distended, non-tender  Skin/Integument: No rashes, no cyanosis, no edema    Medications     HEParin 1,200 Units/hr (07/16/19 7563)     - MEDICATION INSTRUCTIONS -       sodium chloride 75 mL/hr at 07/16/19 0915       aspirin  81 mg Oral Daily     atorvastatin  40 mg Oral QPM     HYDROmorphone  0.5 mg Intravenous Once     metoprolol tartrate  50 mg Oral BID     sodium chloride (PF)  3 mL Intracatheter Q8H     Data   Recent Labs   Lab 07/15/19  1921 07/15/19  1423 07/15/19  0920  07/15/19  0235   WBC  --   --   --   --  7.7   HGB  --   --   --   --  16.5   MCV   --   --   --   --  96   PLT  --   --   --   --  202   NA  --   --   --   --  140   POTASSIUM  --   --   --   --  3.7   CHLORIDE  --   --   --   --  105   CO2  --   --   --   --  24   BUN  --   --   --   --  20   CR  --   --   --   --  0.99   ANIONGAP  --   --   --   --  11   NATHALIA  --   --   --   --  8.9   GLC  --   --   --   --  152*   TROPI 10.659* 12.355* 6.825*   < > 0.019    < > = values in this interval not displayed.     No results found for this or any previous visit (from the past 24 hour(s)).

## 2019-07-16 NOTE — PLAN OF CARE
BP stable, HR 50-60, SB. P/S LAD today via R radial approach. Site CDI. Some intermittent back pain-not severe. Up with SBA. Family involved via phone, supportive. Urology plan for possible ureter stent unclear at this time. Pt has no urinary symptoms.

## 2019-07-16 NOTE — PLAN OF CARE
A/O x4, anxious and fustrated at times, extra time allowed for cares, empathetic listening utilized. Tele SB. AVSS on RA except bradycardic. C/o back pain, tylenol given x1, heat applied. +1 edmea BLE. Up independently, voiding adequately. Heparin infusing 12ml/hr. PIV infusing NS 75ml/hr. NPO. Left unit for angio @ 1315. Urology following for nephrolithiasis.

## 2019-07-16 NOTE — PROGRESS NOTES
[facility]  Cardiology Progress Note    Date of Service (when I saw the patient): 07/16/2019  Summary: Hugo Prince is a 69 year old male with history of morbid obesity, RAY treated with CPAP, prior spinal fusion surgery, and recurrent nephrolithiasis who was admitted on 7/15/2019 with chest discomfort while sleeping. He has had progressive exertional chest discomfort and shortness of breath over the past few weeks.   Interval History   No further chest discomfort. No SOB.   Assessment & Plan   1. NSTEMI. Admitted with severe chest discomfort at rest and unstable angina symptoms for the last few weeks.   -  Troponin peaked at 12.   -  Echocardiogram shows LVEF of 50 - 55% with severe apical wall hypokinesis.  -  Continue IV heparin, aspirin, lipitor, and beta blocker.   2.  Recurrent nephrolithiasis. Urology consulted. Currently, he has a partial obstructive with a little bit of hydronephrosis.   3.  Morbid obesity.  4.  Hyperlipidemia. Lipid profile shows LDL of 133, HDD of 35, total cholesterol of 191. Started on lipitor 40 mg daily.   5.  RAY. Treated with CPAP.     Plan:  1.  Coronary angiography today. We again reviewed the procedure. He has no questions. Consent signed.  2.  Continue current cardiac medications. Will decrease metoprolol dose to 25 mg twice daily due to bradycardia.   3.  BP remains elevated. Will add lisinopril.       May Zhou PA-C    Physical Exam   Temp: 98.3  F (36.8  C) Temp src: Oral BP: 152/88 Pulse: 55 Heart Rate: 52 Resp: 15 SpO2: 95 % O2 Device: None (Room air)    Vitals:    07/15/19 0238 07/15/19 0600 07/16/19 0336   Weight: 137 kg (302 lb) 136.2 kg (300 lb 3.2 oz) 136.9 kg (301 lb 11.2 oz)     Vital Signs with Ranges  Temp:  [98.3  F (36.8  C)-98.6  F (37  C)] 98.3  F (36.8  C)  Pulse:  [55-65] 55  Heart Rate:  [47-77] 52  Resp:  [9-25] 15  BP: (137-161)/(74-91) 152/88  SpO2:  [92 %-95 %] 95 %  07/11 0700 - 07/16 0659  In: 600 [P.O.:600]  Out: 6376  [Urine:1325]  Net: -725  Constitutional: NAD.   Respiratory: CTAB.   Cardiovascular: RRR, s1s2, no murmur  GI: soft, BS+  Skin: warm, no rashes  Musculoskeletal: Moving all extremities. No edema.  Neurologic: Alert, oriented x 3  Neuropsychiatric: Normal affect   Data   Results for orders placed or performed during the hospital encounter of 07/15/19 (from the past 24 hour(s))   Echocardiogram Complete    Narrative    [id number]  [id number]  [id number]  [id number]^AMARILIS^NAOMY           [facility]  Echocardiography Laboratory  [address] [place] [id number]        Name: AZEEM MONTERO  MRN: [id number]  : 1950  Study Date: 07/15/2019 01:22 PM  Age: 69 yrs  Gender: Male  Patient Location: [facility]  Reason For Study: Chest Pain  Ordering Physician: NAOMY OLMOS  Referring Physician: APOLLO MANCILLA  Performed By: Morgan Henson RDCS     BSA: 2.5 m2  Height: 70 in  Weight: 302 lb  HR: 57  BP: 116/82 mmHg  _____________________________________________________________________________  __        Procedure  Complete Portable Echo Adult. Optison (NDC #[id number]) given intravenously.  _____________________________________________________________________________  __        Interpretation Summary     There is severe apical wall hypokinesis.  The visual ejection fraction is estimated at 50-55%.  The study was technically difficult. Contrast was used without apparent  complications. There is no comparison study available.  _____________________________________________________________________________  __        Left Ventricle  The left ventricle is normal in size. Left ventricular hypertrophy: asymmetric  with no LVOT obstruction. The visual ejection fraction is estimated at 50-55%.  Left ventricular diastolic function is indeterminate. Septal motion is  consistent with conduction abnormality. There is severe apical wall  hypokinesis.     Right Ventricle  Right ventricular function cannot be  assessed due to poor image quality.     Atria  The left atrium is mildly dilated. Right atrium not well visualized. There is  no color Doppler evidence of an atrial shunt.     Mitral Valve  The mitral valve leaflets are mildly thickened.        Tricuspid Valve  The tricuspid valve is not well visualized.     Aortic Valve  There is mild trileaflet aortic sclerosis. No aortic stenosis is present.     Pulmonic Valve  The pulmonic valve is not well seen, but is grossly normal.     Vessels  The aortic root is normal size. The ascending aorta is Moderately dilated. The  inferior vena cava is normal.     Pericardium  There is no pericardial effusion.        Rhythm  Sinus rhythm was noted.  _____________________________________________________________________________  __  MMode/2D Measurements & Calculations  IVSd: 1.6 cm     LVIDd: 4.8 cm  LVIDs: 2.9 cm  LVPWd: 1.0 cm  FS: 38.4 %  LV mass(C)d: 254.4 grams  LV mass(C)dI: 102.3 grams/m2  Ao root diam: 3.4 cm  LA dimension: 4.0 cm  asc Aorta Diam: 3.9 cm  LA/Ao: 1.2  LVOT diam: 2.0 cm  LVOT area: 3.1 cm2  LA Volume (BP): 103.0 ml  LA Volume Index (BP): 41.4 ml/m2  RWT: 0.43           Doppler Measurements & Calculations  MV E max esdras: 73.3 cm/sec  MV A max esdras: 74.1 cm/sec  MV E/A: 0.99  MV dec time: 0.19 sec  Ao V2 max: 196.8 cm/sec  Ao max P.0 mmHg  Ao V2 mean: 124.6 cm/sec  Ao mean P.1 mmHg  Ao V2 VTI: 42.1 cm  KERWIN(I,D): 1.5 cm2  KERWIN(V,D): 1.4 cm2  LV V1 max PG: 3.3 mmHg  LV V1 max: 91.2 cm/sec  LV V1 VTI: 20.1 cm  SV(LVOT): 62.0 ml  SI(LVOT): 24.9 ml/m2  PA acc time: 0.13 sec  AV Esdras Ratio (DI): 0.46  KERWIN Index (cm2/m2): 0.59  E/E' av.9  Lateral E/e': 8.0  Medial E/e': 7.8              _____________________________________________________________________________  __        Report approved by: Julius Bernal 07/15/2019 03:50 PM      Troponin I - Now then in 4 hours x 3   Result Value Ref Range    Troponin I ES 12.355 (HH) 0.000 - 0.045 ug/L   Heparin 10a Level    Result Value Ref Range    Heparin 10A Level 0.24 IU/mL   Troponin I   Result Value Ref Range    Troponin I ES 10.659 (HH) 0.000 - 0.045 ug/L   Heparin 10a Level   Result Value Ref Range    Heparin 10A Level 0.15 IU/mL   EKG 12-lead, tracing only   Result Value Ref Range    Interpretation ECG Click View Image link to view waveform and result        Medications     HEParin 1,200 Units/hr (07/16/19 0753)     - MEDICATION INSTRUCTIONS -       sodium chloride 75 mL/hr at 07/16/19 0915       aspirin  81 mg Oral Daily     atorvastatin  40 mg Oral QPM     HYDROmorphone  0.5 mg Intravenous Once     metoprolol tartrate  25 mg Oral BID     sodium chloride (PF)  3 mL Intracatheter Q8H

## 2019-07-16 NOTE — PROGRESS NOTES
Pt here for cath lab hold, VSS, pt voided in urinal. Fluids infusing, consent signed with Dr. Thrasher.

## 2019-07-16 NOTE — PLAN OF CARE
VSS ex bp elevated D/T metoprolol held earlier because HR in 50's.  Now HR in 70's to 80's with bp 160/90.  Metoprolol given.  Remains in SR.  Denies pain or sob.  Heparin infusing @ 1200u.  Made npo pending angiogram in am.  Refused pre angio teaching.  Voiding well.

## 2019-07-17 ENCOUNTER — APPOINTMENT (OUTPATIENT)
Dept: PHYSICAL THERAPY | Facility: CLINIC | Age: 69
DRG: 247 | End: 2019-07-17
Attending: INTERNAL MEDICINE
Payer: MEDICARE

## 2019-07-17 VITALS
HEIGHT: 70 IN | BODY MASS INDEX: 43.19 KG/M2 | TEMPERATURE: 97.4 F | RESPIRATION RATE: 16 BRPM | WEIGHT: 301.7 LBS | HEART RATE: 53 BPM | OXYGEN SATURATION: 95 % | SYSTOLIC BLOOD PRESSURE: 123 MMHG | DIASTOLIC BLOOD PRESSURE: 78 MMHG

## 2019-07-17 LAB
ANION GAP SERPL CALCULATED.3IONS-SCNC: 8 MMOL/L (ref 3–14)
BUN SERPL-MCNC: 16 MG/DL (ref 7–30)
CALCIUM SERPL-MCNC: 8.5 MG/DL (ref 8.5–10.1)
CHLORIDE SERPL-SCNC: 106 MMOL/L (ref 94–109)
CHOLEST SERPL-MCNC: 156 MG/DL
CO2 SERPL-SCNC: 24 MMOL/L (ref 20–32)
CREAT SERPL-MCNC: 0.98 MG/DL (ref 0.66–1.25)
ERYTHROCYTE [DISTWIDTH] IN BLOOD BY AUTOMATED COUNT: 12.7 % (ref 10–15)
GFR SERPL CREATININE-BSD FRML MDRD: 77 ML/MIN/{1.73_M2}
GLUCOSE SERPL-MCNC: 105 MG/DL (ref 70–99)
HCT VFR BLD AUTO: 43.2 % (ref 40–53)
HDLC SERPL-MCNC: 31 MG/DL
HGB BLD-MCNC: 14.6 G/DL (ref 13.3–17.7)
KCT BLD-ACNC: 296 SEC (ref 75–150)
LDLC SERPL CALC-MCNC: 84 MG/DL
MCH RBC QN AUTO: 32.4 PG (ref 26.5–33)
MCHC RBC AUTO-ENTMCNC: 33.8 G/DL (ref 31.5–36.5)
MCV RBC AUTO: 96 FL (ref 78–100)
NONHDLC SERPL-MCNC: 125 MG/DL
PLATELET # BLD AUTO: 169 10E9/L (ref 150–450)
POTASSIUM SERPL-SCNC: 3.9 MMOL/L (ref 3.4–5.3)
RBC # BLD AUTO: 4.5 10E12/L (ref 4.4–5.9)
SODIUM SERPL-SCNC: 138 MMOL/L (ref 133–144)
TRIGL SERPL-MCNC: 207 MG/DL
WBC # BLD AUTO: 9 10E9/L (ref 4–11)

## 2019-07-17 PROCEDURE — 97110 THERAPEUTIC EXERCISES: CPT | Mod: GP

## 2019-07-17 PROCEDURE — 85027 COMPLETE CBC AUTOMATED: CPT | Performed by: HOSPITALIST

## 2019-07-17 PROCEDURE — 25000132 ZZH RX MED GY IP 250 OP 250 PS 637: Mod: GY | Performed by: INTERNAL MEDICINE

## 2019-07-17 PROCEDURE — 99222 1ST HOSP IP/OBS MODERATE 55: CPT | Performed by: UROLOGY

## 2019-07-17 PROCEDURE — 93005 ELECTROCARDIOGRAM TRACING: CPT

## 2019-07-17 PROCEDURE — 93010 ELECTROCARDIOGRAM REPORT: CPT | Performed by: INTERNAL MEDICINE

## 2019-07-17 PROCEDURE — 80048 BASIC METABOLIC PNL TOTAL CA: CPT | Performed by: HOSPITALIST

## 2019-07-17 PROCEDURE — 99239 HOSP IP/OBS DSCHRG MGMT >30: CPT | Performed by: HOSPITALIST

## 2019-07-17 PROCEDURE — 36415 COLL VENOUS BLD VENIPUNCTURE: CPT | Performed by: HOSPITALIST

## 2019-07-17 PROCEDURE — 80061 LIPID PANEL: CPT | Performed by: HOSPITALIST

## 2019-07-17 PROCEDURE — 97161 PT EVAL LOW COMPLEX 20 MIN: CPT | Mod: GP

## 2019-07-17 PROCEDURE — 97530 THERAPEUTIC ACTIVITIES: CPT | Mod: GP

## 2019-07-17 PROCEDURE — 25000132 ZZH RX MED GY IP 250 OP 250 PS 637: Mod: GY | Performed by: PHYSICIAN ASSISTANT

## 2019-07-17 PROCEDURE — 99232 SBSQ HOSP IP/OBS MODERATE 35: CPT | Mod: 25 | Performed by: INTERNAL MEDICINE

## 2019-07-17 RX ORDER — ATORVASTATIN CALCIUM 40 MG/1
40 TABLET, FILM COATED ORAL EVERY EVENING
Qty: 30 TABLET | Refills: 0 | Status: SHIPPED | OUTPATIENT
Start: 2019-07-17 | End: 2019-08-02

## 2019-07-17 RX ORDER — LORAZEPAM 0.5 MG/1
.25-.5 TABLET ORAL EVERY 4 HOURS PRN
Status: DISCONTINUED | OUTPATIENT
Start: 2019-07-17 | End: 2019-07-17 | Stop reason: HOSPADM

## 2019-07-17 RX ORDER — PRASUGREL 10 MG/1
60 TABLET, FILM COATED ORAL ONCE
Status: COMPLETED | OUTPATIENT
Start: 2019-07-17 | End: 2019-07-17

## 2019-07-17 RX ORDER — PRASUGREL 10 MG/1
10 TABLET, FILM COATED ORAL DAILY
Qty: 30 TABLET | Refills: 0 | Status: SHIPPED | OUTPATIENT
Start: 2019-07-18 | End: 2019-08-02

## 2019-07-17 RX ORDER — MORPHINE SULFATE 2 MG/ML
2 INJECTION, SOLUTION INTRAMUSCULAR; INTRAVENOUS ONCE
Status: DISCONTINUED | OUTPATIENT
Start: 2019-07-17 | End: 2019-07-17 | Stop reason: HOSPADM

## 2019-07-17 RX ORDER — ACETAMINOPHEN 325 MG/1
650 TABLET ORAL EVERY 4 HOURS PRN
COMMUNITY
Start: 2019-07-17 | End: 2023-03-24

## 2019-07-17 RX ORDER — METOPROLOL TARTRATE 25 MG/1
25 TABLET, FILM COATED ORAL 2 TIMES DAILY
Qty: 60 TABLET | Refills: 0 | Status: SHIPPED | OUTPATIENT
Start: 2019-07-17 | End: 2019-08-02

## 2019-07-17 RX ORDER — LISINOPRIL 5 MG/1
5 TABLET ORAL DAILY
Qty: 30 TABLET | Refills: 0 | Status: SHIPPED | OUTPATIENT
Start: 2019-07-18 | End: 2019-08-02

## 2019-07-17 RX ORDER — NITROGLYCERIN 0.4 MG/1
TABLET SUBLINGUAL
Qty: 25 TABLET | Refills: 0 | Status: SHIPPED | OUTPATIENT
Start: 2019-07-17 | End: 2020-09-14

## 2019-07-17 RX ORDER — PRASUGREL 10 MG/1
10 TABLET, FILM COATED ORAL DAILY
Status: DISCONTINUED | OUTPATIENT
Start: 2019-07-18 | End: 2019-07-17 | Stop reason: HOSPADM

## 2019-07-17 RX ADMIN — METOPROLOL TARTRATE 25 MG: 25 TABLET ORAL at 09:53

## 2019-07-17 RX ADMIN — LORAZEPAM 0.5 MG: 0.5 TABLET ORAL at 01:19

## 2019-07-17 RX ADMIN — LISINOPRIL 5 MG: 5 TABLET ORAL at 09:53

## 2019-07-17 RX ADMIN — ASPIRIN 81 MG: 81 TABLET, COATED ORAL at 09:53

## 2019-07-17 RX ADMIN — TICAGRELOR 90 MG: 90 TABLET ORAL at 01:20

## 2019-07-17 RX ADMIN — PRASUGREL HYDROCHLORIDE 60 MG: 10 TABLET, FILM COATED ORAL at 10:40

## 2019-07-17 NOTE — PROVIDER NOTIFICATION
MD Notification    Notified Person: MD    Notified Person Name: On-call Cardiologist     Notification Date/Time: 07/17/19 @ 0021    Notification Interaction: Answering service     Purpose of Notification: New SOB and intermittent chest pain    Orders Received: Verbal telephone order w/ read-back for Morphine 2 mg IV push ONCE for chest pain, if nitroglycerin does not resolve chest pain.    Comments:

## 2019-07-17 NOTE — PROGRESS NOTES
Paged on-call Hospitalist at [phone] for an update of pt is having another occurrence of SOB. Awaiting response.

## 2019-07-17 NOTE — PLAN OF CARE
A&Ox4 w/ VSS. RA, home CPAP at . Tele is SB/SR w/ 1st degree block. TR band (right wrist) removed at 2200 - site is CDI, soft. New chest pain & SOB during night - resolved w/o intervention. Cardiology notified and orders placed. Ativan given for anxiety. On home CPAP. TR band (right wrist) CDI, soft. VSS. SOB occurrence in AM, on-call Hospitalist paged.

## 2019-07-17 NOTE — PROGRESS NOTES
Discussed plan with Dr. Desai from [facility] over the phone. Urology would like to use conservative management for patients kidney stone at this time. If severe pain occurs able to place stent in kidney. Updated: Dr. Desai saw patient, patient has been updated w/ plan. Follow up appointment is being made.

## 2019-07-17 NOTE — DISCHARGE INSTRUCTIONS
Cardiac Angiogram Discharge Instructions - Radial    After you go home:      Have an adult stay with you until tomorrow.    Drink extra fluids for 2 days.    You may resume your normal diet.    No smoking       For 24 hours - due to the sedation you received:    Relax and take it easy.    Do NOT make any important or legal decisions.    Do NOT drive or operate machines at home or at work.    Do NOT drink alcohol.    Care of Wrist Puncture Site:      For the first 24 hrs - check the puncture site every 1-2 hours while awake.    It is normal to have soreness at the puncture site and mild tingling in your hand for up to 3 days.    Remove the bandaid after 24 hours. If there is minor oozing, apply another bandaid and remove it after 12 hours.    You may shower tomorrow.  Do NOT take a bath, or use a hot tub or pool for at least 3 days. Do NOT scrub the site. Do not use lotion or powder near the puncture site.           Activity:        For 2 days:     do not use your hand or arm to support your weight (such as rising from a chair)     do not bend your wrist (such as lifting a garage door).    do not lift more than 5 pounds or exercise your arm (such as tennis, golf or bowling).    Do NOT do any heavy activity such as exercise, lifting, or straining.     Bleeding:      If you start bleeding from the site in your wrist, sit down and press firmly on/above the site for 10 minutes.     Once bleeding stops, keep arm still for 2 hours.     Call [facility] as soon as you can.       Call 911 right away if you have heavy bleeding or bleeding that does not stop.      Medicines:      If you are taking an antiplatelet medication such as Plavix, Brilinta or Effient, do not stop taking it until you talk to your cardiologist.        If you are on Metformin (Glucophage), do not restart it until you have blood tests (within 2 to 3 days after discharge).  After you have your blood drawn, you may restart the Metformin.     Take your  medications, including blood thinners, unless your provider tells you not to.  If you take Coumadin (Warfarin), have your INR checked by your provider in  3-5 days. Call your clinic to schedule this.    If you have stopped any medicines, check with your provider about when to restart them.    Follow Up Appointments:      Follow up with [facility] Nurse Practitioner at [facility] of patient preference in 7-10 days.    Call the clinic if:      You have a large or growing hard lump around the site.    The site is red, swollen, hot or tender.    Blood or fluid is draining from the site.    You have chills or a fever greater than 101 F (38 C).    Your arm feels numb, cool or changes color.    You have hives, a rash or unusual itching.    Any questions or concerns.          [facility] [place]:    [phone] [facility] (7 days a week)

## 2019-07-17 NOTE — CONSULTS
"NUTRITION EDUCATION    REASON FOR ASSESSMENT:  Nutrition education on American Heart Association (AHA) Heart Healthy Diet.    NUTRITION HISTORY:  Information obtained from patient.   - No prior heart healthy education.   - Patient has a large garden where he grows a variety of veggies and herbs (potatoes, tomatoes, kale, broccoli, peppers, etc.)   - eats 3 meals/day most days.   - Breakfast: [employer] \"Triple O\" yogurt with fruit  - Lunch: sandwich - grilled ham and cheddar cheese  - Dinner: recently made a rack of ribs with a dry rub, eaten with a large salad. Dressing: apple cider vinegar, barnes, honey, some spices. Also makes stir montiel often, using peanut oil. Other listed meals: Turkey Tacos, 6 oz Steak w/ sides, Skinless/boneless chicken breast.   - NKFA      CURRENT DIET ORDER:  LSF, NA <2400 mg    NUTRITION DIAGNOSIS:  Food- and nutrition-related knowledge deficit R/t heart healthy diet AEB patient verbalizes no prior ed     INTERVENTIONS:  Nutrition Prescription:    Recommended AHA Heart Healthy Diet    Implementation:     Nutrition Education (Content):  a) reviewed Heart Healthy Diet guidelines  b) provided heart healthy diet handout    Nutrition Education (Application):  a) Discussed current eating habits and recommended alternative food choices    Anticipate good compliance    Diet Education - refer to Education flowsheet    Goals:    Patient verbalizes understanding of diet    All of the above goals met during education session    Follow Up/Monitoring:    Provided RD contact information for future questions    Teressa Cloud RD, LD        "

## 2019-07-17 NOTE — CONSULTS
Medication coverage check for Brilinta. $47 copay monthly.    Missy Plata CphT  [facility] Discharge Pharmacy Liaison  Liaison Cell: [phone]

## 2019-07-17 NOTE — DISCHARGE SUMMARY
[facility]    Discharge Summary  Hospitalist    Date of Admission:  7/15/2019  Date of Discharge:  7/17/2019  Discharging Provider: Vinayak Samaniego MD  Date of Service (when I saw the patient): 07/17/19    Discharge Diagnoses   Acute NSTEMI.  Coronary artery disease.  Dyslipidemia.  Obstructive left ureterolithiasis.  Bilateral nephrolithiasis.  RAY.  Fatty liver.    History of Present Illness   Hugo Prince is an 69 year old male who presented with chest pain and dyspnea. Symptoms were concerning for angina and he was admitted to the hospitalist service. Initial troponin was within normal limits in the ER, second troponin elevated to 3.599 and cardiology was consulted.    Hospital Course   Hugo Prince was admitted on 7/15/2019. The following problems were addressed during his hospitalization:    Acute NSTEMI.  Coronary artery disease.  - Presented with chest pain and dyspnea.  - Troponin initially negative, then trended up and peaked at 12.355.  - He did not have any further chest pain while in the hospital.  - Cardiology was consulted.  - He was initially treated with aspirin, atorvastatin, metoprolol, lisinopril and IV heparin. Initial metoprolol dose was decreased due to bradycardia.  - He had an angiogram on 7/16/19 revealing 95% stenosis of the mid LAD which was treated with successful PCI with a MICAH.  - Discharge home today.  - Continue aspirin, atorvastatin, metoprolol, and lisinopril upon discharge.  - Follow-up with cardiology in 7-10 days.     Dyslipidemia.  - Total cholesterol 191, triglycerides 113, , HDL 35.  - Started on atorvastatin during this hospitalization, continue the same.  - Follow-up with PCP in one week.    Essential hypertension.  - Blood pressure consistently elevated during this admission.  - Improved following initiation of metoprolol and lisinopril.  - Follow-up with PCP in one week.     Obstructive left ureterolithiasis.  Bilateral  nephrolithiasis.  - Has a long history of renal stones requiring multiple interventions in the past. Followed by Dr. Desai in Urology clinic as an outpatient.  - CT scan at the time of admission shows a left ureteropelvic junction 0.8 cm stone causing mild hydronephrosis.  - Creatinine stable at 0.98 on the day of discharge.  - Urology was consulted. Recommends conservative management at this time, see note for details.  - Follow-up in Urology Clinic on Monday.     RAY.  - Continue CPAP as previously ordered.     Fatty liver.  - Noted on CT scan.  - Follow-up outpatient with primary care provider.    Greater than 30 minutes were spent on discharge of this patient today.    Vinayak Samaniego MD    Significant Results and Procedures   Successful PCI with MICAH placement in the mid LAD by Dr. Thrasher on 7/16/19.    Code Status   Full Code       Primary Care Physician   Juan De La Vega    Physical Exam   Temp: 97.9  F (36.6  C) Temp src: Oral BP: 123/78 Pulse: 53 Heart Rate: 59 Resp: 16 SpO2: 95 % O2 Device: None (Room air) Oxygen Delivery: 4 LPM  Vitals:    07/15/19 0238 07/15/19 0600 07/16/19 0336   Weight: 137 kg (302 lb) 136.2 kg (300 lb 3.2 oz) 136.9 kg (301 lb 11.2 oz)     Vital Signs with Ranges  Temp:  [97.4  F (36.3  C)-98.3  F (36.8  C)] 97.9  F (36.6  C)  Pulse:  [53-57] 53  Heart Rate:  [48-72] 59  Resp:  [8-30] 16  BP: (115-179)/() 123/78  SpO2:  [90 %-98 %] 95 %  I/O last 3 completed shifts:  In: 1608.75 [P.O.:600; I.V.:1008.75]  Out: 2825 [Urine:2825]    Constitutional: awake, alert, cooperative, no apparent distress, and appears stated age  Respiratory: No increased work of breathing, good air exchange, clear to auscultation bilaterally, no crackles or wheezing  Cardiovascular: normal S1 and S2 and no murmur noted  GI: normal bowel sounds, soft, non-distended and non-tender  Skin: normal skin color, texture, turgor  Neurologic: Awake, alert, oriented to name, place and time.    Discharge  Disposition   Discharged to home  Condition at discharge: Stable    Consultations This Hospital Stay   UROLOGY IP CONSULT  PHARMACY TO DOSE HEPARIN  CARDIOLOGY IP CONSULT  NUTRITION SERVICES ADULT IP CONSULT  CARDIAC REHAB IP CONSULT  PHARMACY IP CONSULT  PHARMACY IP CONSULT  PHARMACY LIAISON FOR MEDICATION COVERAGE CONSULT  SMOKING CESSATION PROGRAM IP CONSULT    Time Spent on this Encounter   I, Vinayak Samaniego, personally saw the patient today and spent greater than 30 minutes discharging this patient.    Discharge Orders      CARDIAC REHAB REFERRAL      Discharge Order: F/U with Cardiac  DUNG      Reason for your hospital stay    You were admitted to the hospital for a heart attack. You had a stent placed in one of the main arteries of your heart.     Follow-up and recommended labs and tests     Follow up with primary care provider, Juan De La Vega, within 7 days for hospital follow- up.     Activity    Your activity upon discharge: as directed by cardiac rehab     Full Code     Diet    Follow this diet upon discharge: Cardiac     Discharge Medications   Current Discharge Medication List      START taking these medications    Details   acetaminophen (TYLENOL) 325 MG tablet Take 2 tablets (650 mg) by mouth every 4 hours as needed for mild pain    Associated Diagnoses: NSTEMI (non-ST elevated myocardial infarction) (H)      aspirin (ASA) 81 MG EC tablet Take 1 tablet (81 mg) by mouth daily    Associated Diagnoses: NSTEMI (non-ST elevated myocardial infarction) (H)      atorvastatin (LIPITOR) 40 MG tablet Take 1 tablet (40 mg) by mouth every evening  Qty: 30 tablet, Refills: 0    Comments: Future refills by PCP Dr. Juan De La Vega with phone number [phone].  Associated Diagnoses: NSTEMI (non-ST elevated myocardial infarction) (H)      lisinopril (PRINIVIL/ZESTRIL) 5 MG tablet Take 1 tablet (5 mg) by mouth daily  Qty: 30 tablet, Refills: 0    Comments: Future refills by PCP Dr. Juan De La Vega with phone  number [phone].  Associated Diagnoses: NSTEMI (non-ST elevated myocardial infarction) (H)      metoprolol tartrate (LOPRESSOR) 25 MG tablet Take 1 tablet (25 mg) by mouth 2 times daily  Qty: 60 tablet, Refills: 0    Comments: Future refills by PCP Dr. Juan De La Vega with phone number [phone].  Associated Diagnoses: NSTEMI (non-ST elevated myocardial infarction) (H)      nitroGLYcerin (NITROSTAT) 0.4 MG sublingual tablet As needed for chest pain place 1 tablet under the tongue every 5 minutes for 3 doses. If symptoms persist 5 minutes after 1st dose call 911.  Qty: 25 tablet, Refills: 0    Comments: Future refills by PCP Dr. Juan De La Vega with phone number [phone].  Associated Diagnoses: NSTEMI (non-ST elevated myocardial infarction) (H)      prasugrel (EFFIENT) 10 MG TABS tablet Take 1 tablet (10 mg) by mouth daily  Qty: 30 tablet, Refills: 0    Comments: Future refills by PCP Dr. Juan De La Vega with phone number [phone].  Associated Diagnoses: NSTEMI (non-ST elevated myocardial infarction) (H)         CONTINUE these medications which have NOT CHANGED    Details   vitamin B-12 (CYANOCOBALAMIN) 1000 MCG tablet Take 1,000 mcg by mouth daily      order for DME Equipment being ordered: CPAP         STOP taking these medications       ibuprofen (ADVIL/MOTRIN) 200 MG capsule Comments:   Reason for Stopping:         vardenafil (LEVITRA) 10 MG tablet Comments:   Reason for Stopping:             Allergies   Allergies   Allergen Reactions     Chlorthalidone GI Disturbance     Compazine Other (See Comments)     PSYCHOTIC     Erythromycin Nausea and Vomiting     Flomax [Tamsulosin Hcl]      FLUSHING, NASAl CONGESTION     Prochlorperazine      LW Reaction: anxiety     Seasonal Allergies      Data   Most Recent 3 CBC's:  Recent Labs   Lab Test 07/17/19  0524 07/15/19  0235 01/09/15  0559  12/30/14  1103   WBC 9.0 7.7  --   --  7.2   HGB 14.6 16.5 12.9*   < > 15.4   MCV 96 96  --   --  97.8     202  --   --  188    < > = values in this interval not displayed.      Most Recent 3 BMP's:  Recent Labs   Lab Test 07/17/19  0524 07/15/19  0235 12/17/18  1031    140 139   POTASSIUM 3.9 3.7 4.5   CHLORIDE 106 105 107   CO2 24 24 25   BUN 16 20 21   CR 0.98 0.99 1.09   ANIONGAP 8 11 7   NATHALIA 8.5 8.9 9.3   * 152* 113*     Most Recent 2 LFT's:  Recent Labs   Lab Test 06/30/14  0834   AST 23   ALT 27   ALKPHOS 79   BILITOTAL 0.5     Most Recent INR's and Anticoagulation Dosing History:  Anticoagulation Dose History     There is no flowsheet data to display.        Most Recent 3 Troponin's:  Recent Labs   Lab Test 07/15/19  1921 07/15/19  1423 07/15/19  0920   TROPI 10.659* 12.355* 6.825*     Most Recent Cholesterol Panel:  Recent Labs   Lab Test 07/17/19  0524   CHOL 156   LDL 84   HDL 31*   TRIG 207*     Most Recent 6 Bacteria Isolates From Any Culture (See EPIC Reports for Culture Details):No lab results found.  Most Recent TSH, T4 and A1c Labs:  Recent Labs   Lab Test 03/19/18  1024   A1C 5.9*     Transthoracic Echocardiogram (7/15/19)  Interpretation Summary  There is severe apical wall hypokinesis.  The visual ejection fraction is estimated at 50-55%.  The study was technically difficult. Contrast was used without apparent  complications. There is no comparison study available.    CT CHEST/ABDOMEN/PELVIS WITH CONTRAST  7/15/2019 3:30 AM  FINDINGS:  Chest: Evaluation of the pulmonary arterial system shows no evidence of embolus. There is no aortic aneurysm or dissection. The heart size is normal. There is no mediastinal, hilar or axillary lymph node enlargement. There is a small calcified granuloma in the right lung base posteriorly. Mild dependent atelectasis bilaterally. No pneumothorax or pleural effusion.  ABDOMEN: There is diffuse fatty infiltration of the liver. The spleen, gallbladder, pancreas and adrenal glands are normal in appearance.  There is a 0.8 cm stone at the left ureteropelvic  junction causing mild dilatation of the left renal collecting system. There are several stones within both kidneys. There is no abdominal or pelvic lymph node enlargement.  Pelvis: There is no bowel obstruction or inflammation. No free intraperitoneal gas or fluid. Postoperative changes in the lumbar spine. Degenerative disease throughout the spine.                                                      IMPRESSION:  1. There is no pulmonary embolus, aortic aneurysm or dissection.  2. There is a 0.8 cm left ureteropelvic junction stone causing mildhydronephrosis.   3. Multiple bilateral renal stones.   4. Fatty infiltration of the liver.   CINTHYA WILKINS MD

## 2019-07-17 NOTE — PLAN OF CARE
Discharge Planner PT   Patient plan for discharge: home  Current status: PT eval and treatment initiated for cardiac rehab. 68y/o M admitted with NSTEMI with severe chest discomfort and unstable angina, POD#1 pCI with MICAH placement in the mid LAD.     Pt with reports of increased SOB overnight and some chest discomfort 1/10. Dr. Zhao aware and present at beginning of session and recommended proceed with activity. Pt tolerated continuous activity including walking on flat surface >500ft and 5 stairs x 2 reps for total of 6 min. Pt rates activity as 2/10 on OMNI effort scale, able to hold a conversation comfortably. Vitals monitored during session. Hypertensive response to activity with WT=538/101mmHg, HR=72bpm. With 4 min rest BP:152/94mmHg, HR=-60bpm. SPO2 monitored during ambulation=96% on room air. No increase in pt's initial chest discomfort complaints. RN notified of session and reports BP meds not yet given. Issued and reviewed cardiac rehab education.   Barriers to return to prior living situation: none anticipated from a mobility perspective once medically stable  Recommendations for discharge: home with OP CR phase II   Rationale for recommendations: Pt will benefit from ongoing CR for monitored progression of activity and further education to optimize return of heart health.        Entered by: Elisabeth Acosta 07/17/2019 9:02 AM     Physical Therapy Discharge Summary    Reason for therapy discharge:    Discharged to home. Per chart, anticipated discharge home today.     Progress towards therapy goal(s). See goals on Care Plan in [facility] electronic health record for goal details.  Goals partially met.  Barriers to achieving goals:   discharge from facility. Pt currently tolerating 5-6 min of continuous ambulation/stairs.  Pt given education on exercise parameters and progression for home.      Therapy recommendation(s):    Continued therapy is recommended.  Rationale/Recommendations:  pt will continue to  benefit from OP CR phase II for monitored progression of activity and further education to optimize return of heart health..

## 2019-07-17 NOTE — PROVIDER NOTIFICATION
MD Notification    Notified Person: MD    Notified Person Name: On-call Hospitalist    Notification Date/Time: 07/17/19 @ 0042    Notification Interaction: Answering service     Purpose of Notification: Pt is anxious. Received anxiety med last night w/ relief.     Orders Received: Ativan ordered.    Comments:

## 2019-07-17 NOTE — PROGRESS NOTES
[facility]    Cardiology Progress Note    Date of Service (when I saw the patient): 07/17/2019     Assessment & Plan   Hugo Prince is a 69 year old male who was admitted on 7/15/2019.     1. NSTEMI. Admitted with severe chest discomfort at rest and unstable angina symptoms for the last few weeks.   -  Troponin peaked at 12.   -  Echocardiogram shows LVEF of 50 - 55% with severe apical wall hypokinesis.  -  Angiography done yesterday, reviewed by me.  Demonstrate a culprit lesion in the mid LAD.  This was successfully stented with a drug-eluting stent.  There was moderate proximal LAD lesion but the IFR was not significantly abnormal (0.291) through the proximal lesion and at the proximal end of the stented segment.  Proceed with cardiac rehab and if doing well discharged after his nephrolithiasis is dealt with    2.  Recurrent nephrolithiasis. Urology consulted. Currently, he has a partial obstructive with a little bit of hydronephrosis.  Can have definitive treatment by urology now that he has been successfully stented.    3.  Hyperlipidemia. Lipid profile shows LDL of 133, HDD of 35, total cholesterol of 191. Started on lipitor 40 mg daily.     4.  Possible/probable side effect of ticagrelor with sensation of dyspnea.  This was new and was noticed particularly after his evening dose.  He has bothered by this sensation all night long although.  Therefore will discontinue ticagrelor and switched to Prasugrel.    5.  RAY. Treated with CPAP.     Pascual Zhao M.D.    Interval History   Angiography done yesterday with finding of culprit lesion as noted above stenting.  He felt well after that until after his evening dose of ticagrelor.  He states that about every 6 breath felt short and uncomfortable since that time he did not sleep well.  He was not hypoxic.  He is not having his previous ischemic symptoms.  EKG during this time was without new changes.  He is still having that sensation this  "morning.  He is not having nephrolithiasis symptoms today but still has a partially obstructing stone.    Physical Exam   Vital Signs with Ranges  Temp:  [97.4  F (36.3  C)-98.3  F (36.8  C)] 98.3  F (36.8  C)  Pulse:  [48-57] 53  Heart Rate:  [48-72] 60  Resp:  [8-30] 13  BP: (115-179)/() 152/94  SpO2:  [90 %-98 %] 96 %  Vitals:    07/15/19 0238 07/15/19 0600 07/16/19 0336   Weight: 137 kg (302 lb) 136.2 kg (300 lb 3.2 oz) 136.9 kg (301 lb 11.2 oz)     I/O last 3 completed shifts:  In: 1608.75 [P.O.:600; I.V.:1008.75]  Out: 2825 [Urine:2825]    Vitals: BP (!) 152/94 (BP Location: Left arm)   Pulse 53   Temp 98.3  F (36.8  C) (Oral)   Resp 13   Ht 1.778 m (5' 10\")   Wt 136.9 kg (301 lb 11.2 oz)   SpO2 96%   BMI 43.29 kg/m      Constitutional: A little bit anxious about her shortness of breath.  No respiratory distress.    Chest:  Clear lungs    Cardiac: Regular rhythm.  No JVD or HJR.    Abdomen: Obese but soft and nontender    Vascular/Extremities: Right radial cath site CDI.    Recent Labs   Lab 07/15/19  1921 07/15/19  1423 07/15/19  0920 07/15/19  0638 07/15/19  0235   TROPI 10.659* 12.355* 6.825* 3.599* 0.019       Recent Labs   Lab 07/17/19  0524 07/15/19  1921 07/15/19  1423 07/15/19  0920  07/15/19  0235   WBC 9.0  --   --   --   --  7.7   HGB 14.6  --   --   --   --  16.5   MCV 96  --   --   --   --  96     --   --   --   --  202     --   --   --   --  140   POTASSIUM 3.9  --   --   --   --  3.7   CHLORIDE 106  --   --   --   --  105   CO2 24  --   --   --   --  24   BUN 16  --   --   --   --  20   CR 0.98  --   --   --   --  0.99   GFRESTIMATED 77  --   --   --   --  76   GFRESTBLACK 89  --   --   --   --  88   ANIONGAP 8  --   --   --   --  11   NATHALIA 8.5  --   --   --   --  8.9   *  --   --   --   --  152*   TROPI  --  10.659* 12.355* 6.825*   < > 0.019    < > = values in this interval not displayed.       No results found for this or any previous visit (from the past 48 " hour(s)).    No results found for this or any previous visit (from the past 4320 hour(s)).    Medications     Current Facility-Administered Medications   Medication Dose Route Frequency     aspirin  81 mg Oral Daily     atorvastatin  40 mg Oral QPM     HYDROmorphone  0.5 mg Intravenous Once     lisinopril  5 mg Oral Daily     metoprolol tartrate  25 mg Oral BID     morphine  2 mg Intravenous Once     [START ON 7/18/2019] prasugrel  10 mg Oral Daily     prasugrel  60 mg Oral Once         Current Facility-Administered Medications   Medication Last Rate     Continuing ACE inhibitor/ARB/ARNI from home medication list OR ACE inhibitor/ARB order already placed during this visit       - MEDICATION INSTRUCTIONS -       - MEDICATION INSTRUCTIONS -       Percutaneous Coronary Intervention orders placed (this is information for BPA alerting)

## 2019-07-17 NOTE — PROGRESS NOTES
Pt reported new intermittent chest pain and shortness of breath at 2330 - EKG obtained and on-call Cardiologist notified. Cardiologist gave telephone verbal order (with read-back) for Morphine 2 mg IV push ONCE for chest pain that is not resolved with nitroglycerin.

## 2019-07-17 NOTE — CONSULTS
Medication coverage check for Brilinta or Effient. $47 copay monthly for Brilinta.  Effient is about $58 monthly.    Misys Plata CphT  [facility] Pharmacy Liaison  Liaison Cell: [phone]

## 2019-07-17 NOTE — PROGRESS NOTES
07/17/19 0805   Quick Adds   Type of Visit Initial PT Evaluation   Living Environment   Lives With significant other   Living Arrangements house   Home Accessibility stairs to enter home;stairs within home   Number of Stairs, Main Entrance 2   Stair Railings, Main Entrance   (1 railing)   Number of Stairs, Within Home, Primary   (flight of stairs to the basement)   Transportation Anticipated car, drives self   Living Environment Comment flight of stairs to basement   Self-Care   Usual Activity Tolerance good   Current Activity Tolerance good   Regular Exercise Yes   Activity/Exercise Type biking;other (see comments)  ([employer] on stationary bike, more difficult recent d/t SOB)   Equipment Currently Used at Home none   Activity/Exercise/Self-Care Comment duck hunts   Functional Level Prior   Ambulation 0-->independent   Transferring 0-->independent   Toileting 0-->independent   Bathing 0-->independent   Communication 0-->understands/communicates without difficulty   Swallowing 0-->swallows foods/liquids without difficulty   Cognition 0 - no cognition issues reported   Fall history within last six months yes   Number of times patient has fallen within last six months 3  (from fly fishing per chart)   Which of the above functional risks had a recent onset or change? none   Prior Functional Level Comment IND with all mobility   General Information   Onset of Illness/Injury or Date of Surgery - Date 07/15/19   Referring Physician Mick Thrasher MD   Patient/Family Goals Statement to return home   Pertinent History of Current Problem (include personal factors and/or comorbidities that impact the POC) 70y/o M admitted with NSTEMI with severe chest discomfort and unstable angina, POD#1 pCI with MICAH placement in the mid LAD.    Precautions/Limitations other (see comments)  (right radial approach)   General Observations resting in bed, RN present, splint on right wrist for radial access,    Cognitive Status  "Examination   Orientation orientation to person, place and time   Level of Consciousness alert   Follows Commands and Answers Questions 100% of the time   Personal Safety and Judgment intact   Memory intact   Pain Assessment   Patient Currently in Pain Yes, see Vital Sign flowsheet  (1/10 chest discomfort-RN and MD aware. )   Range of Motion (ROM)   ROM Comment WFL   Strength   Strength Comments WFL   Bed Mobility   Bed Mobility Comments IND   Transfer Skills   Transfer Comments IND   Gait   Gait Comments IND without assistive device.    Balance   Balance Comments IND   General Therapy Interventions   Planned Therapy Interventions home program guidelines;risk factor education;progressive activity/exercise;transfer training   Clinical Impression   Criteria for Skilled Therapeutic Intervention yes, treatment indicated   PT Diagnosis impaired activity tolerance   Influenced by the following impairments impaired functional activity tolerance   Functional limitations due to impairments exercies parameters, cardiac restrictions, MI precautions.    Clinical Presentation Stable/Uncomplicated   Clinical Presentation Rationale based on clinical judgement   Clinical Decision Making (Complexity) Low complexity   Therapy Frequency 2x/day   Predicted Duration of Therapy Intervention (days/wks) 1-2 days   Anticipated Discharge Disposition Home;Other (see comments)  (outpt CR)   Risk & Benefits of therapy have been explained Yes   Patient, Family & other staff in agreement with plan of care Yes   [facility] [employer] \"6 Clicks\"   2016, Trustees of [facility], under license to [employer].  All rights reserved.   6 Clicks Short Forms Basic Mobility Inpatient Short Form   [facility] [employer]  \"6 Clicks\" V.2 Basic Mobility Inpatient Short Form   1. Turning from your back to your side while in a flat bed without using bedrails? 4 - None   2. Moving from lying on your back to sitting on the side of a flat bed without " using bedrails? 4 - None   3. Moving to and from a bed to a chair (including a wheelchair)? 4 - None   4. Standing up from a chair using your arms (e.g., wheelchair, or bedside chair)? 4 - None   5. To walk in hospital room? 4 - None   6. Climbing 3-5 steps with a railing? 4 - None   Basic Mobility Raw Score (Score out of 24.Lower scores equate to lower levels of function) 24   Total Evaluation Time   Total Evaluation Time (Minutes) 10

## 2019-07-17 NOTE — CONSULTS
History: Very pleasant 69-year-old gentleman well-known to me who was admitted to the hospital with severe chest discomfort and unstable angina who had a cardiovascular stent placed in the coronary artery yesterday is now on Plavix.  A CT scan had been performed as part of his evaluation and he was seen to have stones in the kidneys.  He is well-known to me.  He is was 302 pounds and we have been discussing management of stones in the kidney recently.  He has a significant stone burden in each kidney, and we have been discussing management recently.  At the time I saw him at 302 pounds this would preclude ESWL.  There is a significant stone burden in the right kidney and which would most likely require percutaneous stone removal.  There is a lesser burden in the left kidney and may be possible to do ESWL at that time, however his weight would have to be reduced.  Currently however with him now on Plavix this would not be feasible.  Currently it appears although is not getting flank pain that there is now a stone on the left side in the renal pelvis, approximately 8 mm in diameter close to the left ureteropelvic junction which is moved since the previous scan.    Past Medical History:   Diagnosis Date     Atopic dermatitis      Esophageal reflux     Gastroesophageal reflux     Other and unspecified disc disorder of unspecified region     Intervertebral disc disorders     Personal history of colonic polyps     Colon polyps     S/P lateral meniscectomy of left knee      Tick bite 4/20/15    amoxicillin treated     Unspecified sleep apnea     Sleep apnea     Urinary calculus, unspecified     Renal stones     Vitamin D deficiency 9-27-09       Past Surgical History:   Procedure Laterality Date     ARTHROPLASTY KNEE Left 1/7/2015    Procedure: ARTHROPLASTY KNEE;  Surgeon: Agustin Saenz MD;  Location: [facility] OR     ARTHROSCOPY KNEE RT/LT  6-20-11     COLONOSCOPY  12/09     CV FRACTIONAL FLOW RESERVE N/A 7/16/2019     Procedure: Fractional Flow Reserve;  Surgeon: Mick Thrasher MD;  Location:  HEART CARDIAC CATH LAB     CV HEART CATHETERIZATION WITH POSSIBLE INTERVENTION N/A 7/16/2019    Procedure: Heart Catheterization with Possible Intervention;  Surgeon: Mick Thrasher MD;  Location:  HEART CARDIAC CATH LAB     CYSTOSCOPY       EXTRACORPOREAL SHOCK WAVE LITHOTRIPSY (ESWL)  1/20/2012    Procedure:EXTRACORPOREAL SHOCK WAVE LITHOTRIPSY (ESWL); LEFT EXTRACORPOREAL SHOCK WAVE LITHOTRIPSY ; Surgeon:ANJUM BUNDY; Location: OR     EXTRACORPOREAL SHOCK WAVE LITHOTRIPSY, CYSTOSCOPY, INSERT STENT URETER(S), COMBINED  2/3/2012    Procedure:COMBINED EXTRACORPOREAL SHOCK WAVE LITHOTRIPSY, CYSTOSCOPY, INSERT STENT URETER(S); CYSTOSCOPY, RIGHT STENT; Surgeon:ANJUM BUNDY; Location: OR     FUSION LUMBAR ANTERIOR, FUSION LUMBAR POSTERIOR ONE LEVEL, COMBINED  11-1-10    L4-5     LASER HOLMIUM LITHOTRIPSY URETER(S), INSERT STENT, COMBINED  2/7/2012    Procedure:COMBINED CYSTOSCOPY, URETEROSCOPY, LASER HOLMIUM LITHOTRIPSY URETER(S), INSERT STENT; CYSTOSCOPY, RIGHT URETEROSCOPY, RIGHT RETROGRADES,  STONE EXTRACTION WITH HOLMIUM LASER AND RIGHT URETERAL STENT EXCHANGE ; Surgeon:DEEPTI MELVIN; Location: OR     LITHOTRIPSY      Lithotrypsy     SEPTOPLASTY       VASECTOMY         Family History   Problem Relation Age of Onset     Melanoma Father      Prostate Cancer Father      Cancer Father         neck cancer from radiation at work (urology)     Breast Cancer Mother      C.A.D. Paternal Grandfather 72       Social History     Socioeconomic History     Marital status:      Spouse name: Not on file     Number of children: Not on file     Years of education: Not on file     Highest education level: Not on file   Occupational History     Not on file   Social Needs     Financial resource strain: Not on file     Food insecurity:     Worry: Not on file     Inability: Not on file     Transportation needs:  "    Medical: Not on file     Non-medical: Not on file   Tobacco Use     Smoking status: Passive Smoke Exposure - Never Smoker     Smokeless tobacco: Never Used     Tobacco comment: has cigars when it is warm out   Substance and Sexual Activity     Alcohol use: Yes     Alcohol/week: 7.0 oz     Types: 14 Standard drinks or equivalent per week     Drug use: No     Sexual activity: Not on file   Lifestyle     Physical activity:     Days per week: Not on file     Minutes per session: Not on file     Stress: Not on file   Relationships     Social connections:     Talks on phone: Not on file     Gets together: Not on file     Attends Yazdanism service: Not on file     Active member of club or organization: Not on file     Attends meetings of clubs or organizations: Not on file     Relationship status: Not on file     Intimate partner violence:     Fear of current or ex partner: Not on file     Emotionally abused: Not on file     Physically abused: Not on file     Forced sexual activity: Not on file   Other Topics Concern     Parent/sibling w/ CABG, MI or angioplasty before 65F 55M? Not Asked   Social History Narrative     Not on file       No current outpatient medications on file.       Review Of Systems:  Skin: negative  Eyes: negative  Ears/Nose/Throat: negative  Respiratory: No shortness of breath, dyspnea on exertion, cough, or hemoptysis  Cardiovascular: exertional chest pain or pressure  Gastrointestinal: obesity  Genitourinary: kidney stone  Musculoskeletal: back pain  Neurologic: negative  Psychiatric: negative  Hematologic/Lymphatic/Immunologic: negative  Endocrine: negative    Exam:  /78 (BP Location: Left arm)   Pulse 53   Temp 97.9  F (36.6  C) (Oral)   Resp 16   Ht 1.778 m (5' 10\")   Wt 136.9 kg (301 lb 11.2 oz)   SpO2 95%   BMI 43.29 kg/m      General Impression: Very pleasant gentleman in no acute distress, well-oriented in time place and person.  Ambulating freely.    Mental status.  " Normal    HEENT: No clinical evidence of jaundice, mucous membranes normal    Skin: Skin otherwise normal to examination     Lymph Ndea.  Not examined    Respiratory System: The respiratory cycle is normal    Cardiovascular: No significant peripheral edema    Abdominal: Grossly obese abdomen    Extremities: Mild swelling of both ankles    Back and Flank: No significant flank tenderness    Genital: Not examined    Rectal: Not examined    Neurologic: There are no focal abnormal clinical neurological signs and central, peripheral nervous systems    Impression: I reviewed all the radiologic studies carefully and compared to the previous.  1 of the stones in the left kidney has moved from the lower pole calyx into the region of the left ureteropelvic junction but the CT scan shows minimal evidence of any hydronephrosis on the left side.  The patient has no significant symptoms at this time.  We should stress that he is just had a stent placed in the coronary artery and is on Plavix will have to stay on Plavix for at least 3 months and probably for a year.  I have carefully discussed the situation with the patient and advised at the moment that we do not intervene.  He has no pain at the moment and if we do place a stent for prophylactic avoidance of disc obstruction then he will be very uncomfortable.  My recommendation is that I see him in the office next week to get a KUB to see if the stone has moved.  If he does get severe pain and will need to place a stent which we can leave in 3 to 6 months by which time he may be able to plan management of the stones without him having been on Plavix   I discussed the entire situation in very detail with both the patient and the patient is taking a, reviewed all records in detail went over pertinent labs and radiologic studies and is involved with decision making as.      I will see him in the office next week for KUB and follow-up examination.  He does get severe pain we will  place a stent promptly    This was a complex issue lengthy consultation involving all the issues outlined above

## 2019-07-18 ENCOUNTER — TELEPHONE (OUTPATIENT)
Dept: CARDIOLOGY | Facility: CLINIC | Age: 69
End: 2019-07-18

## 2019-07-18 LAB
INTERPRETATION ECG - MUSE: NORMAL

## 2019-07-18 NOTE — TELEPHONE ENCOUNTER
"Received call from pt inquiring if it is OK to remove the bandage at his radial catheterization site. Pt was admitted 7/15/19-7/17/19 for NSTEMI and is s/p MICAH to LAD on 7/15/19, right radial catheterization site. Advised pt it is fine to remove the bandage, advised that symptoms to watch for include bleeding, oozing, redness and swelling at site. Pt stated his site looks fine, noted a bruise. Advised pt that some bruising is normal. Pt asked, \"What else should I know?\".  Reviewed that pt has picked up all of his cardiac  medications and pt confirmed he is taking all as prescribed. Reviewed how to use SL nitroglycerin if pt develops chest pain. Reviewed plan for follow up on 8/2/19 at 2:30 pm at [facility]. Pt verbalized understanding, no further questions at this time.   "

## 2019-07-18 NOTE — PLAN OF CARE
A&O, anxious at times. Tele: SB/SR w/ 1st degree AVB. VSS on room air. Some c/o CP and SOB this AM. Dr. Zhao aware, patient switched from brilinta to effient d/t possible SOB contributor. Independent. Cardiac rehab today, signed off, follow up appointments scheduled. Patient teaching done on radial site restrictions. R site C/D/I, CMS intact, +2 pulses. Covered w/ band-aid. All questions and concerns have been addressed at this time. IV removed. New medication teaching complete. Medication prescriptions sent to patients preferred pharmacy for patient to  tonight. Patient girlfriend picked up pt, patient walked to entrance of hospital by staff.

## 2019-07-22 ENCOUNTER — OFFICE VISIT (OUTPATIENT)
Dept: UROLOGY | Facility: CLINIC | Age: 69
End: 2019-07-22
Payer: MEDICARE

## 2019-07-22 VITALS
BODY MASS INDEX: 43.09 KG/M2 | WEIGHT: 301 LBS | SYSTOLIC BLOOD PRESSURE: 138 MMHG | DIASTOLIC BLOOD PRESSURE: 70 MMHG | HEIGHT: 70 IN | HEART RATE: 60 BPM

## 2019-07-22 DIAGNOSIS — N20.0 KIDNEY STONE: Primary | ICD-10-CM

## 2019-07-22 LAB
ALBUMIN UR-MCNC: NEGATIVE MG/DL
APPEARANCE UR: CLEAR
BILIRUB UR QL STRIP: NEGATIVE
COLOR UR AUTO: YELLOW
GLUCOSE UR STRIP-MCNC: NEGATIVE MG/DL
HGB UR QL STRIP: ABNORMAL
KETONES UR STRIP-MCNC: NEGATIVE MG/DL
LEUKOCYTE ESTERASE UR QL STRIP: ABNORMAL
NITRATE UR QL: NEGATIVE
PH UR STRIP: 5 PH (ref 5–7)
SOURCE: ABNORMAL
SP GR UR STRIP: 1.02 (ref 1–1.03)
UROBILINOGEN UR STRIP-ACNC: 0.2 EU/DL (ref 0.2–1)

## 2019-07-22 PROCEDURE — 99214 OFFICE O/P EST MOD 30 MIN: CPT | Performed by: UROLOGY

## 2019-07-22 PROCEDURE — 81003 URINALYSIS AUTO W/O SCOPE: CPT | Performed by: UROLOGY

## 2019-07-22 ASSESSMENT — PAIN SCALES - GENERAL: PAINLEVEL: NO PAIN (0)

## 2019-07-22 ASSESSMENT — MIFFLIN-ST. JEOR: SCORE: 2126.58

## 2019-07-22 NOTE — LETTER
7/22/2019       RE: Hugo Prince  [address]  [facility] [id number]     Dear Colleague,    Thank you for referring your patient, Hugo Prince, to the [facility] at [facility]. Please see a copy of my visit note below.    It is a great pleasure to see this pleasant 69-year-old gentleman in follow-up consultation today.  As we recall, he has a significant clinically challenging situation.  He has a history of recurrent urinary stone disease having had lithotripsy in the past on a number of occasions.  Previously, within the last few months he had been seen because of a significant stone burden in each kidney but without pain.  He has also has a history of major problems with the lumbar spine having had previous spinal fusion with ongoing pain and sciatica.  We had evaluated him closely and determined that he had a high stone burden in the right kidney almost all in the lower pole calyx.  There was a lesser volume in the left kidney and almost all of that at that time was in the lower pole calyx.  On top of that he was over 300 pounds which precluded doing ESWL present sometime although he has an active lifestyle.  We had decided at that point to proceed cautiously, to consider a percutaneous nephrolithotomy of the stones in the right kidney, and perhaps, if he could lose weight to a level where we could do ESWL to consider ESWL for the stones in the left kidney.  Recently however he suddenly developed chest pain, was evaluated in the coronary unit here and a cardiovascular stent was placed as treatment for occlusive coronary artery disease and is now on anticoagulants.  He was also seen at that time by them ourselves because a CT scan had been done while in the hospital was showing that the stone in the left kidney had migrated from the lower calyx into the region of the left ureteropelvic junction.  However there  was no evidence of hydronephrosis.  We had seen the patient in consultation in the hospital and advised against intervention as he had already been anticoagulated and is asymptomatic.  We decided to see and reevaluate the situation in the office.  He therefore is here today for further discussions with    Past Medical History:   Diagnosis Date     Atopic dermatitis      CAD (coronary artery disease)      DJD (degenerative joint disease) of knee 1/14/2015     Dyslipidemia      ED (erectile dysfunction) 4/30/2012     Esophageal reflux     Gastroesophageal reflux     NSTEMI (non-ST elevated myocardial infarction) (H) 7/15/2019     Other and unspecified disc disorder of unspecified region     Intervertebral disc disorders     Personal history of colonic polyps     Colon polyps     S/P lateral meniscectomy of left knee      S/P total knee replacement 1/7/2015     Tick bite 4/20/15    amoxicillin treated     Unspecified sleep apnea     Sleep apnea     Urinary calculus, unspecified     Renal stones     Vitamin D deficiency 9-27-09     Past Surgical History:   Procedure Laterality Date     ARTHROPLASTY KNEE Left 1/7/2015    Procedure: ARTHROPLASTY KNEE;  Surgeon: Agustin Saenz MD;  Location:  OR     ARTHROSCOPY KNEE RT/LT  6-20-11     COLONOSCOPY  12/09     CV FRACTIONAL FLOW RESERVE N/A 7/16/2019    Procedure: Fractional Flow Reserve;  Surgeon: Mick Thrasher MD;  Location:  HEART CARDIAC CATH LAB     CV HEART CATHETERIZATION WITH POSSIBLE INTERVENTION N/A 7/16/2019    successful PCI with MICAH placement to mid LAD      CYSTOSCOPY       EXTRACORPOREAL SHOCK WAVE LITHOTRIPSY (ESWL)  1/20/2012    Procedure:EXTRACORPOREAL SHOCK WAVE LITHOTRIPSY (ESWL); LEFT EXTRACORPOREAL SHOCK WAVE LITHOTRIPSY ; Surgeon:ANJUM BUNDY; Location: OR     EXTRACORPOREAL SHOCK WAVE LITHOTRIPSY, CYSTOSCOPY, INSERT STENT URETER(S), COMBINED  2/3/2012    Procedure:COMBINED EXTRACORPOREAL SHOCK WAVE LITHOTRIPSY, CYSTOSCOPY,  INSERT STENT URETER(S); CYSTOSCOPY, RIGHT STENT; Surgeon:ANJUM BUNDY; Location:[facility] OR     FUSION LUMBAR ANTERIOR, FUSION LUMBAR POSTERIOR ONE LEVEL, COMBINED  11-1-10    L4-5     LASER HOLMIUM LITHOTRIPSY URETER(S), INSERT STENT, COMBINED  2/7/2012    Procedure:COMBINED CYSTOSCOPY, URETEROSCOPY, LASER HOLMIUM LITHOTRIPSY URETER(S), INSERT STENT; CYSTOSCOPY, RIGHT URETEROSCOPY, RIGHT RETROGRADES,  STONE EXTRACTION WITH HOLMIUM LASER AND RIGHT URETERAL STENT EXCHANGE ; Surgeon:DEEPTI MELVIN; Location:[facility] OR     LITHOTRIPSY      Lithotrypsy     SEPTOPLASTY       VASECTOMY         Current Outpatient Medications:      acetaminophen (TYLENOL) 325 MG tablet, Take 2 tablets (650 mg) by mouth every 4 hours as needed for mild pain, Disp: , Rfl:      aspirin (ASA) 81 MG EC tablet, Take 1 tablet (81 mg) by mouth daily, Disp: , Rfl:      atorvastatin (LIPITOR) 40 MG tablet, Take 1 tablet (40 mg) by mouth every evening, Disp: 30 tablet, Rfl: 0     lisinopril (PRINIVIL/ZESTRIL) 5 MG tablet, Take 1 tablet (5 mg) by mouth daily, Disp: 30 tablet, Rfl: 0     metoprolol tartrate (LOPRESSOR) 25 MG tablet, Take 1 tablet (25 mg) by mouth 2 times daily, Disp: 60 tablet, Rfl: 0     nitroGLYcerin (NITROSTAT) 0.4 MG sublingual tablet, As needed for chest pain place 1 tablet under the tongue every 5 minutes for 3 doses. If symptoms persist 5 minutes after 1st dose call 911., Disp: 25 tablet, Rfl: 0     prasugrel (EFFIENT) 10 MG TABS tablet, Take 1 tablet (10 mg) by mouth daily, Disp: 30 tablet, Rfl: 0     vitamin B-12 (CYANOCOBALAMIN) 1000 MCG tablet, Take 1,000 mcg by mouth daily, Disp: , Rfl:      order for DME, Equipment being ordered: CPAP, Disp: , Rfl:      10 point ROS of systems including Constitutional, Eyes, Respiratory, Cardiovascular, Gastroenterology, Genitourinary, Integumentary, Muscularskeletal, Psychiatric were all negative except for pertinent positives noted in my HPI.    /70   Pulse 60   Ht 1.778 m  "(5' 10\")   Wt 136.5 kg (301 lb)   BMI 43.19 kg/m     Very pleasant gentleman in no acute distress, well-oriented in time place and person and quite conversational.  Mental status.  A little anxious.  HEENT.  No clinical evidence of jaundice and mucous membranes normal.  Skin.  Skin is otherwise normal to examination.  Respiratory system.  The respiratory cycle is normal.  Cardiovascular system.  There is mild swelling of the ankles without pitting.  Abdomen grossly obese abdomen.  Back and flanks.  There is tenderness over the mid lumbar spine just over L1-L2, there is no evidence of any tenderness in either renal angle  Extremities.  Extremities otherwise unremarkable.  Neurologic system.  There are no focal abnormal clinical neurological signs in the central, peripheral nerve systems    Impression  I had a careful discussion with him today, going over the recent radiologic studies, reviewing his records in detail and discussing the ongoing situation with him.  I am going to recommend a very conservative approach.  We should not intervene in the absence of any significant symptoms regarding the stones in the kidney.  I am going to recommend, should problems arise with regard flank pain as result of stones that the first step should be to place a stent as I would not wish to do any other intervention while he has to be on anticoagulants and it is essentially stay on anticoagulants if possible for 1 year after this placement of a stent.  If problems arise we will place a stent  If we do need to think we need to proceed with some sort of intervention in the short-term, then it may be possible to do flexible ureteroscopy with holmium laser but we had to discuss this with the cardiologist prior to contemplating this.  I will therefore follow him closely and I would like to see him again in 8 weeks for KUB to see if the stones position has changed on the left side.  I did discuss the entire situation with the patient " "in detail today.  I answered all questions.    Plan.  8 weeks for KUB and examination.    Time.  25 minutes.  This is a complex situation as outlined above with extensive record review discussion in addition to those things already listed above    \"This dictation was performed with voice recognition software and may contain errors,  omissions and inadvertent word substitution.\"        Again, thank you for allowing me to participate in the care of your patient.      Sincerely,    Dima Desai MD      "

## 2019-07-22 NOTE — PROGRESS NOTES
It is a great pleasure to see this pleasant 69-year-old gentleman in follow-up consultation today.  As we recall, he has a significant clinically challenging situation.  He has a history of recurrent urinary stone disease having had lithotripsy in the past on a number of occasions.  Previously, within the last few months he had been seen because of a significant stone burden in each kidney but without pain.  He has also has a history of major problems with the lumbar spine having had previous spinal fusion with ongoing pain and sciatica.  We had evaluated him closely and determined that he had a high stone burden in the right kidney almost all in the lower pole calyx.  There was a lesser volume in the left kidney and almost all of that at that time was in the lower pole calyx.  On top of that he was over 300 pounds which precluded doing ESWL present sometime although he has an active lifestyle.  We had decided at that point to proceed cautiously, to consider a percutaneous nephrolithotomy of the stones in the right kidney, and perhaps, if he could lose weight to a level where we could do ESWL to consider ESWL for the stones in the left kidney.  Recently however he suddenly developed chest pain, was evaluated in the coronary unit here and a cardiovascular stent was placed as treatment for occlusive coronary artery disease and is now on anticoagulants.  He was also seen at that time by them ourselves because a CT scan had been done while in the hospital was showing that the stone in the left kidney had migrated from the lower calyx into the region of the left ureteropelvic junction.  However there was no evidence of hydronephrosis.  We had seen the patient in consultation in the hospital and advised against intervention as he had already been anticoagulated and is asymptomatic.  We decided to see and reevaluate the situation in the office.  He therefore is here today for further discussions with    Past Medical  History:   Diagnosis Date     Atopic dermatitis      CAD (coronary artery disease)      DJD (degenerative joint disease) of knee 1/14/2015     Dyslipidemia      ED (erectile dysfunction) 4/30/2012     Esophageal reflux     Gastroesophageal reflux     NSTEMI (non-ST elevated myocardial infarction) (H) 7/15/2019     Other and unspecified disc disorder of unspecified region     Intervertebral disc disorders     Personal history of colonic polyps     Colon polyps     S/P lateral meniscectomy of left knee      S/P total knee replacement 1/7/2015     Tick bite 4/20/15    amoxicillin treated     Unspecified sleep apnea     Sleep apnea     Urinary calculus, unspecified     Renal stones     Vitamin D deficiency 9-27-09     Past Surgical History:   Procedure Laterality Date     ARTHROPLASTY KNEE Left 1/7/2015    Procedure: ARTHROPLASTY KNEE;  Surgeon: Agustin Saenz MD;  Location:  OR     ARTHROSCOPY KNEE RT/LT  6-20-11     COLONOSCOPY  12/09     CV FRACTIONAL FLOW RESERVE N/A 7/16/2019    Procedure: Fractional Flow Reserve;  Surgeon: Mick Thrasher MD;  Location:  HEART CARDIAC CATH LAB     CV HEART CATHETERIZATION WITH POSSIBLE INTERVENTION N/A 7/16/2019    successful PCI with MICAH placement to mid LAD      CYSTOSCOPY       EXTRACORPOREAL SHOCK WAVE LITHOTRIPSY (ESWL)  1/20/2012    Procedure:EXTRACORPOREAL SHOCK WAVE LITHOTRIPSY (ESWL); LEFT EXTRACORPOREAL SHOCK WAVE LITHOTRIPSY ; Surgeon:ANJUM BUNDY; Location: OR     EXTRACORPOREAL SHOCK WAVE LITHOTRIPSY, CYSTOSCOPY, INSERT STENT URETER(S), COMBINED  2/3/2012    Procedure:COMBINED EXTRACORPOREAL SHOCK WAVE LITHOTRIPSY, CYSTOSCOPY, INSERT STENT URETER(S); CYSTOSCOPY, RIGHT STENT; Surgeon:ANJUM BUNDY; Location: OR     FUSION LUMBAR ANTERIOR, FUSION LUMBAR POSTERIOR ONE LEVEL, COMBINED  11-1-10    L4-5     LASER HOLMIUM LITHOTRIPSY URETER(S), INSERT STENT, COMBINED  2/7/2012    Procedure:COMBINED CYSTOSCOPY, URETEROSCOPY, LASER HOLMIUM  "LITHOTRIPSY URETER(S), INSERT STENT; CYSTOSCOPY, RIGHT URETEROSCOPY, RIGHT RETROGRADES,  STONE EXTRACTION WITH HOLMIUM LASER AND RIGHT URETERAL STENT EXCHANGE ; Surgeon:DEEPTI MELVIN; Location:[facility]     LITHOTRIPSY      Lithotrypsy     SEPTOPLASTY       VASECTOMY         Current Outpatient Medications:      acetaminophen (TYLENOL) 325 MG tablet, Take 2 tablets (650 mg) by mouth every 4 hours as needed for mild pain, Disp: , Rfl:      aspirin (ASA) 81 MG EC tablet, Take 1 tablet (81 mg) by mouth daily, Disp: , Rfl:      atorvastatin (LIPITOR) 40 MG tablet, Take 1 tablet (40 mg) by mouth every evening, Disp: 30 tablet, Rfl: 0     lisinopril (PRINIVIL/ZESTRIL) 5 MG tablet, Take 1 tablet (5 mg) by mouth daily, Disp: 30 tablet, Rfl: 0     metoprolol tartrate (LOPRESSOR) 25 MG tablet, Take 1 tablet (25 mg) by mouth 2 times daily, Disp: 60 tablet, Rfl: 0     nitroGLYcerin (NITROSTAT) 0.4 MG sublingual tablet, As needed for chest pain place 1 tablet under the tongue every 5 minutes for 3 doses. If symptoms persist 5 minutes after 1st dose call 911., Disp: 25 tablet, Rfl: 0     prasugrel (EFFIENT) 10 MG TABS tablet, Take 1 tablet (10 mg) by mouth daily, Disp: 30 tablet, Rfl: 0     vitamin B-12 (CYANOCOBALAMIN) 1000 MCG tablet, Take 1,000 mcg by mouth daily, Disp: , Rfl:      order for DME, Equipment being ordered: CPAP, Disp: , Rfl:      10 point ROS of systems including Constitutional, Eyes, Respiratory, Cardiovascular, Gastroenterology, Genitourinary, Integumentary, Muscularskeletal, Psychiatric were all negative except for pertinent positives noted in my HPI.    /70   Pulse 60   Ht 1.778 m (5' 10\")   Wt 136.5 kg (301 lb)   BMI 43.19 kg/m    Very pleasant gentleman in no acute distress, well-oriented in time place and person and quite conversational.  Mental status.  A little anxious.  HEENT.  No clinical evidence of jaundice and mucous membranes normal.  Skin.  Skin is otherwise normal to " "examination.  Respiratory system.  The respiratory cycle is normal.  Cardiovascular system.  There is mild swelling of the ankles without pitting.  Abdomen grossly obese abdomen.  Back and flanks.  There is tenderness over the mid lumbar spine just over L1-L2, there is no evidence of any tenderness in either renal angle  Extremities.  Extremities otherwise unremarkable.  Neurologic system.  There are no focal abnormal clinical neurological signs in the central, peripheral nerve systems    Impression  I had a careful discussion with him today, going over the recent radiologic studies, reviewing his records in detail and discussing the ongoing situation with him.  I am going to recommend a very conservative approach.  We should not intervene in the absence of any significant symptoms regarding the stones in the kidney.  I am going to recommend, should problems arise with regard flank pain as result of stones that the first step should be to place a stent as I would not wish to do any other intervention while he has to be on anticoagulants and it is essentially stay on anticoagulants if possible for 1 year after this placement of a stent.  If problems arise we will place a stent  If we do need to think we need to proceed with some sort of intervention in the short-term, then it may be possible to do flexible ureteroscopy with holmium laser but we had to discuss this with the cardiologist prior to contemplating this.  I will therefore follow him closely and I would like to see him again in 8 weeks for KUB to see if the stones position has changed on the left side.  I did discuss the entire situation with the patient in detail today.  I answered all questions.    Plan.  8 weeks for KUB and examination.    Time.  25 minutes.  This is a complex situation as outlined above with extensive record review discussion in addition to those things already listed above    \"This dictation was performed with voice recognition software " "and may contain errors,  omissions and inadvertent word substitution.\"      "

## 2019-07-22 NOTE — NURSING NOTE
"Chief Complaint   Patient presents with     Kidney Stone Related     Pt is here for kidney stone ER follow up       Patient Active Problem List   Diagnosis     Kidney stone     Sleep apnea     ED (erectile dysfunction)     Health Care Home     GERD (gastroesophageal reflux disease)     S/P total knee replacement     Dermatitis     Physical deconditioning     Knee pain     DJD (degenerative joint disease) of knee     Anemia due to blood loss, acute     Edema     S/P knee surgery     Chronic pharyngitis and nasopharyngitis(472)     Morbid obesity (H)     NSTEMI (non-ST elevated myocardial infarction) (H)     CAD (coronary artery disease)     Dyslipidemia       Allergies   Allergen Reactions     Chlorthalidone GI Disturbance     Compazine Other (See Comments)     PSYCHOTIC     Erythromycin Nausea and Vomiting     Flomax [Tamsulosin Hcl]      FLUSHING, NASAl CONGESTION     Prochlorperazine      LW Reaction: anxiety     Seasonal Allergies        /70   Pulse 60   Ht 1.778 m (5' 10\")   Wt 136.5 kg (301 lb)   BMI 43.19 kg/m            Xu Russell, EMT-B  7/22/2019         "

## 2019-07-25 ENCOUNTER — HOSPITAL ENCOUNTER (OUTPATIENT)
Dept: CARDIAC REHAB | Facility: CLINIC | Age: 69
End: 2019-07-25
Attending: INTERNAL MEDICINE
Payer: MEDICARE

## 2019-07-25 DIAGNOSIS — I21.4 NSTEMI (NON-ST ELEVATED MYOCARDIAL INFARCTION) (H): ICD-10-CM

## 2019-07-25 PROCEDURE — 93797 PHYS/QHP OP CAR RHAB WO ECG: CPT | Mod: XU

## 2019-07-25 PROCEDURE — 40000116 ZZH STATISTIC OP CR VISIT

## 2019-07-25 PROCEDURE — 40000575 ZZH STATISTIC OP CARDIAC VISIT #2

## 2019-07-25 PROCEDURE — 93798 PHYS/QHP OP CAR RHAB W/ECG: CPT

## 2019-07-31 ENCOUNTER — HOSPITAL ENCOUNTER (OUTPATIENT)
Dept: CARDIAC REHAB | Facility: CLINIC | Age: 69
End: 2019-07-31
Attending: INTERNAL MEDICINE
Payer: MEDICARE

## 2019-07-31 PROCEDURE — 93798 PHYS/QHP OP CAR RHAB W/ECG: CPT | Performed by: REHABILITATION PRACTITIONER

## 2019-07-31 PROCEDURE — 40000116 ZZH STATISTIC OP CR VISIT: Performed by: REHABILITATION PRACTITIONER

## 2019-08-01 NOTE — PROGRESS NOTES
Cardiology Progress Note    Date of Service: 08/02/2019  Patient seen today in follow up of: post hospital  Primary cardiologist: bree f/u with Dr. Zhao    HPI:  Hugo Prince is a very pleasant 69 year old male with a history of former tobacco use, coronary artery disease, morbid obesity, obstructive sleep apnea treated with CPAP, and recurrent nephrolithiasis who is here today for post hospital follow-up.    He was admitted to [facility] on 7/15 with complaints of chest discomfort and shortness of breath over the last few weeks consistent with unstable angina.  EKG showed changes laterally. His troponin level peaked at 12.  Echocardiogram showed an LVEF of 50 to 55% with severe apical wall hypokinesis.  He was seen in consultation by Dr. Zhao and underwent coronary angiography.  This showed a culprit lesion in the mid LAD which was successfully stented with a drug-eluting stent.  There was a moderate proximal LAD lesion which was negative by IFR. He also has moderate mid RCA disease.  He was started on aspirin and Brilinta for dual antiplatelet therapy. He developed some shortness of breath after Brilinta and was switch from ticagrelor to prasugrel.  He was also started on a atorvastatin, metoprolol, and lisinopril.    He is here today for post hospital follow-up. He has been feeling well since his hosptial discharge. He denies any recurrent chest discomfort or shortness of breath. He did have an episode of epigastric discomfort but this resolved and has not recurrent. He believes it indigestion. He has enrolled in cardiac rehab and has had three sessions. He has not had any symptoms there. His radial access site is well healed.     ASSESSMENT/PLAN:  1.  Coronary artery disease. Status post stenting to the mid LAD.  He has moderate disease elsewhere which we will continue to medically manage.  He remains on aspirin and Effient for dual antiplatelet therapy as he did not tolerate  Brilinta due to shortness of breath.  This should be continued for at least one year uninterrupted.  He is also appropriately on high intensity statin therapy with Lipitor 40 mg daily as well as beta-blocker therapy and ACE inhibitor.  As noted above, his echo showed an LVEF of 50 to 55% with apical wall hypokinesis.  We will plan to repeat another echocardiogram in 2 months for reevaluation. His blood pressure is excellent today on his current regimen.  He is participating in cardiac rehab.  2.  Hyperlipidemia.  Lipid profile on an LDL of 133, HDL of 35 and total cholesterol 191.  We will repeat a lipid profile when he comes to see Dr. Zhao for follow-up.  3.  Obstructive sleep apnea.  Treated with CPAP.  4.  Recurrent nephrolithiasis.  He follows with urology.  They have recommended a conservative approach in regards to this in the absence of symptoms.    Mohamud is overall recovering well from his hospitalization. He will return to see Dr. Zhao in three months. In the meantime, he will call us with any recurrent symptoms.    Orders this Visit:  No orders of the defined types were placed in this encounter.    No orders of the defined types were placed in this encounter.    There are no discontinued medications.    CURRENT MEDICATIONS:  Current Outpatient Medications   Medication Sig Dispense Refill     acetaminophen (TYLENOL) 325 MG tablet Take 2 tablets (650 mg) by mouth every 4 hours as needed for mild pain       aspirin (ASA) 81 MG EC tablet Take 1 tablet (81 mg) by mouth daily       atorvastatin (LIPITOR) 40 MG tablet Take 1 tablet (40 mg) by mouth every evening 30 tablet 0     lisinopril (PRINIVIL/ZESTRIL) 5 MG tablet Take 1 tablet (5 mg) by mouth daily 30 tablet 0     metoprolol tartrate (LOPRESSOR) 25 MG tablet Take 1 tablet (25 mg) by mouth 2 times daily 60 tablet 0     nitroGLYcerin (NITROSTAT) 0.4 MG sublingual tablet As needed for chest pain place 1 tablet under the tongue every 5 minutes for 3 doses.  If symptoms persist 5 minutes after 1st dose call 911. 25 tablet 0     order for DME Equipment being ordered: CPAP       prasugrel (EFFIENT) 10 MG TABS tablet Take 1 tablet (10 mg) by mouth daily 30 tablet 0     vitamin B-12 (CYANOCOBALAMIN) 1000 MCG tablet Take 1,000 mcg by mouth daily       ALLERGIES  Allergies   Allergen Reactions     Chlorthalidone GI Disturbance     Compazine Other (See Comments)     PSYCHOTIC     Erythromycin Nausea and Vomiting     Flomax [Tamsulosin Hcl]      FLUSHING, NASAl CONGESTION     Prochlorperazine      LW Reaction: anxiety     Seasonal Allergies      PAST MEDICAL HISTORY:  Past Medical History:   Diagnosis Date     Atopic dermatitis      CAD (coronary artery disease)      DJD (degenerative joint disease) of knee 1/14/2015     Dyslipidemia      ED (erectile dysfunction) 4/30/2012     Esophageal reflux     Gastroesophageal reflux     NSTEMI (non-ST elevated myocardial infarction) (H) 7/15/2019     Other and unspecified disc disorder of unspecified region     Intervertebral disc disorders     Personal history of colonic polyps     Colon polyps     S/P lateral meniscectomy of left knee      S/P total knee replacement 1/7/2015     Tick bite 4/20/15    amoxicillin treated     Unspecified sleep apnea     Sleep apnea     Urinary calculus, unspecified     Renal stones     Vitamin D deficiency 9-27-09     PAST SURGICAL HISTORY:  Past Surgical History:   Procedure Laterality Date     ARTHROPLASTY KNEE Left 1/7/2015    Procedure: ARTHROPLASTY KNEE;  Surgeon: Agustin Saenz MD;  Location:  OR     ARTHROSCOPY KNEE RT/LT  6-20-11     COLONOSCOPY  12/09     CV FRACTIONAL FLOW RESERVE N/A 7/16/2019    Procedure: Fractional Flow Reserve;  Surgeon: Mick Thrasher MD;  Location:  HEART CARDIAC CATH LAB     CV HEART CATHETERIZATION WITH POSSIBLE INTERVENTION N/A 7/16/2019    successful PCI with MICAH placement to mid LAD      CYSTOSCOPY       EXTRACORPOREAL SHOCK WAVE LITHOTRIPSY (ESWL)   1/20/2012    Procedure:EXTRACORPOREAL SHOCK WAVE LITHOTRIPSY (ESWL); LEFT EXTRACORPOREAL SHOCK WAVE LITHOTRIPSY ; Surgeon:ANJUM BUNDY; Location:[facility]     EXTRACORPOREAL SHOCK WAVE LITHOTRIPSY, CYSTOSCOPY, INSERT STENT URETER(S), COMBINED  2/3/2012    Procedure:COMBINED EXTRACORPOREAL SHOCK WAVE LITHOTRIPSY, CYSTOSCOPY, INSERT STENT URETER(S); CYSTOSCOPY, RIGHT STENT; Surgeon:ANJUM BUNDY; Location:[facility] OR     FUSION LUMBAR ANTERIOR, FUSION LUMBAR POSTERIOR ONE LEVEL, COMBINED  11-1-10    L4-5     LASER HOLMIUM LITHOTRIPSY URETER(S), INSERT STENT, COMBINED  2/7/2012    Procedure:COMBINED CYSTOSCOPY, URETEROSCOPY, LASER HOLMIUM LITHOTRIPSY URETER(S), INSERT STENT; CYSTOSCOPY, RIGHT URETEROSCOPY, RIGHT RETROGRADES,  STONE EXTRACTION WITH HOLMIUM LASER AND RIGHT URETERAL STENT EXCHANGE ; Surgeon:DEEPTI MELVIN; Location:[facility] OR     LITHOTRIPSY      Lithotrypsy     SEPTOPLASTY       VASECTOMY       FAMILY HISTORY:  Family History   Problem Relation Age of Onset     Melanoma Father      Prostate Cancer Father      Cancer Father         neck cancer from radiation at work (urology)     Breast Cancer Mother      C.A.D. Paternal Grandfather 72     SOCIAL HISTORY:  Social History     Socioeconomic History     Marital status:      Spouse name: None     Number of children: None     Years of education: None     Highest education level: None   Occupational History     None   Social Needs     Financial resource strain: None     Food insecurity:     Worry: None     Inability: None     Transportation needs:     Medical: None     Non-medical: None   Tobacco Use     Smoking status: Former Smoker     Types: Cigars     Smokeless tobacco: Never Used     Tobacco comment: has cigars when it is warm out   Substance and Sexual Activity     Alcohol use: Yes     Alcohol/week: 7.0 oz     Types: 14 Standard drinks or equivalent per week     Comment: 1-2 drinks day/beer and scotch     Drug use: No     Sexual  "activity: None   Lifestyle     Physical activity:     Days per week: None     Minutes per session: None     Stress: None   Relationships     Social connections:     Talks on phone: None     Gets together: None     Attends Christianity service: None     Active member of club or organization: None     Attends meetings of clubs or organizations: None     Relationship status: None     Intimate partner violence:     Fear of current or ex partner: None     Emotionally abused: None     Physically abused: None     Forced sexual activity: None   Other Topics Concern     Parent/sibling w/ CABG, MI or angioplasty before 65F 55M? Not Asked   Social History Narrative     None     Review of Systems:  Skin:  Negative     Eyes:  Positive for glasses  ENT:  Negative    Respiratory:  Positive for sleep apnea;CPAP;cough  Cardiovascular:  Negative;chest pain;palpitations;syncope or near-syncope;cyanosis;dizziness;lightheadedness;edema;fatigue Positive for  Gastroenterology: Negative    Genitourinary:  Positive for kidney stone  Musculoskeletal:  Positive for back pain  Neurologic:  Negative    Psychiatric:  Positive for    Heme/Lymph/Imm:  Negative    Endocrine:  Negative       Physical Exam:  Vitals: /72   Pulse 55   Ht 1.778 m (5' 10\")   BMI 43.19 kg/m     Wt Readings from Last 4 Encounters:   07/22/19 136.5 kg (301 lb)   07/16/19 136.9 kg (301 lb 11.2 oz)   07/08/19 137 kg (302 lb)   07/02/19 137.2 kg (302 lb 8 oz)     GEN: well nourished, in no acute distress.  HEENT:  Pupils equal, round. Sclerae nonicteric.   NECK: Supple, no masses appreciated.  r radial access site is well healed without hematoma or bruit.  C/V:  Regular rate and rhythm, no murmur, rub or gallop.    RESP: Respirations are unlabored. Clear to auscultation bilaterally without wheezing, rales, or rhonchi.  GI: Abdomen soft, nontender.  EXTREM: no LE edema.  NEURO: Alert and oriented, cooperative.  SKIN: Warm and dry.     Recent Lab Results:  LIPID " RESULTS:  Lab Results   Component Value Date    CHOL 156 07/17/2019    HDL 31 (L) 07/17/2019    LDL 84 07/17/2019    TRIG 207 (H) 07/17/2019     LIVER ENZYME RESULTS:  Lab Results   Component Value Date    AST 23 06/30/2014    ALT 27 06/30/2014     CBC RESULTS:  Lab Results   Component Value Date    WBC 9.0 07/17/2019    RBC 4.50 07/17/2019    HGB 14.6 07/17/2019    HCT 43.2 07/17/2019    MCV 96 07/17/2019    MCH 32.4 07/17/2019    MCHC 33.8 07/17/2019    RDW 12.7 07/17/2019     07/17/2019     BMP RESULTS:  Lab Results   Component Value Date     07/17/2019    POTASSIUM 3.9 07/17/2019    CHLORIDE 106 07/17/2019    CO2 24 07/17/2019    ANIONGAP 8 07/17/2019     (H) 07/17/2019    BUN 16 07/17/2019    CR 0.98 07/17/2019    GFRESTIMATED 77 07/17/2019    GFRESTBLACK 89 07/17/2019    NATHALIA 8.5 07/17/2019      A1C RESULTS:  Lab Results   Component Value Date    A1C 5.9 (H) 03/19/2018     INR RESULTS:  No results found for: INR      May Zhou PA-C  [facility]

## 2019-08-02 ENCOUNTER — OFFICE VISIT (OUTPATIENT)
Dept: CARDIOLOGY | Facility: CLINIC | Age: 69
End: 2019-08-02
Attending: INTERNAL MEDICINE
Payer: MEDICARE

## 2019-08-02 ENCOUNTER — HOSPITAL ENCOUNTER (OUTPATIENT)
Dept: CARDIAC REHAB | Facility: CLINIC | Age: 69
End: 2019-08-02
Attending: INTERNAL MEDICINE
Payer: MEDICARE

## 2019-08-02 VITALS
DIASTOLIC BLOOD PRESSURE: 72 MMHG | SYSTOLIC BLOOD PRESSURE: 112 MMHG | BODY MASS INDEX: 43.19 KG/M2 | HEIGHT: 70 IN | HEART RATE: 55 BPM

## 2019-08-02 DIAGNOSIS — I21.4 NSTEMI (NON-ST ELEVATED MYOCARDIAL INFARCTION) (H): Primary | ICD-10-CM

## 2019-08-02 LAB — INTERPRETATION ECG - MUSE: NORMAL

## 2019-08-02 PROCEDURE — 93798 PHYS/QHP OP CAR RHAB W/ECG: CPT | Performed by: REHABILITATION PRACTITIONER

## 2019-08-02 PROCEDURE — 99214 OFFICE O/P EST MOD 30 MIN: CPT | Performed by: PHYSICIAN ASSISTANT

## 2019-08-02 PROCEDURE — 40000116 ZZH STATISTIC OP CR VISIT: Performed by: REHABILITATION PRACTITIONER

## 2019-08-02 RX ORDER — PRASUGREL 10 MG/1
10 TABLET, FILM COATED ORAL DAILY
Qty: 30 TABLET | Refills: 11 | Status: SHIPPED | OUTPATIENT
Start: 2019-08-02 | End: 2020-07-06

## 2019-08-02 RX ORDER — LISINOPRIL 5 MG/1
5 TABLET ORAL DAILY
Qty: 30 TABLET | Refills: 11 | Status: SHIPPED | OUTPATIENT
Start: 2019-08-02 | End: 2019-12-04

## 2019-08-02 RX ORDER — METOPROLOL TARTRATE 25 MG/1
25 TABLET, FILM COATED ORAL 2 TIMES DAILY
Qty: 60 TABLET | Refills: 11 | Status: SHIPPED | OUTPATIENT
Start: 2019-08-02 | End: 2020-07-06

## 2019-08-02 RX ORDER — ATORVASTATIN CALCIUM 40 MG/1
40 TABLET, FILM COATED ORAL EVERY EVENING
Qty: 30 TABLET | Refills: 11 | Status: SHIPPED | OUTPATIENT
Start: 2019-08-02 | End: 2020-01-07

## 2019-08-02 NOTE — PATIENT INSTRUCTIONS
Thank you for your [facility] visit today. Your provider has recommended the following:  Medication Changes:  No medication changes today. I sent in new prescriptions for you.   Recommendations:  -  We'll schedule another echocardiogram (ultrasound of your heart) before you see Dr. Zhao. We'll also check your lipid profile at that time.  Follow-up:  See Dr. Zhao for cardiology follow up in 3 months.  Reminder:  Please bring in all current medications, over the counter supplements and vitamin bottles to your next appointment.            [facility]

## 2019-08-02 NOTE — LETTER
8/2/2019    Juan De La Vega MD  [address]  [facility] [place] [address]    RE: Hugo Prince       Dear Colleague,    I had the pleasure of seeing Hugo Prince in the [facility].      Cardiology Progress Note    Date of Service: 08/02/2019  Patient seen today in follow up of: post hospital  Primary cardiologist: bree f/u with Dr. Zhao    HPI:  Hugo Prince is a very pleasant 69 year old male with a history of former tobacco use, coronary artery disease, morbid obesity, obstructive sleep apnea treated with CPAP, and recurrent nephrolithiasis who is here today for post hospital follow-up.    He was admitted to [facility] on 7/15 with complaints of chest discomfort and shortness of breath over the last few weeks consistent with unstable angina.  EKG showed changes laterally. His troponin level peaked at 12.  Echocardiogram showed an LVEF of 50 to 55% with severe apical wall hypokinesis.  He was seen in consultation by Dr. Zhao and underwent coronary angiography.  This showed a culprit lesion in the mid LAD which was successfully stented with a drug-eluting stent.  There was a moderate proximal LAD lesion which was negative by IFR. He also has moderate mid RCA disease.  He was started on aspirin and Brilinta for dual antiplatelet therapy. He developed some shortness of breath after Brilinta and was switch from ticagrelor to prasugrel.  He was also started on a atorvastatin, metoprolol, and lisinopril.    He is here today for post hospital follow-up. He has been feeling well since his hosptial discharge. He denies any recurrent chest discomfort or shortness of breath. He did have an episode of epigastric discomfort but this resolved and has not recurrent. He believes it indigestion. He has enrolled in cardiac rehab and has had three sessions. He has not had any symptoms there. His radial access site is well healed.     ASSESSMENT/PLAN:  1.   Coronary artery disease. Status post stenting to the mid LAD.  He has moderate disease elsewhere which we will continue to medically manage.  He remains on aspirin and Effient for dual antiplatelet therapy as he did not tolerate Brilinta due to shortness of breath.  This should be continued for at least one year uninterrupted.  He is also appropriately on high intensity statin therapy with Lipitor 40 mg daily as well as beta-blocker therapy and ACE inhibitor.  As noted above, his echo showed an LVEF of 50 to 55% with apical wall hypokinesis.  We will plan to repeat another echocardiogram in 2 months for reevaluation. His blood pressure is excellent today on his current regimen.  He is participating in cardiac rehab.  2.  Hyperlipidemia.  Lipid profile on an LDL of 133, HDL of 35 and total cholesterol 191.  We will repeat a lipid profile when he comes to see Dr. Zhao for follow-up.  3.  Obstructive sleep apnea.  Treated with CPAP.  4.  Recurrent nephrolithiasis.  He follows with urology.  They have recommended a conservative approach in regards to this in the absence of symptoms.    Mohamud is overall recovering well from his hospitalization. He will return to see Dr. Zhao in three months. In the meantime, he will call us with any recurrent symptoms.    Orders this Visit:  No orders of the defined types were placed in this encounter.    No orders of the defined types were placed in this encounter.    There are no discontinued medications.    CURRENT MEDICATIONS:  Current Outpatient Medications   Medication Sig Dispense Refill     acetaminophen (TYLENOL) 325 MG tablet Take 2 tablets (650 mg) by mouth every 4 hours as needed for mild pain       aspirin (ASA) 81 MG EC tablet Take 1 tablet (81 mg) by mouth daily       atorvastatin (LIPITOR) 40 MG tablet Take 1 tablet (40 mg) by mouth every evening 30 tablet 0     lisinopril (PRINIVIL/ZESTRIL) 5 MG tablet Take 1 tablet (5 mg) by mouth daily 30 tablet 0     metoprolol  tartrate (LOPRESSOR) 25 MG tablet Take 1 tablet (25 mg) by mouth 2 times daily 60 tablet 0     nitroGLYcerin (NITROSTAT) 0.4 MG sublingual tablet As needed for chest pain place 1 tablet under the tongue every 5 minutes for 3 doses. If symptoms persist 5 minutes after 1st dose call 911. 25 tablet 0     order for DME Equipment being ordered: CPAP       prasugrel (EFFIENT) 10 MG TABS tablet Take 1 tablet (10 mg) by mouth daily 30 tablet 0     vitamin B-12 (CYANOCOBALAMIN) 1000 MCG tablet Take 1,000 mcg by mouth daily       ALLERGIES  Allergies   Allergen Reactions     Chlorthalidone GI Disturbance     Compazine Other (See Comments)     PSYCHOTIC     Erythromycin Nausea and Vomiting     Flomax [Tamsulosin Hcl]      FLUSHING, NASAl CONGESTION     Prochlorperazine      LW Reaction: anxiety     Seasonal Allergies      PAST MEDICAL HISTORY:  Past Medical History:   Diagnosis Date     Atopic dermatitis      CAD (coronary artery disease)      DJD (degenerative joint disease) of knee 1/14/2015     Dyslipidemia      ED (erectile dysfunction) 4/30/2012     Esophageal reflux     Gastroesophageal reflux     NSTEMI (non-ST elevated myocardial infarction) (H) 7/15/2019     Other and unspecified disc disorder of unspecified region     Intervertebral disc disorders     Personal history of colonic polyps     Colon polyps     S/P lateral meniscectomy of left knee      S/P total knee replacement 1/7/2015     Tick bite 4/20/15    amoxicillin treated     Unspecified sleep apnea     Sleep apnea     Urinary calculus, unspecified     Renal stones     Vitamin D deficiency 9-27-09     PAST SURGICAL HISTORY:  Past Surgical History:   Procedure Laterality Date     ARTHROPLASTY KNEE Left 1/7/2015    Procedure: ARTHROPLASTY KNEE;  Surgeon: Agustin Saenz MD;  Location: [facility] OR     ARTHROSCOPY KNEE RT/LT  6-20-11     COLONOSCOPY  12/09     CV FRACTIONAL FLOW RESERVE N/A 7/16/2019    Procedure: Fractional Flow Reserve;  Surgeon: Mick Thrasher,  MD;  Location:  HEART CARDIAC CATH LAB     CV HEART CATHETERIZATION WITH POSSIBLE INTERVENTION N/A 7/16/2019    successful PCI with MICAH placement to mid LAD      CYSTOSCOPY       EXTRACORPOREAL SHOCK WAVE LITHOTRIPSY (ESWL)  1/20/2012    Procedure:EXTRACORPOREAL SHOCK WAVE LITHOTRIPSY (ESWL); LEFT EXTRACORPOREAL SHOCK WAVE LITHOTRIPSY ; Surgeon:ANJUM BUNDY; Location: OR     EXTRACORPOREAL SHOCK WAVE LITHOTRIPSY, CYSTOSCOPY, INSERT STENT URETER(S), COMBINED  2/3/2012    Procedure:COMBINED EXTRACORPOREAL SHOCK WAVE LITHOTRIPSY, CYSTOSCOPY, INSERT STENT URETER(S); CYSTOSCOPY, RIGHT STENT; Surgeon:ANJUM BUNDY; Location: OR     FUSION LUMBAR ANTERIOR, FUSION LUMBAR POSTERIOR ONE LEVEL, COMBINED  11-1-10    L4-5     LASER HOLMIUM LITHOTRIPSY URETER(S), INSERT STENT, COMBINED  2/7/2012    Procedure:COMBINED CYSTOSCOPY, URETEROSCOPY, LASER HOLMIUM LITHOTRIPSY URETER(S), INSERT STENT; CYSTOSCOPY, RIGHT URETEROSCOPY, RIGHT RETROGRADES,  STONE EXTRACTION WITH HOLMIUM LASER AND RIGHT URETERAL STENT EXCHANGE ; Surgeon:DEEPTI MELVIN; Location: OR     LITHOTRIPSY      Lithotrypsy     SEPTOPLASTY       VASECTOMY       FAMILY HISTORY:  Family History   Problem Relation Age of Onset     Melanoma Father      Prostate Cancer Father      Cancer Father         neck cancer from radiation at work (urology)     Breast Cancer Mother      C.A.D. Paternal Grandfather 72     SOCIAL HISTORY:  Social History     Socioeconomic History     Marital status:      Spouse name: None     Number of children: None     Years of education: None     Highest education level: None   Occupational History     None   Social Needs     Financial resource strain: None     Food insecurity:     Worry: None     Inability: None     Transportation needs:     Medical: None     Non-medical: None   Tobacco Use     Smoking status: Former Smoker     Types: Cigars     Smokeless tobacco: Never Used     Tobacco comment: has cigars  "when it is warm out   Substance and Sexual Activity     Alcohol use: Yes     Alcohol/week: 7.0 oz     Types: 14 Standard drinks or equivalent per week     Comment: 1-2 drinks day/beer and scotch     Drug use: No     Sexual activity: None   Lifestyle     Physical activity:     Days per week: None     Minutes per session: None     Stress: None   Relationships     Social connections:     Talks on phone: None     Gets together: None     Attends Religion service: None     Active member of club or organization: None     Attends meetings of clubs or organizations: None     Relationship status: None     Intimate partner violence:     Fear of current or ex partner: None     Emotionally abused: None     Physically abused: None     Forced sexual activity: None   Other Topics Concern     Parent/sibling w/ CABG, MI or angioplasty before 65F 55M? Not Asked   Social History Narrative     None     Review of Systems:  Skin:  Negative     Eyes:  Positive for glasses  ENT:  Negative    Respiratory:  Positive for sleep apnea;CPAP;cough  Cardiovascular:  Negative;chest pain;palpitations;syncope or near-syncope;cyanosis;dizziness;lightheadedness;edema;fatigue Positive for  Gastroenterology: Negative    Genitourinary:  Positive for kidney stone  Musculoskeletal:  Positive for back pain  Neurologic:  Negative    Psychiatric:  Positive for    Heme/Lymph/Imm:  Negative    Endocrine:  Negative       Physical Exam:  Vitals: /72   Pulse 55   Ht 1.778 m (5' 10\")   BMI 43.19 kg/m      Wt Readings from Last 4 Encounters:   07/22/19 136.5 kg (301 lb)   07/16/19 136.9 kg (301 lb 11.2 oz)   07/08/19 137 kg (302 lb)   07/02/19 137.2 kg (302 lb 8 oz)     GEN: well nourished, in no acute distress.  HEENT:  Pupils equal, round. Sclerae nonicteric.   NECK: Supple, no masses appreciated.  r radial access site is well healed without hematoma or bruit.  C/V:  Regular rate and rhythm, no murmur, rub or gallop.    RESP: Respirations are unlabored. " Clear to auscultation bilaterally without wheezing, rales, or rhonchi.  GI: Abdomen soft, nontender.  EXTREM: no LE edema.  NEURO: Alert and oriented, cooperative.  SKIN: Warm and dry.     Recent Lab Results:  LIPID RESULTS:  Lab Results   Component Value Date    CHOL 156 07/17/2019    HDL 31 (L) 07/17/2019    LDL 84 07/17/2019    TRIG 207 (H) 07/17/2019     LIVER ENZYME RESULTS:  Lab Results   Component Value Date    AST 23 06/30/2014    ALT 27 06/30/2014     CBC RESULTS:  Lab Results   Component Value Date    WBC 9.0 07/17/2019    RBC 4.50 07/17/2019    HGB 14.6 07/17/2019    HCT 43.2 07/17/2019    MCV 96 07/17/2019    MCH 32.4 07/17/2019    MCHC 33.8 07/17/2019    RDW 12.7 07/17/2019     07/17/2019     BMP RESULTS:  Lab Results   Component Value Date     07/17/2019    POTASSIUM 3.9 07/17/2019    CHLORIDE 106 07/17/2019    CO2 24 07/17/2019    ANIONGAP 8 07/17/2019     (H) 07/17/2019    BUN 16 07/17/2019    CR 0.98 07/17/2019    GFRESTIMATED 77 07/17/2019    GFRESTBLACK 89 07/17/2019    NATHALIA 8.5 07/17/2019      A1C RESULTS:  Lab Results   Component Value Date    A1C 5.9 (H) 03/19/2018     INR RESULTS:  No results found for: INR      May Zhou PA-C  [facility] [place] [facility]      Thank you for allowing me to participate in the care of your patient.      Sincerely,     May Zhou PA-C     [facility]

## 2019-08-04 ENCOUNTER — HOSPITAL ENCOUNTER (EMERGENCY)
Facility: CLINIC | Age: 69
Discharge: HOME | End: 2019-08-04
Attending: PHYSICIAN ASSISTANT | Admitting: PHYSICIAN ASSISTANT
Payer: MEDICARE

## 2019-08-04 VITALS
DIASTOLIC BLOOD PRESSURE: 68 MMHG | WEIGHT: 292 LBS | OXYGEN SATURATION: 95 % | SYSTOLIC BLOOD PRESSURE: 139 MMHG | RESPIRATION RATE: 16 BRPM | BODY MASS INDEX: 41.8 KG/M2 | HEIGHT: 70 IN | TEMPERATURE: 98.4 F

## 2019-08-04 DIAGNOSIS — S61.209A AVULSION OF SKIN OF FINGER, INITIAL ENCOUNTER: ICD-10-CM

## 2019-08-04 PROCEDURE — 99282 EMERGENCY DEPT VISIT SF MDM: CPT

## 2019-08-04 ASSESSMENT — ENCOUNTER SYMPTOMS: WOUND: 1

## 2019-08-04 ASSESSMENT — MIFFLIN-ST. JEOR: SCORE: 2085.75

## 2019-08-04 NOTE — ED AVS SNAPSHOT
Emergency Department  [address] [place] [id number]  Phone:  [phone]  Fax:  [phone]                                    Hugo Prince   MRN: [id number]    Department:   Emergency Department   Date of Visit:  8/4/2019           After Visit Summary Signature Page    I have received my discharge instructions, and my questions have been answered. I have discussed any challenges I see with this plan with the nurse or doctor.    ..........................................................................................................................................  Patient/Patient Representative Signature      ..........................................................................................................................................  Patient Representative Print Name and Relationship to Patient    ..................................................               ................................................  Date                                   Time    ..........................................................................................................................................  Reviewed by Signature/Title    ...................................................              ..............................................  Date                                               Time          22EPIC Rev 08/18

## 2019-08-05 ENCOUNTER — HOSPITAL ENCOUNTER (OUTPATIENT)
Dept: CARDIAC REHAB | Facility: CLINIC | Age: 69
End: 2019-08-05
Attending: INTERNAL MEDICINE
Payer: MEDICARE

## 2019-08-05 PROCEDURE — 40000116 ZZH STATISTIC OP CR VISIT: Performed by: OCCUPATIONAL THERAPIST

## 2019-08-05 PROCEDURE — 93798 PHYS/QHP OP CAR RHAB W/ECG: CPT | Performed by: OCCUPATIONAL THERAPIST

## 2019-08-05 NOTE — ED PROVIDER NOTES
"  History     Chief Complaint:  Avulsion Laceration    The history is provided by the patient.      Hugo Prince is a 69 year old male status post NSTEMI with angioplasty on 7/15 on Prasugrel who presents with concerns for a right thumb avulsion laceration sustained while cutting cucumbers just prior to arrival. Bleeding is not controlled during initial encounter. Per [facility], last Td was on 2012.      Allergies:  Chlorthalidone  Compazine  Erythromycin  Flomax  Prochlorperazine  Season allergies    Medications:    Lipitor  Lisinopril  Lopressor  Nitrostat  Prasugrel  Asprin 81 mg     Past Medical History:    CAD  Dyslipidemia  ED  Morbid obesity  Esophageal reflux  NSTEMI  Tick bite  Sleep apnea  Vitamin D deficiency  Urinary calculus    Past Surgical History:    Left knee arthroplasty  PCI with MICAH placement to mid LAD  ESWL  Lumbar fusion (L4-5)  Lithotripsy  Septoplasty  Cystoscopy with stent  Vasectomy    Family History:    Melanoma  Prostate cancer  Neck cancer  Breast cancer    Social History:  Former Smoker  Alcohol Use: Positive  Marital Status:       Review of Systems   Skin: Positive for wound.   All other systems reviewed and are negative.    Physical Exam   Patient Vitals for the past 24 hrs:   BP Temp Temp src Heart Rate Resp SpO2 Height Weight   08/04/19 1920 139/68 98.4  F (36.9  C) Oral 66 18 95 % 1.778 m (5' 10\") 132.5 kg (292 lb)     Physical Exam  Constitutional: Alert, attentive, GCS 15   CV: 2+ radial pulse, brisk distal cap refill  Pulm: No respiratory distress  MSK: Full range of motion of the right thumb MCP and IP joints.  Neurological: Alert, attentive  5/5 strength to the right thumb MCP and IP joints.  Sensation intact distal to avulsion injury.   Skin: Skin is warm and dry. Superficial skin avulsion to the right thumb pad. No nail involvement. No deep laceration.  No tendon or bone visualized.  Psych: Normal mood and affect    Emergency Department Course     Emergency " Department Course:  1923 Nursing notes and vitals reviewed.  1924 I performed an exam of the patient as documented above.   1950 Bleeding controlled.   1957 I personally answered all related questions prior to discharge, anticipatory guidance given.    Impression & Plan      Medical Decision Making:  Findings and exam were consistent with uncomplicated avulsion type laceration of right thumb. The wound was carefully evaluated and explored.  No laceration amenable to sutures.  Surgifoam placed and bleeding controlled.  There is no evidence at this time of bony injury, foreign body, or neurovascular injury. Follow up in 2-3 days time if concerns over healing. Possible complications (infection, scarring) were reviewed with the patient.  Indications for immediate return to ER/UR were reviewed and included but are not limited to, redness, fevers, drainage, increasing pain, high fevers, or other concerns.  Patient agrees with this plan and all questions and concerns addressed prior to discharge home.    Diagnosis:    ICD-10-CM   1. Avulsion of skin of finger (right thumb), initial encounter S61.209A     Disposition: Home    Scribe Disclosure:  I, Rich Singh, am serving as a scribe at 7:31 PM on 8/4/2019 to document services personally performed by Shefali Spain PA-C based on my observations and the provider's statements to me.     EMERGENCY DEPARTMENT       Shefali Spain PA-C  08/04/19 [id number]

## 2019-08-05 NOTE — DISCHARGE INSTRUCTIONS
*You can keep current dressing on for 24 hours and then daily dressing changes as discussed.   *Keep the wound clean and dry.  *Follow-up with primary care provider in 2-3 days for wound recheck.  *Return to the emergency department for spreading redness, fevers, or any other new/concerning symptoms.

## 2019-08-12 ENCOUNTER — HOSPITAL ENCOUNTER (OUTPATIENT)
Dept: CARDIAC REHAB | Facility: CLINIC | Age: 69
End: 2019-08-12
Attending: INTERNAL MEDICINE
Payer: MEDICARE

## 2019-08-12 PROCEDURE — 93798 PHYS/QHP OP CAR RHAB W/ECG: CPT | Performed by: OCCUPATIONAL THERAPIST

## 2019-08-12 PROCEDURE — 40000116 ZZH STATISTIC OP CR VISIT: Performed by: OCCUPATIONAL THERAPIST

## 2019-08-14 ENCOUNTER — HOSPITAL ENCOUNTER (OUTPATIENT)
Dept: CARDIAC REHAB | Facility: CLINIC | Age: 69
End: 2019-08-14
Attending: INTERNAL MEDICINE
Payer: MEDICARE

## 2019-08-14 PROCEDURE — 40000116 ZZH STATISTIC OP CR VISIT: Performed by: CLINICAL EXERCISE PHYSIOLOGIST

## 2019-08-14 PROCEDURE — 93798 PHYS/QHP OP CAR RHAB W/ECG: CPT | Performed by: CLINICAL EXERCISE PHYSIOLOGIST

## 2019-08-16 ENCOUNTER — TELEPHONE (OUTPATIENT)
Dept: CARDIOLOGY | Facility: CLINIC | Age: 69
End: 2019-08-16

## 2019-08-16 NOTE — TELEPHONE ENCOUNTER
Left pt detailed voicemail regarding Dr. Zhao's recommendations. Informed pt that he needs to discuss this further with his urologist to see what the next step and plans are before Dr. Zhao can give more recommendations. Left team 1's call back number for pt.

## 2019-08-16 NOTE — TELEPHONE ENCOUNTER
Spoke with pt about questions he has regarding his recurrent nephrolithiasis. While pt was admitted with his NSTEMI he was consulted by urology (Dr. Desai).   Per Dr. Zhao's note from 7/17:  2.  Recurrent nephrolithiasis. Urology consulted. Currently, he has a partial obstructive with a little bit of hydronephrosis.  Can have definitive treatment by urology now that he has been successfully stented.    Per Dr. Desai's note from 7/22:  I am going to recommend, should problems arise with regard flank pain as result of stones that the first step should be to place a stent as I would not wish to do any other intervention while he has to be on anticoagulants and it is essentially stay on anticoagulants if possible for 1 year after this placement of a stent.  If problems arise we will place a stent  If we do need to think we need to proceed with some sort of intervention in the short-term, then it may be possible to do flexible ureteroscopy with holmium laser but we had to discuss this with the cardiologist prior to contemplating this.    Pt is scheduled to see Dr. Desai on 9/23 and have a KUB to see the status of the stones position.    Pt's brother is a urologist and wanted pt to ask about anesthesia and if pt's cardiologist would be okay with that for a procedure. Pt did not specify what kind of anesthesia his brother was wondering about. Also reiterated to the pt that he is on Effient and that he is expected to be on that uninterrupted for 1 year per cardiology after stent placement. Informed pt that this RN will check with Dr. Zhao to see what his recommendations are going forward and if pt needs to have the stone removal procedure.

## 2019-08-16 NOTE — TELEPHONE ENCOUNTER
Received message from Dr. Zhao:  Pascual Zhao MD Haley, Leandra K RN   Caller: Unspecified (Today,  8:19 AM)             Anesthesia risk is low and should not be an issue if needed.  He has normal ejection fraction, is completely revascularized.  Dual antiplatelet therapy should not be interrupted ideally for 6 months, could be temporarily interrupted then if needed for treatment of nephrolithiasis.  If his treatment prior to that that cannot be done under dual antiplatelet therapy, we would need to arrange some kind of bridging strategy.

## 2019-08-19 ENCOUNTER — HOSPITAL ENCOUNTER (OUTPATIENT)
Dept: CARDIAC REHAB | Facility: CLINIC | Age: 69
End: 2019-08-19
Attending: INTERNAL MEDICINE
Payer: MEDICARE

## 2019-08-19 PROCEDURE — 40000116 ZZH STATISTIC OP CR VISIT

## 2019-08-19 PROCEDURE — G0463 HOSPITAL OUTPT CLINIC VISIT: HCPCS

## 2019-08-26 ENCOUNTER — HOSPITAL ENCOUNTER (OUTPATIENT)
Dept: CARDIAC REHAB | Facility: CLINIC | Age: 69
End: 2019-08-26
Attending: INTERNAL MEDICINE
Payer: MEDICARE

## 2019-08-26 PROCEDURE — 93798 PHYS/QHP OP CAR RHAB W/ECG: CPT

## 2019-08-26 PROCEDURE — 40000116 ZZH STATISTIC OP CR VISIT

## 2019-08-28 ENCOUNTER — HOSPITAL ENCOUNTER (OUTPATIENT)
Dept: CARDIAC REHAB | Facility: CLINIC | Age: 69
End: 2019-08-28
Attending: INTERNAL MEDICINE
Payer: MEDICARE

## 2019-08-28 PROCEDURE — 40000575 ZZH STATISTIC OP CARDIAC VISIT #2

## 2019-08-28 PROCEDURE — 93798 PHYS/QHP OP CAR RHAB W/ECG: CPT

## 2019-08-28 PROCEDURE — 93797 PHYS/QHP OP CAR RHAB WO ECG: CPT

## 2019-08-28 PROCEDURE — 40000116 ZZH STATISTIC OP CR VISIT

## 2019-09-04 ENCOUNTER — HOSPITAL ENCOUNTER (OUTPATIENT)
Dept: CARDIAC REHAB | Facility: CLINIC | Age: 69
End: 2019-09-04
Attending: INTERNAL MEDICINE
Payer: MEDICARE

## 2019-09-04 PROCEDURE — 93798 PHYS/QHP OP CAR RHAB W/ECG: CPT | Performed by: CLINICAL EXERCISE PHYSIOLOGIST

## 2019-09-04 PROCEDURE — 40000116 ZZH STATISTIC OP CR VISIT: Performed by: CLINICAL EXERCISE PHYSIOLOGIST

## 2019-09-06 ENCOUNTER — HOSPITAL ENCOUNTER (OUTPATIENT)
Dept: CARDIAC REHAB | Facility: CLINIC | Age: 69
End: 2019-09-06
Attending: INTERNAL MEDICINE
Payer: MEDICARE

## 2019-09-06 PROCEDURE — 40000116 ZZH STATISTIC OP CR VISIT: Performed by: OCCUPATIONAL THERAPIST

## 2019-09-06 PROCEDURE — 93798 PHYS/QHP OP CAR RHAB W/ECG: CPT | Performed by: OCCUPATIONAL THERAPIST

## 2019-09-09 ENCOUNTER — HOSPITAL ENCOUNTER (OUTPATIENT)
Dept: CARDIAC REHAB | Facility: CLINIC | Age: 69
End: 2019-09-09
Attending: INTERNAL MEDICINE
Payer: MEDICARE

## 2019-09-09 PROCEDURE — 40000116 ZZH STATISTIC OP CR VISIT

## 2019-09-09 PROCEDURE — 93798 PHYS/QHP OP CAR RHAB W/ECG: CPT

## 2019-09-16 ENCOUNTER — HOSPITAL ENCOUNTER (OUTPATIENT)
Dept: CARDIAC REHAB | Facility: CLINIC | Age: 69
End: 2019-09-16
Attending: INTERNAL MEDICINE
Payer: MEDICARE

## 2019-09-16 PROCEDURE — 40000116 ZZH STATISTIC OP CR VISIT: Performed by: CLINICAL EXERCISE PHYSIOLOGIST

## 2019-09-16 PROCEDURE — 93798 PHYS/QHP OP CAR RHAB W/ECG: CPT | Performed by: CLINICAL EXERCISE PHYSIOLOGIST

## 2019-09-18 ENCOUNTER — HOSPITAL ENCOUNTER (OUTPATIENT)
Dept: CARDIAC REHAB | Facility: CLINIC | Age: 69
End: 2019-09-18
Attending: INTERNAL MEDICINE
Payer: MEDICARE

## 2019-09-18 PROCEDURE — 93798 PHYS/QHP OP CAR RHAB W/ECG: CPT

## 2019-09-18 PROCEDURE — 40000116 ZZH STATISTIC OP CR VISIT

## 2019-09-20 ENCOUNTER — HOSPITAL ENCOUNTER (OUTPATIENT)
Dept: CARDIAC REHAB | Facility: CLINIC | Age: 69
End: 2019-09-20
Attending: INTERNAL MEDICINE
Payer: MEDICARE

## 2019-09-20 PROCEDURE — 40000116 ZZH STATISTIC OP CR VISIT

## 2019-09-20 PROCEDURE — 93798 PHYS/QHP OP CAR RHAB W/ECG: CPT

## 2019-09-23 ENCOUNTER — HOSPITAL ENCOUNTER (OUTPATIENT)
Dept: CARDIAC REHAB | Facility: CLINIC | Age: 69
End: 2019-09-23
Attending: INTERNAL MEDICINE
Payer: MEDICARE

## 2019-09-23 ENCOUNTER — OFFICE VISIT (OUTPATIENT)
Dept: UROLOGY | Facility: CLINIC | Age: 69
End: 2019-09-23
Payer: MEDICARE

## 2019-09-23 ENCOUNTER — HOSPITAL ENCOUNTER (OUTPATIENT)
Dept: GENERAL RADIOLOGY | Facility: CLINIC | Age: 69
Discharge: HOME | End: 2019-09-23
Attending: UROLOGY | Admitting: UROLOGY
Payer: MEDICARE

## 2019-09-23 VITALS
HEART RATE: 60 BPM | HEIGHT: 70 IN | WEIGHT: 292 LBS | BODY MASS INDEX: 41.8 KG/M2 | SYSTOLIC BLOOD PRESSURE: 132 MMHG | DIASTOLIC BLOOD PRESSURE: 68 MMHG

## 2019-09-23 DIAGNOSIS — N20.0 KIDNEY STONE: Primary | ICD-10-CM

## 2019-09-23 DIAGNOSIS — N20.0 KIDNEY STONE: ICD-10-CM

## 2019-09-23 PROCEDURE — 99214 OFFICE O/P EST MOD 30 MIN: CPT | Performed by: UROLOGY

## 2019-09-23 PROCEDURE — 40000116 ZZH STATISTIC OP CR VISIT

## 2019-09-23 PROCEDURE — 74019 RADEX ABDOMEN 2 VIEWS: CPT

## 2019-09-23 PROCEDURE — 93798 PHYS/QHP OP CAR RHAB W/ECG: CPT

## 2019-09-23 ASSESSMENT — MIFFLIN-ST. JEOR: SCORE: 2085.75

## 2019-09-23 ASSESSMENT — PAIN SCALES - GENERAL: PAINLEVEL: MILD PAIN (2)

## 2019-09-23 NOTE — NURSING NOTE
Chief Complaint   Patient presents with     Clinic Care Coordination - Follow-up     History of Kidney Stone      Patricia Zuleta LPN

## 2019-09-23 NOTE — LETTER
9/23/2019       RE: Hugo Prince  [address]  [facility] [address]     Dear Colleague,    Thank you for referring your patient, Hugo Prince, to the [facility] at [facility]. Please see a copy of my visit note below.    It is a great pleasure to see this very pleasant 69-year-old gentleman in follow-up consultation today.  As we recall, he has a significant clinically challenging situation.  He has a history of recurrent urinary stone disease having had lithotripsy in the past on a number of occasions.  Previously, within the last few months he had been seen because of a significant stone burden in each kidney but without pain.  He has also has a history of major problems with the lumbar spine having had previous spinal fusion with ongoing pain and sciatica.  We had evaluated him closely and determined that he had a high stone burden in the right kidney almost all in the lower pole calyx.  There was a lesser volume in the left kidney and almost all of that at that time was in the lower pole calyx.  On top of that he was over 300 pounds which precluded doing ESWL present sometime although he has an active lifestyle.  We had decided at that point to proceed cautiously, to consider a percutaneous nephrolithotomy of the stones in the right kidney, and perhaps, if he could lose weight to a level where we could do ESWL to consider ESWL for the stones in the left kidney.  Recently however he suddenly developed chest pain, was evaluated in the coronary unit here and a cardiovascular stent was placed as treatment for occlusive coronary artery disease and is now on anticoagulants.  He was also seen at that time by them ourselves because a CT scan had been done while in the hospital was showing that the stone in the left kidney had migrated from the lower calyx into the region of the left ureteropelvic junction.  However  there was no evidence of hydronephrosis.  We had seen the patient in consultation in the hospital and advised against intervention as he had already been anticoagulated and is asymptomatic.  We decided to see and reevaluate the situation in the office.    KUB was performed today.  I have personally reviewed the KUB today which has not yet been reported.  He does have considerable stone burden in each kidney.  However the stone, when he was in the hospital in the coronary care unit, which appeared to be in the region of the left ureteropelvic junction, seems to have now backed into the middle pole calyx.  The patient has had no flank pain  He has other stones in the left kidney 1 of which is 1.25 cm diameter and the other 2 stones are about 1 cm in diameter.  He has 2 stones in the lower pole of the right kidney each about 1.5 cm in diameter with a further stone in the upper calyx of the right kidney.  Recent CT scan done in the hospital showed no evidence of hydronephrosis and this is when he was being treated for coronary artery occlusion.  We recall other medications stents were placed in the coronary artery and he is currently taking Effient, and anticoagulant, which will most likely have to continue for 1 year.    I reviewed all the records in detail, I went over the entire situation with him very carefully.    Discussion.  We had a careful discussion about this complicated situation.  The issues are as follows.  1.  He weighs just under 300 pounds at the present time.  2.  He has a history of cardiovascular disease having had stents placed about 8 weeks ago currently on Effient which will most likely have to continue for 1 year without interruption.  3.  He has considerable burden of stone in each kidney with a long history of recurrent urinary stone disease and many treatments both by ESWL and ureteroscopy in the past over 30 years.    The KUB today did show that the stone, which on CT appeared to have moved  "to the region of the left ureteropelvic junction is no longer in that location and is in fact back in 1 of the calyces.  Treatment in the form of ESWL at the present time would be precluded because of continuing need for anticoagulation.  Neither, for the same reason, can PCNL be performed.  Ureteroscopy would be possible under certain circumstances if really necessary, i.e. if the stone was in the ureter and could be very carefully treated with a holmium laser, but the burden of stone in each kidney is it, in my opinion too much to consider ureteroscopy with holmium laser.  Therefore I recommend we proceed as follows.  We should continue observation and repeat the KUB in 1 year unless there is evidence of significant symptoms before then.  In 1 years time, when it is safe to have him off anticoagulants, we should talk seriously about percutaneous nephrolithotomy to try and clear this up once and for all.  If there are major problems in the interim which can be managed with the ureteroscope that would be the option, and another option would be just to place a stent until such time as it safe to perform more definitive treatment.    This is a complex situation, patient has been advised to inform me promptly should the be major symptoms or come straight to the emergency room.  He understands this clearly.  We did discuss the entire situation in detail today.  I answered and discussed many questions.    Plan.  1 year for KUB and examination.  I will send a note to his cardiologist to ask advice on how we can handle anticoagulants if we need to before 1 year is done.    Time.  25 minutes.  Greater than 50% spent in discussion and consultation.  As noted above this is a highly complicated issue which required careful discussion review of records review of previous and current x-rays, lab studies and discussion about alternative therapies    \"This dictation was performed with voice recognition software and may contain " "errors,  omissions and inadvertent word substitution.\"          Again, thank you for allowing me to participate in the care of your patient.      Sincerely,    Dima Desai MD      "

## 2019-09-23 NOTE — PROGRESS NOTES
It is a great pleasure to see this very pleasant 69-year-old gentleman in follow-up consultation today.  As we recall, he has a significant clinically challenging situation.  He has a history of recurrent urinary stone disease having had lithotripsy in the past on a number of occasions.  Previously, within the last few months he had been seen because of a significant stone burden in each kidney but without pain.  He has also has a history of major problems with the lumbar spine having had previous spinal fusion with ongoing pain and sciatica.  We had evaluated him closely and determined that he had a high stone burden in the right kidney almost all in the lower pole calyx.  There was a lesser volume in the left kidney and almost all of that at that time was in the lower pole calyx.  On top of that he was over 300 pounds which precluded doing ESWL present sometime although he has an active lifestyle.  We had decided at that point to proceed cautiously, to consider a percutaneous nephrolithotomy of the stones in the right kidney, and perhaps, if he could lose weight to a level where we could do ESWL to consider ESWL for the stones in the left kidney.  Recently however he suddenly developed chest pain, was evaluated in the coronary unit here and a cardiovascular stent was placed as treatment for occlusive coronary artery disease and is now on anticoagulants.  He was also seen at that time by them ourselves because a CT scan had been done while in the hospital was showing that the stone in the left kidney had migrated from the lower calyx into the region of the left ureteropelvic junction.  However there was no evidence of hydronephrosis.  We had seen the patient in consultation in the hospital and advised against intervention as he had already been anticoagulated and is asymptomatic.  We decided to see and reevaluate the situation in the office.    KUB was performed today.  I have personally reviewed the KUB today  which has not yet been reported.  He does have considerable stone burden in each kidney.  However the stone, when he was in the hospital in the coronary care unit, which appeared to be in the region of the left ureteropelvic junction, seems to have now backed into the middle pole calyx.  The patient has had no flank pain  He has other stones in the left kidney 1 of which is 1.25 cm diameter and the other 2 stones are about 1 cm in diameter.  He has 2 stones in the lower pole of the right kidney each about 1.5 cm in diameter with a further stone in the upper calyx of the right kidney.  Recent CT scan done in the hospital showed no evidence of hydronephrosis and this is when he was being treated for coronary artery occlusion.  We recall other medications stents were placed in the coronary artery and he is currently taking Effient, and anticoagulant, which will most likely have to continue for 1 year.    I reviewed all the records in detail, I went over the entire situation with him very carefully.    Discussion.  We had a careful discussion about this complicated situation.  The issues are as follows.  1.  He weighs just under 300 pounds at the present time.  2.  He has a history of cardiovascular disease having had stents placed about 8 weeks ago currently on Effient which will most likely have to continue for 1 year without interruption.  3.  He has considerable burden of stone in each kidney with a long history of recurrent urinary stone disease and many treatments both by ESWL and ureteroscopy in the past over 30 years.    The KUB today did show that the stone, which on CT appeared to have moved to the region of the left ureteropelvic junction is no longer in that location and is in fact back in 1 of the calyces.  Treatment in the form of ESWL at the present time would be precluded because of continuing need for anticoagulation.  Neither, for the same reason, can PCNL be performed.  Ureteroscopy would be possible  "under certain circumstances if really necessary, i.e. if the stone was in the ureter and could be very carefully treated with a holmium laser, but the burden of stone in each kidney is it, in my opinion too much to consider ureteroscopy with holmium laser.  Therefore I recommend we proceed as follows.  We should continue observation and repeat the KUB in 1 year unless there is evidence of significant symptoms before then.  In 1 years time, when it is safe to have him off anticoagulants, we should talk seriously about percutaneous nephrolithotomy to try and clear this up once and for all.  If there are major problems in the interim which can be managed with the ureteroscope that would be the option, and another option would be just to place a stent until such time as it safe to perform more definitive treatment.    This is a complex situation, patient has been advised to inform me promptly should the be major symptoms or come straight to the emergency room.  He understands this clearly.  We did discuss the entire situation in detail today.  I answered and discussed many questions.    Plan.  1 year for KUB and examination.  I will send a note to his cardiologist to ask advice on how we can handle anticoagulants if we need to before 1 year is done.    Time.  25 minutes.  Greater than 50% spent in discussion and consultation.  As noted above this is a highly complicated issue which required careful discussion review of records review of previous and current x-rays, lab studies and discussion about alternative therapies    \"This dictation was performed with voice recognition software and may contain errors,  omissions and inadvertent word substitution.\"        "

## 2019-09-25 ENCOUNTER — HOSPITAL ENCOUNTER (OUTPATIENT)
Dept: CARDIAC REHAB | Facility: CLINIC | Age: 69
End: 2019-09-25
Attending: INTERNAL MEDICINE
Payer: MEDICARE

## 2019-09-25 PROCEDURE — 93798 PHYS/QHP OP CAR RHAB W/ECG: CPT | Performed by: OCCUPATIONAL THERAPIST

## 2019-09-25 PROCEDURE — 40000116 ZZH STATISTIC OP CR VISIT: Performed by: OCCUPATIONAL THERAPIST

## 2019-10-02 ENCOUNTER — HEALTH MAINTENANCE LETTER (OUTPATIENT)
Age: 69
End: 2019-10-02

## 2019-10-02 ENCOUNTER — HOSPITAL ENCOUNTER (OUTPATIENT)
Dept: CARDIAC REHAB | Facility: CLINIC | Age: 69
End: 2019-10-02
Attending: INTERNAL MEDICINE
Payer: MEDICARE

## 2019-10-02 PROCEDURE — 93798 PHYS/QHP OP CAR RHAB W/ECG: CPT | Performed by: CLINICAL EXERCISE PHYSIOLOGIST

## 2019-10-02 PROCEDURE — 40000116 ZZH STATISTIC OP CR VISIT: Performed by: CLINICAL EXERCISE PHYSIOLOGIST

## 2019-10-04 ENCOUNTER — HOSPITAL ENCOUNTER (OUTPATIENT)
Dept: CARDIAC REHAB | Facility: CLINIC | Age: 69
End: 2019-10-04
Attending: INTERNAL MEDICINE
Payer: MEDICARE

## 2019-10-04 PROCEDURE — 40000116 ZZH STATISTIC OP CR VISIT: Performed by: OCCUPATIONAL THERAPIST

## 2019-10-04 PROCEDURE — 93798 PHYS/QHP OP CAR RHAB W/ECG: CPT | Performed by: OCCUPATIONAL THERAPIST

## 2019-10-07 ENCOUNTER — HOSPITAL ENCOUNTER (OUTPATIENT)
Dept: CARDIAC REHAB | Facility: CLINIC | Age: 69
End: 2019-10-07
Attending: INTERNAL MEDICINE
Payer: MEDICARE

## 2019-10-07 PROCEDURE — 93798 PHYS/QHP OP CAR RHAB W/ECG: CPT | Performed by: REHABILITATION PRACTITIONER

## 2019-10-07 PROCEDURE — 40000116 ZZH STATISTIC OP CR VISIT: Performed by: REHABILITATION PRACTITIONER

## 2019-10-14 ENCOUNTER — HOSPITAL ENCOUNTER (OUTPATIENT)
Dept: CARDIAC REHAB | Facility: CLINIC | Age: 69
End: 2019-10-14
Attending: INTERNAL MEDICINE
Payer: MEDICARE

## 2019-10-14 PROCEDURE — 93798 PHYS/QHP OP CAR RHAB W/ECG: CPT

## 2019-10-14 PROCEDURE — 40000116 ZZH STATISTIC OP CR VISIT

## 2019-10-18 ENCOUNTER — HOSPITAL ENCOUNTER (OUTPATIENT)
Dept: CARDIAC REHAB | Facility: CLINIC | Age: 69
End: 2019-10-18
Attending: INTERNAL MEDICINE
Payer: MEDICARE

## 2019-10-18 PROCEDURE — 93798 PHYS/QHP OP CAR RHAB W/ECG: CPT | Performed by: REHABILITATION PRACTITIONER

## 2019-10-18 PROCEDURE — 40000116 ZZH STATISTIC OP CR VISIT: Performed by: REHABILITATION PRACTITIONER

## 2019-10-21 ENCOUNTER — HOSPITAL ENCOUNTER (OUTPATIENT)
Dept: CARDIAC REHAB | Facility: CLINIC | Age: 69
End: 2019-10-21
Attending: INTERNAL MEDICINE
Payer: MEDICARE

## 2019-10-21 PROCEDURE — 93798 PHYS/QHP OP CAR RHAB W/ECG: CPT

## 2019-10-21 PROCEDURE — 40000116 ZZH STATISTIC OP CR VISIT

## 2019-10-23 ENCOUNTER — HOSPITAL ENCOUNTER (OUTPATIENT)
Dept: CARDIAC REHAB | Facility: CLINIC | Age: 69
End: 2019-10-23
Attending: INTERNAL MEDICINE
Payer: MEDICARE

## 2019-10-23 PROCEDURE — 93798 PHYS/QHP OP CAR RHAB W/ECG: CPT | Performed by: OCCUPATIONAL THERAPIST

## 2019-10-23 PROCEDURE — 40000116 ZZH STATISTIC OP CR VISIT: Performed by: OCCUPATIONAL THERAPIST

## 2019-10-25 ENCOUNTER — HOSPITAL ENCOUNTER (OUTPATIENT)
Dept: CARDIAC REHAB | Facility: CLINIC | Age: 69
End: 2019-10-25
Attending: INTERNAL MEDICINE
Payer: MEDICARE

## 2019-10-25 PROCEDURE — 40000116 ZZH STATISTIC OP CR VISIT: Performed by: REHABILITATION PRACTITIONER

## 2019-10-25 PROCEDURE — 93798 PHYS/QHP OP CAR RHAB W/ECG: CPT | Performed by: REHABILITATION PRACTITIONER

## 2019-10-28 ENCOUNTER — HOSPITAL ENCOUNTER (OUTPATIENT)
Dept: CARDIOLOGY | Facility: CLINIC | Age: 69
Discharge: HOME | End: 2019-10-28
Attending: PHYSICIAN ASSISTANT | Admitting: PHYSICIAN ASSISTANT
Payer: MEDICARE

## 2019-10-28 ENCOUNTER — HOSPITAL ENCOUNTER (OUTPATIENT)
Dept: CARDIAC REHAB | Facility: CLINIC | Age: 69
End: 2019-10-28
Attending: INTERNAL MEDICINE
Payer: MEDICARE

## 2019-10-28 DIAGNOSIS — I21.4 NSTEMI (NON-ST ELEVATED MYOCARDIAL INFARCTION) (H): ICD-10-CM

## 2019-10-28 LAB
ALT SERPL W P-5'-P-CCNC: 5 U/L (ref 5–30)
CHOLEST SERPL-MCNC: 145 MG/DL
HDLC SERPL-MCNC: 48 MG/DL
LDLC SERPL CALC-MCNC: 72 MG/DL
NONHDLC SERPL-MCNC: 97 MG/DL
TRIGL SERPL-MCNC: 124 MG/DL

## 2019-10-28 PROCEDURE — 93308 TTE F-UP OR LMTD: CPT | Mod: 26 | Performed by: INTERNAL MEDICINE

## 2019-10-28 PROCEDURE — 93325 DOPPLER ECHO COLOR FLOW MAPG: CPT | Mod: 26 | Performed by: INTERNAL MEDICINE

## 2019-10-28 PROCEDURE — 93798 PHYS/QHP OP CAR RHAB W/ECG: CPT

## 2019-10-28 PROCEDURE — 25500064 ZZH RX 255 OP 636: Performed by: PHYSICIAN ASSISTANT

## 2019-10-28 PROCEDURE — 80061 LIPID PANEL: CPT | Performed by: INTERNAL MEDICINE

## 2019-10-28 PROCEDURE — 40000116 ZZH STATISTIC OP CR VISIT

## 2019-10-28 PROCEDURE — 40000264 ECHOCARDIOGRAM LIMITED

## 2019-10-28 PROCEDURE — 93321 DOPPLER ECHO F-UP/LMTD STD: CPT | Mod: 26 | Performed by: INTERNAL MEDICINE

## 2019-10-28 PROCEDURE — 36415 COLL VENOUS BLD VENIPUNCTURE: CPT | Performed by: INTERNAL MEDICINE

## 2019-10-28 PROCEDURE — 84460 ALANINE AMINO (ALT) (SGPT): CPT | Performed by: INTERNAL MEDICINE

## 2019-10-28 RX ADMIN — HUMAN ALBUMIN MICROSPHERES AND PERFLUTREN 9 ML: 10; .22 INJECTION, SOLUTION INTRAVENOUS at 09:35

## 2019-10-29 ENCOUNTER — TELEPHONE (OUTPATIENT)
Dept: CARDIOLOGY | Facility: CLINIC | Age: 69
End: 2019-10-29

## 2019-10-29 NOTE — TELEPHONE ENCOUNTER
Updated patient, pleased with echo results. Would like to hold off on increasing Lipitor at this time. Agrees to follow-up with Dr. Zhao in the winter.

## 2019-10-29 NOTE — TELEPHONE ENCOUNTER
----- Message from May Zhou PA-C sent at 10/29/2019  9:54 AM CDT -----  Please let him know his echo looks better. Wall motion abnormalities have resolved. EF is normal. His lipid profile looks better as well. HDL is up a bit and LDL is lower although still above goal. If he's agreeable, I'd increase his lipitor to 80 mg daily. No other changes. He can follow up with Dr. Zhao this winter as recommended before. Thanks. Mark

## 2019-11-01 ENCOUNTER — HOSPITAL ENCOUNTER (OUTPATIENT)
Dept: CARDIAC REHAB | Facility: CLINIC | Age: 69
End: 2019-11-01
Attending: INTERNAL MEDICINE
Payer: MEDICARE

## 2019-11-01 PROCEDURE — 40000116 ZZH STATISTIC OP CR VISIT: Performed by: REHABILITATION PRACTITIONER

## 2019-11-01 PROCEDURE — 93798 PHYS/QHP OP CAR RHAB W/ECG: CPT | Performed by: REHABILITATION PRACTITIONER

## 2019-11-04 ENCOUNTER — HOSPITAL ENCOUNTER (OUTPATIENT)
Dept: CARDIAC REHAB | Facility: CLINIC | Age: 69
End: 2019-11-04
Attending: INTERNAL MEDICINE
Payer: MEDICARE

## 2019-11-04 PROCEDURE — 93798 PHYS/QHP OP CAR RHAB W/ECG: CPT | Performed by: CLINICAL EXERCISE PHYSIOLOGIST

## 2019-11-04 PROCEDURE — 40000116 ZZH STATISTIC OP CR VISIT: Performed by: CLINICAL EXERCISE PHYSIOLOGIST

## 2019-11-06 ENCOUNTER — HOSPITAL ENCOUNTER (OUTPATIENT)
Dept: CARDIAC REHAB | Facility: CLINIC | Age: 69
End: 2019-11-06
Attending: INTERNAL MEDICINE
Payer: MEDICARE

## 2019-11-06 PROCEDURE — 40000116 ZZH STATISTIC OP CR VISIT

## 2019-11-06 PROCEDURE — 93798 PHYS/QHP OP CAR RHAB W/ECG: CPT

## 2019-11-13 ENCOUNTER — HOSPITAL ENCOUNTER (OUTPATIENT)
Dept: CARDIAC REHAB | Facility: CLINIC | Age: 69
End: 2019-11-13
Attending: INTERNAL MEDICINE
Payer: MEDICARE

## 2019-11-13 PROCEDURE — 40000116 ZZH STATISTIC OP CR VISIT: Performed by: OCCUPATIONAL THERAPIST

## 2019-11-13 PROCEDURE — 93798 PHYS/QHP OP CAR RHAB W/ECG: CPT | Performed by: OCCUPATIONAL THERAPIST

## 2019-11-15 ENCOUNTER — HOSPITAL ENCOUNTER (OUTPATIENT)
Dept: CARDIAC REHAB | Facility: CLINIC | Age: 69
End: 2019-11-15
Attending: INTERNAL MEDICINE
Payer: MEDICARE

## 2019-11-15 PROCEDURE — 40000116 ZZH STATISTIC OP CR VISIT: Performed by: REHABILITATION PRACTITIONER

## 2019-11-15 PROCEDURE — 93798 PHYS/QHP OP CAR RHAB W/ECG: CPT | Performed by: REHABILITATION PRACTITIONER

## 2019-11-19 ENCOUNTER — HOSPITAL ENCOUNTER (OUTPATIENT)
Dept: CARDIAC REHAB | Facility: CLINIC | Age: 69
End: 2019-11-19
Attending: INTERNAL MEDICINE
Payer: MEDICARE

## 2019-11-19 PROCEDURE — 40000575 ZZH STATISTIC OP CARDIAC VISIT #2: Performed by: OCCUPATIONAL THERAPIST

## 2019-11-19 PROCEDURE — 40000116 ZZH STATISTIC OP CR VISIT: Performed by: OCCUPATIONAL THERAPIST

## 2019-11-19 PROCEDURE — 93797 PHYS/QHP OP CAR RHAB WO ECG: CPT | Mod: 59 | Performed by: OCCUPATIONAL THERAPIST

## 2019-11-19 PROCEDURE — 93798 PHYS/QHP OP CAR RHAB W/ECG: CPT | Performed by: OCCUPATIONAL THERAPIST

## 2019-12-04 ENCOUNTER — OFFICE VISIT (OUTPATIENT)
Dept: FAMILY MEDICINE | Facility: CLINIC | Age: 69
End: 2019-12-04

## 2019-12-04 VITALS — DIASTOLIC BLOOD PRESSURE: 72 MMHG | SYSTOLIC BLOOD PRESSURE: 136 MMHG | OXYGEN SATURATION: 95 % | HEART RATE: 62 BPM

## 2019-12-04 DIAGNOSIS — M79.645 PAIN OF FINGER OF LEFT HAND: ICD-10-CM

## 2019-12-04 DIAGNOSIS — I25.10 CORONARY ARTERY DISEASE INVOLVING NATIVE CORONARY ARTERY OF NATIVE HEART WITHOUT ANGINA PECTORIS: Primary | ICD-10-CM

## 2019-12-04 PROCEDURE — 99214 OFFICE O/P EST MOD 30 MIN: CPT | Mod: 25 | Performed by: FAMILY MEDICINE

## 2019-12-04 PROCEDURE — 73140 X-RAY EXAM OF FINGER(S): CPT | Mod: FY | Performed by: FAMILY MEDICINE

## 2019-12-04 RX ORDER — LOSARTAN POTASSIUM 25 MG/1
12.5 TABLET ORAL DAILY
Qty: 30 TABLET | Refills: 0 | Status: SHIPPED | OUTPATIENT
Start: 2019-12-04 | End: 2019-12-19

## 2019-12-04 NOTE — PROGRESS NOTES
SUBJECTIVE:  Hugo Prince is a 69 year old male who presents to the clinic today with a chief complaint of cough  for a few week(s).  His cough is described as persistent and nonproductive.    The patient's symptoms are moderate and stable.  Associated symptoms include no sob or fever   Has been on lisinopril since his heart attack earlier this year . The patient's symptoms are exacerbated by no particular triggers  Patient has been using OTC cough suppressants  to improve symptoms.    Past Medical History:   Diagnosis Date     Atopic dermatitis      CAD (coronary artery disease)      DJD (degenerative joint disease) of knee 1/14/2015     Dyslipidemia      ED (erectile dysfunction) 4/30/2012     Esophageal reflux     Gastroesophageal reflux     NSTEMI (non-ST elevated myocardial infarction) (H) 7/15/2019     Other and unspecified disc disorder of unspecified region     Intervertebral disc disorders     Personal history of colonic polyps     Colon polyps     S/P lateral meniscectomy of left knee      S/P total knee replacement 1/7/2015     Tick bite 4/20/15    amoxicillin treated     Unspecified sleep apnea     Sleep apnea     Urinary calculus, unspecified     Renal stones     Vitamin D deficiency 9-27-09       Current Outpatient Medications   Medication Sig Dispense Refill     acetaminophen (TYLENOL) 325 MG tablet Take 2 tablets (650 mg) by mouth every 4 hours as needed for mild pain       aspirin (ASA) 81 MG EC tablet Take 1 tablet (81 mg) by mouth daily       atorvastatin (LIPITOR) 40 MG tablet Take 1 tablet (40 mg) by mouth every evening 30 tablet 11     losartan (COZAAR) 25 MG tablet Take 0.5 tablets (12.5 mg) by mouth daily 30 tablet 0     metoprolol tartrate (LOPRESSOR) 25 MG tablet Take 1 tablet (25 mg) by mouth 2 times daily 60 tablet 11     nitroGLYcerin (NITROSTAT) 0.4 MG sublingual tablet As needed for chest pain place 1 tablet under the tongue every 5 minutes for 3 doses. If symptoms persist 5  minutes after 1st dose call 911. 25 tablet 0     order for DME Equipment being ordered: CPAP       prasugrel (EFFIENT) 10 MG TABS tablet Take 1 tablet (10 mg) by mouth daily 30 tablet 11     vitamin B-12 (CYANOCOBALAMIN) 1000 MCG tablet Take 1,000 mcg by mouth daily         Social History     Tobacco Use     Smoking status: Former Smoker     Types: Cigars     Smokeless tobacco: Never Used     Tobacco comment: has cigars when it is warm out   Substance Use Topics     Alcohol use: Yes     Alcohol/week: 11.7 standard drinks     Types: 14 Standard drinks or equivalent per week     Comment: 1-2 drinks day/beer and scotch     ROS  GI: NEGATIVE for nausea, abdominal pain, heartburn, or change in bowel habits  MUSCULOSKELETAL: + FOR l MIDDLE FINGER PAIN AFTER JAMMING IT    OBJECTIVE:  /72 (BP Location: Right arm, Patient Position: Sitting, Cuff Size: Adult Large)   Pulse 62   SpO2 95%   GENERAL APPEARANCE: no distress and over weight  EYES: EOMI,  PERRL, conjunctiva clear  HENT: ear canals and TM's normal.  Nose and mouth without ulcers, erythema or lesions  NECK: supple, nontender, no lymphadenopathy  RESP: lungs clear to auscultation - no rales, rhonchi or wheezes  CV: regular rates and rhythm, normal S1 S2, no murmur noted  ABDOMEN:  soft, nontender, no HSM or masses and bowel sounds normal  NEURO: Normal strength and tone, sensory exam grossly normal,  normal speech and mentation  SKIN: bruising about the L middle finger with normal extension and mildly reduced flexion    ASSESSMENT:    1. Coronary artery disease involving native coronary artery of native heart without angina pectoris  Change ace to see if this is cause    Call me in 2 weeks if not better  - losartan (COZAAR) 25 MG tablet; Take 0.5 tablets (12.5 mg) by mouth daily  Dispense: 30 tablet; Refill: 0    2. Pain of finger of left hand  I have personally reviewed the images and find nothing acute.    Radiology to review xrays and I will communicate new  findings   Symptomatic cares were discussed in detail.     - X-ray lt finger 2-3 view

## 2019-12-16 ENCOUNTER — HEALTH MAINTENANCE LETTER (OUTPATIENT)
Age: 69
End: 2019-12-16

## 2019-12-19 ENCOUNTER — OFFICE VISIT (OUTPATIENT)
Dept: FAMILY MEDICINE | Facility: CLINIC | Age: 69
End: 2019-12-19

## 2019-12-19 VITALS
HEIGHT: 70 IN | DIASTOLIC BLOOD PRESSURE: 82 MMHG | RESPIRATION RATE: 16 BRPM | OXYGEN SATURATION: 98 % | WEIGHT: 296 LBS | HEART RATE: 54 BPM | BODY MASS INDEX: 42.37 KG/M2 | SYSTOLIC BLOOD PRESSURE: 122 MMHG

## 2019-12-19 DIAGNOSIS — I25.10 CORONARY ARTERY DISEASE INVOLVING NATIVE CORONARY ARTERY OF NATIVE HEART WITHOUT ANGINA PECTORIS: ICD-10-CM

## 2019-12-19 DIAGNOSIS — J01.90 ACUTE SINUSITIS WITH SYMPTOMS > 10 DAYS: Primary | ICD-10-CM

## 2019-12-19 PROCEDURE — 99214 OFFICE O/P EST MOD 30 MIN: CPT | Performed by: FAMILY MEDICINE

## 2019-12-19 RX ORDER — LISINOPRIL 5 MG/1
5 TABLET ORAL DAILY
COMMUNITY
End: 2020-07-06

## 2019-12-19 ASSESSMENT — MIFFLIN-ST. JEOR: SCORE: 2103.9

## 2019-12-19 NOTE — PROGRESS NOTES
SUBJECTIVE:  Hugo Prince is a 69 year old male who presents to the clinic today with a chief complaint of cough  for 3 week(s).  His cough is described as persistent.   The patient's symptoms are moderate and not changing over the course of time.  Associated symptoms include sinus pressure with heavy chest (which he feels began with med change). The patient's symptoms are exacerbated by no particular triggers  Patient has been using nothing  to improve symptoms.   Change ace to arb last visit and might have some side affects from it (aches)    Past Medical History:   Diagnosis Date     Atopic dermatitis      CAD (coronary artery disease)      DJD (degenerative joint disease) of knee 1/14/2015     Dyslipidemia      ED (erectile dysfunction) 4/30/2012     Esophageal reflux     Gastroesophageal reflux     NSTEMI (non-ST elevated myocardial infarction) (H) 7/15/2019     Other and unspecified disc disorder of unspecified region     Intervertebral disc disorders     Personal history of colonic polyps     Colon polyps     S/P lateral meniscectomy of left knee      S/P total knee replacement 1/7/2015     Tick bite 4/20/15    amoxicillin treated     Unspecified sleep apnea     Sleep apnea     Urinary calculus, unspecified     Renal stones     Vitamin D deficiency 9-27-09       Current Outpatient Medications   Medication Sig Dispense Refill     acetaminophen (TYLENOL) 325 MG tablet Take 2 tablets (650 mg) by mouth every 4 hours as needed for mild pain       aspirin (ASA) 81 MG EC tablet Take 1 tablet (81 mg) by mouth daily       atorvastatin (LIPITOR) 40 MG tablet Take 1 tablet (40 mg) by mouth every evening 30 tablet 11     losartan (COZAAR) 25 MG tablet Take 0.5 tablets (12.5 mg) by mouth daily 30 tablet 0     metoprolol tartrate (LOPRESSOR) 25 MG tablet Take 1 tablet (25 mg) by mouth 2 times daily 60 tablet 11     nitroGLYcerin (NITROSTAT) 0.4 MG sublingual tablet As needed for chest pain place 1 tablet under the  "tongue every 5 minutes for 3 doses. If symptoms persist 5 minutes after 1st dose call 911. 25 tablet 0     order for DME Equipment being ordered: CPAP       prasugrel (EFFIENT) 10 MG TABS tablet Take 1 tablet (10 mg) by mouth daily 30 tablet 11     vitamin B-12 (CYANOCOBALAMIN) 1000 MCG tablet Take 1,000 mcg by mouth daily         Social History     Tobacco Use     Smoking status: Former Smoker     Types: Cigars     Smokeless tobacco: Never Used     Tobacco comment: has cigars when it is warm out   Substance Use Topics     Alcohol use: Yes     Alcohol/week: 11.7 standard drinks     Types: 14 Standard drinks or equivalent per week     Comment: 1-2 drinks day/beer and scotch     ROS  CONSTITUTIONAL:NEGATIVE for fever, chills, change in weight  INTEGUMENTARY/SKIN: NEGATIVE for worrisome rashes, moles or lesions    OBJECTIVE:  /82   Pulse 54   Resp 16   Ht 1.778 m (5' 10\")   Wt 134.3 kg (296 lb)   SpO2 98%   BMI 42.47 kg/m    GENERAL APPEARANCE: obese, alert and no distress  EYES: EOMI,  PERRL, conjunctiva clear  HENT: ear canals and TM's normal.  Nose and mouth without ulcers, erythema or lesions  NECK: supple, nontender, no lymphadenopathy  RESP: lungs clear to auscultation - no rales, rhonchi or wheezes  CV: regular rates and rhythm, normal S1 S2, no murmur noted  NEURO: Normal strength and tone, sensory exam grossly normal,  normal speech and mentation  SKIN: no suspicious lesions or rashes    ASSESSMENT:   1. Acute sinusitis with symptoms > 10 days  abx and time  - amoxicillin-clavulanate (AUGMENTIN) 875-125 MG tablet; Take 1 tablet by mouth 2 times daily  Dispense: 20 tablet; Refill: 0    2. Coronary artery disease involving native coronary artery of native heart without angina pectoris  See back in one month or [facility] message with bp   PLAN:  Change back to lisinopril  See orders in Epic  Symptomatic measures encouraged, humidified air, plenty of fluids.  "

## 2019-12-31 DIAGNOSIS — N52.9 ED (ERECTILE DYSFUNCTION): Primary | ICD-10-CM

## 2019-12-31 RX ORDER — VARDENAFIL HYDROCHLORIDE 10 MG/1
10 TABLET ORAL DAILY PRN
Qty: 12 TABLET | Refills: 0 | COMMUNITY
Start: 2019-12-31 | End: 2020-08-28

## 2020-01-07 ENCOUNTER — OFFICE VISIT (OUTPATIENT)
Dept: CARDIOLOGY | Facility: CLINIC | Age: 70
End: 2020-01-07
Payer: MEDICARE

## 2020-01-07 VITALS
WEIGHT: 298.2 LBS | BODY MASS INDEX: 42.69 KG/M2 | HEART RATE: 56 BPM | DIASTOLIC BLOOD PRESSURE: 66 MMHG | SYSTOLIC BLOOD PRESSURE: 118 MMHG | HEIGHT: 70 IN

## 2020-01-07 DIAGNOSIS — I21.4 NSTEMI (NON-ST ELEVATED MYOCARDIAL INFARCTION) (H): Primary | ICD-10-CM

## 2020-01-07 DIAGNOSIS — E78.5 HYPERLIPIDEMIA LDL GOAL <70: ICD-10-CM

## 2020-01-07 DIAGNOSIS — Z95.5 S/P CORONARY ARTERY STENT PLACEMENT: ICD-10-CM

## 2020-01-07 PROCEDURE — 99214 OFFICE O/P EST MOD 30 MIN: CPT | Performed by: INTERNAL MEDICINE

## 2020-01-07 RX ORDER — ATORVASTATIN CALCIUM 40 MG/1
40 TABLET, FILM COATED ORAL 2 TIMES DAILY
Qty: 186 TABLET | Refills: 3 | Status: SHIPPED | OUTPATIENT
Start: 2020-01-07 | End: 2020-07-14

## 2020-01-07 ASSESSMENT — MIFFLIN-ST. JEOR: SCORE: 2105.94

## 2020-01-07 NOTE — PROGRESS NOTES
HPI and Plan:   This 69-year-old gentleman is seen in follow-up of his recent non-STEMI due to a mid LAD lesion which was then subsequently stented with a drug-eluting stent.    He subsequent went through cardiac rehab.  He tolerated that well.  He has an average activity level and states he has no limitations with that and has never had any recurrence of his previous ischemic chest discomfort.  He notes he is sensitive to a number of medications but is happy with his current regimen because he is tolerating it.  He was switched from Brilinta to Prasugrel because of dyspnea which resolved upon that switch.  He has had no spontaneous bleeding but does bruise very easily and when he gets cut has a hard time stopping the bleeding.  I suggested switching to clopidogrel but again he is worried he might have side effects, is tolerating the current regimen and so wants to continue it.    He has quite significant bilateral worsening nephrolithiasis and needs surgical stone removal.  However currently has had no recurrent obstructive episodes and wants to try to make it through 1 year of dual antiplatelet therapy before having surgery.    At one time LDLs were as high as 160.  At the time of presentation and the year before, off of lipid-lowering medications, LDLs were around 130.  On current 40 mg of atorvastatin LDL is improved significantly to 70-75.  However it is not a 50% reduction and may not be optimal risk reduction.  I discussed that with him and recommended increasing to 80 mg a day.  Because of some friends of his he is worried somewhat about GI side effects but says since he is tolerating to 40 mg dose he would be willing to try 40 mg twice a day rather than a single 80 mg once a day dose.      Blood pressure is well controlled.  He is not smoking.  No orthopnea, PND, edema, palpitations syncope or near syncope.      Exam is detailed below.  In summary:  Blood pressure well controlled.  Pulse regular.  There is  some coarse rales at the right base, otherwise his cardiac, pulmonary, and peripheral vascular exams are grossly intact and normal     Impression/plan    1.-Recent anterior non-STEMI.  However there was significant wall motion normality initially.  However on follow-up echo in October 2019 LV function and wall motion have normalized.    2-status post drug-eluting stent to the LAD.  This resulted in resolution of all of his symptoms they have not recurred.  Tolerating dual antiplatelet therapy.  I think it could be interrupted temporarily now if he needed surgery but he would prefer to wait another 6 months or so if he is remaining stable from a nephrolithiasis standpoint    3-dyslipidemia.  He will increase his atorvastatin to 40 mg twice daily.    I will have him back in about 7 months and if he is stable he can stop his Effient and could tolerate temporary aspirin her to her option for surgery as needed.      Orders Placed This Encounter   Procedures     Follow-Up with Cardiac Advanced Practice Provider       Orders Placed This Encounter   Medications     atorvastatin (LIPITOR) 40 MG tablet     Sig: Take 1 tablet (40 mg) by mouth 2 times daily     Dispense:  186 tablet     Refill:  3       Medications Discontinued During This Encounter   Medication Reason     atorvastatin (LIPITOR) 40 MG tablet Reorder         Encounter Diagnoses   Name Primary?     NSTEMI (non-ST elevated myocardial infarction) (H) Yes     S/P coronary artery stent placement      Hyperlipidemia LDL goal <70        CURRENT MEDICATIONS:  Current Outpatient Medications   Medication Sig Dispense Refill     acetaminophen (TYLENOL) 325 MG tablet Take 2 tablets (650 mg) by mouth every 4 hours as needed for mild pain       aspirin (ASA) 81 MG EC tablet Take 1 tablet (81 mg) by mouth daily       atorvastatin (LIPITOR) 40 MG tablet Take 1 tablet (40 mg) by mouth 2 times daily 186 tablet 3     lisinopril (PRINIVIL/ZESTRIL) 5 MG tablet Take 5 mg by mouth  daily       metoprolol tartrate (LOPRESSOR) 25 MG tablet Take 1 tablet (25 mg) by mouth 2 times daily 60 tablet 11     nitroGLYcerin (NITROSTAT) 0.4 MG sublingual tablet As needed for chest pain place 1 tablet under the tongue every 5 minutes for 3 doses. If symptoms persist 5 minutes after 1st dose call 911. 25 tablet 0     order for DME Equipment being ordered: CPAP       prasugrel (EFFIENT) 10 MG TABS tablet Take 1 tablet (10 mg) by mouth daily 30 tablet 11     vardenafil (LEVITRA) 10 MG tablet Take 1 tablet (10 mg) by mouth daily as needed (ED) 12 tablet 0     vitamin B-12 (CYANOCOBALAMIN) 1000 MCG tablet Take 1,000 mcg by mouth daily       amoxicillin-clavulanate (AUGMENTIN) 875-125 MG tablet Take 1 tablet by mouth 2 times daily (Patient not taking: Reported on 1/7/2020) 20 tablet 0       ALLERGIES     Allergies   Allergen Reactions     Chlorthalidone GI Disturbance     Compazine Other (See Comments)     PSYCHOTIC     Erythromycin Nausea and Vomiting     Flomax [Tamsulosin Hcl]      FLUSHING, NASAl CONGESTION     Prochlorperazine      LW Reaction: anxiety     Seasonal Allergies        PAST MEDICAL HISTORY:  Past Medical History:   Diagnosis Date     Atopic dermatitis      CAD (coronary artery disease)      DJD (degenerative joint disease) of knee 1/14/2015     Dyslipidemia      ED (erectile dysfunction) 4/30/2012     Esophageal reflux     Gastroesophageal reflux     NSTEMI (non-ST elevated myocardial infarction) (H) 7/15/2019     Other and unspecified disc disorder of unspecified region     Intervertebral disc disorders     Personal history of colonic polyps     Colon polyps     S/P lateral meniscectomy of left knee      S/P total knee replacement 1/7/2015     Tick bite 4/20/15    amoxicillin treated     Unspecified sleep apnea     Sleep apnea     Urinary calculus, unspecified     Renal stones     Vitamin D deficiency 9-27-09       PAST SURGICAL HISTORY:  Past Surgical History:   Procedure Laterality Date      ARTHROPLASTY KNEE Left 1/7/2015    Procedure: ARTHROPLASTY KNEE;  Surgeon: Agustin Saenz MD;  Location:  OR     ARTHROSCOPY KNEE RT/LT  6-20-11     COLONOSCOPY  12/09     CV FRACTIONAL FLOW RESERVE N/A 7/16/2019    Procedure: Fractional Flow Reserve;  Surgeon: Mick Thrasher MD;  Location:  HEART [facility]ARDI[facility] CATH LAB     CV HEART CATHETERIZATION WITH POSSIBLE INTERVENTION N/A 7/16/2019    successful PCI with MICAH placement to mid LAD      CYSTOSCOPY       EXTRACORPOREAL SHOCK WAVE LITHOTRIPSY (ESWL)  1/20/2012    Procedure:EXTRACORPOREAL SHOCK WAVE LITHOTRIPSY (ESWL); LEFT EXTRACORPOREAL SHOCK WAVE LITHOTRIPSY ; Surgeon:ANJUM BUNDY; Location: OR     EXTRACORPOREAL SHOCK WAVE LITHOTRIPSY, CYSTOSCOPY, INSERT STENT URETER(S), COMBINED  2/3/2012    Procedure:COMBINED EXTRACORPOREAL SHOCK WAVE LITHOTRIPSY, CYSTOSCOPY, INSERT STENT URETER(S); CYSTOSCOPY, RIGHT STENT; Surgeon:ANJUM BUNDY; Location: OR     FUSION LUMBAR ANTERIOR, FUSION LUMBAR POSTERIOR ONE LEVEL, COMBINED  11-1-10    L4-5     LASER HOLMIUM LITHOTRIPSY URETER(S), INSERT STENT, COMBINED  2/7/2012    Procedure:COMBINED CYSTOSCOPY, URETEROSCOPY, LASER HOLMIUM LITHOTRIPSY URETER(S), INSERT STENT; CYSTOSCOPY, RIGHT URETEROSCOPY, RIGHT RETROGRADES,  STONE EXTRACTION WITH HOLMIUM LASER AND RIGHT URETERAL STENT EXCHANGE ; Surgeon:DEEPTI MELVIN; Location: OR     LITHOTRIPSY      Lithotrypsy     SEPTOPLASTY       VASECTOMY         FAMILY HISTORY:  Family History   Problem Relation Age of Onset     Melanoma Father      Prostate Cancer Father      Cancer Father         neck cancer from radiation at work (urology)     Breast Cancer Mother      C.A.D. Paternal Grandfather 72       SOCIAL HISTORY:  Social History     Socioeconomic History     Marital status:      Spouse name: None     Number of children: None     Years of education: None     Highest education level: None   Occupational History     None   Social Needs      Financial resource strain: None     Food insecurity:     Worry: None     Inability: None     Transportation needs:     Medical: None     Non-medical: None   Tobacco Use     Smoking status: Former Smoker     Types: Cigars     Smokeless tobacco: Never Used     Tobacco comment: has cigars when it is warm out   Substance and Sexual Activity     Alcohol use: Yes     Alcohol/week: 11.7 standard drinks     Types: 14 Standard drinks or equivalent per week     Comment: 1-2 drinks day/beer and scotch     Drug use: No     Sexual activity: None   Lifestyle     Physical activity:     Days per week: None     Minutes per session: None     Stress: None   Relationships     Social connections:     Talks on phone: None     Gets together: None     Attends [employer] service: None     Active member of club or organization: None     Attends meetings of clubs or organizations: None     Relationship status: None     Intimate partner violence:     Fear of current or ex partner: None     Emotionally abused: None     Physically abused: None     Forced sexual activity: None   Other Topics Concern     Parent/sibling w/ CABG, MI or angioplasty before 65F 55M? Not Asked   Social History Narrative     None       Review of Systems:  Skin:  Positive for   dry skin   Eyes:  Positive for glasses    ENT:  Positive for sinus trouble;postnasal drainage completed abx  Respiratory:  Positive for sleep apnea;CPAP cough improved   Cardiovascular:  Negative;palpitations;chest pain;syncope or near-syncope;cyanosis;fatigue Positive for chest tightness in December, states from coughing  Gastroenterology: Negative      Genitourinary:  Positive for urinary frequency;kidney stone    Musculoskeletal:  Positive for back pain;joint pain    Neurologic:  Negative      Psychiatric:  Positive for sleep disturbances    Heme/Lymph/Imm:  Negative      Endocrine:  Negative        Physical Exam:  Vitals: /66 (BP Location: Left arm, Cuff Size: Adult Large)   Pulse 56   " Ht 1.765 m (5' 9.5\")   Wt 135.3 kg (298 lb 3.2 oz)   BMI 43.41 kg/m      Constitutional:  cooperative, alert and oriented, well developed, well nourished, in no acute distress        Skin:  warm and dry to the touch, no apparent skin lesions or masses noted          Head:  normocephalic, no masses or lesions        Eyes:  pupils equal and round;sclera white;EOMS intact        Lymph:      ENT:  no pallor or cyanosis        Neck:  carotid pulses are full and equal bilaterally;JVP normal        Respiratory:    coarse rales(right base)       Cardiac: regular rhythm;normal S1 and S2;no S3 or S4;no murmurs, gallops or rubs detected                not assessed this visit                                        GI:  abdomen soft;non-tender obese      Extremities and Muscular Skeletal:  no edema;no deformities, clubbing, cyanosis, erythema observed              Neurological:  no gross motor deficits        Psych:  affect appropriate, oriented to time, person and place        CC  No referring provider defined for this encounter.              "

## 2020-01-07 NOTE — LETTER
1/7/2020    Juan De La Vega MD  [address]  [facility] [address]    RE: Hugo Estrellaward       Dear Colleague,    I had the pleasure of seeing Hugo Prince in the [facility].    HPI and Plan:   This 69-year-old gentleman is seen in follow-up of his recent non-STEMI due to a mid LAD lesion which was then subsequently stented with a drug-eluting stent.    He subsequent went through cardiac rehab.  He tolerated that well.  He has an average activity level and states he has no limitations with that and has never had any recurrence of his previous ischemic chest discomfort.  He notes he is sensitive to a number of medications but is happy with his current regimen because he is tolerating it.  He was switched from Brilinta to Prasugrel because of dyspnea which resolved upon that switch.  He has had no spontaneous bleeding but does bruise very easily and when he gets cut has a hard time stopping the bleeding.  I suggested switching to clopidogrel but again he is worried he might have side effects, is tolerating the current regimen and so wants to continue it.    He has quite significant bilateral worsening nephrolithiasis and needs surgical stone removal.  However currently has had no recurrent obstructive episodes and wants to try to make it through 1 year of dual antiplatelet therapy before having surgery.    At one time LDLs were as high as 160.  At the time of presentation and the year before, off of lipid-lowering medications, LDLs were around 130.  On current 40 mg of atorvastatin LDL is improved significantly to 70-75.  However it is not a 50% reduction and may not be optimal risk reduction.  I discussed that with him and recommended increasing to 80 mg a day.  Because of some friends of his he is worried somewhat about GI side effects but says since he is tolerating to 40 mg dose he would be willing to try 40 mg twice a day rather than a single 80 mg once a day  dose.      Blood pressure is well controlled.  He is not smoking.  No orthopnea, PND, edema, palpitations syncope or near syncope.      Exam is detailed below.  In summary:  Blood pressure well controlled.  Pulse regular.  There is some coarse rales at the right base, otherwise his cardiac, pulmonary, and peripheral vascular exams are grossly intact and normal     Impression/plan    1.-Recent anterior non-STEMI.  However there was significant wall motion normality initially.  However on follow-up echo in October 2019 LV function and wall motion have normalized.    2-status post drug-eluting stent to the LAD.  This resulted in resolution of all of his symptoms they have not recurred.  Tolerating dual antiplatelet therapy.  I think it could be interrupted temporarily now if he needed surgery but he would prefer to wait another 6 months or so if he is remaining stable from a nephrolithiasis standpoint    3-dyslipidemia.  He will increase his atorvastatin to 40 mg twice daily.    I will have him back in about 7 months and if he is stable he can stop his Effient and could tolerate temporary aspirin her to her option for surgery as needed.      Orders Placed This Encounter   Procedures     Follow-Up with Cardiac Advanced Practice Provider       Orders Placed This Encounter   Medications     atorvastatin (LIPITOR) 40 MG tablet     Sig: Take 1 tablet (40 mg) by mouth 2 times daily     Dispense:  186 tablet     Refill:  3       Medications Discontinued During This Encounter   Medication Reason     atorvastatin (LIPITOR) 40 MG tablet Reorder         Encounter Diagnoses   Name Primary?     NSTEMI (non-ST elevated myocardial infarction) (H) Yes     S/P coronary artery stent placement      Hyperlipidemia LDL goal <70        CURRENT MEDICATIONS:  Current Outpatient Medications   Medication Sig Dispense Refill     acetaminophen (TYLENOL) 325 MG tablet Take 2 tablets (650 mg) by mouth every 4 hours as needed for mild pain        aspirin (ASA) 81 MG EC tablet Take 1 tablet (81 mg) by mouth daily       atorvastatin (LIPITOR) 40 MG tablet Take 1 tablet (40 mg) by mouth 2 times daily 186 tablet 3     lisinopril (PRINIVIL/ZESTRIL) 5 MG tablet Take 5 mg by mouth daily       metoprolol tartrate (LOPRESSOR) 25 MG tablet Take 1 tablet (25 mg) by mouth 2 times daily 60 tablet 11     nitroGLYcerin (NITROSTAT) 0.4 MG sublingual tablet As needed for chest pain place 1 tablet under the tongue every 5 minutes for 3 doses. If symptoms persist 5 minutes after 1st dose call 911. 25 tablet 0     order for DME Equipment being ordered: CPAP       prasugrel (EFFIENT) 10 MG TABS tablet Take 1 tablet (10 mg) by mouth daily 30 tablet 11     vardenafil (LEVITRA) 10 MG tablet Take 1 tablet (10 mg) by mouth daily as needed (ED) 12 tablet 0     vitamin B-12 (CYANOCOBALAMIN) 1000 MCG tablet Take 1,000 mcg by mouth daily       amoxicillin-clavulanate (AUGMENTIN) 875-125 MG tablet Take 1 tablet by mouth 2 times daily (Patient not taking: Reported on 1/7/2020) 20 tablet 0       ALLERGIES     Allergies   Allergen Reactions     Chlorthalidone GI Disturbance     Compazine Other (See Comments)     PSYCHOTIC     Erythromycin Nausea and Vomiting     Flomax [Tamsulosin Hcl]      FLUSHING, NASAl CONGESTION     Prochlorperazine      LW Reaction: anxiety     Seasonal Allergies        PAST MEDICAL HISTORY:  Past Medical History:   Diagnosis Date     Atopic dermatitis      CAD (coronary artery disease)      DJD (degenerative joint disease) of knee 1/14/2015     Dyslipidemia      ED (erectile dysfunction) 4/30/2012     Esophageal reflux     Gastroesophageal reflux     NSTEMI (non-ST elevated myocardial infarction) (H) 7/15/2019     Other and unspecified disc disorder of unspecified region     Intervertebral disc disorders     Personal history of colonic polyps     Colon polyps     S/P lateral meniscectomy of left knee      S/P total knee replacement 1/7/2015     Tick bite 4/20/15     amoxicillin treated     Unspecified sleep apnea     Sleep apnea     Urinary calculus, unspecified     Renal stones     Vitamin D deficiency 9-27-09       PAST SURGICAL HISTORY:  Past Surgical History:   Procedure Laterality Date     ARTHROPLASTY KNEE Left 1/7/2015    Procedure: ARTHROPLASTY KNEE;  Surgeon: Agustni Saenz MD;  Location:  OR     ARTHROSCOPY KNEE RT/LT  6-20-11     COLONOSCOPY  12/09     CV FRACTIONAL FLOW RESERVE N/A 7/16/2019    Procedure: Fractional Flow Reserve;  Surgeon: Mick Thrasher MD;  Location:  HEART CARDIAC CATH LAB     CV HEART CATHETERIZATION WITH POSSIBLE INTERVENTION N/A 7/16/2019    successful PCI with MICAH placement to mid LAD      CYSTOSCOPY       EXTRACORPOREAL SHOCK WAVE LITHOTRIPSY (ESWL)  1/20/2012    Procedure:EXTRACORPOREAL SHOCK WAVE LITHOTRIPSY (ESWL); LEFT EXTRACORPOREAL SHOCK WAVE LITHOTRIPSY ; Surgeon:ANJUM BUNDY; Location: OR     EXTRACORPOREAL SHOCK WAVE LITHOTRIPSY, CYSTOSCOPY, INSERT STENT URETER(S), COMBINED  2/3/2012    Procedure:COMBINED EXTRACORPOREAL SHOCK WAVE LITHOTRIPSY, CYSTOSCOPY, INSERT STENT URETER(S); CYSTOSCOPY, RIGHT STENT; Surgeon:ANJUM BUNDY; Location: OR     FUSION LUMBAR ANTERIOR, FUSION LUMBAR POSTERIOR ONE LEVEL, COMBINED  11-1-10    L4-5     LASER HOLMIUM LITHOTRIPSY URETER(S), INSERT STENT, COMBINED  2/7/2012    Procedure:COMBINED CYSTOSCOPY, URETEROSCOPY, LASER HOLMIUM LITHOTRIPSY URETER(S), INSERT STENT; CYSTOSCOPY, RIGHT URETEROSCOPY, RIGHT RETROGRADES,  STONE EXTRACTION WITH HOLMIUM LASER AND RIGHT URETERAL STENT EXCHANGE ; Surgeon:DEEPTI MELVIN; Location: OR     LITHOTRIPSY      Lithotrypsy     SEPTOPLASTY       VASECTOMY         FAMILY HISTORY:  Family History   Problem Relation Age of Onset     Melanoma Father      Prostate Cancer Father      Cancer Father         neck cancer from radiation at work (urology)     Breast Cancer Mother      C.A.D. Paternal Grandfather 72       SOCIAL  HISTORY:  Social History     Socioeconomic History     Marital status:      Spouse name: None     Number of children: None     Years of education: None     Highest education level: None   Occupational History     None   Social Needs     Financial resource strain: None     Food insecurity:     Worry: None     Inability: None     Transportation needs:     Medical: None     Non-medical: None   Tobacco Use     Smoking status: Former Smoker     Types: Cigars     Smokeless tobacco: Never Used     Tobacco comment: has cigars when it is warm out   Substance and Sexual Activity     Alcohol use: Yes     Alcohol/week: 11.7 standard drinks     Types: 14 Standard drinks or equivalent per week     Comment: 1-2 drinks day/beer and scotch     Drug use: No     Sexual activity: None   Lifestyle     Physical activity:     Days per week: None     Minutes per session: None     Stress: None   Relationships     Social connections:     Talks on phone: None     Gets together: None     Attends Baptism service: None     Active member of club or organization: None     Attends meetings of clubs or organizations: None     Relationship status: None     Intimate partner violence:     Fear of current or ex partner: None     Emotionally abused: None     Physically abused: None     Forced sexual activity: None   Other Topics Concern     Parent/sibling w/ CABG, MI or angioplasty before 65F 55M? Not Asked   Social History Narrative     None       Review of Systems:  Skin:  Positive for   dry skin   Eyes:  Positive for glasses    ENT:  Positive for sinus trouble;postnasal drainage completed abx  Respiratory:  Positive for sleep apnea;CPAP cough improved   Cardiovascular:  Negative;palpitations;chest pain;syncope or near-syncope;cyanosis;fatigue Positive for chest tightness in December, states from coughing  Gastroenterology: Negative      Genitourinary:  Positive for urinary frequency;kidney stone    Musculoskeletal:  Positive for back  "pain;joint pain    Neurologic:  Negative      Psychiatric:  Positive for sleep disturbances    Heme/Lymph/Imm:  Negative      Endocrine:  Negative        Physical Exam:  Vitals: /66 (BP Location: Left arm, Cuff Size: Adult Large)   Pulse 56   Ht 1.765 m (5' 9.5\")   Wt 135.3 kg (298 lb 3.2 oz)   BMI 43.41 kg/m       Constitutional:  cooperative, alert and oriented, well developed, well nourished, in no acute distress        Skin:  warm and dry to the touch, no apparent skin lesions or masses noted          Head:  normocephalic, no masses or lesions        Eyes:  pupils equal and round;sclera white;EOMS intact        Lymph:      ENT:  no pallor or cyanosis        Neck:  carotid pulses are full and equal bilaterally;JVP normal        Respiratory:    coarse rales(right base)       Cardiac: regular rhythm;normal S1 and S2;no S3 or S4;no murmurs, gallops or rubs detected                not assessed this visit                                        GI:  abdomen soft;non-tender obese      Extremities and Muscular Skeletal:  no edema;no deformities, clubbing, cyanosis, erythema observed              Neurological:  no gross motor deficits        Psych:  affect appropriate, oriented to time, person and place          Thank you for allowing me to participate in the care of your patient.    Sincerely,     Pascual Zhao MD     [facility]    "

## 2020-04-16 ENCOUNTER — TRANSFERRED RECORDS (OUTPATIENT)
Dept: FAMILY MEDICINE | Facility: CLINIC | Age: 70
End: 2020-04-16

## 2020-04-22 ENCOUNTER — TELEPHONE (OUTPATIENT)
Dept: CARDIOLOGY | Facility: CLINIC | Age: 70
End: 2020-04-22

## 2020-04-22 NOTE — TELEPHONE ENCOUNTER
Received call from pt reporting he is having an MRI of his back on 4/24/20, pt inquired if his stent will interfere with the MRI. Pt is s/p PCI with a 3.5 x 28 mm Synergy MICAH to LAD on 7/16/19. Reviewed type of stent and MRI information from the [employer] website with pt stating stent is MR conditional and advised pt to alert the facility doing the MRI of his stent. Pt also asked what his last BP reading was, reviewed BP was 118/66, HR 56 at last visit on 1/7/20. Pt verbalized understanding, will call back if any further questions.

## 2020-05-04 ENCOUNTER — TRANSFERRED RECORDS (OUTPATIENT)
Dept: FAMILY MEDICINE | Facility: CLINIC | Age: 70
End: 2020-05-04

## 2020-05-14 ENCOUNTER — TELEPHONE (OUTPATIENT)
Dept: CARDIOLOGY | Facility: CLINIC | Age: 70
End: 2020-05-14

## 2020-05-14 NOTE — TELEPHONE ENCOUNTER
Received call from pt requesting to hold Effient x 2 days for a nerve root injection in his back scheduled on 5/22/20 at [facility] with Dr. Blair. Pt is s/p MICAH to LAD on 7/16/19. Advised pt will review with Dr. Zhao and call back with recommendations. Pt also noted Dr. Blair's office faxed a form to our office. Advised pt we have not received any forms, called to Dr. Blair's office and left message for Leonor with fax and phone numbers for Team 1 if a form is needed.

## 2020-05-15 NOTE — TELEPHONE ENCOUNTER
Received response from Dr. Zhao:    Pascual Zhao MD Hair, Ashley W, RN    Caller: Unspecified (Yesterday,  3:45 PM)               Probably should stop it for 4 or 5 days  prior. Very low risk at this time. Are they going to hold ASA also (probably should)      Called to Dr. Blair's office at [phone]. Left VM with Dr. Zhao's recommendation that pt should stop Effient for 4-5 days, inquired if planned to hold aspirin as well. Left call back number for Team 1.

## 2020-05-15 NOTE — TELEPHONE ENCOUNTER
Form received from [facility], completed and signed by Dr. Zhao and faxed back. Called pt and advised form was faxed, let pt know  Dr. Zhao wrote to consider holding Effient for 4 days instead of 48 hours and had also recommended pt hold aspirin too. Pt verbalized understating and agreement with plan. Pt asked if he can stop his Effient when he is one year out from stent placement. Reviewed that Dr. Zhao ordered pt to have a visit with an DUNG ~8/1/20 and pt should have a clinic visit to discuss this prior to stopping Effient. Pt verbalized understanding. Pt stated he would like to have back surgery after he is no longer on Effient but nothing is currently planned.

## 2020-05-22 ENCOUNTER — TRANSFERRED RECORDS (OUTPATIENT)
Dept: FAMILY MEDICINE | Facility: CLINIC | Age: 70
End: 2020-05-22

## 2020-06-17 ENCOUNTER — TELEPHONE (OUTPATIENT)
Dept: CARDIOLOGY | Facility: CLINIC | Age: 70
End: 2020-06-17

## 2020-06-17 NOTE — TELEPHONE ENCOUNTER
Received call from pt inquiring if Preservision Areds 2 will interfere with his Effient. Pt stated his eye doctor wants him to start taking this vitamin supplement due to macular degeneration. Per Lexicomp drug interactions, category C, monitor for increased symptoms of bleeding and bruising. Pt stated he already bruises very easily and inquired if he will be able to get off of Effient soon. Reviewed that pt is due for follow up around ~8/7/20 to discuss and pt will be 1 year out from stent placement on 7/16/20. Pt stated he would like to schedule his appointment, advised this RN will request scheduling call pt to schedule a virtual visit. Pt stated he will hold off on starting any new vitamins until his visit.

## 2020-06-24 PROBLEM — M54.42 ACUTE LEFT-SIDED LOW BACK PAIN WITH LEFT-SIDED SCIATICA: Status: ACTIVE | Noted: 2017-07-23

## 2020-07-06 DIAGNOSIS — I21.4 NSTEMI (NON-ST ELEVATED MYOCARDIAL INFARCTION) (H): ICD-10-CM

## 2020-07-06 RX ORDER — PRASUGREL 10 MG/1
10 TABLET, FILM COATED ORAL DAILY
Qty: 90 TABLET | Refills: 0 | Status: SHIPPED | OUTPATIENT
Start: 2020-07-06 | End: 2020-07-14

## 2020-07-06 RX ORDER — LISINOPRIL 5 MG/1
5 TABLET ORAL DAILY
Qty: 90 TABLET | Refills: 0 | Status: SHIPPED | OUTPATIENT
Start: 2020-07-06 | End: 2020-07-14

## 2020-07-06 RX ORDER — METOPROLOL TARTRATE 25 MG/1
25 TABLET, FILM COATED ORAL 2 TIMES DAILY
Qty: 180 TABLET | Refills: 0 | Status: SHIPPED | OUTPATIENT
Start: 2020-07-06 | End: 2020-07-14

## 2020-07-14 ENCOUNTER — VIRTUAL VISIT (OUTPATIENT)
Dept: CARDIOLOGY | Facility: CLINIC | Age: 70
End: 2020-07-14
Attending: INTERNAL MEDICINE
Payer: MEDICARE

## 2020-07-14 ENCOUNTER — TELEPHONE (OUTPATIENT)
Dept: UROLOGY | Facility: CLINIC | Age: 70
End: 2020-07-14

## 2020-07-14 VITALS — WEIGHT: 302 LBS | BODY MASS INDEX: 43.96 KG/M2

## 2020-07-14 DIAGNOSIS — E78.5 DYSLIPIDEMIA: ICD-10-CM

## 2020-07-14 DIAGNOSIS — I21.4 NSTEMI (NON-ST ELEVATED MYOCARDIAL INFARCTION) (H): ICD-10-CM

## 2020-07-14 DIAGNOSIS — Z95.5 S/P CORONARY ARTERY STENT PLACEMENT: Primary | ICD-10-CM

## 2020-07-14 PROCEDURE — 99213 OFFICE O/P EST LOW 20 MIN: CPT | Mod: 95 | Performed by: PHYSICIAN ASSISTANT

## 2020-07-14 RX ORDER — METOPROLOL TARTRATE 25 MG/1
25 TABLET, FILM COATED ORAL 2 TIMES DAILY
Qty: 180 TABLET | Refills: 3 | Status: SHIPPED | OUTPATIENT
Start: 2020-07-14 | End: 2021-02-11

## 2020-07-14 RX ORDER — LISINOPRIL 5 MG/1
5 TABLET ORAL DAILY
Qty: 90 TABLET | Refills: 3 | Status: SHIPPED | OUTPATIENT
Start: 2020-07-14 | End: 2021-02-11

## 2020-07-14 RX ORDER — ATORVASTATIN CALCIUM 40 MG/1
40 TABLET, FILM COATED ORAL DAILY
COMMUNITY
Start: 2020-07-14 | End: 2021-02-08

## 2020-07-14 NOTE — PROGRESS NOTES
"Hugo Prince is a 70 year old male who is being evaluated via a billable video visit.      The patient has been notified of following:     \"This video visit will be conducted via a call between you and your physician/provider. We have found that certain health care needs can be provided without the need for an in-person physical exam.  This service lets us provide the care you need with a video conversation.  If a prescription is necessary we can send it directly to your pharmacy.  If lab work is needed we can place an order for that and you can then stop by our lab to have the test done at a later time.    Video visits are billed at different rates depending on your insurance coverage.  Please reach out to your insurance provider with any questions.    If during the course of the call the physician/provider feels a video visit is not appropriate, you will not be charged for this service.\"  [phone]  Pt reported wt today 302#  Anabelle Pate  Patient has given verbal consent for Video visit? Yes  How would you like to obtain your AVS? Faviola       Patient would like the video invitation sent by: Send to e-mail at: [email or web address]     Provider notes:    Hugo Prince is a pleasant 70 year old male with a history of former tobacco use, coronary artery disease, morbid obesity, obstructive sleep apnea treated with CPAP, recurrent nephrolithiasis, and coronary artery disease status post tenting of the mid LAD in July 2019.    He was admitted to [facility] on 7/15/2019 with chest discomfort and shortness of breath consistent with unstable angina.  His troponin peaked at 12.  Echocardiogram showed an EF of 50 to 55% with severe apical wall hypokinesis.  He underwent coronary angiography which showed a culprit lesion in the mid LAD which was treated with a drug-eluting stent.  He had a moderate proximal LAD lesion which was negative by IFR.  He also had moderate mid RCA disease.  He was " started on dual antiplatelet therapy.  He had some shortness of breath with Brilinta thus was switched to a prasugrel.  He was also probably started on lisinopril, metoprolol, and atorvastatin.     He had a visit with Dr. Zhao in January 2020.  He was doing well at that time without recurrent ischemic symptoms.  His LDL had improved from 130 to around 75.  Dr. Zhao recommended increasing his Lipitor to 80 mg daily.  He had an echocardiogram done prior to that which showed normal LV function.  The apical wall motion appeared normal.    I spoke with Hugo today.  He notes since his last visit he has been doing well.  He did increase his atorvastatin to 80 mg but had difficulty with muscle aches and thus has been only taking 40 mg daily.  He is not had any recurrent anginal symptoms specifically he denies chest discomfort or shortness of breath.  He previously was working out 3 times a day at the gym for 45 minutes on the bike.  Due to the COVID-19 pandemic, he does not plan to go back to the gym.  He has been trying to walk regularly at home and also when the weather is cooler plans to ride his bike.  He also stays busy mowing his lawn have at the cabin and has a very large garden which he manages.    General:  no apparent distress, normal body habitus, sitting upright.  ENT/Mouth:  membranes moist, no nasal discharge.  Normal head shape, no apparent injury or laceration.  Eyes: normal conjunctivae.  No observed jaundice.  Chest/Lungs:  No breathing difficulty while speaking.  No audible wheezing.  No cough during conversation.  Skin:  no xanthelasma.  No facial lacerations.  Neurologic:  Normal arm motion bilateral, no tremors.    Psychiatric:  Alert and oriented x3, calm demeanor    Assessment/plan:  Hugo Prince is a delightful 70-year-old male with a history of coronary artery disease status post stenting of the mid LAD in July 2019, hyperlipidemia, obstructive sleep apnea treated with CPAP, and  former tobacco use.    He fortunately has done well since his NSTEMI 1 year ago. He has residual moderate proximal LAD and RCA disease but has not had any recurrent anginal symptoms.  At this point, in 2 days he will be 1 year out from his intervention.  We discussed that after that he can stop his Effient and continue on aspirin 81 mg daily to be continued indefinitely.  He is also on Lipitor 40 mg daily.  He did not tolerate higher doses of this due to myalgias.  We discussed potentially switching him to Crestor but for now would like to continue the Lipitor unchanged.  His last lipid profile in October showed an LDL of 72.  HDL was 48.  We will plan to repeat a lipid profile next time he is back in clinic.    His weight has trended up a bit recently as he has not been going to the gym due to the COVID-19 pandemic.  I encouraged him to continue with his regular exercise and to work on diet.    I will have Mohamud return to see Dr. Zhao in about 6 months from now.  As above, we will repeat a lipid profile at that time.  He will contact us sooner if he is having any concerns.    Video-Visit Details    Type of service:  Video Visit    Video Start Time: 12:33 PM  Video End Time: 12:57 PM    Originating Location (pt. Location): Home    Distant Location (provider location):  Home office    Platform used for Video Visit: Elenita Zhou PA-C

## 2020-07-14 NOTE — TELEPHONE ENCOUNTER
[facility] Call Center    Phone Message    May a detailed message be left on voicemail: yes     Reason for Call: Other: Pt called and said that he past a kidney stone last week and was wondering what would Dr. Desai like for the pt to do with it.  Pt said that it looks different from the other ones.Please call the pt back. Thanks    Action Taken: Message routed to:  [facility]    Travel Screening: Not Applicable

## 2020-07-14 NOTE — TELEPHONE ENCOUNTER
Spoke to patient he  Will bring the stone in at his Sept appointment  For KUB  And PSA.Lanette Kendrick LPN

## 2020-07-14 NOTE — LETTER
7/14/2020    Ant Sawyer MD  [address]  [facility] [id number]    RE: Hugo Estrellaward       Dear Colleague,    I had the pleasure of seeing Hugo Prince in the [facility].    Hugo Prince is a 70 year old male who is being evaluated via a billable video visit.      Provider notes:    Hugo Prince is a pleasant 70 year old male with a history of former tobacco use, coronary artery disease, morbid obesity, obstructive sleep apnea treated with CPAP, recurrent nephrolithiasis, and coronary artery disease status post tenting of the mid LAD in July 2019.    He was admitted to [facility] on 7/15/2019 with chest discomfort and shortness of breath consistent with unstable angina.  His troponin peaked at 12.  Echocardiogram showed an EF of 50 to 55% with severe apical wall hypokinesis.  He underwent coronary angiography which showed a culprit lesion in the mid LAD which was treated with a drug-eluting stent.  He had a moderate proximal LAD lesion which was negative by IFR.  He also had moderate mid RCA disease.  He was started on dual antiplatelet therapy.  He had some shortness of breath with Brilinta thus was switched to a prasugrel.  He was also probably started on lisinopril, metoprolol, and atorvastatin.     He had a visit with Dr. Zhao in January 2020.  He was doing well at that time without recurrent ischemic symptoms.  His LDL had improved from 130 to around 75.  Dr. Zhao recommended increasing his Lipitor to 80 mg daily.  He had an echocardiogram done prior to that which showed normal LV function.  The apical wall motion appeared normal.    I spoke with Hugo today.  He notes since his last visit he has been doing well.  He did increase his atorvastatin to 80 mg but had difficulty with muscle aches and thus has been only taking 40 mg daily.  He is not had any recurrent anginal symptoms specifically he denies chest  discomfort or shortness of breath.  He previously was working out 3 times a day at the gym for 45 minutes on the bike.  Due to the COVID-19 pandemic, he does not plan to go back to the gym.  He has been trying to walk regularly at home and also when the weather is cooler plans to ride his bike.  He also stays busy mowing his lawn have at the cabin and has a very large garden which he manages.    General:  no apparent distress, normal body habitus, sitting upright.  ENT/Mouth:  membranes moist, no nasal discharge.  Normal head shape, no apparent injury or laceration.  Eyes: normal conjunctivae.  No observed jaundice.  Chest/Lungs:  No breathing difficulty while speaking.  No audible wheezing.  No cough during conversation.  Skin:  no xanthelasma.  No facial lacerations.  Neurologic:  Normal arm motion bilateral, no tremors.    Psychiatric:  Alert and oriented x3, calm demeanor    Assessment/plan:  Hugo Prince is a delightful 70-year-old male with a history of coronary artery disease status post stenting of the mid LAD in July 2019, hyperlipidemia, obstructive sleep apnea treated with CPAP, and former tobacco use.    He fortunately has done well since his NSTEMI 1 year ago. He has residual moderate proximal LAD and RCA disease but has not had any recurrent anginal symptoms.  At this point, in 2 days he will be 1 year out from his intervention.  We discussed that after that he can stop his Effient and continue on aspirin 81 mg daily to be continued indefinitely.  He is also on Lipitor 40 mg daily.  He did not tolerate higher doses of this due to myalgias.  We discussed potentially switching him to Crestor but for now would like to continue the Lipitor unchanged.  His last lipid profile in October showed an LDL of 72.  HDL was 48.  We will plan to repeat a lipid profile next time he is back in clinic.    His weight has trended up a bit recently as he has not been going to the gym due to the COVID-19 pandemic.   I encouraged him to continue with his regular exercise and to work on diet.    I will have Mohamud return to see Dr. Zhao in about 6 months from now.  As above, we will repeat a lipid profile at that time.  He will contact us sooner if he is having any concerns.      Thank you for allowing me to participate in the care of your patient.    Sincerely,     May Zhou PA-C     [facility]

## 2020-07-27 ENCOUNTER — TELEPHONE (OUTPATIENT)
Dept: UROLOGY | Facility: CLINIC | Age: 70
End: 2020-07-27

## 2020-07-27 NOTE — TELEPHONE ENCOUNTER
[facility] Call Center    Phone Message    May a detailed message be left on voicemail: yes     Reason for Call: Order(s): Other:   Reason for requested: LIV  Date needed: before 9/28  Provider name: Dr. Lloyd PECK order expires on 9/23 and will need to extend it to 9/28. Thanks      Action Taken: Message routed to:  [facility]    Travel Screening: Not Applicable

## 2020-08-28 DIAGNOSIS — N52.9 ED (ERECTILE DYSFUNCTION): ICD-10-CM

## 2020-08-28 RX ORDER — VARDENAFIL HYDROCHLORIDE 10 MG/1
10 TABLET ORAL DAILY PRN
Qty: 12 TABLET | Refills: 0 | Status: SHIPPED | OUTPATIENT
Start: 2020-08-28 | End: 2021-12-29

## 2020-09-02 ENCOUNTER — TELEPHONE (OUTPATIENT)
Dept: CARDIOLOGY | Facility: CLINIC | Age: 70
End: 2020-09-02

## 2020-09-02 NOTE — TELEPHONE ENCOUNTER
Pt called wondering if he can be in a hot tub.   Pt had a heart cath done on 7/15/19.  Informed pt that he can go in a hot tub.   Pt also needs a more current prescription for nitroglycerin.  Pt has also been having hamstring pain and thinks it may be related to his statin medication.   Informed pt that he can stop his atorvastatin for two weeks to see if that helps with his muscle pain.   Pt gave verbal understanding.

## 2020-09-14 DIAGNOSIS — I21.4 NSTEMI (NON-ST ELEVATED MYOCARDIAL INFARCTION) (H): ICD-10-CM

## 2020-09-14 RX ORDER — NITROGLYCERIN 0.4 MG/1
TABLET SUBLINGUAL
Qty: 25 TABLET | Refills: 3 | Status: SHIPPED | OUTPATIENT
Start: 2020-09-14 | End: 2021-02-11

## 2020-09-24 DIAGNOSIS — N20.0 KIDNEY STONE: Primary | ICD-10-CM

## 2020-09-28 ENCOUNTER — HOSPITAL ENCOUNTER (OUTPATIENT)
Dept: GENERAL RADIOLOGY | Facility: CLINIC | Age: 70
Discharge: HOME | End: 2020-09-28
Attending: UROLOGY | Admitting: UROLOGY
Payer: MEDICARE

## 2020-09-28 ENCOUNTER — OFFICE VISIT (OUTPATIENT)
Dept: UROLOGY | Facility: CLINIC | Age: 70
End: 2020-09-28
Payer: MEDICARE

## 2020-09-28 VITALS
BODY MASS INDEX: 42.95 KG/M2 | HEIGHT: 70 IN | HEART RATE: 64 BPM | DIASTOLIC BLOOD PRESSURE: 80 MMHG | WEIGHT: 300 LBS | SYSTOLIC BLOOD PRESSURE: 120 MMHG

## 2020-09-28 DIAGNOSIS — N20.0 KIDNEY STONE: ICD-10-CM

## 2020-09-28 LAB
ALBUMIN UR-MCNC: NEGATIVE MG/DL
APPEARANCE UR: CLEAR
BILIRUB UR QL STRIP: NEGATIVE
COLOR UR AUTO: YELLOW
GLUCOSE UR STRIP-MCNC: NEGATIVE MG/DL
HGB UR QL STRIP: ABNORMAL
KETONES UR STRIP-MCNC: NEGATIVE MG/DL
LEUKOCYTE ESTERASE UR QL STRIP: NEGATIVE
NITRATE UR QL: NEGATIVE
PH UR STRIP: 6.5 PH (ref 5–7)
SOURCE: ABNORMAL
SP GR UR STRIP: 1.02 (ref 1–1.03)
UROBILINOGEN UR STRIP-ACNC: 1 EU/DL (ref 0.2–1)

## 2020-09-28 PROCEDURE — 99000 SPECIMEN HANDLING OFFICE-LAB: CPT | Performed by: UROLOGY

## 2020-09-28 PROCEDURE — 74019 RADEX ABDOMEN 2 VIEWS: CPT

## 2020-09-28 PROCEDURE — 81003 URINALYSIS AUTO W/O SCOPE: CPT | Performed by: UROLOGY

## 2020-09-28 PROCEDURE — 99215 OFFICE O/P EST HI 40 MIN: CPT | Performed by: UROLOGY

## 2020-09-28 PROCEDURE — 82365 CALCULUS SPECTROSCOPY: CPT | Mod: 90 | Performed by: UROLOGY

## 2020-09-28 ASSESSMENT — MIFFLIN-ST. JEOR: SCORE: 2117.04

## 2020-09-28 ASSESSMENT — PAIN SCALES - GENERAL: PAINLEVEL: SEVERE PAIN (6)

## 2020-09-28 NOTE — LETTER
9/28/2020       RE: Hugo Prince  [address]  [facility] [id number]     Dear Colleague,    Thank you for referring your patient, Hugo Prince, to the [facility]. Please see a copy of my visit note below.    History: It is a great pleasure to see this very pleasant 70-year-old gentleman in follow-up consultation today.  As we recall, he has a significant clinically challenging situation.  He has a history of recurrent urinary stone disease having had lithotripsy in the past on a number of occasions.  Previously, within the last few months he had been seen because of a significant stone burden in each kidney but without pain.  He has also has a history of major problems with the lumbar spine having had previous spinal fusion with ongoing pain and sciatica.  We had evaluated him closely and determined that he had a high stone burden in the right kidney almost all in the lower pole calyx.  There was a lesser volume in the left kidney and almost all of that at that time was in the lower pole calyx.  On top of that he was over 300 pounds which precluded doing ESWL present sometime although he has an active lifestyle.  We had decided at that point to proceed cautiously, to consider a percutaneous nephrolithotomy of the stones in the right kidney, and perhaps, if he could lose weight to a level where we could do ESWL to consider ESWL for the stones in the left kidney.  Recently however he suddenly developed chest pain, was evaluated in the coronary unit here and a cardiovascular stent was placed as treatment for occlusive coronary artery disease and is now on anticoagulants.  He was also seen at that time by them ourselves because a CT scan had been done while in the hospital was showing that the stone in the left kidney had migrated from the lower calyx into the region of the left ureteropelvic  junction  We decided after careful evaluation at that time that we should be conservative.  His symptoms have subsided, and he then had a stent placed and was on anticoagulants which would last for a year.  He completed that 1 year course of treatment about 3 months ago and is now taking aspirin only.  Recently he passed a small stone from the left kidney when he had flank pain but otherwise from the point of view of the stones is asymptomatic.  He does have a considerable history of problems with spinal stenosis having had previous lumbar fusion and is currently still getting low back pain accentuated by exercise.  Patient does weigh about 300 pounds.  We have repeated a KUB today  These have not yet been reported but I have myself reviewed these carefully and measured the size of the stones and this seems to be little change in the size of the stones from the studies done 1 year ago, and the location of the stones are such that they position in the calyces of the kidneys and no longer in the renal pelvis itself.      Past Medical History:   Diagnosis Date     Atopic dermatitis      CAD (coronary artery disease)      DJD (degenerative joint disease) of knee 1/14/2015     Dyslipidemia      ED (erectile dysfunction) 4/30/2012     Esophageal reflux     Gastroesophageal reflux     NSTEMI (non-ST elevated myocardial infarction) (H) 7/15/2019     Other and unspecified disc disorder of unspecified region     Intervertebral disc disorders     Personal history of colonic polyps     Colon polyps     S/P lateral meniscectomy of left knee      S/P total knee replacement 1/7/2015     Tick bite 4/20/15    amoxicillin treated     Unspecified sleep apnea     Sleep apnea     Urinary calculus, unspecified     Renal stones     Vitamin D deficiency 9-27-09       Social History     Socioeconomic History     Marital status:      Spouse name: None     Number of children: None     Years of education: None     Highest education  level: None   Occupational History     None   Social Needs     Financial resource strain: None     Food insecurity     Worry: None     Inability: None     Transportation needs     Medical: None     Non-medical: None   Tobacco Use     Smoking status: Former Smoker     Types: Cigars     Smokeless tobacco: Never Used     Tobacco comment: has cigars when it is warm out   Substance and Sexual Activity     Alcohol use: Yes     Alcohol/week: 11.7 standard drinks     Types: 14 Standard drinks or equivalent per week     Comment: 1-2 drinks day/beer and scotch     Drug use: No     Sexual activity: None   Lifestyle     Physical activity     Days per week: None     Minutes per session: None     Stress: None   Relationships     Social connections     Talks on phone: None     Gets together: None     Attends Church service: None     Active member of club or organization: None     Attends meetings of clubs or organizations: None     Relationship status: None     Intimate partner violence     Fear of current or ex partner: None     Emotionally abused: None     Physically abused: None     Forced sexual activity: None   Other Topics Concern     Parent/sibling w/ CABG, MI or angioplasty before 65F 55M? Not Asked   Social History Narrative     None       Past Surgical History:   Procedure Laterality Date     ARTHROPLASTY KNEE Left 1/7/2015    Procedure: ARTHROPLASTY KNEE;  Surgeon: Agustin Saenz MD;  Location:  OR     ARTHROSCOPY KNEE RT/LT  6-20-11     COLONOSCOPY  12/09     CV FRACTIONAL FLOW RESERVE N/A 7/16/2019    Procedure: Fractional Flow Reserve;  Surgeon: Mick Thrasher MD;  Location:  HEART CARDIAC CATH LAB     CV HEART CATHETERIZATION WITH POSSIBLE INTERVENTION N/A 7/16/2019    successful PCI with MICAH placement to mid LAD      CYSTOSCOPY       EXTRACORPOREAL SHOCK WAVE LITHOTRIPSY (ESWL)  1/20/2012    Procedure:EXTRACORPOREAL SHOCK WAVE LITHOTRIPSY (ESWL); LEFT EXTRACORPOREAL SHOCK WAVE LITHOTRIPSY ;  Surgeon:ANJUM BUNDY; Location:[facility] OR     EXTRACORPOREAL SHOCK WAVE LITHOTRIPSY, CYSTOSCOPY, INSERT STENT URETER(S), COMBINED  2/3/2012    Procedure:COMBINED EXTRACORPOREAL SHOCK WAVE LITHOTRIPSY, CYSTOSCOPY, INSERT STENT URETER(S); CYSTOSCOPY, RIGHT STENT; Surgeon:ANJUM BUNDY; Location:[facility] OR     FUSION LUMBAR ANTERIOR, FUSION LUMBAR POSTERIOR ONE LEVEL, COMBINED  11-1-10    L4-5     LASER HOLMIUM LITHOTRIPSY URETER(S), INSERT STENT, COMBINED  2/7/2012    Procedure:COMBINED CYSTOSCOPY, URETEROSCOPY, LASER HOLMIUM LITHOTRIPSY URETER(S), INSERT STENT; CYSTOSCOPY, RIGHT URETEROSCOPY, RIGHT RETROGRADES,  STONE EXTRACTION WITH HOLMIUM LASER AND RIGHT URETERAL STENT EXCHANGE ; Surgeon:DEEPTI MELVIN; Location:[facility] OR     LITHOTRIPSY      Lithotrypsy     SEPTOPLASTY       VASECTOMY         Family History   Problem Relation Age of Onset     Melanoma Father      Prostate Cancer Father      Cancer Father         neck cancer from radiation at work (urology)     Breast Cancer Mother      C.A.D. Paternal Grandfather 72         Current Outpatient Medications:      acetaminophen (TYLENOL) 325 MG tablet, Take 2 tablets (650 mg) by mouth every 4 hours as needed for mild pain, Disp: , Rfl:      aspirin (ASA) 81 MG EC tablet, Take 1 tablet (81 mg) by mouth daily, Disp: , Rfl:      atorvastatin (LIPITOR) 40 MG tablet, Take 1 tablet (40 mg) by mouth daily, Disp: , Rfl:      lisinopril (ZESTRIL) 5 MG tablet, Take 1 tablet (5 mg) by mouth daily, Disp: 90 tablet, Rfl: 3     metoprolol tartrate (LOPRESSOR) 25 MG tablet, Take 1 tablet (25 mg) by mouth 2 times daily, Disp: 180 tablet, Rfl: 3     order for DME, Equipment being ordered: CPAP, Disp: , Rfl:      vardenafil (LEVITRA) 10 MG tablet, Take 1 tablet (10 mg) by mouth daily as needed (ED) Do not take medication with Nitroglycerin, Disp: 12 tablet, Rfl: 0     vitamin B-12 (CYANOCOBALAMIN) 1000 MCG tablet, Take 1,000 mcg by mouth daily, Disp: , Rfl:       "amoxicillin-clavulanate (AUGMENTIN) 875-125 MG tablet, Take 1 tablet by mouth 2 times daily (Patient not taking: Reported on 9/28/2020), Disp: 20 tablet, Rfl: 0     nitroGLYcerin (NITROSTAT) 0.4 MG sublingual tablet, As needed for chest pain place 1 tablet under the tongue every 5 minutes for 3 doses. If symptoms persist 5 minutes after 1st dose call 911. (Patient not taking: Reported on 9/28/2020), Disp: 25 tablet, Rfl: 3    10 point ROS of systems including Constitutional, Eyes, Respiratory, Cardiovascular, Gastroenterology, Genitourinary, Integumentary, Muscularskeletal, Psychiatric and Neurologic were all negative except for pertinent positives noted in my HPI.    Examination:   /80   Pulse 64   Ht 1.778 m (5' 10\")   Wt 136.1 kg (300 lb)   BMI 43.05 kg/m    General Impression: Very pleasant patient in no acute distress, well-oriented in time place and person and quite conversational  Mental Status: Normal  HEENT: Extraocular movements intact.  No clinical evidence of jaundice on examination of eyes.  Mucous membranes are unremarkable  Skin: Warm.  No other abnormalities  Respiratory System: Unlabored on room air.  Respiratory cycle normal  Lymph Nodes: Not examined  Back/Flank Tenderness: There is no flank tenderness, there is some tenderness over the lower lumbar spine, there is no evidence of kyphosis, scoliosis or lordosis  Cardiovascular System: No significant peripheral pitting edema  Abdominal Examination: The abdomen is considerably obese  Extremities: Extremities are otherwise unremarkable  Genitial: Not examined  Rectal Examination: Not examined  Neurologic System: There are no significant acute abnormal neurological signs in the central or peripheral nervous systems    Impression: I had a lengthy discussion with the patient today about the situation.  His first priority is the pain in his low back and is going to be seeing a spinal surgeon in the very near future about whether further surgery " will be helpful for this.  His coronary artery status is now stabilized, he has a stent placed last year and is now completed his full course of anticoagulants and is now on aspirin alone.  He recently passed a stone from the left kidney which we will send out for analysis today.  Overall, the status of the stones remaining in the kidney seems quite stable and the stones are in locations which at the  present time are asymptomatic.  Given the stone volume however there is concerns about whether we should consider treatment.  He has had multiple treatments by ESWL in the past.  I was able to measure the Hounsfield units which were 750 which would indicate that the stones would probably break quite well however much of the stones are in the lower calyx of each kidney and passage of the fragments may still be challenging.  In addition to that he is 300 pounds which is very much at the upper limit of capability for ESWL.  We therefore had a careful discussion again today about percutaneous nephrolithotomy.  He is 300 pounds which would make that a challenge also but it would be, in my opinion, a better way to clear the stones out of his kidney if it is feasible.  It is more invasive and I did discuss the situation with the patient in detail including the access, the potential risks associated with the procedure the need for nephrostomy tube and the likelihood, and the need for hospital stay and the possibility even of a second look in the kidney if residual fragments remain after the primary procedure.  I certainly would not anticipate doing both sides simultaneously.  He will give consideration addition I am recommending we meet again in 3 months after he has been able to resolve issues related to his back for further discussions about this.  We will repeat the PSA at that time as a routine but the PSA itself is been very low and stable in the past, and likely, if continues to be low once again we can probably  "discontinue checking the PSA in the future given the current AUA recommendations.  I went over the entire situation very carefully with him in detail today.  I answered all his questions    Plan.  3 months for KUB, PSA and follow-up examination.    Time.  We did have an extended discussion today and review of records outside records and then careful discussions about a number of urologic issues and other issues and then discussions about his complex situation and the potential therapeutic options that would be considered for treatment of his stones.  This encompassed 40 minutes      \"This dictation was performed with voice recognition software and may contain errors,  omissions and inadvertent word substitution.\"    Again, thank you for allowing me to participate in the care of your patient.      Sincerely,    Dima Desai MD      "

## 2020-09-28 NOTE — PROGRESS NOTES
History: It is a great pleasure to see this very pleasant 70-year-old gentleman in follow-up consultation today.  As we recall, he has a significant clinically challenging situation.  He has a history of recurrent urinary stone disease having had lithotripsy in the past on a number of occasions.  Previously, within the last few months he had been seen because of a significant stone burden in each kidney but without pain.  He has also has a history of major problems with the lumbar spine having had previous spinal fusion with ongoing pain and sciatica.  We had evaluated him closely and determined that he had a high stone burden in the right kidney almost all in the lower pole calyx.  There was a lesser volume in the left kidney and almost all of that at that time was in the lower pole calyx.  On top of that he was over 300 pounds which precluded doing ESWL present sometime although he has an active lifestyle.  We had decided at that point to proceed cautiously, to consider a percutaneous nephrolithotomy of the stones in the right kidney, and perhaps, if he could lose weight to a level where we could do ESWL to consider ESWL for the stones in the left kidney.  Recently however he suddenly developed chest pain, was evaluated in the coronary unit here and a cardiovascular stent was placed as treatment for occlusive coronary artery disease and is now on anticoagulants.  He was also seen at that time by them ourselves because a CT scan had been done while in the hospital was showing that the stone in the left kidney had migrated from the lower calyx into the region of the left ureteropelvic junction  We decided after careful evaluation at that time that we should be conservative.  His symptoms have subsided, and he then had a stent placed and was on anticoagulants which would last for a year.  He completed that 1 year course of treatment about 3 months ago and is now taking aspirin only.  Recently he passed a small stone  from the left kidney when he had flank pain but otherwise from the point of view of the stones is asymptomatic.  He does have a considerable history of problems with spinal stenosis having had previous lumbar fusion and is currently still getting low back pain accentuated by exercise.  Patient does weigh about 300 pounds.  We have repeated a KUB today  These have not yet been reported but I have myself reviewed these carefully and measured the size of the stones and this seems to be little change in the size of the stones from the studies done 1 year ago, and the location of the stones are such that they position in the calyces of the kidneys and no longer in the renal pelvis itself.      Past Medical History:   Diagnosis Date     Atopic dermatitis      CAD (coronary artery disease)      DJD (degenerative joint disease) of knee 1/14/2015     Dyslipidemia      ED (erectile dysfunction) 4/30/2012     Esophageal reflux     Gastroesophageal reflux     NSTEMI (non-ST elevated myocardial infarction) (H) 7/15/2019     Other and unspecified disc disorder of unspecified region     Intervertebral disc disorders     Personal history of colonic polyps     Colon polyps     S/P lateral meniscectomy of left knee      S/P total knee replacement 1/7/2015     Tick bite 4/20/15    amoxicillin treated     Unspecified sleep apnea     Sleep apnea     Urinary calculus, unspecified     Renal stones     Vitamin D deficiency 9-27-09       Social History     Socioeconomic History     Marital status:      Spouse name: None     Number of children: None     Years of education: None     Highest education level: None   Occupational History     None   Social Needs     Financial resource strain: None     Food insecurity     Worry: None     Inability: None     Transportation needs     Medical: None     Non-medical: None   Tobacco Use     Smoking status: Former Smoker     Types: Cigars     Smokeless tobacco: Never Used     Tobacco comment: has  cigars when it is warm out   Substance and Sexual Activity     Alcohol use: Yes     Alcohol/week: 11.7 standard drinks     Types: 14 Standard drinks or equivalent per week     Comment: 1-2 drinks day/beer and scotch     Drug use: No     Sexual activity: None   Lifestyle     Physical activity     Days per week: None     Minutes per session: None     Stress: None   Relationships     Social connections     Talks on phone: None     Gets together: None     Attends Yarsanism service: None     Active member of club or organization: None     Attends meetings of clubs or organizations: None     Relationship status: None     Intimate partner violence     Fear of current or ex partner: None     Emotionally abused: None     Physically abused: None     Forced sexual activity: None   Other Topics Concern     Parent/sibling w/ CABG, MI or angioplasty before 65F 55M? Not Asked   Social History Narrative     None       Past Surgical History:   Procedure Laterality Date     ARTHROPLASTY KNEE Left 1/7/2015    Procedure: ARTHROPLASTY KNEE;  Surgeon: Agustin Saenz MD;  Location:  OR     ARTHROSCOPY KNEE RT/LT  6-20-11     COLONOSCOPY  12/09     CV FRACTIONAL FLOW RESERVE N/A 7/16/2019    Procedure: Fractional Flow Reserve;  Surgeon: Mick Thrasher MD;  Location:  [facility]     CV HEART CATHETERIZATION WITH POSSIBLE INTERVENTION N/A 7/16/2019    successful PCI with MICAH placement to mid LAD      CYSTOSCOPY       EXTRACORPOREAL SHOCK WAVE LITHOTRIPSY (ESWL)  1/20/2012    Procedure:EXTRACORPOREAL SHOCK WAVE LITHOTRIPSY (ESWL); LEFT EXTRACORPOREAL SHOCK WAVE LITHOTRIPSY ; Surgeon:ANJUM BUNDY; Location: OR     EXTRACORPOREAL SHOCK WAVE LITHOTRIPSY, CYSTOSCOPY, INSERT STENT URETER(S), COMBINED  2/3/2012    Procedure:COMBINED EXTRACORPOREAL SHOCK WAVE LITHOTRIPSY, CYSTOSCOPY, INSERT STENT URETER(S); CYSTOSCOPY, RIGHT STENT; Surgeon:ANJUM BUNDY; Location: OR     FUSION LUMBAR ANTERIOR, FUSION  LUMBAR POSTERIOR ONE LEVEL, COMBINED  11-1-10    L4-5     LASER HOLMIUM LITHOTRIPSY URETER(S), INSERT STENT, COMBINED  2/7/2012    Procedure:COMBINED CYSTOSCOPY, URETEROSCOPY, LASER HOLMIUM LITHOTRIPSY URETER(S), INSERT STENT; CYSTOSCOPY, RIGHT URETEROSCOPY, RIGHT RETROGRADES,  STONE EXTRACTION WITH HOLMIUM LASER AND RIGHT URETERAL STENT EXCHANGE ; Surgeon:DEEPTI MELVIN; Location:[facility]     LITHOTRIPSY      Lithotrypsy     SEPTOPLASTY       VASECTOMY         Family History   Problem Relation Age of Onset     Melanoma Father      Prostate Cancer Father      Cancer Father         neck cancer from radiation at work (urology)     Breast Cancer Mother      C.A.D. Paternal Grandfather 72         Current Outpatient Medications:      acetaminophen (TYLENOL) 325 MG tablet, Take 2 tablets (650 mg) by mouth every 4 hours as needed for mild pain, Disp: , Rfl:      aspirin (ASA) 81 MG EC tablet, Take 1 tablet (81 mg) by mouth daily, Disp: , Rfl:      atorvastatin (LIPITOR) 40 MG tablet, Take 1 tablet (40 mg) by mouth daily, Disp: , Rfl:      lisinopril (ZESTRIL) 5 MG tablet, Take 1 tablet (5 mg) by mouth daily, Disp: 90 tablet, Rfl: 3     metoprolol tartrate (LOPRESSOR) 25 MG tablet, Take 1 tablet (25 mg) by mouth 2 times daily, Disp: 180 tablet, Rfl: 3     order for DME, Equipment being ordered: CPAP, Disp: , Rfl:      vardenafil (LEVITRA) 10 MG tablet, Take 1 tablet (10 mg) by mouth daily as needed (ED) Do not take medication with Nitroglycerin, Disp: 12 tablet, Rfl: 0     vitamin B-12 (CYANOCOBALAMIN) 1000 MCG tablet, Take 1,000 mcg by mouth daily, Disp: , Rfl:      amoxicillin-clavulanate (AUGMENTIN) 875-125 MG tablet, Take 1 tablet by mouth 2 times daily (Patient not taking: Reported on 9/28/2020), Disp: 20 tablet, Rfl: 0     nitroGLYcerin (NITROSTAT) 0.4 MG sublingual tablet, As needed for chest pain place 1 tablet under the tongue every 5 minutes for 3 doses. If symptoms persist 5 minutes after 1st dose call 911.  "(Patient not taking: Reported on 9/28/2020), Disp: 25 tablet, Rfl: 3    10 point ROS of systems including Constitutional, Eyes, Respiratory, Cardiovascular, Gastroenterology, Genitourinary, Integumentary, Muscularskeletal, Psychiatric and Neurologic were all negative except for pertinent positives noted in my HPI.    Examination:   /80   Pulse 64   Ht 1.778 m (5' 10\")   Wt 136.1 kg (300 lb)   BMI 43.05 kg/m    General Impression: Very pleasant patient in no acute distress, well-oriented in time place and person and quite conversational  Mental Status: Normal  HEENT: Extraocular movements intact.  No clinical evidence of jaundice on examination of eyes.  Mucous membranes are unremarkable  Skin: Warm.  No other abnormalities  Respiratory System: Unlabored on room air.  Respiratory cycle normal  Lymph Nodes: Not examined  Back/Flank Tenderness: There is no flank tenderness, there is some tenderness over the lower lumbar spine, there is no evidence of kyphosis, scoliosis or lordosis  Cardiovascular System: No significant peripheral pitting edema  Abdominal Examination: The abdomen is considerably obese  Extremities: Extremities are otherwise unremarkable  Genitial: Not examined  Rectal Examination: Not examined  Neurologic System: There are no significant acute abnormal neurological signs in the central or peripheral nervous systems    Impression: I had a lengthy discussion with the patient today about the situation.  His first priority is the pain in his low back and is going to be seeing a spinal surgeon in the very near future about whether further surgery will be helpful for this.  His coronary artery status is now stabilized, he has a stent placed last year and is now completed his full course of anticoagulants and is now on aspirin alone.  He recently passed a stone from the left kidney which we will send out for analysis today.  Overall, the status of the stones remaining in the kidney seems quite " stable and the stones are in locations which at the  present time are asymptomatic.  Given the stone volume however there is concerns about whether we should consider treatment.  He has had multiple treatments by ESWL in the past.  I was able to measure the Hounsfield units which were 750 which would indicate that the stones would probably break quite well however much of the stones are in the lower calyx of each kidney and passage of the fragments may still be challenging.  In addition to that he is 300 pounds which is very much at the upper limit of capability for ESWL.  We therefore had a careful discussion again today about percutaneous nephrolithotomy.  He is 300 pounds which would make that a challenge also but it would be, in my opinion, a better way to clear the stones out of his kidney if it is feasible.  It is more invasive and I did discuss the situation with the patient in detail including the access, the potential risks associated with the procedure the need for nephrostomy tube and the likelihood, and the need for hospital stay and the possibility even of a second look in the kidney if residual fragments remain after the primary procedure.  I certainly would not anticipate doing both sides simultaneously.  He will give consideration addition I am recommending we meet again in 3 months after he has been able to resolve issues related to his back for further discussions about this.  We will repeat the PSA at that time as a routine but the PSA itself is been very low and stable in the past, and likely, if continues to be low once again we can probably discontinue checking the PSA in the future given the current AUA recommendations.  I went over the entire situation very carefully with him in detail today.  I answered all his questions    Plan.  3 months for KUB, PSA and follow-up examination.    Time.  We did have an extended discussion today and review of records outside records and then careful  "discussions about a number of urologic issues and other issues and then discussions about his complex situation and the potential therapeutic options that would be considered for treatment of his stones.  This encompassed 40 minutes      \"This dictation was performed with voice recognition software and may contain errors,  omissions and inadvertent word substitution.\"      "

## 2020-10-01 LAB
APPEARANCE STONE: NORMAL
COMPN STONE: NORMAL
NUMBER STONE: 1
SIZE STONE: NORMAL MM
WT STONE: 61 MG

## 2020-10-19 ENCOUNTER — OFFICE VISIT (OUTPATIENT)
Dept: FAMILY MEDICINE | Facility: CLINIC | Age: 70
End: 2020-10-19

## 2020-10-19 VITALS
SYSTOLIC BLOOD PRESSURE: 138 MMHG | HEART RATE: 61 BPM | WEIGHT: 298 LBS | DIASTOLIC BLOOD PRESSURE: 80 MMHG | TEMPERATURE: 97.9 F | OXYGEN SATURATION: 94 % | BODY MASS INDEX: 42.76 KG/M2

## 2020-10-19 DIAGNOSIS — R03.0 ELEVATED BLOOD PRESSURE READING WITHOUT DIAGNOSIS OF HYPERTENSION: ICD-10-CM

## 2020-10-19 DIAGNOSIS — N20.0 RECURRENT NEPHROLITHIASIS: ICD-10-CM

## 2020-10-19 DIAGNOSIS — M54.50 CHRONIC LOW BACK PAIN WITHOUT SCIATICA, UNSPECIFIED BACK PAIN LATERALITY: ICD-10-CM

## 2020-10-19 DIAGNOSIS — G47.33 OSA (OBSTRUCTIVE SLEEP APNEA): ICD-10-CM

## 2020-10-19 DIAGNOSIS — E66.01 MORBID OBESITY (H): ICD-10-CM

## 2020-10-19 DIAGNOSIS — I25.10 CORONARY ARTERY DISEASE INVOLVING NATIVE CORONARY ARTERY OF NATIVE HEART WITHOUT ANGINA PECTORIS: ICD-10-CM

## 2020-10-19 DIAGNOSIS — G89.29 CHRONIC LOW BACK PAIN WITHOUT SCIATICA, UNSPECIFIED BACK PAIN LATERALITY: ICD-10-CM

## 2020-10-19 DIAGNOSIS — Z01.818 PREOP GENERAL PHYSICAL EXAM: Primary | ICD-10-CM

## 2020-10-19 DIAGNOSIS — E78.5 DYSLIPIDEMIA: ICD-10-CM

## 2020-10-19 DIAGNOSIS — Z13.6 SCREENING FOR HYPERTENSION: ICD-10-CM

## 2020-10-19 PROBLEM — M54.42 ACUTE LEFT-SIDED LOW BACK PAIN WITH LEFT-SIDED SCIATICA: Status: RESOLVED | Noted: 2017-07-23 | Resolved: 2020-10-19

## 2020-10-19 LAB
% GRANULOCYTES: 76 % (ref 42.2–75.2)
HCT VFR BLD AUTO: 46.4 % (ref 39–51)
HEMOGLOBIN: 15.7 G/DL (ref 13.4–17.5)
LYMPHOCYTES NFR BLD AUTO: 17.8 % (ref 20.5–51.1)
MCH RBC QN AUTO: 33.3 PG (ref 27–31)
MCHC RBC AUTO-ENTMCNC: 33.9 G/DL (ref 33–37)
MCV RBC AUTO: 97.2 FL (ref 80–100)
MONOCYTES NFR BLD AUTO: 6.02 % (ref 1.7–9.3)
PLATELET # BLD AUTO: 208 K/UL (ref 140–450)
RBC # BLD AUTO: 4.77 X10/CMM (ref 4.2–5.9)
WBC # BLD AUTO: 8 X10/CMM (ref 3.8–11)

## 2020-10-19 PROCEDURE — 99215 OFFICE O/P EST HI 40 MIN: CPT | Performed by: FAMILY MEDICINE

## 2020-10-19 PROCEDURE — 93000 ELECTROCARDIOGRAM COMPLETE: CPT | Performed by: FAMILY MEDICINE

## 2020-10-19 PROCEDURE — 93050 ART PRESSURE WAVEFORM ANALYS: CPT | Performed by: FAMILY MEDICINE

## 2020-10-19 PROCEDURE — 85025 COMPLETE CBC W/AUTO DIFF WBC: CPT | Performed by: FAMILY MEDICINE

## 2020-10-19 NOTE — PROGRESS NOTES
[facility]  [address] [place] [address] [phone]  Dept: [phone]    PRE-OP EVALUATION:  Today's date: 10/19/2020    Hugo Prince (: 1950) presents for pre-operative evaluation assessment as requested by Dr. Pascual Headley.  He requires evaluation and anesthesia risk assessment prior to undergoing surgery/procedure for treatment of Lumbar decompression .    Proposed Surgery/ Procedure: Lumbar decompression  Date of Surgery/ Procedure: 2020  Time of Surgery/ Procedure: [facility]  Hospital/Surgical Facility: [facility]  Surgery Fax Number: Note does not need to be faxed, will be available electronically in Epic.  Primary Physician: [facility], [facility]  Type of Anesthesia Anticipated: to be determined    Preoperative Questionnaire:   YES - HAVE YOU EVER HAD A HEART ATTACK OR STROKE? Heart attack  YES - HAVE YOU EVER HAD SURGERY ON YOUR HEART OR BLOOD VESSELS, SUCH AS A STENT, CORONARY (HEART) BYPASS, OR SURGERY ON AN ARTERY IN THE HEAD, NECK, HEART, OR LEGS? Stent  No - Do you have chest pain when you are physically active?  No - Do you have a history of heart failure?  No - Do you currently have a cold, bronchitis, or symptoms of other respiratory (head and chest) infections?  No - Do you have a cough, shortness of breath, or wheezing?  No - Do you or anyone in your family have a history of blood clots?  No - Do you or anyone in your family have a serious bleeding problem, such as long-lasting bleeding after surgeries or cuts?  No - Have you ever had anemia or been told to take iron pills?  No - Have you had any abnormal blood loss such as black, tarry or bloody stools, or abnormal vaginal bleeding?  No - Have you ever had a blood transfusion?  Yes - Are you willing to have a blood transfusion if it is medically needed before, during, or after your surgery?  No - Have you or anyone in your family ever had problems with anesthesia (sedation for surgery)?  No -  Do you have sleep apnea, excessive snoring, or daytime drowsiness?   No - DO YOU HAVE ANY ARTIFICIAL HEART VALVES OR OTHER IMPLANTED MEDICAL DEVICES, SUCH AS A PACEMAKER, DEFIBRILLATOR, OR CONTINUOUS GLUCOSE MONITOR? WHAT TYPE OF DEVICE? Stent- do devices. WHAT IS THE NAME OF THE CLINIC THAT MANAGES YOUR DEVICE? N/A  YES - DO YOU HAVE ANY ARTIFICIAL JOINTS? Left knee  No - Are you allergic to latex?  No - Is there any chance that you may be pregnant?    Patient has a Health Care Directive or Living Will:  NO    HPI:     HPI related to upcoming procedure:       CAD - Patient has history of CAD.  He was found to have a mid LAD lesion in July 2019 which was treated with a drug-eluting stent.  He had a moderate proximal LAD lesion which was negative by IFR.  He also had moderate mid RCA disease.  He was started on dual antiplatelet therapy.  He had some shortness of breath with Brilinta thus was switched to a prasugrel. This has now been discontinued after 1 year of therapy.  He is maintained on aspirin.  Patient denies recent chest pain or NTG use, denies exercise induced dyspnea or PND. Last Stress test 5/2019, EKG 7/17/2019.  He is also on metoprolol and lisinopril.     HYPERLIPIDEMIA - Patient has a long history of significant Hyperlipidemia requiring medication for treatment with recent good control. Patient reports no problems or side effects with the medication.     RAY: longstanding on CPAP.      Recurrent nephrolithiasis: follows with urology    MEDICAL HISTORY:     Patient Active Problem List    Diagnosis Date Noted     CAD (coronary artery disease)      Priority: Medium     Dyslipidemia      Priority: Medium     NSTEMI (non-ST elevated myocardial infarction) (H) 07/15/2019     Priority: Medium     Morbid obesity (H) 03/14/2018     Priority: Medium     Chronic pharyngitis and nasopharyngitis(472) 09/29/2015     Priority: Medium     DJD (degenerative joint disease) of knee 01/14/2015     Priority: Medium      Edema 01/14/2015     Priority: Medium     Dermatitis 01/12/2015     Priority: Medium     Physical deconditioning 01/12/2015     Priority: Medium     S/P total knee replacement 01/07/2015     Priority: Medium     GERD (gastroesophageal reflux disease) 05/14/2014     Priority: Medium     Health Care Home 05/13/2014     Priority: Medium     .State Tier Level:  Tier 1             ED (erectile dysfunction) 04/30/2012     Priority: Medium     RAY (obstructive sleep apnea)      Priority: Medium     Sleep apnea  Problem list name updated by automated process. Provider to review       Recurrent nephrolithiasis 03/22/2011     Priority: Medium      Past Medical History:   Diagnosis Date     Anemia due to blood loss, acute 1/14/2015     Atopic dermatitis      CAD (coronary artery disease)      DJD (degenerative joint disease) of knee 1/14/2015     Dyslipidemia      ED (erectile dysfunction) 4/30/2012     Esophageal reflux     Gastroesophageal reflux     NSTEMI (non-ST elevated myocardial infarction) (H) 7/15/2019     Other and unspecified disc disorder of unspecified region     Intervertebral disc disorders     Personal history of colonic polyps     Colon polyps     S/P lateral meniscectomy of left knee      S/P total knee replacement 1/7/2015     Tick bite 4/20/15    amoxicillin treated     Unspecified sleep apnea     Sleep apnea     Urinary calculus, unspecified     Renal stones     Vitamin D deficiency 9-27-09     Past Surgical History:   Procedure Laterality Date     ARTHROPLASTY KNEE Left 1/7/2015    Procedure: ARTHROPLASTY KNEE;  Surgeon: Agustin Saenz MD;  Location:  OR     ARTHROSCOPY KNEE RT/LT  6-20-11     COLONOSCOPY  12/09     CV FRACTIONAL FLOW RESERVE N/A 7/16/2019    Procedure: Fractional Flow Reserve;  Surgeon: Mick Thrasher MD;  Location:  HEART CARDIAC CATH LAB     CV HEART CATHETERIZATION WITH POSSIBLE INTERVENTION N/A 7/16/2019    successful PCI with MICAH placement to mid LAD      CYSTOSCOPY        EXTRACORPOREAL SHOCK WAVE LITHOTRIPSY (ESWL)  1/20/2012    Procedure:EXTRACORPOREAL SHOCK WAVE LITHOTRIPSY (ESWL); LEFT EXTRACORPOREAL SHOCK WAVE LITHOTRIPSY ; Surgeon:ANJUM BUNDY; Location:[facility] OR     EXTRACORPOREAL SHOCK WAVE LITHOTRIPSY, CYSTOSCOPY, INSERT STENT URETER(S), COMBINED  2/3/2012    Procedure:COMBINED EXTRACORPOREAL SHOCK WAVE LITHOTRIPSY, CYSTOSCOPY, INSERT STENT URETER(S); CYSTOSCOPY, RIGHT STENT; Surgeon:ANJUM BUNDY; Location:[facility] OR     FUSION LUMBAR ANTERIOR, FUSION LUMBAR POSTERIOR ONE LEVEL, COMBINED  11-1-10    L4-5     LASER HOLMIUM LITHOTRIPSY URETER(S), INSERT STENT, COMBINED  2/7/2012    Procedure:COMBINED CYSTOSCOPY, URETEROSCOPY, LASER HOLMIUM LITHOTRIPSY URETER(S), INSERT STENT; CYSTOSCOPY, RIGHT URETEROSCOPY, RIGHT RETROGRADES,  STONE EXTRACTION WITH HOLMIUM LASER AND RIGHT URETERAL STENT EXCHANGE ; Surgeon:DEEPTI MELVIN; Location:[facility] OR     LITHOTRIPSY      Lithotrypsy     SEPTOPLASTY       VASECTOMY       Current Outpatient Medications   Medication Sig Dispense Refill     acetaminophen (TYLENOL) 325 MG tablet Take 2 tablets (650 mg) by mouth every 4 hours as needed for mild pain       aspirin (ASA) 81 MG EC tablet Take 1 tablet (81 mg) by mouth daily       atorvastatin (LIPITOR) 40 MG tablet Take 1 tablet (40 mg) by mouth daily       lisinopril (ZESTRIL) 5 MG tablet Take 1 tablet (5 mg) by mouth daily 90 tablet 3     metoprolol tartrate (LOPRESSOR) 25 MG tablet Take 1 tablet (25 mg) by mouth 2 times daily 180 tablet 3     order for DME Equipment being ordered: CPAP       vardenafil (LEVITRA) 10 MG tablet Take 1 tablet (10 mg) by mouth daily as needed (ED) Do not take medication with Nitroglycerin 12 tablet 0     vitamin B-12 (CYANOCOBALAMIN) 1000 MCG tablet Take 1,000 mcg by mouth daily       nitroGLYcerin (NITROSTAT) 0.4 MG sublingual tablet As needed for chest pain place 1 tablet under the tongue every 5 minutes for 3 doses. If symptoms persist 5  minutes after 1st dose call 911. (Patient not taking: Reported on 9/28/2020) 25 tablet 3     OTC products: None, except as noted above    Allergies   Allergen Reactions     Chlorthalidone GI Disturbance     Compazine Other (See Comments)     PSYCHOTIC     Erythromycin Nausea and Vomiting     Flomax [Tamsulosin Hcl]      FLUSHING, NASAl CONGESTION     Prochlorperazine      LW Reaction: anxiety     Seasonal Allergies       Latex Allergy: NO    Social History     Tobacco Use     Smoking status: Former Smoker     Types: Cigars     Smokeless tobacco: Never Used     Tobacco comment: has cigars when it is warm out   Substance Use Topics     Alcohol use: Yes     Alcohol/week: 11.7 standard drinks     Types: 14 Standard drinks or equivalent per week     Comment: 1-2 drinks day/beer and scotch     History   Drug Use No       REVIEW OF SYSTEMS:   Constitutional, neuro, ENT, endocrine, pulmonary, cardiac, gastrointestinal, genitourinary, musculoskeletal, integument and psychiatric systems are negative, except as otherwise noted.    EXAM:   /80   Pulse 61   Temp 97.9  F (36.6  C)   Wt 135.2 kg (298 lb)   SpO2 94%   BMI 42.76 kg/m      GENERAL APPEARANCE: alert, active and obese     EYES: EOMI,  PERRL     HENT: ear canals and TM's normal and nose and mouth without ulcers or lesions     NECK: no adenopathy, no asymmetry, masses, or scars and thyroid normal to palpation     RESP: lungs clear to auscultation - no rales, rhonchi or wheezes     CV: regular rates and rhythm, normal S1 S2, no S3 or S4 and no murmur, click or rub     ABDOMEN:  soft, nontender, no HSM or masses and bowel sounds normal     MS: extremities normal- no gross deformities noted, no evidence of inflammation in joints, FROM in all extremities.     SKIN: no suspicious lesions or rashes     NEURO: Normal strength and tone, sensory exam grossly normal, mentation intact and speech normal     PSYCH: mentation appears normal. and affect normal/bright      LYMPHATICS: No cervical adenopathy    DIAGNOSTICS:     EKG: sinus bradycardia, 1st degree AV block, no ST or T wave changes, no LVH by voltage criteria  Labs Drawn and in Process:   BMP and CBC    Recent Labs   Lab Test 07/17/19  0524 07/15/19  0235 03/19/18  1024 03/19/18  1024 03/23/16  1421   HGB 14.6 16.5  --   --   --     202  --   --   --     140   < >  --   --    POTASSIUM 3.9 3.7   < >  --   --    CR 0.98 0.99   < >  --   --    A1C  --   --   --  5.9* 5.9*    < > = values in this interval not displayed.        IMPRESSION:   Reason for surgery/procedure: Chronic back pain  Diagnosis/reason for consult: preoperative risk assessment    The proposed surgical procedure is considered INTERMEDIATE risk.    REVISED CARDIAC RISK INDEX  The patient has the following serious cardiovascular risks for perioperative complications such as (Morbid obesityI, PE, VFib and 3  AV Block):  Coronary Artery Disease (MI, positive stress test, angina, Qs on EKG)  INTERPRETATION: 1 risks: Class II (low risk - 0.9% complication rate)    The patient has the following additional risks for perioperative complications: Morbid obesity        ICD-10-CM    1. Preop general physical exam  Z01.818 Basic Metabolic Panel (8) ([facility])     EKG 12-lead complete w/read - Clinics     CBC with Diff/Plt ([facility])   2. Morbid obesity (H)  E66.01    3. Screening for hypertension  Z13.6 TX ART PRESS WAVEFORM ANALYS CENTRAL ART PRESSURE   4. Coronary artery disease involving native coronary artery of native heart without angina pectoris  I25.10 Basic Metabolic Panel (8) ([facility])     EKG 12-lead complete w/read - Clinics     CBC with Diff/Plt ([facility])   5. Dyslipidemia  E78.5 Basic Metabolic Panel (8) ([facility])   6. RAY (obstructive sleep apnea)  G47.33    7. Recurrent nephrolithiasis  N20.0 Basic Metabolic Panel (8) ([facility])   8. Elevated blood pressure reading without diagnosis of hypertension  R03.0 Basic Metabolic Panel (8) ([facility])        RECOMMENDATIONS:     --Consult hospital rounder / IM to assist post-op medical management    Cardiovascular Risk  Performs 4 METs exercise without symptoms (Light housework (dusting, washing dishes) and Climb a flight of stairs) .   Patient is already on a Beta Blocker. Continue Betablocker therapy after surgery, using Beta blocker order set as necessary for NPO status.      Obstructive Sleep Apnea (or suspected sleep apnea)  Patient is to bring their home CPAP with them on the day of surgery      --Patient is to take all scheduled medications on the day of surgery EXCEPT for modifications listed below.    Anticoagulant or Antiplatelet Medication Use  ASPIRIN: Patient is at increased risk of thrombosis (e.g. MI, CVA) and aspirin 81 mg daily should be continued in the perioperative period        ACE Inhibitor or Angiotensin Receptor Blocker (ARB) Use  Ace inhibitor or Angiotensin Receptor Blocker (ARB) and will continue this medication due to the higher risk of uncontrolled perioperative hypertension (e.g. neurosurgical procedure)      Pending review of diagnostic evaluation and planned discussion with his Dr Zhao, APPROVAL GIVEN to proceed with proposed procedure, without further diagnostic evaluation.       Signed Electronically by: Clay Luis MD    Copy of this evaluation report is provided to requesting physician.    [facility] Preop Guidelines    Revised Cardiac Risk Index

## 2020-10-19 NOTE — PATIENT INSTRUCTIONS

## 2020-10-20 PROBLEM — R73.03 PREDIABETES: Status: ACTIVE | Noted: 2020-10-20

## 2020-10-20 LAB
BUN SERPL-MCNC: 18 MG/DL (ref 8–27)
BUN/CREATININE RATIO: 17 (ref 10–24)
CALCIUM SERPL-MCNC: 9.6 MG/DL (ref 8.6–10.2)
CHLORIDE SERPLBLD-SCNC: 104 MMOL/L (ref 96–106)
CREAT SERPL-MCNC: 1.08 MG/DL (ref 0.76–1.27)
EGFR IF AFRICN AM: 79 ML/MIN/1.73
EGFR IF NONAFRICN AM: 68 ML/MIN/1.73
GLUCOSE SERPL-MCNC: 104 MG/DL (ref 65–99)
POTASSIUM SERPL-SCNC: 4.6 MMOL/L (ref 3.5–5.2)
SODIUM SERPL-SCNC: 139 MMOL/L (ref 134–144)
TOTAL CO2: 25 MMOL/L (ref 20–29)

## 2020-10-26 NOTE — PROGRESS NOTES
10/22/20 faxed preop, EKG and labs to [facility] @ [phone]    Fly Krishna,   [facility]  [phone]

## 2020-11-18 ENCOUNTER — TRANSFERRED RECORDS (OUTPATIENT)
Dept: FAMILY MEDICINE | Facility: CLINIC | Age: 70
End: 2020-11-18

## 2020-11-25 ENCOUNTER — OFFICE VISIT (OUTPATIENT)
Dept: FAMILY MEDICINE | Facility: CLINIC | Age: 70
End: 2020-11-25

## 2020-11-25 VITALS
BODY MASS INDEX: 42.76 KG/M2 | DIASTOLIC BLOOD PRESSURE: 74 MMHG | WEIGHT: 298 LBS | SYSTOLIC BLOOD PRESSURE: 116 MMHG | OXYGEN SATURATION: 99 % | HEART RATE: 63 BPM | RESPIRATION RATE: 18 BRPM | TEMPERATURE: 97.9 F

## 2020-11-25 DIAGNOSIS — M79.89 LOCALIZED SWELLING OF BOTH LOWER EXTREMITIES: Primary | ICD-10-CM

## 2020-11-25 PROCEDURE — 99213 OFFICE O/P EST LOW 20 MIN: CPT | Performed by: FAMILY MEDICINE

## 2020-11-25 RX ORDER — CALCIUM CARBONATE 160(400)MG
TABLET,CHEWABLE ORAL
COMMUNITY
Start: 2020-11-11 | End: 2021-02-11

## 2020-11-25 RX ORDER — HYDROCODONE BITARTRATE AND ACETAMINOPHEN 7.5; 325 MG/1; MG/1
1-2 TABLET ORAL
COMMUNITY
Start: 2020-11-11 | End: 2021-02-11

## 2020-11-25 RX ORDER — DIAZEPAM 2 MG
TABLET ORAL
COMMUNITY
Start: 2020-11-18 | End: 2021-02-11

## 2020-11-25 NOTE — PROGRESS NOTES
SUBJECTIVE:                                                    Hugo Prince is a 70 year old male who presents to clinic today for evaluation. He had recent spinal surgery 11/5.  He is recovering but understandably has been more immobile recently.  He has noticed increased leg swelling, left much more than right.  He went to urgent care a few days ago (note not visible) and had an ultrasound that was negative for DVT on left.  He has not had any SOB, chest pain, orthopnea, PND.  He feels well otherwise.      Problem list, Medication list, Allergies, and Medical/Social/Surgical histories reviewed in EPIC and updated as appropriate.   Additional history: as documented    ROS:    A 7 system review was completed and is as noted in HPI and otherwise negative.      Histories:   Patient Active Problem List   Diagnosis     Recurrent nephrolithiasis     RAY (obstructive sleep apnea)     ED (erectile dysfunction)     Health Care Home     GERD (gastroesophageal reflux disease)     S/P total knee replacement     Dermatitis     Physical deconditioning     DJD (degenerative joint disease) of knee     Edema     Chronic pharyngitis and nasopharyngitis(472)     Morbid obesity (H)     NSTEMI (non-ST elevated myocardial infarction) (H)     CAD (coronary artery disease)     Dyslipidemia     Prediabetes     Past Surgical History:   Procedure Laterality Date     ARTHROPLASTY KNEE Left 1/7/2015    Procedure: ARTHROPLASTY KNEE;  Surgeon: Agustin Saenz MD;  Location:  OR     ARTHROSCOPY KNEE RT/LT  6-20-11     COLONOSCOPY  12/09     CV FRACTIONAL FLOW RESERVE N/A 7/16/2019    Procedure: Fractional Flow Reserve;  Surgeon: Mick Thrasher MD;  Location:  HEART CARDIAC CATH LAB     CV HEART CATHETERIZATION WITH POSSIBLE INTERVENTION N/A 7/16/2019    successful PCI with MICAH placement to mid LAD      CYSTOSCOPY       EXTRACORPOREAL SHOCK WAVE LITHOTRIPSY (ESWL)  1/20/2012    Procedure:EXTRACORPOREAL SHOCK WAVE LITHOTRIPSY  (ESWL); LEFT EXTRACORPOREAL SHOCK WAVE LITHOTRIPSY ; Surgeon:ANJUM BUNDY; Location:[facility]     EXTRACORPOREAL SHOCK WAVE LITHOTRIPSY, CYSTOSCOPY, INSERT STENT URETER(S), COMBINED  2/3/2012    Procedure:COMBINED EXTRACORPOREAL SHOCK WAVE LITHOTRIPSY, CYSTOSCOPY, INSERT STENT URETER(S); CYSTOSCOPY, RIGHT STENT; Surgeon:ANJUM BUNDY; Location:[facility] OR     FUSION LUMBAR ANTERIOR, FUSION LUMBAR POSTERIOR ONE LEVEL, COMBINED  11-1-10    L4-5     LASER HOLMIUM LITHOTRIPSY URETER(S), INSERT STENT, COMBINED  2/7/2012    Procedure:COMBINED CYSTOSCOPY, URETEROSCOPY, LASER HOLMIUM LITHOTRIPSY URETER(S), INSERT STENT; CYSTOSCOPY, RIGHT URETEROSCOPY, RIGHT RETROGRADES,  STONE EXTRACTION WITH HOLMIUM LASER AND RIGHT URETERAL STENT EXCHANGE ; Surgeon:DEEPTI MELVIN; Location:[facility] OR     LITHOTRIPSY      Lithotrypsy     SEPTOPLASTY       VASECTOMY         Social History     Tobacco Use     Smoking status: Former Smoker     Types: Cigars     Smokeless tobacco: Never Used     Tobacco comment: has cigars when it is warm out   Substance Use Topics     Alcohol use: Yes     Alcohol/week: 11.7 standard drinks     Types: 14 Standard drinks or equivalent per week     Comment: 1-2 drinks day/beer and scotch     Family History   Problem Relation Age of Onset     Melanoma Father      Prostate Cancer Father      Cancer Father         neck cancer from radiation at work (urology)     Breast Cancer Mother      C.A.D. Paternal Grandfather 72           OBJECTIVE:                                                    /74   Pulse 63   Temp 97.9  F (36.6  C) (Skin)   Resp 18   Wt 135.2 kg (298 lb)   SpO2 99%   BMI 42.76 kg/m    Body mass index is 42.76 kg/m .     General: obese, NAD  Oropharynx: Clear  Heart: RRR, no murmur  Chest: Lungs CTAB  Ext: LLE with 2+ pitting edema to the knee, RLE with 1+ pitting edema to the knee.  No significant posterior leg pain.  No erythema  Psych: Normal mood and affect          ASSESSMENT/PLAN:                                                          Localized swelling of both lower extremities: DVT ruled out.  No other clinical or historical findings c/w CHF.  Recommend compression and elevation.  Increase activity as soon as able.  He prefers to avoid diuretics if possible.  Close follow up if not improving, sooner if worse.    Patient is agreement with the assessment and plan as outlined above.  All questions answered.  Red flag symptoms that should prompt emergent evaluation discussed and understood.          The following health maintenance items are reviewed in Epic and correct as of today:  Health Maintenance   Topic Date Due     ADVANCE CARE PLANNING  1950     ZOSTER IMMUNIZATION (1 of 2) 03/25/1998     MEDICARE ANNUAL WELLNESS VISIT  03/25/2013     Pneumococcal Vaccine: 65+ Years (1 of 1 - PPSV23) 03/25/2013     FALL RISK ASSESSMENT  03/14/2019     INFLUENZA VACCINE (1) 09/01/2020     DTAP/TDAP/TD IMMUNIZATION (3 - Td) 02/17/2022     COLORECTAL CANCER SCREENING  03/03/2022     HEPATITIS C SCREENING  Completed     PHQ-2  Completed     AORTIC ANEURYSM SCREENING (SYSTEM ASSIGNED)  Completed     Pneumococcal Vaccine: Pediatrics (0 to 5 Years) and At-Risk Patients (6 to 64 Years)  Aged Out     IPV IMMUNIZATION  Aged Out     MENINGITIS IMMUNIZATION  Aged Out     HEPATITIS B IMMUNIZATION  Aged Out         Clay Luis MD  [facility]

## 2020-11-25 NOTE — PATIENT INSTRUCTIONS
Patient Education     Leg Swelling in Both Legs    Swelling of the feet, ankles, and legs is called edema. It is caused by excess fluid that has collected in the tissues. Extra fluid in the body settles in the lowest part because of gravity. This is why the legs and feet are most affected.  Some of the causes for edema include:    Disease of the heart such as congestive heart failure    Standing or sitting for long periods of time    Infection of the feet or legs    Blood pooling in the veins of your legs (venous insufficiency) when the veins have less elasticity    Dilated veins in your lower leg (varicose veins)    Stockings or other clothing that is tight on your legs. This will cause blood to pool in your legs because the clothing limits blood flow.    Some medicines. These include some hormones such as birth control pills, some blood pressure medicines such as calcium channel blockers, steroids, and some antidepressants such as MAO inhibitors and tricyclics.    Menstrual periods that cause you to retain fluids    Many types of kidney disease    Liver failure or cirrhosis    Pregnancy. Some swelling is normal, but a sudden increase in leg swelling or weight gain can be a sign of a dangerous complication of pregnancy called eclampsia.    Poor nutrition    Thyroid disease  Treatment will depend on what is causing the swelling in your legs. Your healthcare provider may prescribe water pills (diuretics) to get rid of the extra fluid.  Home care  Follow these guidelines when caring for yourself at home:    Don't wear clothing such as stockings that are tight on your legs.    Keep your legs up while lying or sitting.    If infection, injury, or recent surgery is causing the swelling, stay off your legs as much as possible until symptoms get better.    If your healthcare provider says that your leg swelling is caused by venous insufficiency or varicose veins, don't sit or  one place for long periods of time.  Take breaks and walk about every few hours. Brisk walking is a good exercise. It helps circulate the blood that has collected in your leg. Talk with your provider about using support stockings to stop daytime leg swelling.    If your provider says that heart disease is causing your leg swelling, follow a low-salt diet to stop extra fluid from staying in your body. You may also need medicine.  Follow-up care  Follow up with your healthcare provider, or as advised.  When to seek medical advice  Call your healthcare provider right away if any of these occur:    Swelling in both legs or ankles that gets worse    Swelling of the abdomen    Redness, warmth, or swelling in one leg    Fever of 100.4 F (38 C) or higher , or as directed by your healthcare provider    Yellow color to your skin or eyes    Rapid, unexplained weight gain    Having to sleep upright or use an increased number of pillow     Call 911  This is the fastest and safest way to get to the emergency department. The paramedics can also start treatment on the way to the hospital, if needed.  Call 911, or seekmedical attention right away if    You have new shortness of breath or chest pain    Worsening shortness of breath or chest pain?  [employer] last reviewed this educational content on 11/1/2019 2000-2020 [employer]. [address] [place] [address], [place] [address]. All rights reserved. This information is not intended as a substitute for professional medical care. Always follow your healthcare professional's instructions.

## 2020-11-30 ENCOUNTER — TELEPHONE (OUTPATIENT)
Dept: FAMILY MEDICINE | Facility: CLINIC | Age: 70
End: 2020-11-30

## 2020-11-30 DIAGNOSIS — M79.89 LOCALIZED SWELLING OF BOTH LOWER EXTREMITIES: Primary | ICD-10-CM

## 2020-11-30 RX ORDER — FUROSEMIDE 20 MG
20 TABLET ORAL DAILY
Qty: 7 TABLET | Refills: 0 | Status: SHIPPED | OUTPATIENT
Start: 2020-11-30 | End: 2021-02-11

## 2020-11-30 NOTE — TELEPHONE ENCOUNTER
Patient called clinic with update on bilateral lower leg swelling. Patient reports that he has been using the compression stocking with little to no improvement in swelling. He is now requesting prescription for diuretic. Call routed to Dr Luis for review. Wanda Bermudez

## 2020-11-30 NOTE — TELEPHONE ENCOUNTER
Per Dr Luis prescription for lasix sent to pharmacy. Patient scheduled for f/u with Dr Luis 12/7/20. Wanda Luis, MD Carlos Tony, Wanda, LPN   Caller: Unspecified (Today, 12:19 PM)             Let's try furosemide 20 mg daily and recheck in clinic in 1 week for BMP and reassessment of legs.

## 2020-12-07 ENCOUNTER — OFFICE VISIT (OUTPATIENT)
Dept: FAMILY MEDICINE | Facility: CLINIC | Age: 70
End: 2020-12-07

## 2020-12-07 ENCOUNTER — TRANSFERRED RECORDS (OUTPATIENT)
Dept: FAMILY MEDICINE | Facility: CLINIC | Age: 70
End: 2020-12-07

## 2020-12-07 VITALS
TEMPERATURE: 98.1 F | WEIGHT: 298 LBS | BODY MASS INDEX: 42.76 KG/M2 | SYSTOLIC BLOOD PRESSURE: 110 MMHG | DIASTOLIC BLOOD PRESSURE: 56 MMHG | OXYGEN SATURATION: 97 % | HEART RATE: 63 BPM

## 2020-12-07 DIAGNOSIS — M79.89 LOCALIZED SWELLING OF BOTH LOWER EXTREMITIES: Primary | ICD-10-CM

## 2020-12-07 DIAGNOSIS — E66.01 MORBID OBESITY (H): ICD-10-CM

## 2020-12-07 DIAGNOSIS — I25.10 CORONARY ARTERY DISEASE INVOLVING NATIVE CORONARY ARTERY OF NATIVE HEART WITHOUT ANGINA PECTORIS: ICD-10-CM

## 2020-12-07 PROCEDURE — 99214 OFFICE O/P EST MOD 30 MIN: CPT | Performed by: FAMILY MEDICINE

## 2020-12-07 NOTE — PROGRESS NOTES
SUBJECTIVE:                                                    Hugo Prince is a 70 year old male who presents to clinic today for follow up.  See last visit for details.  Started on short term furosemide which has helped with swelling but urinating a lot.  Denies PND, orthopnea.  No chest pain.  DVT eval was negative in past.      Problem list, Medication list, Allergies, and Medical/Social/Surgical histories reviewed in EPIC and updated as appropriate.   Additional history: as documented    ROS:    A 7 system review was completed and is as noted in HPI and otherwise negative.      Histories:   Patient Active Problem List   Diagnosis     Recurrent nephrolithiasis     RAY (obstructive sleep apnea)     ED (erectile dysfunction)     Health Care Home     GERD (gastroesophageal reflux disease)     S/P total knee replacement     Dermatitis     Physical deconditioning     DJD (degenerative joint disease) of knee     Edema     Chronic pharyngitis and nasopharyngitis(472)     Morbid obesity (H)     NSTEMI (non-ST elevated myocardial infarction) (H)     CAD (coronary artery disease)     Dyslipidemia     Prediabetes     Past Surgical History:   Procedure Laterality Date     ARTHROPLASTY KNEE Left 1/7/2015    Procedure: ARTHROPLASTY KNEE;  Surgeon: Agustin Saenz MD;  Location:  OR     ARTHROSCOPY KNEE RT/LT  6-20-11     COLONOSCOPY  12/09     CV FRACTIONAL FLOW RESERVE N/A 7/16/2019    Procedure: Fractional Flow Reserve;  Surgeon: Mick Thrasher MD;  Location:  HEART CARDIAC CATH LAB     CV HEART CATHETERIZATION WITH POSSIBLE INTERVENTION N/A 7/16/2019    successful PCI with MICAH placement to mid LAD      CYSTOSCOPY       EXTRACORPOREAL SHOCK WAVE LITHOTRIPSY (ESWL)  1/20/2012    Procedure:EXTRACORPOREAL SHOCK WAVE LITHOTRIPSY (ESWL); LEFT EXTRACORPOREAL SHOCK WAVE LITHOTRIPSY ; Surgeon:ANJUM BUNDY; Location: OR     EXTRACORPOREAL SHOCK WAVE LITHOTRIPSY, CYSTOSCOPY, INSERT STENT URETER(S),  COMBINED  2/3/2012    Procedure:COMBINED EXTRACORPOREAL SHOCK WAVE LITHOTRIPSY, CYSTOSCOPY, INSERT STENT URETER(S); CYSTOSCOPY, RIGHT STENT; Surgeon:ANJUM BUNDY; Location:[facility]     FUSION LUMBAR ANTERIOR, FUSION LUMBAR POSTERIOR ONE LEVEL, COMBINED  11-1-10    L4-5     LASER HOLMIUM LITHOTRIPSY URETER(S), INSERT STENT, COMBINED  2/7/2012    Procedure:COMBINED CYSTOSCOPY, URETEROSCOPY, LASER HOLMIUM LITHOTRIPSY URETER(S), INSERT STENT; CYSTOSCOPY, RIGHT URETEROSCOPY, RIGHT RETROGRADES,  STONE EXTRACTION WITH HOLMIUM LASER AND RIGHT URETERAL STENT EXCHANGE ; Surgeon:DEEPTI MELVIN; Location:[facility] OR     LITHOTRIPSY      Lithotrypsy     SEPTOPLASTY       VASECTOMY         Social History     Tobacco Use     Smoking status: Former Smoker     Types: Cigars     Smokeless tobacco: Never Used     Tobacco comment: has cigars when it is warm out   Substance Use Topics     Alcohol use: Yes     Alcohol/week: 11.7 standard drinks     Types: 14 Standard drinks or equivalent per week     Comment: 1-2 drinks day/beer and scotch     Family History   Problem Relation Age of Onset     Melanoma Father      Prostate Cancer Father      Cancer Father         neck cancer from radiation at work (urology)     Breast Cancer Mother      C.A.D. Paternal Grandfather 72           OBJECTIVE:                                                    /56   Pulse 63   Temp 98.1  F (36.7  C)   Wt 135.2 kg (298 lb)   SpO2 97%   BMI 42.76 kg/m    Body mass index is 42.76 kg/m .     General: Well appearing, NAD  Oropharynx: Clear  Heart: RRR, no murmur  Chest: Lungs CTAB  Ext: 1+ pretibial edema bilateral. Mild anterior tenderness.  No calf tenderness  Psych: Normal mood and affect         ASSESSMENT/PLAN:                                                        Localized swelling of both lower extremities: favor venous insufficiency, obesity, recent surgery.  Check BNP.  No clinical evidence of heart failure otherwise.  Elevate,  increase activity, hold off on further diuretics unless worse.  Cont compression  -     Basic Metabolic Panel (8) ([facility])  -     B-Type Natriuretic Peptide ([facility])    Morbid obesity (H)    Coronary artery disease involving native coronary artery of native heart without angina pectoris: stable.  Cont ASA, statin, BB        The following health maintenance items are reviewed in Epic and correct as of today:  Health Maintenance   Topic Date Due     ADVANCE CARE PLANNING  1950     ZOSTER IMMUNIZATION (1 of 2) 03/25/1998     MEDICARE ANNUAL WELLNESS VISIT  03/25/2013     Pneumococcal Vaccine: 65+ Years (1 of 1 - PPSV23) 03/25/2013     INFLUENZA VACCINE (1) 09/01/2020     FALL RISK ASSESSMENT  11/25/2021     DTAP/TDAP/TD IMMUNIZATION (3 - Td) 02/17/2022     COLORECTAL CANCER SCREENING  03/03/2022     LIPID  10/28/2024     HEPATITIS C SCREENING  Completed     PHQ-2  Completed     AORTIC ANEURYSM SCREENING (SYSTEM ASSIGNED)  Completed     Pneumococcal Vaccine: Pediatrics (0 to 5 Years) and At-Risk Patients (6 to 64 Years)  Aged Out     IPV IMMUNIZATION  Aged Out     MENINGITIS IMMUNIZATION  Aged Out     HEPATITIS B IMMUNIZATION  Aged Out         Clay Luis MD  [facility]

## 2020-12-08 LAB
BNP SERPL-MCNC: 35.4 PG/ML (ref 0–100)
BUN SERPL-MCNC: 20 MG/DL (ref 8–27)
BUN/CREATININE RATIO: 19 (ref 10–24)
CALCIUM SERPL-MCNC: 9.7 MG/DL (ref 8.6–10.2)
CHLORIDE SERPLBLD-SCNC: 103 MMOL/L (ref 96–106)
CREAT SERPL-MCNC: 1.05 MG/DL (ref 0.76–1.27)
EGFR IF AFRICN AM: 82 ML/MIN/1.73
EGFR IF NONAFRICN AM: 71 ML/MIN/1.73
GLUCOSE SERPL-MCNC: 141 MG/DL (ref 65–99)
POTASSIUM SERPL-SCNC: 4.6 MMOL/L (ref 3.5–5.2)
SODIUM SERPL-SCNC: 141 MMOL/L (ref 134–144)
TOTAL CO2: 31 MMOL/L (ref 20–29)

## 2020-12-16 ENCOUNTER — TRANSFERRED RECORDS (OUTPATIENT)
Dept: FAMILY MEDICINE | Facility: CLINIC | Age: 70
End: 2020-12-16

## 2020-12-22 DIAGNOSIS — Z12.5 SCREENING FOR PROSTATE CANCER: Primary | ICD-10-CM

## 2020-12-28 ENCOUNTER — OFFICE VISIT (OUTPATIENT)
Dept: FAMILY MEDICINE | Facility: CLINIC | Age: 70
End: 2020-12-28

## 2020-12-28 VITALS
SYSTOLIC BLOOD PRESSURE: 130 MMHG | DIASTOLIC BLOOD PRESSURE: 72 MMHG | TEMPERATURE: 98.1 F | HEIGHT: 70 IN | WEIGHT: 296 LBS | OXYGEN SATURATION: 97 % | HEART RATE: 66 BPM | BODY MASS INDEX: 42.37 KG/M2 | RESPIRATION RATE: 16 BRPM

## 2020-12-28 DIAGNOSIS — H61.23 BILATERAL IMPACTED CERUMEN: ICD-10-CM

## 2020-12-28 DIAGNOSIS — Z28.21 INFLUENZA VACCINATION DECLINED: ICD-10-CM

## 2020-12-28 DIAGNOSIS — H93.8X3 PLUGGED FEELING IN EAR, BILATERAL: Primary | ICD-10-CM

## 2020-12-28 DIAGNOSIS — R03.0 ELEVATED BLOOD PRESSURE READING WITHOUT DIAGNOSIS OF HYPERTENSION: ICD-10-CM

## 2020-12-28 PROCEDURE — 93050 ART PRESSURE WAVEFORM ANALYS: CPT | Performed by: NURSE PRACTITIONER

## 2020-12-28 PROCEDURE — 99213 OFFICE O/P EST LOW 20 MIN: CPT | Mod: 25 | Performed by: NURSE PRACTITIONER

## 2020-12-28 PROCEDURE — 69209 REMOVE IMPACTED EAR WAX UNI: CPT | Performed by: NURSE PRACTITIONER

## 2020-12-28 ASSESSMENT — MIFFLIN-ST. JEOR: SCORE: 2098.9

## 2020-12-28 NOTE — PROGRESS NOTES
Problem(s) Oriented visit        SUBJECTIVE:                                                    Hugo Prince is a 70 year old male who presents to clinic today for the following health issues :    Both ears feeling plugged for last couple of weeks. Left more so than the right. Denies ear pain or drainage. Has history of ear wax build up.    Hypertension - well controlled on current medications. Has also noticed improvement in ankle swelling with addition of Furosemide 20 mg daily.  BP Readings from Last 3 Encounters:   12/28/20 130/72   12/07/20 110/56   11/25/20 116/74     States he never gets the flu shot and does not want that or pneumonia vaccine today.    Problem list, Medication list, Allergies, and Medical/Social/Surgical histories reviewed in EPIC and updated as appropriate.   Additional history: as documented    ROS:  4 point ROS completed and negative except noted above, including Gen, HENT, CV, Resp    Histories:   Patient Active Problem List   Diagnosis     Recurrent nephrolithiasis     RAY (obstructive sleep apnea)     ED (erectile dysfunction)     Health Care Home     GERD (gastroesophageal reflux disease)     S/P total knee replacement     Dermatitis     Physical deconditioning     DJD (degenerative joint disease) of knee     Edema     Chronic pharyngitis and nasopharyngitis(472)     Morbid obesity (H)     NSTEMI (non-ST elevated myocardial infarction) (H)     CAD (coronary artery disease)     Dyslipidemia     Prediabetes     Past Surgical History:   Procedure Laterality Date     ARTHROPLASTY KNEE Left 1/7/2015    Procedure: ARTHROPLASTY KNEE;  Surgeon: Agustin Saenz MD;  Location:  OR     ARTHROSCOPY KNEE RT/LT  6-20-11     COLONOSCOPY  12/09     CV FRACTIONAL FLOW RESERVE N/A 7/16/2019    Procedure: Fractional Flow Reserve;  Surgeon: Mick Thrasher MD;  Location:  HEART CARDIAC CATH LAB     CV HEART CATHETERIZATION WITH POSSIBLE INTERVENTION N/A 7/16/2019    successful PCI with  "MICAH placement to mid LAD      CYSTOSCOPY       EXTRACORPOREAL SHOCK WAVE LITHOTRIPSY (ESWL)  1/20/2012    Procedure:EXTRACORPOREAL SHOCK WAVE LITHOTRIPSY (ESWL); LEFT EXTRACORPOREAL SHOCK WAVE LITHOTRIPSY ; Surgeon:ANJUM BUNDY; Location:[facility]     EXTRACORPOREAL SHOCK WAVE LITHOTRIPSY, CYSTOSCOPY, INSERT STENT URETER(S), COMBINED  2/3/2012    Procedure:COMBINED EXTRACORPOREAL SHOCK WAVE LITHOTRIPSY, CYSTOSCOPY, INSERT STENT URETER(S); CYSTOSCOPY, RIGHT STENT; Surgeon:ANJUM BUNDY; Location:[facility] OR     FUSION LUMBAR ANTERIOR, FUSION LUMBAR POSTERIOR ONE LEVEL, COMBINED  11-1-10    L4-5     LASER HOLMIUM LITHOTRIPSY URETER(S), INSERT STENT, COMBINED  2/7/2012    Procedure:COMBINED CYSTOSCOPY, URETEROSCOPY, LASER HOLMIUM LITHOTRIPSY URETER(S), INSERT STENT; CYSTOSCOPY, RIGHT URETEROSCOPY, RIGHT RETROGRADES,  STONE EXTRACTION WITH HOLMIUM LASER AND RIGHT URETERAL STENT EXCHANGE ; Surgeon:DEEPTI MELVIN; Location:[facility] OR     LITHOTRIPSY      Lithotrypsy     SEPTOPLASTY       VASECTOMY         Social History     Tobacco Use     Smoking status: Former Smoker     Types: Cigars     Smokeless tobacco: Never Used     Tobacco comment: has cigars when it is warm out   Substance Use Topics     Alcohol use: Yes     Alcohol/week: 11.7 standard drinks     Types: 14 Standard drinks or equivalent per week     Comment: 1-2 drinks day/beer and scotch     Family History   Problem Relation Age of Onset     Melanoma Father      Prostate Cancer Father      Cancer Father         neck cancer from radiation at work (urology)     Breast Cancer Mother      C.A.D. Paternal Grandfather 72           OBJECTIVE:                                                    /72   Pulse 66   Temp 98.1  F (36.7  C)   Resp 16   Ht 1.778 m (5' 10\")   Wt 134.3 kg (296 lb)   SpO2 97%   BMI 42.47 kg/m    Body mass index is 42.47 kg/m .   GENERAL: Elderly male in no acute distress  HENT: normal cephalic/atraumatic, right ear: " occluded with wax and left ear: occluded with wax  RESP: normal work of breathing  CV: normal rate, 2+ bilateral ankle swelling  MS: back brace intact  PSYCH: mentation appears normal, affect normal/bright     ASSESSMENT/PLAN:                                                      Hugo was seen today for plugged ears.    Diagnoses and all orders for this visit:    Plugged feeling in ear, bilateral  -     Removal Impacted Cerumen Irrigation Unilateral (Ear Wash)    Bilateral impacted cerumen  -     Removal Impacted Cerumen Irrigation Unilateral (Ear Wash)  Successfully irrigated without complication    Elevated blood pressure reading without diagnosis of hypertension  -     MD ART PRESS WAVEFORM ANALYS CENTRAL ART PRESSURE    Influenza vaccination declined        See Patient Instructions  Patient Instructions   Both ears irrigated. Can take a day or so for all water to drain out. Refrain from using Q-tips.      The following health maintenance items are reviewed in Epic and correct as of today:  Health Maintenance   Topic Date Due     ADVANCE CARE PLANNING  1950     ZOSTER IMMUNIZATION (1 of 2) 03/25/1998     MEDICARE ANNUAL WELLNESS VISIT  03/25/2013     Pneumococcal Vaccine: 65+ Years (1 of 1 - PPSV23) 03/25/2013     INFLUENZA VACCINE (1) 09/01/2020     FALL RISK ASSESSMENT  11/25/2021     DTAP/TDAP/TD IMMUNIZATION (3 - Td) 02/17/2022     COLORECTAL CANCER SCREENING  03/03/2022     LIPID  10/28/2024     HEPATITIS C SCREENING  Completed     PHQ-2  Completed     AORTIC ANEURYSM SCREENING (SYSTEM ASSIGNED)  Completed     Pneumococcal Vaccine: Pediatrics (0 to 5 Years) and At-Risk Patients (6 to 64 Years)  Aged Out     IPV IMMUNIZATION  Aged Out     MENINGITIS IMMUNIZATION  Aged Out     HEPATITIS B IMMUNIZATION  Aged Out       DEMETRI Bro CNP  [facility]  Family Practice  [facility]  [phone]    For any issues my office # is [phone]

## 2021-01-08 DIAGNOSIS — E78.5 DYSLIPIDEMIA: ICD-10-CM

## 2021-01-08 DIAGNOSIS — Z95.5 S/P CORONARY ARTERY STENT PLACEMENT: ICD-10-CM

## 2021-01-08 LAB
CHOLEST SERPL-MCNC: 95 MG/DL
HDLC SERPL-MCNC: 40 MG/DL
LDLC SERPL CALC-MCNC: 36 MG/DL
NONHDLC SERPL-MCNC: 55 MG/DL
TRIGL SERPL-MCNC: 94 MG/DL

## 2021-01-08 PROCEDURE — 36415 COLL VENOUS BLD VENIPUNCTURE: CPT | Performed by: PHYSICIAN ASSISTANT

## 2021-01-08 PROCEDURE — 80061 LIPID PANEL: CPT | Performed by: PHYSICIAN ASSISTANT

## 2021-01-15 ENCOUNTER — HEALTH MAINTENANCE LETTER (OUTPATIENT)
Age: 71
End: 2021-01-15

## 2021-01-21 ENCOUNTER — TRANSFERRED RECORDS (OUTPATIENT)
Dept: FAMILY MEDICINE | Facility: CLINIC | Age: 71
End: 2021-01-21

## 2021-01-28 ENCOUNTER — TELEPHONE (OUTPATIENT)
Dept: CARDIOLOGY | Facility: CLINIC | Age: 71
End: 2021-01-28

## 2021-01-28 NOTE — TELEPHONE ENCOUNTER
Pt called to report that he had back surgery 3 months ago and has been taking opioids for pain since then.     Pt is now needing to wean off of the tramadol within the next 2 weeks and his orthopedic provider is recommending ibuprofen once he is off of the tramadol.     Pt reports that he has an appt with Dr. Moe on 2/11/21 to establish care with a new cardiologist.     Informed pt that since he will be weaning off the tramadol in the next few weeks and he will then be seeing Dr. Moe, then he should continue with Tylenol and discuss this at his visit.   Pt gave verbal understanding.

## 2021-02-08 DIAGNOSIS — I21.4 NSTEMI (NON-ST ELEVATED MYOCARDIAL INFARCTION) (H): ICD-10-CM

## 2021-02-08 RX ORDER — ATORVASTATIN CALCIUM 40 MG/1
40 TABLET, FILM COATED ORAL DAILY
Qty: 90 TABLET | Refills: 0 | Status: SHIPPED | OUTPATIENT
Start: 2021-02-08 | End: 2021-02-11

## 2021-02-11 ENCOUNTER — VIRTUAL VISIT (OUTPATIENT)
Dept: CARDIOLOGY | Facility: CLINIC | Age: 71
End: 2021-02-11
Attending: PHYSICIAN ASSISTANT
Payer: MEDICARE

## 2021-02-11 DIAGNOSIS — E66.01 MORBID OBESITY (H): ICD-10-CM

## 2021-02-11 DIAGNOSIS — E78.5 DYSLIPIDEMIA: ICD-10-CM

## 2021-02-11 DIAGNOSIS — Z95.5 S/P CORONARY ARTERY STENT PLACEMENT: ICD-10-CM

## 2021-02-11 DIAGNOSIS — I21.4 NSTEMI (NON-ST ELEVATED MYOCARDIAL INFARCTION) (H): ICD-10-CM

## 2021-02-11 PROCEDURE — 99214 OFFICE O/P EST MOD 30 MIN: CPT | Mod: 95 | Performed by: INTERNAL MEDICINE

## 2021-02-11 RX ORDER — LISINOPRIL 5 MG/1
5 TABLET ORAL DAILY
Qty: 90 TABLET | Refills: 11 | Status: SHIPPED | OUTPATIENT
Start: 2021-02-11 | End: 2022-04-13

## 2021-02-11 RX ORDER — ATORVASTATIN CALCIUM 40 MG/1
40 TABLET, FILM COATED ORAL DAILY
Qty: 90 TABLET | Refills: 11 | Status: SHIPPED | OUTPATIENT
Start: 2021-02-11 | End: 2022-04-27

## 2021-02-11 RX ORDER — NITROGLYCERIN 0.4 MG/1
TABLET SUBLINGUAL
Qty: 25 TABLET | Refills: 11 | Status: SHIPPED | OUTPATIENT
Start: 2021-02-11 | End: 2022-06-10

## 2021-02-11 RX ORDER — METOPROLOL TARTRATE 25 MG/1
25 TABLET, FILM COATED ORAL 2 TIMES DAILY
Qty: 180 TABLET | Refills: 11 | Status: SHIPPED | OUTPATIENT
Start: 2021-02-11 | End: 2022-04-13

## 2021-02-11 NOTE — PROGRESS NOTES
"Hugo is a 70 year old who is being evaluated via a billable video visit.      How would you like to obtain your AVS? MyChart  If the video visit is dropped, the invitation should be resent by: Send to e-mail at: [email or web address]  Will anyone else be joining your video visit? No        Vitals - Patient Reported 2/11/2021   Height (Patient Reported) 5' 10\"   Weight (Patient Reported) 292 lb   BMI (Based on Pt Reported Ht/Wt) 41.9 kg/m2   Systolic (Patient Reported) (No Data)         Review Of Systems  Skin: NEGATIVE  Eyes:Ears/Nose/Throat: Glasses  Respiratory: NEGATIVE  Cardiovascular:Dizziness due to medications  Gastrointestinal: NEGATIVE  Genitourinary:NEGATIVE   Musculoskeletal: NEGATIVE  Neurologic: NEGATIVE  Psychiatric: NEGATIVE  Hematologic/Lymphatic/Immunologic: NEGATIVE  Endocrine:  NEGATIVE    Isatu MARQUEZ    Video Start Time: 4:15  Video-Visit Details    Type of service:  Video Visit    Video End Time:4:36    Originating Location (pt. Location): Home    Distant Location (provider location):  [facility] HEART [facility]     Platform used for Video Visit: [facility]     CARDIOLOGY CLINIC VIRTUAL VIDEO VISIT NOTE:    Past medical history, social history, family history, medication list, allergies, most recent labs, and additional data, all personally reviewed.     HPI and A/P:    Please see dictation # [id number]    Thank you for allowing our team to participate in the care of this patient. Please do not hesitate to page or call me with any questions or concerns.    Juan Moe MD, [facility]  Cardiology  Pager:  [phone]  Text Page   February 11, 2021    Past Medical History:       Past Medical History:   Diagnosis Date     Anemia due to blood loss, acute 1/14/2015     Atopic dermatitis      CAD (coronary artery disease)      DJD (degenerative joint disease) of knee 1/14/2015     Dyslipidemia      ED (erectile dysfunction) 4/30/2012     Esophageal reflux     Gastroesophageal " reflux     NSTEMI (non-ST elevated myocardial infarction) (H) 7/15/2019     Other and unspecified disc disorder of unspecified region     Intervertebral disc disorders     Personal history of colonic polyps     Colon polyps     S/P lateral meniscectomy of left knee      S/P total knee replacement 1/7/2015     Tick bite 4/20/15    amoxicillin treated     Unspecified sleep apnea     Sleep apnea     Urinary calculus, unspecified     Renal stones     Vitamin D deficiency 9-27-09        Past Surgical History:   Past Surgical History:   Procedure Laterality Date     ARTHROPLASTY KNEE Left 1/7/2015    Procedure: ARTHROPLASTY KNEE;  Surgeon: Agustin Saenz MD;  Location:  OR     ARTHROSCOPY KNEE RT/LT  6-20-11     COLONOSCOPY  12/09     CV FRACTIONAL FLOW RESERVE N/A 7/16/2019    Procedure: Fractional Flow Reserve;  Surgeon: Mick Thrasher MD;  Location:  HEART CARDIAC CATH LAB     CV HEART CATHETERIZATION WITH POSSIBLE INTERVENTION N/A 7/16/2019    successful PCI with MICAH placement to mid LAD      CYSTOSCOPY       EXTRACORPOREAL SHOCK WAVE LITHOTRIPSY (ESWL)  1/20/2012    Procedure:EXTRACORPOREAL SHOCK WAVE LITHOTRIPSY (ESWL); LEFT EXTRACORPOREAL SHOCK WAVE LITHOTRIPSY ; Surgeon:ANJUM BUNDY; Location: OR     EXTRACORPOREAL SHOCK WAVE LITHOTRIPSY, CYSTOSCOPY, INSERT STENT URETER(S), COMBINED  2/3/2012    Procedure:COMBINED EXTRACORPOREAL SHOCK WAVE LITHOTRIPSY, CYSTOSCOPY, INSERT STENT URETER(S); CYSTOSCOPY, RIGHT STENT; Surgeon:ANJUM BUNDY; Location: OR     FUSION LUMBAR ANTERIOR, FUSION LUMBAR POSTERIOR ONE LEVEL, COMBINED  11-1-10    L4-5     LASER HOLMIUM LITHOTRIPSY URETER(S), INSERT STENT, COMBINED  2/7/2012    Procedure:COMBINED CYSTOSCOPY, URETEROSCOPY, LASER HOLMIUM LITHOTRIPSY URETER(S), INSERT STENT; CYSTOSCOPY, RIGHT URETEROSCOPY, RIGHT RETROGRADES,  STONE EXTRACTION WITH HOLMIUM LASER AND RIGHT URETERAL STENT EXCHANGE ; Surgeon:DEEPTI MELVIN; Location: OR      LITHOTRIPSY      Lithotrypsy     SEPTOPLASTY       VASECTOMY         Medications (outpatient):  Current Outpatient Medications   Medication Sig Dispense Refill     acetaminophen (TYLENOL) 325 MG tablet Take 2 tablets (650 mg) by mouth every 4 hours as needed for mild pain       aspirin (ASA) 81 MG EC tablet Take 1 tablet (81 mg) by mouth daily       atorvastatin (LIPITOR) 40 MG tablet Take 1 tablet (40 mg) by mouth daily 90 tablet 11     lisinopril (ZESTRIL) 5 MG tablet Take 1 tablet (5 mg) by mouth daily 90 tablet 11     metoprolol tartrate (LOPRESSOR) 25 MG tablet Take 1 tablet (25 mg) by mouth 2 times daily 180 tablet 11     nitroGLYcerin (NITROSTAT) 0.4 MG sublingual tablet As needed for chest pain place 1 tablet under the tongue every 5 minutes for 3 doses. If symptoms persist 5 minutes after 1st dose call 911. 51 tablet 11     order for DME Equipment being ordered: CPAP       vardenafil (LEVITRA) 10 MG tablet Take 1 tablet (10 mg) by mouth daily as needed (ED) Do not take medication with Nitroglycerin 12 tablet 0     vitamin B-12 (CYANOCOBALAMIN) 1000 MCG tablet Take 1,000 mcg by mouth daily         Allergies:  Allergies   Allergen Reactions     Chlorthalidone GI Disturbance     Compazine Other (See Comments)     PSYCHOTIC     Erythromycin Nausea and Vomiting     Flomax [Tamsulosin Hcl]      FLUSHING, NASAl CONGESTION     Prochlorperazine      LW Reaction: anxiety     Seasonal Allergies        Social History:   History   Drug Use No      History   Smoking Status     Former Smoker     Types: Cigars   Smokeless Tobacco     Never Used     Comment: has cigars when it is warm out     Social History    Substance and Sexual Activity      Alcohol use: Yes        Alcohol/week: 11.7 standard drinks        Types: 14 Standard drinks or equivalent per week        Comment: 1-2 drinks day/beer and scotch       Family History:   Family History   Problem Relation Age of Onset     Melanoma Father      Prostate Cancer Father       Cancer Father         neck cancer from radiation at work (urology)     Breast Cancer Mother      C.A.D. Paternal Grandfather 72       Physical exam:  Constitutional: appears stated age, in no apparent distress, appears to be well nourished  Eyes: sclera anicteric, conjunctiva normal, no lesions on eyelids or lashes  ENT: normocephalic, without obvious abnormality, atraumatic  Pulmonary: no increased work of breathing  Cardiovascular: JVP normal  Gastrointestinal: No jaundice, no guarding  Neurologic: awake, alert, face symmetrical, symmetrical upper extremity movement  Skin: no abnormal rashes or lesions on limited exam  Psychiatric: affect is normal, answers questions appropriately, oriented to self and place    Labs reviewed:  Lab Results   Component Value Date    WBC 8.0 10/19/2020    WBC 9.0 07/17/2019    RBC 4.77 10/19/2020    RBC 4.50 07/17/2019    HGB 15.7 10/19/2020    HCT 46.4 10/19/2020    MCV 97.2 10/19/2020    MCH 33.3 (A) 10/19/2020    MCHC 33.9 10/19/2020    RDW 12.7 07/17/2019     10/19/2020     Sodium   Date Value Ref Range Status   12/07/2020 141 134 - 144 mmol/L Final     Potassium   Date Value Ref Range Status   12/07/2020 4.6 3.5 - 5.2 mmol/L Final     Chloride   Date Value Ref Range Status   12/07/2020 103 96 - 106 mmol/L Final     Carbon Dioxide   Date Value Ref Range Status   07/17/2019 24 20 - 32 mmol/L Final     Anion Gap   Date Value Ref Range Status   07/17/2019 8 3 - 14 mmol/L Final     Glucose   Date Value Ref Range Status   12/07/2020 141 (H) 65 - 99 mg/dL Final     Urea Nitrogen   Date Value Ref Range Status   12/07/2020 20 8 - 27 mg/dL Final     BUN/Creatinine Ratio   Date Value Ref Range Status   12/07/2020 19 10 - 24 Final     Creatinine   Date Value Ref Range Status   12/07/2020 1.05 0.76 - 1.27 mg/dL Final     GFR Estimate   Date Value Ref Range Status   07/17/2019 77 >60 mL/min/[1.73_m2] Final     Comment:     Non  GFR Calc  Starting 12/18/2018, serum  creatinine based estimated GFR (eGFR) will be   calculated using the Chronic Kidney Disease Epidemiology Collaboration   (CKD-EPI) equation.       Calcium   Date Value Ref Range Status   12/07/2020 9.7 8.6 - 10.2 mg/dL Final     Bilirubin Total   Date Value Ref Range Status   06/30/2014 0.5 0.0 - 1.2 mg/dL Final     Alkaline Phosphatase   Date Value Ref Range Status   06/30/2014 79 39 - 117 IU/L Final     ALT   Date Value Ref Range Status   10/28/2019 5 5 - 30 U/L Final     AST   Date Value Ref Range Status   06/30/2014 23 0 - 40 IU/L Final     Recent Labs   Lab Test 01/08/21  1011 10/28/19  0751   CHOL 95 145   HDL 40 48   LDL 36 72   TRIG 94 124      Lab Results   Component Value Date    A1C 5.9 03/19/2018    A1C 5.9 03/23/2016    A1C 5.5 11/28/2012

## 2021-02-11 NOTE — LETTER
"2/11/2021    Clay Luis MD  [address]  [facility] [address]    RE: Hugo Prince       Dear Colleague,    I had the pleasure of seeing Hugo Prince in the [facility].    Hugo is a 70 year old who is being evaluated via a billable video visit.      How would you like to obtain your AVS? MyChart  If the video visit is dropped, the invitation should be resent by: Send to e-mail at: [email or web address]  Will anyone else be joining your video visit? No        Vitals - Patient Reported 2/11/2021   Height (Patient Reported) 5' 10\"   Weight (Patient Reported) 292 lb   BMI (Based on Pt Reported Ht/Wt) 41.9 kg/m2   Systolic (Patient Reported) (No Data)         Review Of Systems  Skin: NEGATIVE  Eyes:Ears/Nose/Throat: Glasses  Respiratory: NEGATIVE  Cardiovascular:Dizziness due to medications  Gastrointestinal: NEGATIVE  Genitourinary:NEGATIVE   Musculoskeletal: NEGATIVE  Neurologic: NEGATIVE  Psychiatric: NEGATIVE  Hematologic/Lymphatic/Immunologic: NEGATIVE  Endocrine:  NEGATIVE    Isatu MARQUEZ    Video Start Time: 4:15  Video-Visit Details    Type of service:  Video Visit    Video End Time:4:36    Originating Location (pt. Location): Home    Distant Location (provider location):  [facility]     Platform used for Video Visit: [facility]     CARDIOLOGY CLINIC VIRTUAL VIDEO VISIT NOTE:    Past medical history, social history, family history, medication list, allergies, most recent labs, and additional data, all personally reviewed.     HPI and A/P:    Thank you for allowing our team to participate in the care of this patient. Please do not hesitate to page or call me with any questions or concerns.    Juan Moe MD, [facility]  Cardiology  Pager:  [phone]  Text Page   February 11, 2021    Past Medical History:       Past Medical History:   Diagnosis Date     Anemia due to blood loss, acute " 1/14/2015     Atopic dermatitis      CAD (coronary artery disease)      DJD (degenerative joint disease) of knee 1/14/2015     Dyslipidemia      ED (erectile dysfunction) 4/30/2012     Esophageal reflux     Gastroesophageal reflux     NSTEMI (non-ST elevated myocardial infarction) (H) 7/15/2019     Other and unspecified disc disorder of unspecified region     Intervertebral disc disorders     Personal history of colonic polyps     Colon polyps     S/P lateral meniscectomy of left knee      S/P total knee replacement 1/7/2015     Tick bite 4/20/15    amoxicillin treated     Unspecified sleep apnea     Sleep apnea     Urinary calculus, unspecified     Renal stones     Vitamin D deficiency 9-27-09        Past Surgical History:   Past Surgical History:   Procedure Laterality Date     ARTHROPLASTY KNEE Left 1/7/2015    Procedure: ARTHROPLASTY KNEE;  Surgeon: Agustin Saenz MD;  Location:  OR     ARTHROSCOPY KNEE RT/LT  6-20-11     COLONOSCOPY  12/09     CV FRACTIONAL FLOW RESERVE N/A 7/16/2019    Procedure: Fractional Flow Reserve;  Surgeon: Mick Thrasher MD;  Location:  HEART CARDIAC CATH LAB     CV HEART CATHETERIZATION WITH POSSIBLE INTERVENTION N/A 7/16/2019    successful PCI with MICAH placement to mid LAD      CYSTOSCOPY       EXTRACORPOREAL SHOCK WAVE LITHOTRIPSY (ESWL)  1/20/2012    Procedure:EXTRACORPOREAL SHOCK WAVE LITHOTRIPSY (ESWL); LEFT EXTRACORPOREAL SHOCK WAVE LITHOTRIPSY ; Surgeon:ANJUM BUNDY; Location: OR     EXTRACORPOREAL SHOCK WAVE LITHOTRIPSY, CYSTOSCOPY, INSERT STENT URETER(S), COMBINED  2/3/2012    Procedure:COMBINED EXTRACORPOREAL SHOCK WAVE LITHOTRIPSY, CYSTOSCOPY, INSERT STENT URETER(S); CYSTOSCOPY, RIGHT STENT; Surgeon:ANJUM BUNDY; Location: OR     FUSION LUMBAR ANTERIOR, FUSION LUMBAR POSTERIOR ONE LEVEL, COMBINED  11-1-10    L4-5     LASER HOLMIUM LITHOTRIPSY URETER(S), INSERT STENT, COMBINED  2/7/2012    Procedure:COMBINED CYSTOSCOPY, URETEROSCOPY,  LASER HOLMIUM LITHOTRIPSY URETER(S), INSERT STENT; CYSTOSCOPY, RIGHT URETEROSCOPY, RIGHT RETROGRADES,  STONE EXTRACTION WITH HOLMIUM LASER AND RIGHT URETERAL STENT EXCHANGE ; Surgeon:DEEPTI MELVIN; Location:[facility] OR     LITHOTRIPSY      Lithotrypsy     SEPTOPLASTY       VASECTOMY         Medications (outpatient):  Current Outpatient Medications   Medication Sig Dispense Refill     acetaminophen (TYLENOL) 325 MG tablet Take 2 tablets (650 mg) by mouth every 4 hours as needed for mild pain       aspirin (ASA) 81 MG EC tablet Take 1 tablet (81 mg) by mouth daily       atorvastatin (LIPITOR) 40 MG tablet Take 1 tablet (40 mg) by mouth daily 90 tablet 11     lisinopril (ZESTRIL) 5 MG tablet Take 1 tablet (5 mg) by mouth daily 90 tablet 11     metoprolol tartrate (LOPRESSOR) 25 MG tablet Take 1 tablet (25 mg) by mouth 2 times daily 180 tablet 11     nitroGLYcerin (NITROSTAT) 0.4 MG sublingual tablet As needed for chest pain place 1 tablet under the tongue every 5 minutes for 3 doses. If symptoms persist 5 minutes after 1st dose call 911. 25 tablet 11     order for DME Equipment being ordered: CPAP       vardenafil (LEVITRA) 10 MG tablet Take 1 tablet (10 mg) by mouth daily as needed (ED) Do not take medication with Nitroglycerin 12 tablet 0     vitamin B-12 (CYANOCOBALAMIN) 1000 MCG tablet Take 1,000 mcg by mouth daily         Allergies:  Allergies   Allergen Reactions     Chlorthalidone GI Disturbance     Compazine Other (See Comments)     PSYCHOTIC     Erythromycin Nausea and Vomiting     Flomax [Tamsulosin Hcl]      FLUSHING, NASAl CONGESTION     Prochlorperazine      LW Reaction: anxiety     Seasonal Allergies        Social History:   History   Drug Use No      History   Smoking Status     Former Smoker     Types: Cigars   Smokeless Tobacco     Never Used     Comment: has cigars when it is warm out     Social History    Substance and Sexual Activity      Alcohol use: Yes        Alcohol/week: 11.7 standard  drinks        Types: 14 Standard drinks or equivalent per week        Comment: 1-2 drinks day/beer and scotch       Family History:   Family History   Problem Relation Age of Onset     Melanoma Father      Prostate Cancer Father      Cancer Father         neck cancer from radiation at work (urology)     Breast Cancer Mother      C.A.D. Paternal Grandfather 72       Physical exam:  Constitutional: appears stated age, in no apparent distress, appears to be well nourished  Eyes: sclera anicteric, conjunctiva normal, no lesions on eyelids or lashes  ENT: normocephalic, without obvious abnormality, atraumatic  Pulmonary: no increased work of breathing  Cardiovascular: JVP normal  Gastrointestinal: No jaundice, no guarding  Neurologic: awake, alert, face symmetrical, symmetrical upper extremity movement  Skin: no abnormal rashes or lesions on limited exam  Psychiatric: affect is normal, answers questions appropriately, oriented to self and place    Labs reviewed:  Lab Results   Component Value Date    WBC 8.0 10/19/2020    WBC 9.0 07/17/2019    RBC 4.77 10/19/2020    RBC 4.50 07/17/2019    HGB 15.7 10/19/2020    HCT 46.4 10/19/2020    MCV 97.2 10/19/2020    MCH 33.3 (A) 10/19/2020    MCHC 33.9 10/19/2020    RDW 12.7 07/17/2019     10/19/2020     Sodium   Date Value Ref Range Status   12/07/2020 141 134 - 144 mmol/L Final     Potassium   Date Value Ref Range Status   12/07/2020 4.6 3.5 - 5.2 mmol/L Final     Chloride   Date Value Ref Range Status   12/07/2020 103 96 - 106 mmol/L Final     Carbon Dioxide   Date Value Ref Range Status   07/17/2019 24 20 - 32 mmol/L Final     Anion Gap   Date Value Ref Range Status   07/17/2019 8 3 - 14 mmol/L Final     Glucose   Date Value Ref Range Status   12/07/2020 141 (H) 65 - 99 mg/dL Final     Urea Nitrogen   Date Value Ref Range Status   12/07/2020 20 8 - 27 mg/dL Final     BUN/Creatinine Ratio   Date Value Ref Range Status   12/07/2020 19 10 - 24 Final     Creatinine   Date  Value Ref Range Status   12/07/2020 1.05 0.76 - 1.27 mg/dL Final     GFR Estimate   Date Value Ref Range Status   07/17/2019 77 >60 mL/min/[1.73_m2] Final     Comment:     Non  GFR Calc  Starting 12/18/2018, serum creatinine based estimated GFR (eGFR) will be   calculated using the Chronic Kidney Disease Epidemiology Collaboration   (CKD-EPI) equation.       Calcium   Date Value Ref Range Status   12/07/2020 9.7 8.6 - 10.2 mg/dL Final     Bilirubin Total   Date Value Ref Range Status   06/30/2014 0.5 0.0 - 1.2 mg/dL Final     Alkaline Phosphatase   Date Value Ref Range Status   06/30/2014 79 39 - 117 IU/L Final     ALT   Date Value Ref Range Status   10/28/2019 5 5 - 30 U/L Final     AST   Date Value Ref Range Status   06/30/2014 23 0 - 40 IU/L Final     Recent Labs   Lab Test 01/08/21  1011 10/28/19  0751   CHOL 95 145   HDL 40 48   LDL 36 72   TRIG 94 124      Lab Results   Component Value Date    A1C 5.9 03/19/2018    A1C 5.9 03/23/2016    A1C 5.5 11/28/2012          Service Date: 02/11/2021      CARDIOLOGY CLINIC PROGRESS NOTE      CONSULT INDICATION:  Coronary artery disease, NSTEMI.      HISTORY OF PRESENT ILLNESS:        I had the opportunity to visit with the patient, Hugo Prince, for a virtual Cardiology Clinic visit.  This was done as a virtual visit due to the COVID-19 pandemic.      As you know, Hugo Prince is a 70-year-old male with a past medical history significant for former smoking, morbid obesity (weight 296 pounds), sleep apnea on CPAP therapy, coronary artery disease with NSTEMI status post MICAH to mid LAD (07/2019), who presents for routine followup.      The patient was previously followed by my colleagues, Dr. Zhao, and had last been seen by HAILEY Peralta, on 07/14/2020.  The patient was admitted to [facility] 07/2019 with chest discomfort, troponin peaked at 12.  Echocardiogram demonstrated LVEF 50%-55%, with apical wall hypokinesis.  He  underwent a coronary angiogram 07/16/2019 with Dr. Thrasher, that demonstrated left main normal, proximal LAD 60% (iFR 0.91), mid LAD 95%, mid RCA 55% stenosis.  He underwent a PCI with MICAH to the mid LAD.      Subsequent TTE 10/28/2019 demonstrated recovery of LV function, LVEF 60%-65%, no regional wall motion abnormalities were noted.  He was found to have grade I diastolic dysfunction.  When he was last seen by Kaela Zhou 07/2020, he had been doing reasonably well, no changes were warranted to his cardiac regimen.  The patient was previously on atorvastatin 80 mg daily, which was decreased to 40 mg daily due to myalgias.  Last LDL 01/08/2001 was 36.  Blood pressure on clinic visits over the past several months ranges from 110 to 138/56-80 mmHg.      The patient reports that overall he is feeling generally well, though still recovering from a large back surgery 11/2020.  He denies any chest pain, chest pressure, or abnormal shortness of breath.  He denies any symptoms of orthopnea/PND, denies any specific concerns regarding his cardiac health.      ASSESSMENT, PLAN, RECOMMENDATIONS:     1.  Coronary artery disease, NSTEMI 07/2019 status post coronary angiography and MICAH to mid LAD.  Residual proximal LAD 60%, mid RCA 55% stenoses.  The patient denies any symptoms concerning for angina.   2.  TTE 10/2019, LVEF 60%-65%, normal wall motion (improved from TTE 07/2019 demonstrating LVEF 50%-55% with apical hypokinesis).  The patient denies any symptoms concerning for decompensated heart failure.   3.  Dyslipidemia, prior statin intolerance (atorvastatin 80 mg daily).  LDL within goal of less than 70, on atorvastatin 40 mg daily.   4.  Sleep apnea, on CPAP therapy.   5.  Former smoking.   6.  Morbid obesity (weight 296 pounds).      -- Overall, Mr. Prince is in stable cardiovascular health without symptoms concerning for angina or decompensated heart failure.   -- Continue current cardiac regimen of aspirin 81 mg daily,  atorvastatin 40 mg daily, lisinopril 5 mg daily, metoprolol tartrate 25 mg b.i.d., sublingual nitroglycerin as needed.   -- Patient has not required sublingual nitroglycerin; however, he is also prescribed vardenafil for ED, advised to not take these medications within 48 hours of each other.   -- Follow up in 1 year with fasting lipids with HAILEY Peralta, and with me a year after that, or sooner as needed.   -- Advised to seek medical care if he develops any significant chest pain, chest pressure, abnormal shortness of breath or any other symptoms that are concerning for him.      Thank you for allowing our team to participate in the care of patient Azeem Montero.  Please do not hesitate to page or call with any questions or concerns.        Juan Moe MD, [facility]  Cardiology  Text Page   2021        D: 2021   T: 2021   MT: LJ      Name:     AZEEM MONTERO   MRN:      [id number]        Account:      [id number]   :      1950           Service Date: 2021      Document: [id number]          Thank you for allowing me to participate in the care of your patient.    Sincerely,     Juan Moe MD     [facility]

## 2021-02-12 NOTE — PROGRESS NOTES
Service Date: 02/11/2021      CARDIOLOGY CLINIC PROGRESS NOTE      CONSULT INDICATION:  Coronary artery disease, NSTEMI.      HISTORY OF PRESENT ILLNESS:        I had the opportunity to visit with the patient, Hugo Prince, for a virtual Cardiology Clinic visit.  This was done as a virtual visit due to the COVID-19 pandemic.      As you know, Hugo Prince is a 70-year-old male with a past medical history significant for former smoking, morbid obesity (weight 296 pounds), sleep apnea on CPAP therapy, coronary artery disease with NSTEMI status post MICAH to mid LAD (07/2019), who presents for routine followup.      The patient was previously followed by my colleagues, Dr. Zhao, and had last been seen by HAILEY Peralta, on 07/14/2020.  The patient was admitted to [facility] 07/2019 with chest discomfort, troponin peaked at 12.  Echocardiogram demonstrated LVEF 50%-55%, with apical wall hypokinesis.  He underwent a coronary angiogram 07/16/2019 with Dr. Thrasher, that demonstrated left main normal, proximal LAD 60% (iFR 0.91), mid LAD 95%, mid RCA 55% stenosis.  He underwent a PCI with MICAH to the mid LAD.      Subsequent TTE 10/28/2019 demonstrated recovery of LV function, LVEF 60%-65%, no regional wall motion abnormalities were noted.  He was found to have grade I diastolic dysfunction.  When he was last seen by Kaela Zhou 07/2020, he had been doing reasonably well, no changes were warranted to his cardiac regimen.  The patient was previously on atorvastatin 80 mg daily, which was decreased to 40 mg daily due to myalgias.  Last LDL 01/08/2001 was 36.  Blood pressure on clinic visits over the past several months ranges from 110 to 138/56-80 mmHg.      The patient reports that overall he is feeling generally well, though still recovering from a large back surgery 11/2020.  He denies any chest pain, chest pressure, or abnormal shortness of breath.  He denies any symptoms of orthopnea/PND,  denies any specific concerns regarding his cardiac health.      ASSESSMENT, PLAN, RECOMMENDATIONS:     1.  Coronary artery disease, NSTEMI 2019 status post coronary angiography and MICAH to mid LAD.  Residual proximal LAD 60%, mid RCA 55% stenoses.  The patient denies any symptoms concerning for angina.   2.  TTE 10/2019, LVEF 60%-65%, normal wall motion (improved from TTE 2019 demonstrating LVEF 50%-55% with apical hypokinesis).  The patient denies any symptoms concerning for decompensated heart failure.   3.  Dyslipidemia, prior statin intolerance (atorvastatin 80 mg daily).  LDL within goal of less than 70, on atorvastatin 40 mg daily.   4.  Sleep apnea, on CPAP therapy.   5.  Former smoking.   6.  Morbid obesity (weight 296 pounds).      -- Overall, Mr. Montero is in stable cardiovascular health without symptoms concerning for angina or decompensated heart failure.   -- Continue current cardiac regimen of aspirin 81 mg daily, atorvastatin 40 mg daily, lisinopril 5 mg daily, metoprolol tartrate 25 mg b.i.d., sublingual nitroglycerin as needed.   -- Patient has not required sublingual nitroglycerin; however, he is also prescribed vardenafil for ED, advised to not take these medications within 48 hours of each other.   -- Follow up in 1 year with fasting lipids with HAILEY Peralta, and with me a year after that, or sooner as needed.   -- Advised to seek medical care if he develops any significant chest pain, chest pressure, abnormal shortness of breath or any other symptoms that are concerning for him.      Thank you for allowing our team to participate in the care of patient Hugo Montero.  Please do not hesitate to page or call with any questions or concerns.        Juan Moe MD, [facility]  Cardiology  Text Page   2021        D: 2021   T: 2021   MT: ANT      Name:     HUGO MONTERO   MRN:      [id number]        Account:      [id number]   :       1950           Service Date: 02/11/2021      Document: [id number]

## 2021-02-15 ENCOUNTER — OFFICE VISIT (OUTPATIENT)
Dept: FAMILY MEDICINE | Facility: CLINIC | Age: 71
End: 2021-02-15

## 2021-02-15 VITALS
DIASTOLIC BLOOD PRESSURE: 72 MMHG | OXYGEN SATURATION: 95 % | HEART RATE: 66 BPM | WEIGHT: 297 LBS | BODY MASS INDEX: 42.62 KG/M2 | SYSTOLIC BLOOD PRESSURE: 130 MMHG

## 2021-02-15 DIAGNOSIS — E66.01 MORBID OBESITY (H): ICD-10-CM

## 2021-02-15 DIAGNOSIS — R60.0 LOCALIZED EDEMA: ICD-10-CM

## 2021-02-15 DIAGNOSIS — M79.89 LEG SWELLING: ICD-10-CM

## 2021-02-15 DIAGNOSIS — R73.03 PREDIABETES: ICD-10-CM

## 2021-02-15 DIAGNOSIS — L03.115 CELLULITIS OF RIGHT LOWER EXTREMITY: ICD-10-CM

## 2021-02-15 DIAGNOSIS — T14.8XXA WOUND INFECTION: Primary | ICD-10-CM

## 2021-02-15 DIAGNOSIS — L08.9 WOUND INFECTION: Primary | ICD-10-CM

## 2021-02-15 PROCEDURE — 99214 OFFICE O/P EST MOD 30 MIN: CPT | Performed by: FAMILY MEDICINE

## 2021-02-15 RX ORDER — FUROSEMIDE 20 MG
20 TABLET ORAL DAILY
Qty: 7 TABLET | Refills: 0 | Status: SHIPPED | OUTPATIENT
Start: 2021-02-15 | End: 2021-07-12

## 2021-02-15 RX ORDER — CEPHALEXIN 500 MG/1
500 CAPSULE ORAL 3 TIMES DAILY
Qty: 21 CAPSULE | Refills: 0 | Status: SHIPPED | OUTPATIENT
Start: 2021-02-15 | End: 2021-02-22

## 2021-02-15 NOTE — PATIENT INSTRUCTIONS
Take antibiotics and furosemide as prescribed.    Change dressing at least once daily    Recheck 1 week

## 2021-02-15 NOTE — PROGRESS NOTES
"  Jatinder Arias is a 70 year old patient who presents to clinic for evaluation.  Has had very dry skin on legs.  1-2 weeks ago accidentally scratched off thin layer of skin right shin.  Has been using eucerin since then.  Area has become more tender.  Also more red.  No drainage.  No fever or chills.  Has hx of CAD, LE edema, prediabetes and morbid obesity.      Review of Systems   Constitutional, HEENT, cardiovascular, pulmonary, gi and gu systems are negative, except as otherwise noted.      Objective    /72   Pulse 66   Wt 134.7 kg (297 lb)   SpO2 95%   BMI 42.62 kg/m      General: Well appearing, NAD  Skin: irregular shallow wound with healing, tender, mild surrounding erythema and warmth on background of 2+ pitting edema.  MSK: edema as above.  No calf pain.  Psych: normal mood and affect      Media Information       Document Information    Photograph   Right leg    02/15/2021 13:33   Attached To:   Office Visit on 2/15/21 with Clay Luis MD   Source Information    Clay Luis MD   Medical Group         Assessment & Plan     Wound infection  Cont emollients, wound care.  Applied vaseline and dressing.  Start abx.  Elevate legs.  - cephALEXin (KEFLEX) 500 MG capsule; Take 1 capsule (500 mg) by mouth 3 times daily for 7 days    Cellulitis of right lower extremity  See above.  Abx, elevation, wound care, emollients.  Also need to improve edema to help facilitate improvement.  Furosemide x 1 week  - cephALEXin (KEFLEX) 500 MG capsule; Take 1 capsule (500 mg) by mouth 3 times daily for 7 days    Leg swelling  See above, recheck 1 week, sooner if worse  - furosemide (LASIX) 20 MG tablet; Take 1 tablet (20 mg) by mouth daily for 7 days    Morbid obesity (H)    Prediabetes    Localized edema    956}     BMI:   Estimated body mass index is 42.62 kg/m  as calculated from the following:    Height as of 12/28/20: 1.778 m (5' 10\").    Weight as of this encounter: 134.7 kg " (297 lb).   Weight management plan: Discussed healthy diet and exercise guidelines        Return in about 1 week (around 2/22/2021) for Follow Up.    Clay Luis MD  [facility]

## 2021-02-22 ENCOUNTER — OFFICE VISIT (OUTPATIENT)
Dept: FAMILY MEDICINE | Facility: CLINIC | Age: 71
End: 2021-02-22

## 2021-02-22 VITALS — DIASTOLIC BLOOD PRESSURE: 74 MMHG | SYSTOLIC BLOOD PRESSURE: 126 MMHG | HEART RATE: 68 BPM | OXYGEN SATURATION: 97 %

## 2021-02-22 DIAGNOSIS — L08.9 WOUND INFECTION: Primary | ICD-10-CM

## 2021-02-22 DIAGNOSIS — E66.01 MORBID OBESITY (H): ICD-10-CM

## 2021-02-22 DIAGNOSIS — T14.8XXA WOUND INFECTION: Primary | ICD-10-CM

## 2021-02-22 DIAGNOSIS — R60.0 LOCALIZED EDEMA: ICD-10-CM

## 2021-02-22 PROCEDURE — 99213 OFFICE O/P EST LOW 20 MIN: CPT | Performed by: FAMILY MEDICINE

## 2021-02-22 NOTE — PROGRESS NOTES
Jatinder Arias is a 70 year old patient who presents to clinic for follow up of his leg infection.  Seen 2/15 and started on cephalexin.  Wound healing and looks significantly better.  Swelling better.  No drainage.  No other new concerns.      Review of Systems   Constitutional, HEENT, cardiovascular, pulmonary, gi and gu systems are negative, except as otherwise noted.      Objective    /74   Pulse 68   SpO2 97%     General: Well appearing, NAD  Skin: healing wound RLE with no surround erythema or warmth.    Psych: normal mood and affect    Media Information       Document Information    Photograph      02/22/2021 13:58   Attached To:   Office Visit on 2/22/21 with Clay Luis MD   Source Information    Clay Luis MD   Medical Group           Assessment & Plan     Wound infection  Improved, cont aquaphor, elevation, monitoring.  Follow up if worsens.    Morbid obesity (H)  Continue efforts at weight loss and improving leg edema    Localized edema  See above.          No follow-ups on file.    Clay Luis MD  [facility]

## 2021-03-04 ENCOUNTER — TRANSFERRED RECORDS (OUTPATIENT)
Dept: FAMILY MEDICINE | Facility: CLINIC | Age: 71
End: 2021-03-04

## 2021-03-10 ENCOUNTER — TRANSFERRED RECORDS (OUTPATIENT)
Dept: FAMILY MEDICINE | Facility: CLINIC | Age: 71
End: 2021-03-10

## 2021-03-22 ENCOUNTER — TRANSFERRED RECORDS (OUTPATIENT)
Dept: FAMILY MEDICINE | Facility: CLINIC | Age: 71
End: 2021-03-22

## 2021-03-26 ENCOUNTER — TRANSFERRED RECORDS (OUTPATIENT)
Dept: FAMILY MEDICINE | Facility: CLINIC | Age: 71
End: 2021-03-26

## 2021-03-30 DIAGNOSIS — N20.0 KIDNEY STONE: Primary | ICD-10-CM

## 2021-04-01 DIAGNOSIS — N20.0 KIDNEY STONE: ICD-10-CM

## 2021-04-01 PROCEDURE — 99000 SPECIMEN HANDLING OFFICE-LAB: CPT | Performed by: UROLOGY

## 2021-04-01 PROCEDURE — 82365 CALCULUS SPECTROSCOPY: CPT | Mod: 90 | Performed by: UROLOGY

## 2021-04-05 LAB
APPEARANCE STONE: NORMAL
COMPN STONE: NORMAL
NUMBER STONE: 3
SIZE STONE: NORMAL MM
WT STONE: 674 MG

## 2021-04-09 ENCOUNTER — TRANSFERRED RECORDS (OUTPATIENT)
Dept: FAMILY MEDICINE | Facility: CLINIC | Age: 71
End: 2021-04-09

## 2021-04-26 ENCOUNTER — HOSPITAL ENCOUNTER (OUTPATIENT)
Dept: GENERAL RADIOLOGY | Facility: CLINIC | Age: 71
Discharge: HOME | End: 2021-04-26
Attending: UROLOGY | Admitting: UROLOGY
Payer: MEDICARE

## 2021-04-26 DIAGNOSIS — N20.0 KIDNEY STONE: ICD-10-CM

## 2021-04-26 PROCEDURE — 74019 RADEX ABDOMEN 2 VIEWS: CPT

## 2021-04-28 ENCOUNTER — TRANSFERRED RECORDS (OUTPATIENT)
Dept: FAMILY MEDICINE | Facility: CLINIC | Age: 71
End: 2021-04-28

## 2021-04-29 ENCOUNTER — VIRTUAL VISIT (OUTPATIENT)
Dept: UROLOGY | Facility: CLINIC | Age: 71
End: 2021-04-29
Payer: MEDICARE

## 2021-04-29 VITALS — HEIGHT: 70 IN | BODY MASS INDEX: 42.52 KG/M2 | WEIGHT: 297 LBS

## 2021-04-29 DIAGNOSIS — N20.0 CALCULUS OF KIDNEY: Primary | ICD-10-CM

## 2021-04-29 PROCEDURE — 99213 OFFICE O/P EST LOW 20 MIN: CPT | Mod: 95 | Performed by: UROLOGY

## 2021-04-29 ASSESSMENT — MIFFLIN-ST. JEOR: SCORE: 2098.43

## 2021-04-29 ASSESSMENT — PAIN SCALES - GENERAL: PAINLEVEL: MODERATE PAIN (4)

## 2021-04-29 NOTE — LETTER
4/29/2021       RE: Hugo Prince  [address]  [facility] [id number]     Dear Colleague,    Thank you for referring your patient, Hugo Prince, to the [facility] at [facility]. Please see a copy of my visit note below.    SEND LINK TO CELL PHONE*    PT HAD BACK SURGERY 6 MONTHS AGO    Hugo is a 71 year old who is being evaluated via a billable video visit.      How would you like to obtain your AVS? MyChart  If the video visit is dropped, the invitation should be resent by: Text to cell phone: [phone]  Will anyone else be joining your video visit? No        It is a great pleasure to have a follow-up video consultation with this very pleasant 71-year-old gentleman.    As we recall, he has a significant clinically challenging situation.  He has a history of recurrent urinary stone disease having had lithotripsy in the past on a number of occasions.  Previously, within the last few months he had been seen because of a significant stone burden in each kidney but without pain.  He has also has a history of major problems with the lumbar spine having had previous spinal fusion with ongoing pain and sciatica.  We had evaluated him closely and determined that he had a high stone burden in the right kidney almost all in the lower pole calyx.  There was a lesser volume in the left kidney and almost all of that at that time was in the lower pole calyx.  On top of that he was over 300 pounds which precluded doing ESWL present sometime although he has an active lifestyle.  We had decided at that point to proceed cautiously, to consider a percutaneous nephrolithotomy of the stones in the right kidney, and perhaps, if he could lose weight to a level where we could do ESWL to consider ESWL for the stones in the left kidney.  Recently however he suddenly developed chest pain, was evaluated in the coronary unit here and a  cardiovascular stent was placed as treatment for occlusive coronary artery disease and is now on anticoagulants.  He was also seen at that time by them ourselves because a CT scan had been done while in the hospital was showing that the stone in the left kidney had migrated from the lower calyx into the region of the left ureteropelvic junction  We decided after careful evaluation at that time that we should be conservative.  His symptoms have subsided, and he then had a stent placed and was on anticoagulants which would last for a year.  He completed that 1 year course of treatment about 3 months ago and is now taking aspirin only.  Spinal fusion surgery and is still getting quite a significant amount of back pain.  More recently he did pass several stones which were fairly small.    I have repeated a KUB recently.  ABDOMEN ONE VIEW April 26, 2021 9:49 AM     HISTORY: Kidney stone.     COMPARISON: September 28, 2020.                                                                      IMPRESSION: Unremarkable bowel gas pattern. 1.6 cm stone on the right  is unchanged. Stones on the left measuring 1.2, 1.2, and 1.1 cm  evident on the left. Prostatic calcifications evident.     DEEPTI TRIPATHI MD    I have carefully reviewed these films and compared them to previous films.  This seems very little difference now in the stones in the left renal calyces or in the lower calyx of the right kidney.  The size is certainly comparable to that which was 6 months ago  Interestingly however if I compared the films from 6-month ago and now there was indeed a cluster of stones which initially I thought was in the prostate which in fact must to be in the bladder and a cluster now which is very much smaller.  So I must assume that the stones he passed actually came from the bladder.  At this point he is very reluctant to have the treatment of the stones in the kidney and as we have discussed at length before this would be a  "significant challenge.  My conclusion was that for treatment that to be would be the most likely to clear the stones in the kidney he would benefit from percutaneous nephrolithotomy.  His size would make this a significant challenge.  My conclusion therefore the moment is that in the absence of really any symptoms related to the stones in the kidney and no very significant change in the size of the stones we can continue observation.  He is very happy with this plan.    I therefore went over the entire situation and the plan in detail with him today..  We did have a wide-ranging discussion about the management of his particular situation and the various options for therapy  I reviewed all current and previous records labs and pertinent radiologic studies.    Plan.  6 months for KUB and examination    Consultation, including video time, review of records labs radiologic studies discussions, review of differential therapeutic options and decision making in the addition to things noted above.  25 minutes    \"This dictation was performed with voice recognition software and may contain errors,  omissions and inadvertent word substitution.\"    I addressed and answered many questions    Dima Desai MD      "

## 2021-04-29 NOTE — PROGRESS NOTES
SEND LINK TO CELL PHONE*    PT HAD BACK SURGERY 6 MONTHS AGO    Hugo is a 71 year old who is being evaluated via a billable video visit.      How would you like to obtain your AVS? MyChart  If the video visit is dropped, the invitation should be resent by: Text to cell phone: [phone]  Will anyone else be joining your video visit? No        It is a great pleasure to have a follow-up video consultation with this very pleasant 71-year-old gentleman.    As we recall, he has a significant clinically challenging situation.  He has a history of recurrent urinary stone disease having had lithotripsy in the past on a number of occasions.  Previously, within the last few months he had been seen because of a significant stone burden in each kidney but without pain.  He has also has a history of major problems with the lumbar spine having had previous spinal fusion with ongoing pain and sciatica.  We had evaluated him closely and determined that he had a high stone burden in the right kidney almost all in the lower pole calyx.  There was a lesser volume in the left kidney and almost all of that at that time was in the lower pole calyx.  On top of that he was over 300 pounds which precluded doing ESWL present sometime although he has an active lifestyle.  We had decided at that point to proceed cautiously, to consider a percutaneous nephrolithotomy of the stones in the right kidney, and perhaps, if he could lose weight to a level where we could do ESWL to consider ESWL for the stones in the left kidney.  Recently however he suddenly developed chest pain, was evaluated in the coronary unit here and a cardiovascular stent was placed as treatment for occlusive coronary artery disease and is now on anticoagulants.  He was also seen at that time by them ourselves because a CT scan had been done while in the hospital was showing that the stone in the left kidney had migrated from the lower calyx into the region of the left  ureteropelvic junction  We decided after careful evaluation at that time that we should be conservative.  His symptoms have subsided, and he then had a stent placed and was on anticoagulants which would last for a year.  He completed that 1 year course of treatment about 3 months ago and is now taking aspirin only.  Spinal fusion surgery and is still getting quite a significant amount of back pain.  More recently he did pass several stones which were fairly small.    I have repeated a KUB recently.  ABDOMEN ONE VIEW April 26, 2021 9:49 AM     HISTORY: Kidney stone.     COMPARISON: September 28, 2020.                                                                      IMPRESSION: Unremarkable bowel gas pattern. 1.6 cm stone on the right  is unchanged. Stones on the left measuring 1.2, 1.2, and 1.1 cm  evident on the left. Prostatic calcifications evident.     DEEPTI TRIPATHI MD    I have carefully reviewed these films and compared them to previous films.  This seems very little difference now in the stones in the left renal calyces or in the lower calyx of the right kidney.  The size is certainly comparable to that which was 6 months ago  Interestingly however if I compared the films from 6-month ago and now there was indeed a cluster of stones which initially I thought was in the prostate which in fact must to be in the bladder and a cluster now which is very much smaller.  So I must assume that the stones he passed actually came from the bladder.  At this point he is very reluctant to have the treatment of the stones in the kidney and as we have discussed at length before this would be a significant challenge.  My conclusion was that for treatment that to be would be the most likely to clear the stones in the kidney he would benefit from percutaneous nephrolithotomy.  His size would make this a significant challenge.  My conclusion therefore the moment is that in the absence of really any symptoms related to the  "stones in the kidney and no very significant change in the size of the stones we can continue observation.  He is very happy with this plan.    I therefore went over the entire situation and the plan in detail with him today..  We did have a wide-ranging discussion about the management of his particular situation and the various options for therapy  I reviewed all current and previous records labs and pertinent radiologic studies.    Plan.  6 months for KUB and examination    Consultation, including video time, review of records labs radiologic studies discussions, review of differential therapeutic options and decision making in the addition to things noted above.  25 minutes    \"This dictation was performed with voice recognition software and may contain errors,  omissions and inadvertent word substitution.\"    I addressed and answered many questions      "

## 2021-05-11 ENCOUNTER — TRANSFERRED RECORDS (OUTPATIENT)
Dept: FAMILY MEDICINE | Facility: CLINIC | Age: 71
End: 2021-05-11

## 2021-05-21 ENCOUNTER — TRANSFERRED RECORDS (OUTPATIENT)
Dept: FAMILY MEDICINE | Facility: CLINIC | Age: 71
End: 2021-05-21

## 2021-05-26 NOTE — TELEPHONE ENCOUNTER
Flonase--Last visit 03-23-16   Quality 431: Preventive Care And Screening: Unhealthy Alcohol Use - Screening: Patient screened for unhealthy alcohol use using a single question and scores less than 2 times per year Detail Level: Detailed Quality 47: Advance Care Plan: Advance Care Planning discussed and documented; advance care plan or surrogate decision maker documented in the medical record. Quality 226: Preventive Care And Screening: Tobacco Use: Screening And Cessation Intervention: Patient screened for tobacco use and is an ex/non-smoker Quality 130: Documentation Of Current Medications In The Medical Record: Current Medications Documented

## 2021-06-01 ENCOUNTER — TRANSFERRED RECORDS (OUTPATIENT)
Dept: MULTI SPECIALTY CLINIC | Facility: CLINIC | Age: 71
End: 2021-06-01
Payer: MEDICARE

## 2021-06-01 LAB — RETINOPATHY: NORMAL

## 2021-06-08 ENCOUNTER — TRANSFERRED RECORDS (OUTPATIENT)
Dept: FAMILY MEDICINE | Facility: CLINIC | Age: 71
End: 2021-06-08

## 2021-07-12 ENCOUNTER — OFFICE VISIT (OUTPATIENT)
Dept: FAMILY MEDICINE | Facility: CLINIC | Age: 71
End: 2021-07-12

## 2021-07-12 VITALS
RESPIRATION RATE: 16 BRPM | OXYGEN SATURATION: 93 % | SYSTOLIC BLOOD PRESSURE: 134 MMHG | HEART RATE: 71 BPM | TEMPERATURE: 96.7 F | BODY MASS INDEX: 43.76 KG/M2 | WEIGHT: 305 LBS | DIASTOLIC BLOOD PRESSURE: 59 MMHG

## 2021-07-12 DIAGNOSIS — R60.0 PERIPHERAL EDEMA: Primary | ICD-10-CM

## 2021-07-12 DIAGNOSIS — E66.01 MORBID OBESITY (H): ICD-10-CM

## 2021-07-12 DIAGNOSIS — R03.0 ELEVATED BLOOD PRESSURE READING WITHOUT DIAGNOSIS OF HYPERTENSION: ICD-10-CM

## 2021-07-12 PROCEDURE — 99214 OFFICE O/P EST MOD 30 MIN: CPT | Performed by: FAMILY MEDICINE

## 2021-07-12 PROCEDURE — 93050 ART PRESSURE WAVEFORM ANALYS: CPT | Performed by: FAMILY MEDICINE

## 2021-07-12 NOTE — PROGRESS NOTES
Jatinder Arias is a 71 year old patient who presents to clinic for evaluation.  Reports bilateral leg swelling since spine surgery last year.  Has not been much worse lately but mild erythema bilaterally.  His sister is a nurse practitioner and advised he get checked for arterial insufficiency.  No leg pain, pallor, temperature changes.  Normal sensation.        Review of Systems   Constitutional, HEENT, cardiovascular, pulmonary, GI, , musculoskeletal, neuro, skin, endocrine and psych systems are negative, except as otherwise noted.      Objective    /59   Pulse 71   Temp 96.7  F (35.9  C) (Temporal)   Resp 16   Wt 138.3 kg (305 lb)   SpO2 93%   BMI 43.76 kg/m      General: Well appearing, NAD  MSK: there is 1+ pitting edema to knees bilaterally.  Minimal diffuse erythema without warmth.  No open lesions or wounds.  nontender.  Psych: normal mood and affect        Assessment & Plan     Peripheral edema  Reassurance, asymptomatic.  Likely venous insufficiency.  Declines compression, furosemide.  Follow up if worse.    Elevated blood pressure reading without diagnosis of hypertension  At goal  - AL ART PRESS WAVEFORM ANALYS CENTRAL ART PRESSURE        No follow-ups on file.    Clay Luis MD  [facility]

## 2021-08-13 ENCOUNTER — TRANSFERRED RECORDS (OUTPATIENT)
Dept: FAMILY MEDICINE | Facility: CLINIC | Age: 71
End: 2021-08-13

## 2021-08-18 ENCOUNTER — TELEPHONE (OUTPATIENT)
Dept: FAMILY MEDICINE | Facility: CLINIC | Age: 71
End: 2021-08-18

## 2021-08-18 DIAGNOSIS — R60.0 LOWER EXTREMITY EDEMA: Primary | ICD-10-CM

## 2021-08-18 RX ORDER — FUROSEMIDE 20 MG
10 TABLET ORAL EVERY MORNING
Qty: 15 TABLET | Refills: 0 | Status: SHIPPED | OUTPATIENT
Start: 2021-08-18 | End: 2022-02-14

## 2021-08-18 NOTE — TELEPHONE ENCOUNTER
Patient calls reporting lower extremity edema persists and would like to proceed with furosemide as discussed at 7/12/21 visit. Patient had declined treatment at the time with compression or furosemide. Furosemide has worked for him in past.   Plan: per Dr. Luis- Let him know compression remains the safest option but he did not want to do that previously. Start with furosemide 10 mg daily and will uptitrate if needed.  Should have visit/BMP in 2-3 weeks to ensure no decrease in renal fxn and reassess.  Left message on patient's voicemail with Dr. Luis's recommendations. Rx sent to pharmacy. Patient to call to schedule follow up visit with Dr. Luis in 2-3 weeks. Strongly encouraged compression stockings. Future BMP order placed.  Sherry Case RN

## 2021-09-04 ENCOUNTER — HEALTH MAINTENANCE LETTER (OUTPATIENT)
Age: 71
End: 2021-09-04

## 2021-10-20 ENCOUNTER — OFFICE VISIT (OUTPATIENT)
Dept: UROLOGY | Facility: CLINIC | Age: 71
End: 2021-10-20
Payer: MEDICARE

## 2021-10-20 ENCOUNTER — HOSPITAL ENCOUNTER (OUTPATIENT)
Dept: GENERAL RADIOLOGY | Facility: CLINIC | Age: 71
Discharge: HOME | End: 2021-10-20
Attending: UROLOGY | Admitting: UROLOGY
Payer: MEDICARE

## 2021-10-20 VITALS
WEIGHT: 300 LBS | SYSTOLIC BLOOD PRESSURE: 136 MMHG | HEART RATE: 72 BPM | HEIGHT: 70 IN | BODY MASS INDEX: 42.95 KG/M2 | DIASTOLIC BLOOD PRESSURE: 70 MMHG | OXYGEN SATURATION: 100 %

## 2021-10-20 DIAGNOSIS — N20.0 CALCULUS OF KIDNEY: Primary | ICD-10-CM

## 2021-10-20 DIAGNOSIS — N20.0 CALCULUS OF KIDNEY: ICD-10-CM

## 2021-10-20 DIAGNOSIS — N20.0 BILATERAL NEPHROLITHIASIS: ICD-10-CM

## 2021-10-20 LAB
ALBUMIN UR-MCNC: NEGATIVE MG/DL
APPEARANCE UR: CLEAR
BILIRUB UR QL STRIP: NEGATIVE
COLOR UR AUTO: YELLOW
GLUCOSE UR STRIP-MCNC: NEGATIVE MG/DL
HGB UR QL STRIP: ABNORMAL
KETONES UR STRIP-MCNC: NEGATIVE MG/DL
LEUKOCYTE ESTERASE UR QL STRIP: ABNORMAL
NITRATE UR QL: NEGATIVE
PH UR STRIP: 5.5 [PH] (ref 5–7)
SP GR UR STRIP: 1.02 (ref 1–1.03)
UROBILINOGEN UR STRIP-ACNC: 1 E.U./DL

## 2021-10-20 PROCEDURE — 81003 URINALYSIS AUTO W/O SCOPE: CPT | Performed by: UROLOGY

## 2021-10-20 PROCEDURE — 99214 OFFICE O/P EST MOD 30 MIN: CPT | Performed by: UROLOGY

## 2021-10-20 PROCEDURE — 74019 RADEX ABDOMEN 2 VIEWS: CPT

## 2021-10-20 ASSESSMENT — MIFFLIN-ST. JEOR: SCORE: 2112.04

## 2021-10-20 ASSESSMENT — PAIN SCALES - GENERAL: PAINLEVEL: NO PAIN (0)

## 2021-10-20 NOTE — PROGRESS NOTES
"[facility]  CHIEF COMPLAINT   It was my pleasure to see Hugo Prince who is a 71 year old male for follow-up of NEPHROLITHIASIS.      HPI   Hugo Prince is a very pleasant 71 year old male   Prior Dr. Desai patient - last seen 4/29/21:  \"As we recall, he has a significant clinically challenging situation.  He has a history of recurrent urinary stone disease having had lithotripsy in the past on a number of occasions.  Previously, within the last few months he had been seen because of a significant stone burden in each kidney but without pain.  He has also has a history of major problems with the lumbar spine having had previous spinal fusion with ongoing pain and sciatica.  We had evaluated him closely and determined that he had a high stone burden in the right kidney almost all in the lower pole calyx.  There was a lesser volume in the left kidney and almost all of that at that time was in the lower pole calyx.  On top of that he was over 300 pounds which precluded doing ESWL present sometime although he has an active lifestyle.  We had decided at that point to proceed cautiously, to consider a percutaneous nephrolithotomy of the stones in the right kidney, and perhaps, if he could lose weight to a level where we could do ESWL to consider ESWL for the stones in the left kidney.  Recently however he suddenly developed chest pain, was evaluated in the coronary unit here and a cardiovascular stent was placed as treatment for occlusive coronary artery disease and is now on anticoagulants.  He was also seen at that time by them ourselves because a CT scan had been done while in the hospital was showing that the stone in the left kidney had migrated from the lower calyx into the region of the left ureteropelvic junction  We decided after careful evaluation at that time that we should be conservative.  His symptoms have subsided, and he then had a stent placed and was on anticoagulants which would last " "for a year.  He completed that 1 year course of treatment about 3 months ago and is now taking aspirin only.  Spinal fusion surgery and is still getting quite a significant amount of back pain.  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  Body mass index is 43.05 kg/m .  General Appearance Adult:   Alert, no acute distress, oriented  HENT: throat/mouth:normal, good dentition  Lungs: no respiratory distress, or pursed lip breathing  Heart: No obvious jugular venous distension present  Abdomen: soft, nontender, no organomegaly or masses, OBESE  Musculoskeltal: extremities normal, no peripheral edema  Skin: no suspicious lesions or rashes  Neuro: Alert, oriented, speech and mentation normal  Psych: affect and mood normal  Gait: Normal    UA RESULTS:  Recent Labs   Lab Test 10/20/21  1451 07/22/19  1351 07/15/19  0540   COLOR Yellow   < > Yellow   APPEARANCE Clear   < > Clear   URINEGLC Negative   < > Negative   URINEBILI Negative   < > Negative   URINEKETONE Negative   < > Negative   SG 1.025   < > 1.015   UBLD Trace*   < > Moderate*   URINEPH 5.5   < > 5.5   PROTEIN Negative   < > 10*   UROBILINOGEN 1.0   < >  --    NITRITE Negative   < > Negative   LEUKEST Trace*   < > Negative   RBCU  --   --  8*   WBCU  --   --  1    < > = values in this interval not displayed.      Creatinine   Date Value Ref Range Status   12/07/2020 1.05 0.76 - 1.27 mg/dL Final      IMAGING:  All pertinent imaging reviewed:    All imaging studies reviewed by me.  I personally reviewed these imaging films.  A formal report from radiology will " follow.    CT ABD/PEL 7/15/2019:  FINDINGS:     Chest: Evaluation of the pulmonary arterial system shows no evidence  of embolus. There is no aortic aneurysm or dissection. The heart size  is normal. There is no mediastinal, hilar or axillary lymph node  enlargement. There is a small calcified granuloma in the right lung  base posteriorly. Mild dependent atelectasis bilaterally. No  pneumothorax or pleural effusion.     ABDOMEN: There is diffuse fatty infiltration of the liver. The spleen,  gallbladder, pancreas and adrenal glands are normal in appearance.  There is a 0.8 cm stone at the left ureteropelvic junction causing  mild dilatation of the left renal collecting system. There are several  stones within both kidneys. There is no abdominal or pelvic lymph node  enlargement.     Pelvis: There is no bowel obstruction or inflammation. No free  intraperitoneal gas or fluid. Postoperative changes in the lumbar  spine. Degenerative disease throughout the spine.                                                                      IMPRESSION:  1. There is no pulmonary embolus, aortic aneurysm or dissection.  2. There is a 0.8 cm left ureteropelvic junction stone causing mild  hydronephrosis.   3. Multiple bilateral renal stones.   4. Fatty infiltration of the liver.     KUB 4/26/21:  IMPRESSION: Unremarkable bowel gas pattern. 1.6 cm stone on the right  is unchanged. Stones on the left measuring 1.2, 1.2, and 1.1 cm  evident on the left. Prostatic calcifications evident.    KUB 10/20/21:  Report pending I reviewed the images personally and the stones appear similar in size to his last KUB    ASSESSMENT and PLAN  71-year-old man with recurrent bilateral nephrolithiasis with significant medical history of CAD, morbid obesity, and hypertension    Recurrent bilateral nephrolithiasis  -I reviewed his prior imaging including his CT scan from 2019, his most recent KUB from April and his KUB from today.  I reviewed the report  available and agree with radiologist interpretation.  His KUB from today has not yet been reported, however I compared this [id number] with his April KUB and the stones appear very similar in size.  The left kidney stones seem to have shifted slightly  -We discussed the options would be continued observation versus consideration for intervention.  For intervention the left-sided stones would require a PCNL, and the right-sided large stone would also be best served by a PCNL.  He is not a good candidate for ESWL.  -He is not interested in surgery at this time  -We will plan for follow-up in 6 months in our office with HAILEY Wilcox with a repeat KUB prior      Time spent: 20 minutes spent on the date of the encounter doing chart review, history and exam, documentation and further activities as noted above.    Ariel Ochoa MD   Urology  [facility]  [facility] Phone: [phone]  [facility] Phone: [phone]

## 2021-10-20 NOTE — LETTER
"10/20/2021       RE: Hugo Prince  [address]  [facility] [address]     Dear Colleague,    Thank you for referring your patient, Hugo Prince, to the [facility] [place] at [facility]. Please see a copy of my visit note below.    [place]  CHIEF COMPLAINT   It was my pleasure to see Hugo Prince who is a 71 year old male for follow-up of NEPHROLITHIASIS.      HPI   Hugo Prince is a very pleasant 71 year old male   Prior Dr. Desai patient - last seen 4/29/21:  \"As we recall, he has a significant clinically challenging situation.  He has a history of recurrent urinary stone disease having had lithotripsy in the past on a number of occasions.  Previously, within the last few months he had been seen because of a significant stone burden in each kidney but without pain.  He has also has a history of major problems with the lumbar spine having had previous spinal fusion with ongoing pain and sciatica.  We had evaluated him closely and determined that he had a high stone burden in the right kidney almost all in the lower pole calyx.  There was a lesser volume in the left kidney and almost all of that at that time was in the lower pole calyx.  On top of that he was over 300 pounds which precluded doing ESWL present sometime although he has an active lifestyle.  We had decided at that point to proceed cautiously, to consider a percutaneous nephrolithotomy of the stones in the right kidney, and perhaps, if he could lose weight to a level where we could do ESWL to consider ESWL for the stones in the left kidney.  Recently however he suddenly developed chest pain, was evaluated in the coronary unit here and a cardiovascular stent was placed as treatment for occlusive coronary artery disease and is now on anticoagulants.  He was also seen at that time by them ourselves because a CT scan had been done while in the " "hospital was showing that the stone in the left kidney had migrated from the lower calyx into the region of the left ureteropelvic junction  We decided after careful evaluation at that time that we should be conservative.  His symptoms have subsided, and he then had a stent placed and was on anticoagulants which would last for a year.  He completed that 1 year course of treatment about 3 months ago and is now taking aspirin only.  Spinal fusion surgery and is still getting quite a significant amount of back pain.  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  Body mass index is 43.05 kg/m .  General Appearance Adult:   Alert, no acute distress, oriented  HENT: throat/mouth:normal, good dentition  Lungs: no respiratory distress, or pursed lip breathing  Heart: No obvious jugular venous distension present  Abdomen: soft, nontender, no organomegaly or masses, OBESE  Musculoskeltal: extremities normal, no peripheral edema  Skin: no suspicious lesions or rashes  Neuro: Alert, oriented, speech and mentation normal  Psych: affect and mood normal  Gait: Normal    UA RESULTS:  Recent Labs   Lab Test 10/20/21  1451 07/22/19  1351 07/15/19  0540   COLOR Yellow   < > Yellow   APPEARANCE Clear   < > Clear   URINEGLC Negative   < > Negative   URINEBILI Negative   < > Negative   URINEKETONE Negative   < > Negative   SG 1.025   < > 1.015   UBLD Trace*   < > Moderate*   URINEPH 5.5   < > 5.5   PROTEIN Negative   < > 10*   UROBILINOGEN 1.0   < >  --    NITRITE Negative   < > Negative   LEUKEST Trace*   < > Negative   RBCU  --   --  8*   WBCU "  --   --  1    < > = values in this interval not displayed.      Creatinine   Date Value Ref Range Status   12/07/2020 1.05 0.76 - 1.27 mg/dL Final      IMAGING:  All pertinent imaging reviewed:    All imaging studies reviewed by me.  I personally reviewed these imaging films.  A formal report from radiology will follow.    CT ABD/PEL 7/15/2019:  FINDINGS:     Chest: Evaluation of the pulmonary arterial system shows no evidence  of embolus. There is no aortic aneurysm or dissection. The heart size  is normal. There is no mediastinal, hilar or axillary lymph node  enlargement. There is a small calcified granuloma in the right lung  base posteriorly. Mild dependent atelectasis bilaterally. No  pneumothorax or pleural effusion.     ABDOMEN: There is diffuse fatty infiltration of the liver. The spleen,  gallbladder, pancreas and adrenal glands are normal in appearance.  There is a 0.8 cm stone at the left ureteropelvic junction causing  mild dilatation of the left renal collecting system. There are several  stones within both kidneys. There is no abdominal or pelvic lymph node  enlargement.     Pelvis: There is no bowel obstruction or inflammation. No free  intraperitoneal gas or fluid. Postoperative changes in the lumbar  spine. Degenerative disease throughout the spine.                                                                      IMPRESSION:  1. There is no pulmonary embolus, aortic aneurysm or dissection.  2. There is a 0.8 cm left ureteropelvic junction stone causing mild  hydronephrosis.   3. Multiple bilateral renal stones.   4. Fatty infiltration of the liver.     KUB 4/26/21:  IMPRESSION: Unremarkable bowel gas pattern. 1.6 cm stone on the right  is unchanged. Stones on the left measuring 1.2, 1.2, and 1.1 cm  evident on the left. Prostatic calcifications evident.    KUB 10/20/21:  Report pending I reviewed the images personally and the stones appear similar in size to his last KUB    ASSESSMENT and  PLAN  71-year-old man with recurrent bilateral nephrolithiasis with significant medical history of CAD, morbid obesity, and hypertension    Recurrent bilateral nephrolithiasis  -I reviewed his prior imaging including his CT scan from 2019, his most recent KUB from April and his KUB from today.  I reviewed the report available and agree with radiologist interpretation.  His KUB from today has not yet been reported, however I compared this [id number] with his April KUB and the stones appear very similar in size.  The left kidney stones seem to have shifted slightly  -We discussed the options would be continued observation versus consideration for intervention.  For intervention the left-sided stones would require a PCNL, and the right-sided large stone would also be best served by a PCNL.  He is not a good candidate for ESWL.  -He is not interested in surgery at this time  -We will plan for follow-up in 6 months in our office with HAILEY Wilcox with a repeat KUB prior      Time spent: 20 minutes spent on the date of the encounter doing chart review, history and exam, documentation and further activities as noted above.    Ariel Ochoa MD   Urology  [facility]  [facility] Phone: [phone]  [facility] Phone: [phone]

## 2021-10-20 NOTE — NURSING NOTE
"Chief Complaint   Patient presents with     Kidney Stone Related     Patient here today to go over 6 month KUB recheck, new patient to Dr Ochoa       Blood pressure 136/70, pulse 72, height 1.778 m (5' 10\"), weight 136.1 kg (300 lb), SpO2 100 %. Body mass index is 43.05 kg/m .    Patient Active Problem List   Diagnosis     Recurrent nephrolithiasis     RAY (obstructive sleep apnea)     ED (erectile dysfunction)     Health Care Home     GERD (gastroesophageal reflux disease)     S/P total knee replacement     Dermatitis     Physical deconditioning     DJD (degenerative joint disease) of knee     Edema     Chronic pharyngitis and nasopharyngitis(472)     Morbid obesity (H)     NSTEMI (non-ST elevated myocardial infarction) (H)     CAD (coronary artery disease)     Dyslipidemia     Prediabetes       Allergies   Allergen Reactions     Chlorthalidone GI Disturbance     Compazine Other (See Comments)     PSYCHOTIC     Erythromycin Nausea and Vomiting     Flomax [Tamsulosin Hcl]      FLUSHING, NASAl CONGESTION     Prochlorperazine      LW Reaction: anxiety     Seasonal Allergies        Current Outpatient Medications   Medication Sig Dispense Refill     acetaminophen (TYLENOL) 325 MG tablet Take 2 tablets (650 mg) by mouth every 4 hours as needed for mild pain       aspirin (ASA) 81 MG EC tablet Take 1 tablet (81 mg) by mouth daily       atorvastatin (LIPITOR) 40 MG tablet Take 1 tablet (40 mg) by mouth daily 90 tablet 11     lisinopril (ZESTRIL) 5 MG tablet Take 1 tablet (5 mg) by mouth daily 90 tablet 11     metoprolol tartrate (LOPRESSOR) 25 MG tablet Take 1 tablet (25 mg) by mouth 2 times daily 180 tablet 11     order for DME Equipment being ordered: CPAP       vitamin B-12 (CYANOCOBALAMIN) 1000 MCG tablet Take 1,000 mcg by mouth daily       furosemide (LASIX) 20 MG tablet Take 0.5 tablets (10 mg) by mouth every morning 15 tablet 0     nitroGLYcerin (NITROSTAT) 0.4 MG sublingual tablet As needed for chest pain place 1 " tablet under the tongue every 5 minutes for 3 doses. If symptoms persist 5 minutes after 1st dose call 911. (Patient not taking: Reported on 7/12/2021) 25 tablet 11     vardenafil (LEVITRA) 10 MG tablet Take 1 tablet (10 mg) by mouth daily as needed (ED) Do not take medication with Nitroglycerin (Patient not taking: Reported on 4/29/2021) 12 tablet 0       Social History     Tobacco Use     Smoking status: Former Smoker     Types: Cigars     Smokeless tobacco: Never Used     Tobacco comment: has cigars when it is warm out   Substance Use Topics     Alcohol use: Yes     Alcohol/week: 11.7 standard drinks     Types: 14 Standard drinks or equivalent per week     Comment: 1-2 drinks day/beer and scotch     Drug use: No       UA RESULTS:  Recent Labs   Lab Test 10/20/21  1451 07/22/19  1351 07/15/19  0540   COLOR Yellow   < > Yellow   APPEARANCE Clear   < > Clear   URINEGLC Negative   < > Negative   URINEBILI Negative   < > Negative   URINEKETONE Negative   < > Negative   SG 1.025   < > 1.015   UBLD Trace*   < > Moderate*   URINEPH 5.5   < > 5.5   PROTEIN Negative   < > 10*   UROBILINOGEN 1.0   < >  --    NITRITE Negative   < > Negative   LEUKEST Trace*   < > Negative   RBCU  --   --  8*   WBCU  --   --  1    < > = values in this interval not displayed.         Dee Hoover CJ  10/20/2021  3:19 PM

## 2021-11-03 ENCOUNTER — TRANSFERRED RECORDS (OUTPATIENT)
Dept: FAMILY MEDICINE | Facility: CLINIC | Age: 71
End: 2021-11-03

## 2021-12-17 ENCOUNTER — VIRTUAL VISIT (OUTPATIENT)
Dept: FAMILY MEDICINE | Facility: CLINIC | Age: 71
End: 2021-12-17

## 2021-12-17 DIAGNOSIS — J01.90 ACUTE NON-RECURRENT SINUSITIS, UNSPECIFIED LOCATION: ICD-10-CM

## 2021-12-17 DIAGNOSIS — R05.9 COUGH: Primary | ICD-10-CM

## 2021-12-17 PROCEDURE — 99213 OFFICE O/P EST LOW 20 MIN: CPT | Mod: GT | Performed by: NURSE PRACTITIONER

## 2021-12-17 RX ORDER — DOXYCYCLINE 100 MG/1
100 CAPSULE ORAL 2 TIMES DAILY
Qty: 20 CAPSULE | Refills: 0 | Status: SHIPPED | OUTPATIENT
Start: 2021-12-17 | End: 2021-12-27

## 2021-12-17 NOTE — PATIENT INSTRUCTIONS
Doxycycline 1 tab 2 times daily for 30 days  - can cause sun sensitivity  - stay upright after taking it x 1 hr, or you can get a little acid reflux  - don't swallow w/ dairy     Mucinex 2 times daily as needed to help thin mucus

## 2021-12-17 NOTE — PROGRESS NOTES
Problem(s) Oriented visit      Video-Visit Details    Type of service:  Video Visit    Video Start Time (time video started): 1039    Video End Time (time video stopped): 1049    Originating Location (pt. Location): Home    Distant Location (provider location):  [facility]     Mode of Communication:  Video Conference via [facility]    Physician has received verbal consent for a Video Visit from the patient? Yes    This was a virtual video-visit conducted during COVID-19 outbreak in regulation with social distancing and quarantine recommendations of the CDC and [place] department of health and human services. A two way audio/video connection was used in real time with patient's consent.    DEMETRI Bro CNP    SUBJECTIVE:                                                    Hugo Prince is a 71 year old male who presents to clinic today for the following health issues :    Cough x 3 weeks. Blowing nose a lot. Some sinus pressure/congestion. Cough worse in the morning. Able to sleep well at night. Some shortness of breath, wheezing. No history of COPD or asthma. Tested negative for Covid-19 at beginning of illness and has had vaccines + booster.   Uses cpap machine for sleep apnea > 20 years.  Back surgery 11/7/2020 - takes Tylenol 1000 mg morning & 500 middle of night    Problem list, Medication list, Allergies, and Medical/Social/Surgical histories reviewed in [facility] and updated as appropriate.   Additional history: as documented    ROS:  5 point ROS completed and negative except noted above, including Gen, HENT, CV, Resp, GI    OBJECTIVE:                                                    No vitals taken due to telehealth visit    APPEARANCE: = Relaxed and in no distress  Conj/Eyelids = noninjected and lids and lashes are without inflammation  Ears/Nose = External structures and Nares have usual shape and form  Resp effort = Calm regular breathing, occasional deep cough  Recent/Remote Memory =  Alert and Oriented x 3  Mood/Affect = Cooperative and interested     ASSESSMENT/PLAN:                                                      Hugo was seen today for cough.    Diagnoses and all orders for this visit:    Cough    Acute non-recurrent sinusitis, unspecified location  -     doxycycline hyclate (VIBRAMYCIN) 100 MG capsule; Take 1 capsule (100 mg) by mouth 2 times daily for 10 days    Prescription for Doxycycline 100 mg BID x 10 days. Recommend Mucinex also to thin secretions. Follow-up in clinic next week if not improving.    See Patient Instructions  Patient Instructions   Doxycycline 1 tab 2 times daily for 30 days  - can cause sun sensitivity  - stay upright after taking it x 1 hr, or you can get a little acid reflux  - don't swallow w/ dairy     Mucinex 2 times daily as needed to help thin mucus          DEMETRI Bro CNP  [facility]  Family Practice  [facility]  [phone]    For any issues my office # is [phone]

## 2021-12-29 ENCOUNTER — OFFICE VISIT (OUTPATIENT)
Dept: FAMILY MEDICINE | Facility: CLINIC | Age: 71
End: 2021-12-29

## 2021-12-29 VITALS
BODY MASS INDEX: 43.23 KG/M2 | HEART RATE: 75 BPM | WEIGHT: 302 LBS | OXYGEN SATURATION: 98 % | HEIGHT: 70 IN | SYSTOLIC BLOOD PRESSURE: 124 MMHG | DIASTOLIC BLOOD PRESSURE: 72 MMHG | RESPIRATION RATE: 16 BRPM

## 2021-12-29 DIAGNOSIS — Z28.21 INFLUENZA VACCINATION DECLINED: ICD-10-CM

## 2021-12-29 DIAGNOSIS — J31.0 CHRONIC RHINITIS: ICD-10-CM

## 2021-12-29 DIAGNOSIS — Z28.21 PNEUMOCOCCAL VACCINATION DECLINED: ICD-10-CM

## 2021-12-29 DIAGNOSIS — R05.9 COUGH: Primary | ICD-10-CM

## 2021-12-29 DIAGNOSIS — I25.2 HISTORY OF NON-ST ELEVATION MYOCARDIAL INFARCTION (NSTEMI): ICD-10-CM

## 2021-12-29 DIAGNOSIS — E11.9 TYPE 2 DIABETES MELLITUS WITHOUT COMPLICATION, WITHOUT LONG-TERM CURRENT USE OF INSULIN (H): ICD-10-CM

## 2021-12-29 DIAGNOSIS — K21.9 GASTROESOPHAGEAL REFLUX DISEASE, UNSPECIFIED WHETHER ESOPHAGITIS PRESENT: ICD-10-CM

## 2021-12-29 DIAGNOSIS — H61.21 IMPACTED CERUMEN OF RIGHT EAR: ICD-10-CM

## 2021-12-29 DIAGNOSIS — R07.9 CHEST PAIN, UNSPECIFIED TYPE: ICD-10-CM

## 2021-12-29 DIAGNOSIS — I25.10 CORONARY ARTERY DISEASE INVOLVING NATIVE CORONARY ARTERY OF NATIVE HEART WITHOUT ANGINA PECTORIS: ICD-10-CM

## 2021-12-29 DIAGNOSIS — R06.02 SOB (SHORTNESS OF BREATH): ICD-10-CM

## 2021-12-29 PROBLEM — R73.03 PREDIABETES: Status: RESOLVED | Noted: 2020-10-20 | Resolved: 2021-12-29

## 2021-12-29 LAB
% GRANULOCYTES: 74 % (ref 42.2–75.2)
B-TYPE NATRIURETIC PEPTIDE: 26.6 PG/ML (ref 0–100)
D-DIMER: 441 NG/ML (ref 0–300)
HBA1C MFR BLD: 6.8 % (ref 4–6)
HCT VFR BLD AUTO: 44.5 % (ref 39–51)
HEMOGLOBIN: 14.5 G/DL (ref 13.4–17.5)
LYMPHOCYTES NFR BLD AUTO: 20.3 % (ref 20.5–51.1)
MCH RBC QN AUTO: 32.3 PG (ref 27–31)
MCHC RBC AUTO-ENTMCNC: 32.7 G/DL (ref 33–37)
MCV RBC AUTO: 98.7 FL (ref 80–100)
MONOCYTES NFR BLD AUTO: 5.7 % (ref 1.7–9.3)
PLATELET # BLD AUTO: 202 K/UL (ref 140–450)
RBC # BLD AUTO: 4.51 X10/CMM (ref 4.2–5.9)
WBC # BLD AUTO: 5.5 X10/CMM (ref 3.8–11)

## 2021-12-29 PROCEDURE — 93000 ELECTROCARDIOGRAM COMPLETE: CPT | Performed by: NURSE PRACTITIONER

## 2021-12-29 PROCEDURE — 83036 HEMOGLOBIN GLYCOSYLATED A1C: CPT | Performed by: NURSE PRACTITIONER

## 2021-12-29 PROCEDURE — 99215 OFFICE O/P EST HI 40 MIN: CPT | Mod: 25 | Performed by: NURSE PRACTITIONER

## 2021-12-29 PROCEDURE — 36415 COLL VENOUS BLD VENIPUNCTURE: CPT | Performed by: NURSE PRACTITIONER

## 2021-12-29 PROCEDURE — 85379 FIBRIN DEGRADATION QUANT: CPT | Mod: 90 | Performed by: NURSE PRACTITIONER

## 2021-12-29 PROCEDURE — 69210 REMOVE IMPACTED EAR WAX UNI: CPT | Mod: 59 | Performed by: NURSE PRACTITIONER

## 2021-12-29 PROCEDURE — 83880 ASSAY OF NATRIURETIC PEPTIDE: CPT | Performed by: NURSE PRACTITIONER

## 2021-12-29 PROCEDURE — 85025 COMPLETE CBC W/AUTO DIFF WBC: CPT | Performed by: NURSE PRACTITIONER

## 2021-12-29 RX ORDER — FLUTICASONE PROPIONATE 50 MCG
1 SPRAY, SUSPENSION (ML) NASAL DAILY
Qty: 18.2 ML | Refills: 3 | Status: SHIPPED | OUTPATIENT
Start: 2021-12-29 | End: 2022-09-27

## 2021-12-29 ASSESSMENT — MIFFLIN-ST. JEOR: SCORE: 2121.11

## 2021-12-29 NOTE — PATIENT INSTRUCTIONS
Start daily antihistamine like Claritin or Zyrtec    Fluticasone (Flonase) 1 spray up each nostril 1-2 times daily    Omeprazole (Prilosec) 1 pill 30-60 min before breakfast     Consider medications for diabetes:  Metformin  ozempic    Follow-up in 3 months

## 2021-12-29 NOTE — PROGRESS NOTES
"Problem(s) Oriented visit        SUBJECTIVE:                                                    Hugo Prince is a 71 year old male who presents to clinic today for the following health issues :    Has had cough since beginning of December, about 4 weeks now. Cough seems worse after dinner. Has chronic runny nose. History of heart blockage requiring stents and worried he has heart failure. Does have some shortness of breath and mid chest pain without radiation. Has history of allergies, acid reflux. No history of asthma or COPD.    Ear wax buildup frequently. No ear pain    Type 2 diabetes - new diagnosis today. Last A1C = 5.9% 3/2018. Not on any diabetic medications. States that he has an overall healthy diet with vegetables, lean proteins. Does drink 2 beers daily and has cookies at night. History of coronary artery disease, NSTEMI in the past with stents. Takes daily baby aspirin, Lisinopril 5 mg daily, Atorvastatin 40 mg daily. No known chronic kidney disease.  Lab Results   Component Value Date    A1C 6.8 12/29/2021    A1C 5.9 03/19/2018    A1C 5.9 03/23/2016    A1C 5.5 11/28/2012       Problem list, Medication list, Allergies, and Medical/Social/Surgical histories reviewed in [facility] and updated as appropriate.   Additional history: as documented    ROS:  7 point ROS completed and negative except noted above, including Gen, HENT, CV, Resp, GI, MS, Psych    OBJECTIVE:                                                    /72   Pulse 75   Resp 16   Ht 1.778 m (5' 10\")   Wt 137 kg (302 lb)   SpO2 98%   BMI 43.33 kg/m    Body mass index is 43.33 kg/m .   GENERAL: Elderly male in no acute distress  EYES: Eyes grossly normal to inspection  HENT: normal cephalic/atraumatic, right ear: occluded with wax, left ear: normal: no effusions, no erythema, normal landmarks, nose and mouth without ulcers or lesions, oropharynx clear and oral mucous membranes moist  RESP: lungs clear to auscultation - no rales, " "rhonchi or wheezes  CV: regular rates and rhythm, normal S1 S2, no S3 or S4 and no murmur, click or rub  ABDOMEN: soft, nontender, morbid obesity  MS: generalized weakness  NEURO: sensory exam grossly normal and mentation intact  PSYCH: normal affect & mood     ASSESSMENT/PLAN:                                                        BMI:   Estimated body mass index is 43.33 kg/m  as calculated from the following:    Height as of this encounter: 1.778 m (5' 10\").    Weight as of this encounter: 137 kg (302 lb).   Weight management plan: Discussed healthy diet and exercise guidelines referral to diabetic educator for nutrition education      Hugo was seen today for cough.    Diagnoses and all orders for this visit:    Cough  -     CBC with Diff/Plt ([facility])  -     VENOUS COLLECTION  -     Cancel: D-Dimer ([facility])  -     D-Dimer ([facility])  Chest pain, unspecified type  -     D-Dimer ([facility])  -     B-Type Natriuretic Peptide - BNP ([facility])  -     EKG 12-lead complete w/read - Clinics  -     VENOUS COLLECTION  -     Cancel: D-Dimer ([facility])  -     D-Dimer ([facility])  SOB (shortness of breath)  -     D-Dimer ([facility])  -     B-Type Natriuretic Peptide - BNP ([facility])  -     VENOUS COLLECTION  -     Cancel: D-Dimer ([facility])  -     D-Dimer ([facility])  Chronic rhinitis  -     fluticasone (FLONASE) 50 MCG/ACT nasal spray; Spray 1 spray into both nostrils daily  Gastroesophageal reflux disease, unspecified whether esophagitis present  -     omeprazole (PRILOSEC) 20 MG DR capsule; Take 1 capsule (20 mg) by mouth daily  Coronary artery disease involving native coronary artery of native heart without angina pectoris  History of non-ST elevation myocardial infarction (NSTEMI)  Cough x 3-4 weeks associated with shortness of breath and chest pain. Negative D-dimer and BNP in clinic. CBC reassuring and infectious cause unlikely. Did not improve on course of antibiotics. Suspect chronic rhinitis/postnasal drip as well as acid reflux as causes " of cough. Recommend oral antihistmine + Flonase nasal spray + Omeprazole 1-2 times daily. Follow-up in 2 weeks if not improving.    Type 2 diabetes mellitus without complication, without long-term current use of insulin (H)  -     Hemoglobin A1C ([facility])  -     Comp. Metabolic Panel (14) ([facility])  -     VENOUS COLLECTION  -     Cancel: Microalbumin ([facility])  -     AMB Adult Diabetes Educator Referral; Future  NEW diagnosis today. Spent time discussing with patient along with potential medications used to treat diabetes. Referral to diabetic education placed. Encourage patient to eat smaller portions and to cut back on beer/cookie consumption. Metformin & Ozempic discussed but patient does not want to start medication at this time. Wants to focus on diet and adding movement. Follow-up in 3 months.    Impacted cerumen of right ear  -     REMOVE IMPACTED CERUMEN  Impacted cerumen was removed with a combination of loop removal by MD and water irrigation.  Canal and TM clear afterwards.  Discussed strategies for preventing future ear wax buildups.    Influenza vaccination declined  Pneumococcal vaccination declined  Will consider but not wanting at this time.    50 minutes spent with patient discussing potential causes of cough as well as new diabetes diagnosis.    See Patient Instructions  Patient Instructions   Start daily antihistamine like Claritin or Zyrtec    Fluticasone (Flonase) 1 spray up each nostril 1-2 times daily    Omeprazole (Prilosec) 1 pill 30-60 min before breakfast     Consider medications for diabetes:  Metformin  ozempic    Follow-up in 3 months      DEMETRI Bro CNP  [facility]  Family Practice  [facility]  [phone]    For any issues my office # is [phone]

## 2021-12-30 LAB — D DIMER MG/L FEU: 0.78 MG/L FEU (ref 0–0.49)

## 2021-12-30 RX ORDER — METFORMIN HCL 500 MG
500 TABLET, EXTENDED RELEASE 24 HR ORAL
Qty: 90 TABLET | Refills: 1 | Status: SHIPPED | OUTPATIENT
Start: 2021-12-30 | End: 2022-06-20

## 2021-12-31 LAB
ALBUMIN SERPL-MCNC: 4 G/DL (ref 3.7–4.7)
ALBUMIN/GLOB SERPL: 1.4 {RATIO} (ref 1.2–2.2)
ALP SERPL-CCNC: 116 IU/L (ref 44–121)
ALT SERPL-CCNC: 31 IU/L (ref 0–44)
AST SERPL-CCNC: 32 IU/L (ref 0–40)
BILIRUB SERPL-MCNC: 0.6 MG/DL (ref 0–1.2)
BUN SERPL-MCNC: 17 MG/DL (ref 8–27)
BUN/CREATININE RATIO: 15 (ref 10–24)
CALCIUM SERPL-MCNC: 9.5 MG/DL (ref 8.6–10.2)
CHLORIDE SERPLBLD-SCNC: 102 MMOL/L (ref 96–106)
CREAT SERPL-MCNC: 1.15 MG/DL (ref 0.76–1.27)
EGFR IF AFRICN AM: 73 ML/MIN/1.73
EGFR IF NONAFRICN AM: 63 ML/MIN/1.73
GLOBULIN, TOTAL: 2.8 G/DL (ref 1.5–4.5)
GLUCOSE SERPL-MCNC: 200 MG/DL (ref 65–99)
POTASSIUM SERPL-SCNC: 5.1 MMOL/L (ref 3.5–5.2)
PROT SERPL-MCNC: 6.8 G/DL (ref 6–8.5)
SODIUM SERPL-SCNC: 139 MMOL/L (ref 134–144)
TOTAL CO2: 27 MMOL/L (ref 20–29)

## 2022-01-04 ENCOUNTER — VIRTUAL VISIT (OUTPATIENT)
Dept: EDUCATION SERVICES | Facility: CLINIC | Age: 72
End: 2022-01-04
Attending: NURSE PRACTITIONER
Payer: MEDICARE

## 2022-01-04 DIAGNOSIS — E11.9 TYPE 2 DIABETES MELLITUS WITHOUT COMPLICATION, WITHOUT LONG-TERM CURRENT USE OF INSULIN (H): ICD-10-CM

## 2022-01-04 PROCEDURE — G0108 DIAB MANAGE TRN  PER INDIV: HCPCS | Performed by: DIETITIAN, REGISTERED

## 2022-01-04 NOTE — LETTER
"    1/4/2022         RE: Hugo Prince  [address]  [facility] [address]        Dear Colleague,    Thank you for referring your patient, Hugo Prince, to the [facility]. Please see a copy of my visit note below.    Diabetes Self-Management Education & Support  Type of service:  Video Visit    If the video visit is dropped, the video visit invitation should be resent by: Send to e-mail at: [email or web address]    Originating Location (pt. Location): Home  Distant Location (provider location): Home  Mode of Communication:  Video Conference via [employer]    Video Start Time: 8:00  Video End Time (time video stopped): 8:45    How would patient like to obtain AVS? Davidhart    Presents for: Initial Assessment for new diagnosis    SUBJECTIVE/OBJECTIVE:  Presents for: Initial Assessment for new diagnosis  Accompanied by: Self  Diabetes education in the past 24mo: No  Focus of Visit: Patient Unsure  Diabetes type: Type 2  Other concerns:: None  Cultural Influences/Ethnic Background:  Choose not to answer      Diabetes Symptoms & Complications:  Weight trend: Increasing  Complications assessed today?: No    Patient Problem List and Family Medical History reviewed for relevant medical history, current medical status, and diabetes risk factors.    Vitals:  There were no vitals taken for this visit.  Estimated body mass index is 43.33 kg/m  as calculated from the following:    Height as of 12/29/21: 1.778 m (5' 10\").    Weight as of 12/29/21: 137 kg (302 lb).   Last 3 BP:   BP Readings from Last 3 Encounters:   12/29/21 124/72   10/20/21 136/70   07/12/21 134/59       History   Smoking Status     Former Smoker     Types: Cigars   Smokeless Tobacco     Never Used     Comment: has cigars when it is warm out       Labs:  Lab Results   Component Value Date    A1C 6.8 12/29/2021     Lab Results   Component Value Date     12/29/2021     Lab Results   Component Value Date    LDL " 36 01/08/2021     HDL Cholesterol   Date Value Ref Range Status   01/08/2021 40 >39 mg/dL Final   ]  GFR Estimate   Date Value Ref Range Status   07/17/2019 77 >60 mL/min/[1.73_m2] Final     Comment:     Non  GFR Calc  Starting 12/18/2018, serum creatinine based estimated GFR (eGFR) will be   calculated using the Chronic Kidney Disease Epidemiology Collaboration   (CKD-EPI) equation.       GFR Estimate If Black   Date Value Ref Range Status   07/17/2019 89 >60 mL/min/[1.73_m2] Final     Comment:      GFR Calc  Starting 12/18/2018, serum creatinine based estimated GFR (eGFR) will be   calculated using the Chronic Kidney Disease Epidemiology Collaboration   (CKD-EPI) equation.       Lab Results   Component Value Date    CR 1.15 12/29/2021     No results found for: MICROALBUMIN    Healthy Eating:  Healthy Eating Assessed Today: Yes  Breakfast: seven sundays cereal, low-carb juice/tomato juice OR 2 eggs, sausage, low-carb juice  Lunch: mussels, mini baguette OR tuna salad lettuce wrap, keto bread  Dinner: BLT, stewed tomatoes OR pork chop, green beans OR chicken breast, broccoli  Snacks: almonds OR turkey sticks  Beverages: Water,Juice,Alcohol  Has patient met with a dietitian in the past?: No    Being Active:  Being Active Assessed Today: Yes  Exercise:: Currently not exercising  Barrier to exercise: Physical limitation    Monitoring:  Monitoring Assessed Today: No  Did patient bring glucose meter to appointment? : No  Times checking blood sugar at home (number): Never    Desires to wait on learning how to self-monitor glucose levels.    Taking Medications:  Diabetes Medication(s)     Biguanides       metFORMIN (GLUCOPHAGE-XR) 500 MG 24 hr tablet    Take 1 tablet (500 mg) by mouth daily (with dinner)          Taking Medication Assessed Today: Yes  Current Treatments: Oral Medication (taken by mouth)  Problems taking diabetes medications regularly?: No  Diabetes medication side effects?:  No    Problem Solving:  Problem Solving Assessed Today: No  Is the patient at risk for hypoglycemia?: No  Is the patient at risk for DKA?: No              Reducing Risks:  Reducing Risks Assessed Today: Yes  Diabetes Risks: Age over 45 years,Sedentary Lifestyle,Family History  CAD Risks: Diabetes Mellitus,Male sex,Obesity,Sedentary lifestyle    Healthy Coping:  Healthy Coping Assessed Today: Yes  Emotional response to diabetes: Ready to learn  Stage of change: ACTION (Actively working towards change)  Patient Activation Measure Survey Score:  No flowsheet data found.    Diabetes knowledge and skills assessment:   Patient is knowledgeable in diabetes management concepts related to: Taking Medication    Patient needs further education on the following diabetes management concepts: Healthy Eating, Being Active, Problem Solving, Reducing Risks and Healthy Coping    Based on learning assessment above, most appropriate setting for further diabetes education would be: Individual setting.      INTERVENTIONS:    Education provided today on:  AADE Self-Care Behaviors:  Diabetes Pathophysiology  Healthy Eating: carbohydrate counting, consistency in amount, composition, and timing of food intake, weight reduction, portion control and label reading  Being Active: relationship to blood glucose and describe appropriate activity program  Taking Medication: action of prescribed medication  Reducing Risks: A1C - goals, relating to blood glucose levels, how often to check  Healthy Coping: benefits of making appropriate lifestyle changes    Opportunities for ongoing education and support in diabetes-self management were discussed.    Pt verbalized understanding of concepts discussed and recommendations provided today.       Education Materials Provided:  [facility] Understanding Diabetes Booklet      ASSESSMENT:  Patient with a new dx of Type 2 diabetes and needs comprehensive education.  Today we discussed pathophysiology,  lifestyle changes - diet and exercise, action of Metformin and glucose/A1c testing.    Patient has made diet changes since diagnosis.  Has been avoiding fruit, regular juice, and bread.  Has purchased some keto products (bread).  Patient notes difficulty with body weight and inability to lose weight.    Recommended a carb controlled diet with ~45 gram per meal (range of 30-60 gm is okay).  Snacks of 0-15 grams can be consumed between meals if hungry.      Patient unable to be very active with back/spine issues (hx surgery).  Encouraged purposeful movement of 5-10 minutes every hour when awake.    Patient desires to wait on learning how to self-monitor glucose levels.  If next A1c comes back higher, he will follow-up to learn testing.        Patient's most recent   Lab Results   Component Value Date    A1C 6.8 12/29/2021    is meeting goal of <7.0    PLAN  See Patient Instructions for co-developed, patient-stated behavior change goals.      LESLEY Hung CDCES    Time Spent: 45 minutes  Encounter Type: Individual    Any diabetes medication dose changes were made via the CDE Protocol and Collaborative Practice Agreement with the patient's referring provider. A copy of this encounter was shared with the provider.

## 2022-01-04 NOTE — PATIENT INSTRUCTIONS
MY DIABETES TODAY:    1)  Goal A1C is under <7.0  Mine is:      Lab Results   Component Value Date    A1C 6.8 12/29/2021       2)  Goal LDL (bad cholesterol) under 100  (measured at least yearly)- I am currently at:   Lab Results   Component Value Date    LDL 36 01/08/2021       3)  Goal blood pressure under 130/80- mine was Data Unavailable today    Care Plan:  Meal Plan Recommendation: eat 3 meals a day, have small snacks between meals, if needed and Aim for 2-3 carb servings at meals and 0-1 carb servings at snacks  Exercise / activity plan: As able, goal is 20-30 minutes daily  Check A1c in 3 months    Follow up:  Call ([phone]), e-mail ([email or web address]), or send MyChart message with questions, concerns or if follow-up is needed.     Bring blood glucose meter and logbook with you to all doctor and follow-up appointments.     [facility] the [facility] [[email or web address]] Area:  For Your Diabetes or Nutrition Education Appointments Call:  [phone]   For Diabetes or Nutrition Related Questions:   [phone]  Send MyChart Message   If you need a medication refill please contact your pharmacy. Please allow 3 business days for your refills to be completed.

## 2022-01-04 NOTE — PROGRESS NOTES
"Diabetes Self-Management Education & Support  Type of service:  Video Visit    If the video visit is dropped, the video visit invitation should be resent by: Send to e-mail at: [email or web address]    Originating Location (pt. Location): Home  Distant Location (provider location): Home  Mode of Communication:  Video Conference via [employer]    Video Start Time: 8:00  Video End Time (time video stopped): 8:45    How would patient like to obtain AVS? Davidhart    Presents for: Initial Assessment for new diagnosis    SUBJECTIVE/OBJECTIVE:  Presents for: Initial Assessment for new diagnosis  Accompanied by: Self  Diabetes education in the past 24mo: No  Focus of Visit: Patient Unsure  Diabetes type: Type 2  Other concerns:: None  Cultural Influences/Ethnic Background:  Choose not to answer      Diabetes Symptoms & Complications:  Weight trend: Increasing  Complications assessed today?: No    Patient Problem List and Family Medical History reviewed for relevant medical history, current medical status, and diabetes risk factors.    Vitals:  There were no vitals taken for this visit.  Estimated body mass index is 43.33 kg/m  as calculated from the following:    Height as of 12/29/21: 1.778 m (5' 10\").    Weight as of 12/29/21: 137 kg (302 lb).   Last 3 BP:   BP Readings from Last 3 Encounters:   12/29/21 124/72   10/20/21 136/70   07/12/21 134/59       History   Smoking Status     Former Smoker     Types: Cigars   Smokeless Tobacco     Never Used     Comment: has cigars when it is warm out       Labs:  Lab Results   Component Value Date    A1C 6.8 12/29/2021     Lab Results   Component Value Date     12/29/2021     Lab Results   Component Value Date    LDL 36 01/08/2021     HDL Cholesterol   Date Value Ref Range Status   01/08/2021 40 >39 mg/dL Final   ]  GFR Estimate   Date Value Ref Range Status   07/17/2019 77 >60 mL/min/[1.73_m2] Final     Comment:     Non  GFR Calc  Starting 12/18/2018, serum " creatinine based estimated GFR (eGFR) will be   calculated using the Chronic Kidney Disease Epidemiology Collaboration   (CKD-EPI) equation.       GFR Estimate If Black   Date Value Ref Range Status   07/17/2019 89 >60 mL/min/[1.73_m2] Final     Comment:      GFR Calc  Starting 12/18/2018, serum creatinine based estimated GFR (eGFR) will be   calculated using the Chronic Kidney Disease Epidemiology Collaboration   (CKD-EPI) equation.       Lab Results   Component Value Date    CR 1.15 12/29/2021     No results found for: MICROALBUMIN    Healthy Eating:  Healthy Eating Assessed Today: Yes  Breakfast: seven sundays cereal, low-carb juice/tomato juice OR 2 eggs, sausage, low-carb juice  Lunch: mussels, mini baguette OR tuna salad lettuce wrap, keto bread  Dinner: BLT, stewed tomatoes OR pork chop, green beans OR chicken breast, broccoli  Snacks: almonds OR turkey sticks  Beverages: Water,Juice,Alcohol  Has patient met with a dietitian in the past?: No    Being Active:  Being Active Assessed Today: Yes  Exercise:: Currently not exercising  Barrier to exercise: Physical limitation    Monitoring:  Monitoring Assessed Today: No  Did patient bring glucose meter to appointment? : No  Times checking blood sugar at home (number): Never    Desires to wait on learning how to self-monitor glucose levels.    Taking Medications:  Diabetes Medication(s)     Biguanides       metFORMIN (GLUCOPHAGE-XR) 500 MG 24 hr tablet    Take 1 tablet (500 mg) by mouth daily (with dinner)          Taking Medication Assessed Today: Yes  Current Treatments: Oral Medication (taken by mouth)  Problems taking diabetes medications regularly?: No  Diabetes medication side effects?: No    Problem Solving:  Problem Solving Assessed Today: No  Is the patient at risk for hypoglycemia?: No  Is the patient at risk for DKA?: No              Reducing Risks:  Reducing Risks Assessed Today: Yes  Diabetes Risks: Age over 45 years,Sedentary  Lifestyle,Family History  CAD Risks: Diabetes Mellitus,Male sex,Obesity,Sedentary lifestyle    Healthy Coping:  Healthy Coping Assessed Today: Yes  Emotional response to diabetes: Ready to learn  Stage of change: ACTION (Actively working towards change)  Patient Activation Measure Survey Score:  No flowsheet data found.    Diabetes knowledge and skills assessment:   Patient is knowledgeable in diabetes management concepts related to: Taking Medication    Patient needs further education on the following diabetes management concepts: Healthy Eating, Being Active, Problem Solving, Reducing Risks and Healthy Coping    Based on learning assessment above, most appropriate setting for further diabetes education would be: Individual setting.      INTERVENTIONS:    Education provided today on:  AADE Self-Care Behaviors:  Diabetes Pathophysiology  Healthy Eating: carbohydrate counting, consistency in amount, composition, and timing of food intake, weight reduction, portion control and label reading  Being Active: relationship to blood glucose and describe appropriate activity program  Taking Medication: action of prescribed medication  Reducing Risks: A1C - goals, relating to blood glucose levels, how often to check  Healthy Coping: benefits of making appropriate lifestyle changes    Opportunities for ongoing education and support in diabetes-self management were discussed.    Pt verbalized understanding of concepts discussed and recommendations provided today.       Education Materials Provided:  [employer] Understanding Diabetes Booklet      ASSESSMENT:  Patient with a new dx of Type 2 diabetes and needs comprehensive education.  Today we discussed pathophysiology, lifestyle changes - diet and exercise, action of Metformin and glucose/A1c testing.    Patient has made diet changes since diagnosis.  Has been avoiding fruit, regular juice, and bread.  Has purchased some keto products (bread).  Patient notes difficulty  with body weight and inability to lose weight.    Recommended a carb controlled diet with ~45 gram per meal (range of 30-60 gm is okay).  Snacks of 0-15 grams can be consumed between meals if hungry.      Patient unable to be very active with back/spine issues (hx surgery).  Encouraged purposeful movement of 5-10 minutes every hour when awake.    Patient desires to wait on learning how to self-monitor glucose levels.  If next A1c comes back higher, he will follow-up to learn testing.        Patient's most recent   Lab Results   Component Value Date    A1C 6.8 12/29/2021    is meeting goal of <7.0    PLAN  See Patient Instructions for co-developed, patient-stated behavior change goals.      LESLEY Hung [facility]    Time Spent: 45 minutes  Encounter Type: Individual    Any diabetes medication dose changes were made via the CDE Protocol and Collaborative Practice Agreement with the patient's referring provider. A copy of this encounter was shared with the provider.

## 2022-01-28 ENCOUNTER — TELEPHONE (OUTPATIENT)
Dept: CARDIOLOGY | Facility: CLINIC | Age: 72
End: 2022-01-28
Payer: MEDICARE

## 2022-01-28 NOTE — TELEPHONE ENCOUNTER
Call placed to pt to review if ASA should continue to taken daily. Pt reports that he recently had some gastric upset and is wondering if this medication is contributing. Pt has been taking ASA 81mg since 2019. Pt reports that he read an article X2 in the paper reporting ASA not safe to take long term.     Reviewed with pt reasoning for taking ASA post stent placement and with Hx of CAD. Reviewed risk vs benefit of taking the medication in a pt with CAD HX. Pt verbalized understanding.     Pt had concerns regarding need for continued monitoring of known remaining plaques (55% to mRCA and 55% to pLAD). He had several questions regarding what monitoring is advised. Additional pt wants to review his medications and need for long term use. Pt advised that it was recommended he follow up with Cardiology as previously recommended (orders for follow up 1/2022) per Dr. Moe so that his concerns and questions can be reviewed with the cardiology team.     Message sent to scheduling to arrange follow up as previously ordered.   MATTIE Jimenez RN, BSN.

## 2022-01-28 NOTE — TELEPHONE ENCOUNTER
[facility]    Phone Message    May a detailed message be left on voicemail: yes     Reason for Call: Other: Pt wondering if he should still be taking his aspirin every day or if he can d/c. Please c/b to discuss     Action Taken: Message routed to:  Other: [facility]    Travel Screening: Not Applicable

## 2022-02-14 ENCOUNTER — OFFICE VISIT (OUTPATIENT)
Dept: CARDIOLOGY | Facility: CLINIC | Age: 72
End: 2022-02-14
Payer: MEDICARE

## 2022-02-14 ENCOUNTER — LAB (OUTPATIENT)
Dept: LAB | Facility: CLINIC | Age: 72
End: 2022-02-14
Payer: MEDICARE

## 2022-02-14 VITALS
DIASTOLIC BLOOD PRESSURE: 79 MMHG | HEART RATE: 78 BPM | SYSTOLIC BLOOD PRESSURE: 134 MMHG | WEIGHT: 306.7 LBS | HEIGHT: 70 IN | BODY MASS INDEX: 43.91 KG/M2

## 2022-02-14 DIAGNOSIS — I25.10 CORONARY ARTERY DISEASE INVOLVING NATIVE CORONARY ARTERY OF NATIVE HEART WITHOUT ANGINA PECTORIS: Primary | ICD-10-CM

## 2022-02-14 DIAGNOSIS — E78.5 DYSLIPIDEMIA: Primary | ICD-10-CM

## 2022-02-14 LAB
ALT SERPL W P-5'-P-CCNC: 36 U/L (ref 0–70)
CHOLEST SERPL-MCNC: 128 MG/DL
FASTING STATUS PATIENT QL REPORTED: YES
HDLC SERPL-MCNC: 43 MG/DL
LDLC SERPL CALC-MCNC: 60 MG/DL
NONHDLC SERPL-MCNC: 85 MG/DL
TRIGL SERPL-MCNC: 125 MG/DL

## 2022-02-14 PROCEDURE — 36415 COLL VENOUS BLD VENIPUNCTURE: CPT

## 2022-02-14 PROCEDURE — 80061 LIPID PANEL: CPT

## 2022-02-14 PROCEDURE — 84460 ALANINE AMINO (ALT) (SGPT): CPT

## 2022-02-14 PROCEDURE — 99214 OFFICE O/P EST MOD 30 MIN: CPT | Performed by: PHYSICIAN ASSISTANT

## 2022-02-14 ASSESSMENT — MIFFLIN-ST. JEOR: SCORE: 2142.43

## 2022-02-14 NOTE — PROGRESS NOTES
Cardiology Progress Note    Patient seen today in follow up of: CAD  Primary cardiologist: Dr. Moe    HPI:  Hugo Prince is a very pleasant 71 year old male with a history of coronary artery disease status post stenting of the mid LAD in 2019, obstructive sleep apnea treated with CPAP, former tobacco use, morbid obesity, recurrent nephrolithiasis, recently diagnosed DM type II who is here today for routine follow-up.    Hugo was admitted to [facility] in July 2019 with chest discomfort and shortness of breath.  His troponin was elevated and peaked at 12 consistent with a non-ST elevation myocardial infarction.  Echo showed an EF of 50 to 55% with severe apical wall hypokinesis.  He was referred for coronary angiography and found to have a culprit lesion in the mid LAD which was treated with a drug-eluting stent.  There was a moderate proximal LAD lesion which was negative by IFR.  He also had moderate mid RCA disease.  He was started on appropriate medical therapy including dual antiplatelet therapy, lisinopril, metoprolol and atorvastatin.    In follow-up, his LDL remained elevated and Lipitor was increased to 80 mg daily although he had issues with muscle aches thus decrease this back to 48.  A follow-up echocardiogram in October 2019 showed normal LV function and normal wall motion.    Hugo had a virtual visit with Dr. Moe in February 2021 and was doing well without anginal symptoms at that time.  No changes were made.    He is back today for annual follow-up. He denies any concerning cardiac complaints recently. He denies chest pain or dyspnea on exertion. He has spinal stenosis and is quite limited by his chronic back pain primarily. He has been much less active this lest year. He previously was going to the gym but has not been due to COVID. He has also had a few falls outside so he has avoided walking outside during the winter. He is hopeful to be more active as it warms  up. He enjoys going to his cabin in the summer and fly fishing.     He was seen by his PCP in December for a cough. He has some chest pain at that time from coughing. He was treated with Flonase and prilosec with resolution. He was diagnosed with DM type II at that time and was started on Metformin. He has watching his carb intake.     ASSESSMENT/PLAN:  Hugo goins is is a very pleasant 71-year-old male with a history of coronary artery disease status post stenting of the mid LAD in 2019 with residual moderate LAD and RCA disease, obstructive sleep apnea treated with CPAP, former smoker, DM type II, morbid obesity who is here today for annual follow-up.    At this point, Hugo appears to be stable from a cardiovascular perspective. He denies any chest pain or shortness of breath today. He remains on appropriate therapy for his CAD with aspirin 81 mg daily, Lipitor 40 mg daily, lisinopril 5 mg daily and metoprolol 25 mg twice daily. BP control appears to be adequate. We discussed his lipid profile today. His LDL is 60, HDL is 43 and total cholesterol is 128. Of note, he did not tolerate higher doses of atorvastatin due to myalgias.     I made no medication changes today. I encouraged him to increase his activity as above and certainly to contact us if he has any concerning new symptoms. Otherwise, he will return to see Dr. Moe in one year. We will repeat a lipid profile at that time. It was a pleasure seeing Hugo in clinic today.    Orders this Visit:  Orders Placed This Encounter   Procedures     Lipid Profile     ALT     Follow-Up with Cardiology     No orders of the defined types were placed in this encounter.    Medications Discontinued During This Encounter   Medication Reason     furosemide (LASIX) 20 MG tablet      vitamin B-12 (CYANOCOBALAMIN) 1000 MCG tablet        CURRENT MEDICATIONS:  Current Outpatient Medications   Medication Sig Dispense Refill     acetaminophen (TYLENOL) 325 MG  tablet Take 2 tablets (650 mg) by mouth every 4 hours as needed for mild pain       aspirin (ASA) 81 MG EC tablet Take 1 tablet (81 mg) by mouth daily       atorvastatin (LIPITOR) 40 MG tablet Take 1 tablet (40 mg) by mouth daily 90 tablet 11     fluticasone (FLONASE) 50 MCG/ACT nasal spray Spray 1 spray into both nostrils daily 18.2 mL 3     lisinopril (ZESTRIL) 5 MG tablet Take 1 tablet (5 mg) by mouth daily 90 tablet 11     metFORMIN (GLUCOPHAGE-XR) 500 MG 24 hr tablet Take 1 tablet (500 mg) by mouth daily (with dinner) 90 tablet 1     metoprolol tartrate (LOPRESSOR) 25 MG tablet Take 1 tablet (25 mg) by mouth 2 times daily 180 tablet 11     Multiple Vitamins-Minerals (PRESERVISION AREDS 2 PO)        nitroGLYcerin (NITROSTAT) 0.4 MG sublingual tablet As needed for chest pain place 1 tablet under the tongue every 5 minutes for 3 doses. If symptoms persist 5 minutes after 1st dose call 911. 25 tablet 11     omeprazole (PRILOSEC) 20 MG DR capsule Take 1 capsule (20 mg) by mouth daily 90 capsule 1     order for DME Equipment being ordered: CPAP       ALLERGIES  Allergies   Allergen Reactions     Chlorthalidone GI Disturbance     Compazine Other (See Comments)     PSYCHOTIC     Erythromycin Nausea and Vomiting     Flomax [Tamsulosin Hcl]      FLUSHING, NASAl CONGESTION     Prochlorperazine      LW Reaction: anxiety     Seasonal Allergies      PAST MEDICAL HISTORY:  Past Medical History:   Diagnosis Date     Anemia due to blood loss, acute 1/14/2015     Atopic dermatitis      CAD (coronary artery disease)      Diabetes mellitus, type 2 (H) 12/29/2021     DJD (degenerative joint disease) of knee 1/14/2015     Dyslipidemia      ED (erectile dysfunction) 4/30/2012     Esophageal reflux     Gastroesophageal reflux     NSTEMI (non-ST elevated myocardial infarction) (H) 7/15/2019     RAY (obstructive sleep apnea)      Other and unspecified disc disorder of unspecified region     Intervertebral disc disorders     Personal  history of colonic polyps     Colon polyps     S/P lateral meniscectomy of left knee      S/P total knee replacement 1/7/2015     Tick bite 4/20/15    amoxicillin treated     Unspecified sleep apnea     Sleep apnea     Urinary calculus, unspecified     Renal stones     Vitamin D deficiency 9-27-09     PAST SURGICAL HISTORY:  Past Surgical History:   Procedure Laterality Date     ARTHROPLASTY KNEE Left 1/7/2015    Procedure: ARTHROPLASTY KNEE;  Surgeon: Agustin Saenz MD;  Location:  OR     ARTHROSCOPY KNEE RT/LT  6-20-11     COLONOSCOPY  12/09     CV FRACTIONAL FLOW RATIO WIRE N/A 7/16/2019    Procedure: Fractional Flow Reserve;  Surgeon: Mick Thrasher MD;  Location:  HEART CARDIAC CATH LAB     CV HEART CATHETERIZATION WITH POSSIBLE INTERVENTION N/A 7/16/2019    successful PCI with MICAH placement to mid LAD      CYSTOSCOPY       EXTRACORPOREAL SHOCK WAVE LITHOTRIPSY (ESWL)  1/20/2012    Procedure:EXTRACORPOREAL SHOCK WAVE LITHOTRIPSY (ESWL); LEFT EXTRACORPOREAL SHOCK WAVE LITHOTRIPSY ; Surgeon:ANJUM BUNDY; Location: OR     EXTRACORPOREAL SHOCK WAVE LITHOTRIPSY, CYSTOSCOPY, INSERT STENT URETER(S), COMBINED  2/3/2012    Procedure:COMBINED EXTRACORPOREAL SHOCK WAVE LITHOTRIPSY, CYSTOSCOPY, INSERT STENT URETER(S); CYSTOSCOPY, RIGHT STENT; Surgeon:ANJUM BUNDY; Location: OR     FUSION LUMBAR ANTERIOR, FUSION LUMBAR POSTERIOR ONE LEVEL, COMBINED  11-1-10    L4-5     LASER HOLMIUM LITHOTRIPSY URETER(S), INSERT STENT, COMBINED  2/7/2012    Procedure:COMBINED CYSTOSCOPY, URETEROSCOPY, LASER HOLMIUM LITHOTRIPSY URETER(S), INSERT STENT; CYSTOSCOPY, RIGHT URETEROSCOPY, RIGHT RETROGRADES,  STONE EXTRACTION WITH HOLMIUM LASER AND RIGHT URETERAL STENT EXCHANGE ; Surgeon:DEEPTI MELVIN; Location: OR     LITHOTRIPSY      Lithotrypsy     SEPTOPLASTY       VASECTOMY       FAMILY HISTORY:  Family History   Problem Relation Age of Onset     Melanoma Father      Prostate Cancer Father       "Cancer Father         neck cancer from radiation at work (urology)     Breast Cancer Mother      AUDREY.A.D. Paternal Grandfather 72     SOCIAL HISTORY:  Social History     Socioeconomic History     Marital status:      Spouse name: Not on file     Number of children: Not on file     Years of education: Not on file     Highest education level: Not on file   Occupational History     Not on file   Tobacco Use     Smoking status: Former Smoker     Types: Cigars     Smokeless tobacco: Never Used     Tobacco comment: has cigars when it is warm out   Substance and Sexual Activity     Alcohol use: Yes     Alcohol/week: 11.7 standard drinks     Types: 14 Standard drinks or equivalent per week     Comment: 1-2 drinks day/beer and scotch     Drug use: No     Sexual activity: Not on file   Other Topics Concern     Parent/sibling w/ CABG, MI or angioplasty before 65F 55M? Not Asked   Social History Narrative    Lives with girlfriend    Retired teacher    Loves fly fishing     Social Determinants of Health     Financial Resource Strain: Not on file   Food Insecurity: Not on file   Transportation Needs: Not on file   Physical Activity: Not on file   Stress: Not on file   Social Connections: Not on file   Intimate Partner Violence: Not on file   Housing Stability: Not on file     Review of Systems:  Skin:  Positive for     Eyes:  Positive for glasses  ENT:  Negative    Respiratory:  Positive for sleep apnea;CPAP  Cardiovascular:    Positive for;edema  Gastroenterology: Negative    Genitourinary:  Negative    Musculoskeletal:  Positive for back pain;joint pain  Neurologic:  Negative    Psychiatric:  Positive for sleep disturbances  Heme/Lymph/Imm:  Negative    Endocrine:  Positive for diabetes     Physical Exam:  Vitals: /79   Pulse 78   Ht 1.778 m (5' 10\")   Wt 139.1 kg (306 lb 11.2 oz)   BMI 44.01 kg/m     Wt Readings from Last 4 Encounters:   02/14/22 139.1 kg (306 lb 11.2 oz)   12/29/21 137 kg (302 lb)   10/20/21 " 136.1 kg (300 lb)   07/12/21 138.3 kg (305 lb)     GEN: well nourished, in no acute distress.  HEENT:  Pupils equal, round. Sclerae nonicteric.   C/V:  Regular rate and rhythm, no murmur, rub or gallop.    RESP: Respirations are unlabored. Clear to auscultation bilaterally without wheezing, rales, or rhonchi.  GI: Abdomen soft, nontender.  EXTREM: no LE edema.  NEURO: Alert and oriented, cooperative.  SKIN: Warm and dry.     Recent Lab Results:  LIPID RESULTS:  Lab Results   Component Value Date    CHOL 128 02/14/2022    CHOL 95 01/08/2021    HDL 43 02/14/2022    HDL 40 01/08/2021    LDL 60 02/14/2022    LDL 36 01/08/2021    TRIG 125 02/14/2022    TRIG 94 01/08/2021     LIVER ENZYME RESULTS:  Lab Results   Component Value Date    AST 32 12/29/2021    ALT 36 02/14/2022    ALT 31 12/29/2021     CBC RESULTS:  Lab Results   Component Value Date    WBC 5.5 12/29/2021    WBC 9.0 07/17/2019    RBC 4.51 12/29/2021    RBC 4.50 07/17/2019    HGB 14.5 12/29/2021    HCT 44.5 12/29/2021    MCV 98.7 12/29/2021    MCH 32.3 (A) 12/29/2021    MCHC 32.7 (A) 12/29/2021    RDW 12.7 07/17/2019     12/29/2021     BMP RESULTS:  Lab Results   Component Value Date     12/29/2021    POTASSIUM 5.1 12/29/2021    CHLORIDE 102 12/29/2021    CO2 24 07/17/2019    ANIONGAP 8 07/17/2019     (H) 12/29/2021    BUN 17 12/29/2021    BUN 15 12/29/2021    CR 1.15 12/29/2021    GFRESTIMATED 77 07/17/2019    GFRESTBLACK 89 07/17/2019    NATHALIA 9.5 12/29/2021      A1C RESULTS:  Lab Results   Component Value Date    A1C 6.8 (A) 12/29/2021     INR RESULTS:  No results found for: INR    Total time today was 35 minutes review notes, imaging, labs, patient visit, orders and documentation.     May Zhou PA-C  [facility]

## 2022-02-14 NOTE — LETTER
2/14/2022    Clay Luis MD  [address]  [facility] [address]    RE: Hugo Prince       Dear Colleague,     I had the pleasure of seeing Hugo Prince in the [facility].    Cardiology Progress Note    Patient seen today in follow up of: CAD  Primary cardiologist: Dr. Moe    HPI:  Hugo Prince is a very pleasant 71 year old male with a history of coronary artery disease status post stenting of the mid LAD in 2019, obstructive sleep apnea treated with CPAP, former tobacco use, morbid obesity, recurrent nephrolithiasis, recently diagnosed DM type II who is here today for routine follow-up.    Hugo was admitted to [facility] in July 2019 with chest discomfort and shortness of breath.  His troponin was elevated and peaked at 12 consistent with a non-ST elevation myocardial infarction.  Echo showed an EF of 50 to 55% with severe apical wall hypokinesis.  He was referred for coronary angiography and found to have a culprit lesion in the mid LAD which was treated with a drug-eluting stent.  There was a moderate proximal LAD lesion which was negative by IFR.  He also had moderate mid RCA disease.  He was started on appropriate medical therapy including dual antiplatelet therapy, lisinopril, metoprolol and atorvastatin.    In follow-up, his LDL remained elevated and Lipitor was increased to 80 mg daily although he had issues with muscle aches thus decrease this back to 48.  A follow-up echocardiogram in October 2019 showed normal LV function and normal wall motion.    Hugo had a virtual visit with Dr. Moe in February 2021 and was doing well without anginal symptoms at that time.  No changes were made.    He is back today for annual follow-up. He denies any concerning cardiac complaints recently. He denies chest pain or dyspnea on exertion. He has spinal stenosis and is quite limited by his chronic back pain primarily. He has been much less  active this lest year. He previously was going to the gym but has not been due to COVID. He has also had a few falls outside so he has avoided walking outside during the winter. He is hopeful to be more active as it warms up. He enjoys going to his cabin in the summer and fly fishing.     He was seen by his PCP in December for a cough. He has some chest pain at that time from coughing. He was treated with Flonase and prilosec with resolution. He was diagnosed with DM type II at that time and was started on Metformin. He has watching his carb intake.     ASSESSMENT/PLAN:  Hugo goins is is a very pleasant 71-year-old male with a history of coronary artery disease status post stenting of the mid LAD in 2019 with residual moderate LAD and RCA disease, obstructive sleep apnea treated with CPAP, former smoker, DM type II, morbid obesity who is here today for annual follow-up.    At this point, Hugo appears to be stable from a cardiovascular perspective. He denies any chest pain or shortness of breath today. He remains on appropriate therapy for his CAD with aspirin 81 mg daily, Lipitor 40 mg daily, lisinopril 5 mg daily and metoprolol 25 mg twice daily. BP control appears to be adequate. We discussed his lipid profile today. His LDL is 60, HDL is 43 and total cholesterol is 128. Of note, he did not tolerate higher doses of atorvastatin due to myalgias.     I made no medication changes today. I encouraged him to increase his activity as above and certainly to contact us if he has any concerning new symptoms. Otherwise, he will return to see Dr. Moe in one year. We will repeat a lipid profile at that time. It was a pleasure seeing Hugo in clinic today.    Orders this Visit:  Orders Placed This Encounter   Procedures     Lipid Profile     ALT     Follow-Up with Cardiology     No orders of the defined types were placed in this encounter.    Medications Discontinued During This Encounter   Medication  Reason     furosemide (LASIX) 20 MG tablet      vitamin B-12 (CYANOCOBALAMIN) 1000 MCG tablet        CURRENT MEDICATIONS:  Current Outpatient Medications   Medication Sig Dispense Refill     acetaminophen (TYLENOL) 325 MG tablet Take 2 tablets (650 mg) by mouth every 4 hours as needed for mild pain       aspirin (ASA) 81 MG EC tablet Take 1 tablet (81 mg) by mouth daily       atorvastatin (LIPITOR) 40 MG tablet Take 1 tablet (40 mg) by mouth daily 90 tablet 11     fluticasone (FLONASE) 50 MCG/ACT nasal spray Spray 1 spray into both nostrils daily 18.2 mL 3     lisinopril (ZESTRIL) 5 MG tablet Take 1 tablet (5 mg) by mouth daily 90 tablet 11     metFORMIN (GLUCOPHAGE-XR) 500 MG 24 hr tablet Take 1 tablet (500 mg) by mouth daily (with dinner) 90 tablet 1     metoprolol tartrate (LOPRESSOR) 25 MG tablet Take 1 tablet (25 mg) by mouth 2 times daily 180 tablet 11     Multiple Vitamins-Minerals (PRESERVISION AREDS 2 PO)        nitroGLYcerin (NITROSTAT) 0.4 MG sublingual tablet As needed for chest pain place 1 tablet under the tongue every 5 minutes for 3 doses. If symptoms persist 5 minutes after 1st dose call 911. 25 tablet 11     omeprazole (PRILOSEC) 20 MG DR capsule Take 1 capsule (20 mg) by mouth daily 90 capsule 1     order for DME Equipment being ordered: CPAP       ALLERGIES  Allergies   Allergen Reactions     Chlorthalidone GI Disturbance     Compazine Other (See Comments)     PSYCHOTIC     Erythromycin Nausea and Vomiting     Flomax [Tamsulosin Hcl]      FLUSHING, NASAl CONGESTION     Prochlorperazine      LW Reaction: anxiety     Seasonal Allergies      PAST MEDICAL HISTORY:  Past Medical History:   Diagnosis Date     Anemia due to blood loss, acute 1/14/2015     Atopic dermatitis      CAD (coronary artery disease)      Diabetes mellitus, type 2 (H) 12/29/2021     DJD (degenerative joint disease) of knee 1/14/2015     Dyslipidemia      ED (erectile dysfunction) 4/30/2012     Esophageal reflux      Gastroesophageal reflux     NSTEMI (non-ST elevated myocardial infarction) (H) 7/15/2019     RAY (obstructive sleep apnea)      Other and unspecified disc disorder of unspecified region     Intervertebral disc disorders     Personal history of colonic polyps     Colon polyps     S/P lateral meniscectomy of left knee      S/P total knee replacement 1/7/2015     Tick bite 4/20/15    amoxicillin treated     Unspecified sleep apnea     Sleep apnea     Urinary calculus, unspecified     Renal stones     Vitamin D deficiency 9-27-09     PAST SURGICAL HISTORY:  Past Surgical History:   Procedure Laterality Date     ARTHROPLASTY KNEE Left 1/7/2015    Procedure: ARTHROPLASTY KNEE;  Surgeon: Agustin Saenz MD;  Location:  OR     ARTHROSCOPY KNEE RT/LT  6-20-11     COLONOSCOPY  12/09     CV FRACTIONAL FLOW RATIO WIRE N/A 7/16/2019    Procedure: Fractional Flow Reserve;  Surgeon: Mick Thrasher MD;  Location:  HEART CARDIAC CATH LAB     CV HEART CATHETERIZATION WITH POSSIBLE INTERVENTION N/A 7/16/2019    successful PCI with MICAH placement to mid LAD      CYSTOSCOPY       EXTRACORPOREAL SHOCK WAVE LITHOTRIPSY (ESWL)  1/20/2012    Procedure:EXTRACORPOREAL SHOCK WAVE LITHOTRIPSY (ESWL); LEFT EXTRACORPOREAL SHOCK WAVE LITHOTRIPSY ; Surgeon:ANJUM BUNDY; Location: OR     EXTRACORPOREAL SHOCK WAVE LITHOTRIPSY, CYSTOSCOPY, INSERT STENT URETER(S), COMBINED  2/3/2012    Procedure:COMBINED EXTRACORPOREAL SHOCK WAVE LITHOTRIPSY, CYSTOSCOPY, INSERT STENT URETER(S); CYSTOSCOPY, RIGHT STENT; Surgeon:ANJUM BUNDY; Location: OR     FUSION LUMBAR ANTERIOR, FUSION LUMBAR POSTERIOR ONE LEVEL, COMBINED  11-1-10    L4-5     LASER HOLMIUM LITHOTRIPSY URETER(S), INSERT STENT, COMBINED  2/7/2012    Procedure:COMBINED CYSTOSCOPY, URETEROSCOPY, LASER HOLMIUM LITHOTRIPSY URETER(S), INSERT STENT; CYSTOSCOPY, RIGHT URETEROSCOPY, RIGHT RETROGRADES,  STONE EXTRACTION WITH HOLMIUM LASER AND RIGHT URETERAL STENT EXCHANGE ;  Surgeon:DEEPTI MELVIN; Location: OR     LITHOTRIPSY      Lithotrypsy     SEPTOPLASTY       VASECTOMY       FAMILY HISTORY:  Family History   Problem Relation Age of Onset     Melanoma Father      Prostate Cancer Father      Cancer Father         neck cancer from radiation at work (urology)     Breast Cancer Mother      C.A.D. Paternal Grandfather 72     SOCIAL HISTORY:  Social History     Socioeconomic History     Marital status:      Spouse name: Not on file     Number of children: Not on file     Years of education: Not on file     Highest education level: Not on file   Occupational History     Not on file   Tobacco Use     Smoking status: Former Smoker     Types: Cigars     Smokeless tobacco: Never Used     Tobacco comment: has cigars when it is warm out   Substance and Sexual Activity     Alcohol use: Yes     Alcohol/week: 11.7 standard drinks     Types: 14 Standard drinks or equivalent per week     Comment: 1-2 drinks day/beer and scotch     Drug use: No     Sexual activity: Not on file   Other Topics Concern     Parent/sibling w/ CABG, MI or angioplasty before 65F 55M? Not Asked   Social History Narrative    Lives with girlfriend    Retired teacher    Loves fly fishing     Social Determinants of Health     Financial Resource Strain: Not on file   Food Insecurity: Not on file   Transportation Needs: Not on file   Physical Activity: Not on file   Stress: Not on file   Social Connections: Not on file   Intimate Partner Violence: Not on file   Housing Stability: Not on file     Review of Systems:  Skin:  Positive for     Eyes:  Positive for glasses  ENT:  Negative    Respiratory:  Positive for sleep apnea;CPAP  Cardiovascular:    Positive for;edema  Gastroenterology: Negative    Genitourinary:  Negative    Musculoskeletal:  Positive for back pain;joint pain  Neurologic:  Negative    Psychiatric:  Positive for sleep disturbances  Heme/Lymph/Imm:  Negative    Endocrine:  Positive for diabetes  "    Physical Exam:  Vitals: /79   Pulse 78   Ht 1.778 m (5' 10\")   Wt 139.1 kg (306 lb 11.2 oz)   BMI 44.01 kg/m     Wt Readings from Last 4 Encounters:   02/14/22 139.1 kg (306 lb 11.2 oz)   12/29/21 137 kg (302 lb)   10/20/21 136.1 kg (300 lb)   07/12/21 138.3 kg (305 lb)     GEN: well nourished, in no acute distress.  HEENT:  Pupils equal, round. Sclerae nonicteric.   C/V:  Regular rate and rhythm, no murmur, rub or gallop.    RESP: Respirations are unlabored. Clear to auscultation bilaterally without wheezing, rales, or rhonchi.  GI: Abdomen soft, nontender.  EXTREM: no LE edema.  NEURO: Alert and oriented, cooperative.  SKIN: Warm and dry.     Recent Lab Results:  LIPID RESULTS:  Lab Results   Component Value Date    CHOL 128 02/14/2022    CHOL 95 01/08/2021    HDL 43 02/14/2022    HDL 40 01/08/2021    LDL 60 02/14/2022    LDL 36 01/08/2021    TRIG 125 02/14/2022    TRIG 94 01/08/2021     LIVER ENZYME RESULTS:  Lab Results   Component Value Date    AST 32 12/29/2021    ALT 36 02/14/2022    ALT 31 12/29/2021     CBC RESULTS:  Lab Results   Component Value Date    WBC 5.5 12/29/2021    WBC 9.0 07/17/2019    RBC 4.51 12/29/2021    RBC 4.50 07/17/2019    HGB 14.5 12/29/2021    HCT 44.5 12/29/2021    MCV 98.7 12/29/2021    MCH 32.3 (A) 12/29/2021    MCHC 32.7 (A) 12/29/2021    RDW 12.7 07/17/2019     12/29/2021     BMP RESULTS:  Lab Results   Component Value Date     12/29/2021    POTASSIUM 5.1 12/29/2021    CHLORIDE 102 12/29/2021    CO2 24 07/17/2019    ANIONGAP 8 07/17/2019     (H) 12/29/2021    BUN 17 12/29/2021    BUN 15 12/29/2021    CR 1.15 12/29/2021    GFRESTIMATED 77 07/17/2019    GFRESTBLACK 89 07/17/2019    NATHALIA 9.5 12/29/2021      A1C RESULTS:  Lab Results   Component Value Date    A1C 6.8 (A) 12/29/2021     INR RESULTS:  No results found for: INR    Total time today was 35 minutes review notes, imaging, labs, patient visit, orders and documentation.       May Zhou, " PA-C  [facility]

## 2022-02-14 NOTE — PATIENT INSTRUCTIONS
"Thank you for your [facility] visit today. Your provider has recommended the following:    Medication Changes:  No medication changes.   Recommendations:  Call us with any symptoms or concerns.  Follow-up:  See Dr. chavez for cardiology follow up in one year with labs before.  Reminder:  Please bring in all current medications, over the counter supplements and vitamin bottles to your next appointment.  Important \"[facility]\" telephone numbers for your reference:  Cardiology Scheduling - [phone]  Cardiology Clinic RN-  [phone]     [facility]    Labs look good: LDL is well controlled at 60.  Component      Latest Ref Rng & Units 2/14/2022   Cholesterol      <200 mg/dL 128   Triglycerides      <150 mg/dL 125   HDL Cholesterol      >=40 mg/dL 43   LDL Cholesterol Calculated      <=100 mg/dL 60   Non HDL Cholesterol      <130 mg/dL 85   Patient Fasting > 8hrs?       Yes   ALT      0 - 70 U/L 36     "

## 2022-02-19 ENCOUNTER — HEALTH MAINTENANCE LETTER (OUTPATIENT)
Age: 72
End: 2022-02-19

## 2022-02-28 ENCOUNTER — TELEPHONE (OUTPATIENT)
Dept: UROLOGY | Facility: CLINIC | Age: 72
End: 2022-02-28
Payer: MEDICARE

## 2022-02-28 DIAGNOSIS — N20.0 BILATERAL NEPHROLITHIASIS: Primary | ICD-10-CM

## 2022-02-28 RX ORDER — TAMSULOSIN HYDROCHLORIDE 0.4 MG/1
0.4 CAPSULE ORAL DAILY
Qty: 90 CAPSULE | Refills: 0 | Status: SHIPPED | OUTPATIENT
Start: 2022-02-28 | End: 2022-05-29

## 2022-02-28 NOTE — TELEPHONE ENCOUNTER
Called patient and informed him that refill was given to make it through until his upcoming appointment with Jennifer.     Patricia Zuleta LPN

## 2022-02-28 NOTE — TELEPHONE ENCOUNTER
[facility] Call Center    Phone Message    May a detailed message be left on voicemail: yes     Reason for Call: Medication Question or concern regarding medication   Prescription Clarification  Name of Medication: Tamsulosin  Prescribing Provider: Gabriela   Pharmacy: [id number] [address]   What on the order needs clarification? Pt is requesting a refill of this medication. It was last filled by Lloyd, and pt is currently out.    Action Taken: Other: uro    Travel Screening: Not Applicable

## 2022-04-13 DIAGNOSIS — I21.4 NSTEMI (NON-ST ELEVATED MYOCARDIAL INFARCTION) (H): ICD-10-CM

## 2022-04-13 RX ORDER — METOPROLOL TARTRATE 25 MG/1
25 TABLET, FILM COATED ORAL 2 TIMES DAILY
Qty: 180 TABLET | Refills: 1 | Status: SHIPPED | OUTPATIENT
Start: 2022-04-13 | End: 2022-11-01

## 2022-04-13 RX ORDER — LISINOPRIL 5 MG/1
5 TABLET ORAL DAILY
Qty: 90 TABLET | Refills: 1 | Status: SHIPPED | OUTPATIENT
Start: 2022-04-13 | End: 2022-11-03

## 2022-04-16 ENCOUNTER — HEALTH MAINTENANCE LETTER (OUTPATIENT)
Age: 72
End: 2022-04-16

## 2022-04-19 ENCOUNTER — TELEPHONE (OUTPATIENT)
Dept: UROLOGY | Facility: CLINIC | Age: 72
End: 2022-04-19
Payer: MEDICARE

## 2022-04-19 NOTE — TELEPHONE ENCOUNTER
[facility] Call Center    Phone Message    May a detailed message be left on voicemail: yes     Reason for Call: Patient calling because he was wondering if his 6month appt with Jennifer Lynn is needed. He is not having any issues and would prefer to wait until October to see Dr. Ochoa. Wanting to see if this is an ok plan. Please reach out to patient to discuss. Thank you!    Action Taken: Message routed to:  [facility] ([facility]): Uro    Travel Screening: Not Applicable

## 2022-04-27 DIAGNOSIS — I21.4 NSTEMI (NON-ST ELEVATED MYOCARDIAL INFARCTION) (H): ICD-10-CM

## 2022-04-27 RX ORDER — ATORVASTATIN CALCIUM 40 MG/1
40 TABLET, FILM COATED ORAL DAILY
Qty: 90 TABLET | Refills: 2 | Status: SHIPPED | OUTPATIENT
Start: 2022-04-27 | End: 2023-02-13

## 2022-05-03 ENCOUNTER — OFFICE VISIT (OUTPATIENT)
Dept: FAMILY MEDICINE | Facility: CLINIC | Age: 72
End: 2022-05-03

## 2022-05-03 VITALS
RESPIRATION RATE: 16 BRPM | DIASTOLIC BLOOD PRESSURE: 78 MMHG | BODY MASS INDEX: 43.62 KG/M2 | SYSTOLIC BLOOD PRESSURE: 144 MMHG | OXYGEN SATURATION: 96 % | WEIGHT: 304 LBS | HEART RATE: 69 BPM

## 2022-05-03 DIAGNOSIS — I25.10 CORONARY ARTERY DISEASE INVOLVING NATIVE CORONARY ARTERY OF NATIVE HEART WITHOUT ANGINA PECTORIS: ICD-10-CM

## 2022-05-03 DIAGNOSIS — E78.5 DYSLIPIDEMIA: ICD-10-CM

## 2022-05-03 DIAGNOSIS — E66.01 MORBID OBESITY (H): ICD-10-CM

## 2022-05-03 DIAGNOSIS — E11.9 TYPE 2 DIABETES MELLITUS WITHOUT COMPLICATION, WITHOUT LONG-TERM CURRENT USE OF INSULIN (H): Primary | ICD-10-CM

## 2022-05-03 LAB — HBA1C MFR BLD: 6.3 % (ref 4–6)

## 2022-05-03 PROCEDURE — 82044 UR ALBUMIN SEMIQUANTITATIVE: CPT | Performed by: FAMILY MEDICINE

## 2022-05-03 PROCEDURE — 36415 COLL VENOUS BLD VENIPUNCTURE: CPT | Performed by: FAMILY MEDICINE

## 2022-05-03 PROCEDURE — 99214 OFFICE O/P EST MOD 30 MIN: CPT | Performed by: FAMILY MEDICINE

## 2022-05-03 PROCEDURE — 83036 HEMOGLOBIN GLYCOSYLATED A1C: CPT | Performed by: FAMILY MEDICINE

## 2022-05-03 RX ORDER — DOXYCYCLINE 100 MG/1
100 CAPSULE ORAL
COMMUNITY
Start: 2021-12-17 | End: 2022-08-01

## 2022-05-03 RX ORDER — CEFDINIR 300 MG/1
CAPSULE ORAL
COMMUNITY
Start: 2022-02-26 | End: 2022-08-01

## 2022-05-03 RX ORDER — METFORMIN HCL 500 MG
500 TABLET, EXTENDED RELEASE 24 HR ORAL
COMMUNITY
Start: 2021-12-30 | End: 2022-05-03

## 2022-05-03 NOTE — PROGRESS NOTES
Jatinder Arias is a 72 year old patient who presents to clinic for follow up.    DM2: diagnosed at most recent visit.  Has been working on diet.  Difficult to exercise with his back issues.  Tolerating Metformin 500 mg well.    CAD: He was found to have a mid LAD lesion in July 2019 which was treated with a drug-eluting stent.  He had a moderate proximal LAD lesion which was negative by IFR.  He also had moderate mid RCA disease.  He was started on dual antiplatelet therapy.  He had some shortness of breath with Brilinta thus was switched to a prasugrel. This has now been discontinued after 1 year of therapy.  He is maintained on aspirin.  Patient denies recent chest pain or NTG use, denies exercise induced dyspnea or PND. Last Stress test 5/2019.  He is also on metoprolol and lisinopril.     HLD: on statin    BMI 43: working on weight loss.  Challenging due to back    Review of Systems   Constitutional, HEENT, cardiovascular, pulmonary, GI, , musculoskeletal, neuro, skin, endocrine and psych systems are negative, except as otherwise noted.      Objective    BP (!) 144/78   Pulse 69   Resp 16   Wt 137.9 kg (304 lb)   SpO2 96%   BMI 43.62 kg/m      General: Well appearing, NAD  Psych: normal mood and affect        Results for orders placed or performed in visit on 05/03/22 (from the past 24 hour(s))   Hemoglobin A1C ([facility])   Result Value Ref Range    Hemoglobin A1C POCT 6.3 (A) 4.0 - 6.0 %       Type 2 diabetes mellitus without complication, without long-term current use of insulin (H)  A1c improved, cont Metformin  - optimize diet, exercise as able  - VENOUS COLLECTION  - Microalbumin ([facility])  - Hemoglobin A1C ([facility])  - Adult Gastro Ref - Procedure Only; Future    Coronary artery disease involving native coronary artery of native heart without angina pectoris  Asymptomatic, cont current medications    Morbid obesity (H)  - Discussed importance of optimizing diet, exercise and healthy  weight    Dyslipidemia  Cont statin      Follow up in 3 months for AWV          Answers for HPI/ROS submitted by the patient on 5/2/2022  Frequency of checking blood sugars:: not at all  Diabetic concerns:: none  Paraesthesia present:: numbness in feet  Have you had a diabetic eye exam within the last year?: Yes  Date of last eye exam:: 6/2021  Where was this eye exam done?: [facility]  On average, how many sweetened beverages do you drink each day (Examples: soda, juice, sweet tea, etc.  Do NOT count diet or artificially sweetened beverages)?: 0  How many days a week do you exercise enough to make your heart beat faster?: 3 or less  How many days per week do you miss taking your medication?: 0

## 2022-05-04 LAB
A/C RATIO MG/G: <30 MG/G
ALBUMIN (URINE) MG/L: 10 MG/L
INTERPRETATION: NORMAL
URINE CREATININE MG/DL - QUEST: 200 MG/DL

## 2022-06-10 DIAGNOSIS — I21.4 NSTEMI (NON-ST ELEVATED MYOCARDIAL INFARCTION) (H): ICD-10-CM

## 2022-06-10 RX ORDER — NITROGLYCERIN 0.4 MG/1
TABLET SUBLINGUAL
Qty: 25 TABLET | Refills: 3 | Status: SHIPPED | OUTPATIENT
Start: 2022-06-10 | End: 2023-01-27

## 2022-06-10 NOTE — TELEPHONE ENCOUNTER
[facility] Refill Guideline reviewed.  Medication meets criteria for refill.    Received refill request for:  NTG  Last OV was: 2/14/2022  Labs/EKG: na  F/U scheduled: orders in Epic for 2/2023  New script sent to: [facility]    .

## 2022-06-20 DIAGNOSIS — E11.9 TYPE 2 DIABETES MELLITUS WITHOUT COMPLICATION, WITHOUT LONG-TERM CURRENT USE OF INSULIN (H): ICD-10-CM

## 2022-06-20 DIAGNOSIS — K21.9 GASTROESOPHAGEAL REFLUX DISEASE, UNSPECIFIED WHETHER ESOPHAGITIS PRESENT: ICD-10-CM

## 2022-06-20 RX ORDER — METFORMIN HCL 500 MG
500 TABLET, EXTENDED RELEASE 24 HR ORAL
Qty: 90 TABLET | Refills: 1 | Status: SHIPPED | OUTPATIENT
Start: 2022-06-20 | End: 2022-12-15

## 2022-06-20 NOTE — TELEPHONE ENCOUNTER
OMEPRAZOLE   METFORMIN    LOV: 5/3/22  LAST LABS: 5/3/22    Hemoglobin A1C POCT   Date Value Ref Range Status   05/03/2022 6.3 (A) 4.0 - 6.0 % Final

## 2022-06-20 NOTE — TELEPHONE ENCOUNTER
Omeprazole. Last addressed 12/29/21. LOV 5/03/22.    Gastroesophageal reflux disease, unspecified whether esophagitis present

## 2022-07-28 ENCOUNTER — TRANSFERRED RECORDS (OUTPATIENT)
Dept: FAMILY MEDICINE | Facility: CLINIC | Age: 72
End: 2022-07-28

## 2022-07-28 LAB — RETINOPATHY: NEGATIVE

## 2022-08-01 ENCOUNTER — OFFICE VISIT (OUTPATIENT)
Dept: FAMILY MEDICINE | Facility: CLINIC | Age: 72
End: 2022-08-01

## 2022-08-01 VITALS
BODY MASS INDEX: 42.62 KG/M2 | DIASTOLIC BLOOD PRESSURE: 72 MMHG | OXYGEN SATURATION: 96 % | HEART RATE: 66 BPM | SYSTOLIC BLOOD PRESSURE: 126 MMHG | WEIGHT: 297 LBS

## 2022-08-01 DIAGNOSIS — M25.512 ACUTE PAIN OF LEFT SHOULDER: ICD-10-CM

## 2022-08-01 DIAGNOSIS — G56.03 BILATERAL CARPAL TUNNEL SYNDROME: Primary | ICD-10-CM

## 2022-08-01 PROCEDURE — 99214 OFFICE O/P EST MOD 30 MIN: CPT | Performed by: FAMILY MEDICINE

## 2022-08-01 NOTE — PROGRESS NOTES
Jatinder Arias is a 72 year old patient who presents to clinic for evaluation.  Reports chronic CTS bilaterally.  Underwent EMG remotely.  Symptoms waxed and waned but have been worse lately.  Painful when waking up, numbness and tingling in first three fingers bilaterally and bothersome when fly fishing.  Has not done any interventions other than stretching.  Does not want surgery.    Also left shoulder/upper arm pain.  Began 2 days after covid booster.  No radicular symptoms.  No weakness.          Review of Systems   Constitutional, HEENT, cardiovascular, pulmonary, GI, , musculoskeletal, neuro, skin, endocrine and psych systems are negative, except as otherwise noted.      Objective    /72   Pulse 66   Wt 134.7 kg (297 lb)   SpO2 96%   BMI 42.62 kg/m      General: Well appearing, NAD  MSK: Focused exam of left shoulder:  Appearance: no swelling or visible deformity  AROM (in degrees): Flexion:  165, Abduction:  165, External Rotation:  130  PROM: Flexion:  same, Abduction:  same, External Rotation:  same  Motor strength: Supraspinatus (empty can): 5/5, External rotation: 5/5, Internal rotation: 5/5, Subscapularis (belly press): 5/5, Biceps: 5/5, Deltoid: 5/5.    Normal reflex and sensation  Palpation: nontender  Special tests:    Neer: pos    Lama: pos    Apprehension: neg    Speed's: neg    Yergason's: ne    [place]am[place]'s: neg    Psych: normal mood and affect      Bilateral carpal tunnel syndrome  Trial of wrist splints and PT.  If not improving will return for CSI  - Physical Therapy Referral; Future    Acute pain of left shoulder  Trial of PT.  If not improved consider GOOD vs MRI vs referral  - Physical Therapy Referral; Future    Follow up in 4-6 weeks

## 2022-08-06 ENCOUNTER — HEALTH MAINTENANCE LETTER (OUTPATIENT)
Age: 72
End: 2022-08-06

## 2022-08-15 ENCOUNTER — TRANSFERRED RECORDS (OUTPATIENT)
Dept: FAMILY MEDICINE | Facility: CLINIC | Age: 72
End: 2022-08-15

## 2022-09-17 ENCOUNTER — APPOINTMENT (OUTPATIENT)
Dept: CT IMAGING | Facility: CLINIC | Age: 72
End: 2022-09-17
Attending: EMERGENCY MEDICINE
Payer: MEDICARE

## 2022-09-17 ENCOUNTER — HOSPITAL ENCOUNTER (EMERGENCY)
Facility: CLINIC | Age: 72
Discharge: HOME | End: 2022-09-17
Attending: EMERGENCY MEDICINE | Admitting: EMERGENCY MEDICINE
Payer: MEDICARE

## 2022-09-17 ENCOUNTER — APPOINTMENT (OUTPATIENT)
Dept: MRI IMAGING | Facility: CLINIC | Age: 72
End: 2022-09-17
Payer: MEDICARE

## 2022-09-17 VITALS
BODY MASS INDEX: 42.52 KG/M2 | HEIGHT: 70 IN | DIASTOLIC BLOOD PRESSURE: 86 MMHG | RESPIRATION RATE: 16 BRPM | TEMPERATURE: 98.2 F | SYSTOLIC BLOOD PRESSURE: 159 MMHG | WEIGHT: 297 LBS | OXYGEN SATURATION: 97 % | HEART RATE: 52 BPM

## 2022-09-17 DIAGNOSIS — E11.65 TYPE 2 DIABETES MELLITUS WITH HYPERGLYCEMIA, WITHOUT LONG-TERM CURRENT USE OF INSULIN (H): ICD-10-CM

## 2022-09-17 DIAGNOSIS — H81.10 BENIGN PAROXYSMAL POSITIONAL VERTIGO, UNSPECIFIED LATERALITY: ICD-10-CM

## 2022-09-17 LAB
ANION GAP SERPL CALCULATED.3IONS-SCNC: 7 MMOL/L (ref 3–14)
APTT PPP: 25 SECONDS (ref 22–38)
ATRIAL RATE - MUSE: 77 BPM
BASOPHILS # BLD AUTO: 0 10E3/UL (ref 0–0.2)
BASOPHILS NFR BLD AUTO: 1 %
BUN SERPL-MCNC: 19 MG/DL (ref 7–30)
CALCIUM SERPL-MCNC: 9.3 MG/DL (ref 8.5–10.1)
CHLORIDE BLD-SCNC: 106 MMOL/L (ref 94–109)
CO2 SERPL-SCNC: 27 MMOL/L (ref 20–32)
CREAT SERPL-MCNC: 0.95 MG/DL (ref 0.66–1.25)
DIASTOLIC BLOOD PRESSURE - MUSE: NORMAL MMHG
EOSINOPHIL # BLD AUTO: 0.1 10E3/UL (ref 0–0.7)
EOSINOPHIL NFR BLD AUTO: 2 %
ERYTHROCYTE [DISTWIDTH] IN BLOOD BY AUTOMATED COUNT: 12.8 % (ref 10–15)
GFR SERPL CREATININE-BSD FRML MDRD: 84 ML/MIN/1.73M2
GLUCOSE BLD-MCNC: 190 MG/DL (ref 70–99)
HCT VFR BLD AUTO: 48.3 % (ref 40–53)
HGB BLD-MCNC: 15.8 G/DL (ref 13.3–17.7)
HOLD SPECIMEN: NORMAL
IMM GRANULOCYTES # BLD: 0.1 10E3/UL
IMM GRANULOCYTES NFR BLD: 2 %
INR PPP: 1.01 (ref 0.85–1.15)
INTERPRETATION ECG - MUSE: NORMAL
LYMPHOCYTES # BLD AUTO: 1.5 10E3/UL (ref 0.8–5.3)
LYMPHOCYTES NFR BLD AUTO: 21 %
MCH RBC QN AUTO: 33 PG (ref 26.5–33)
MCHC RBC AUTO-ENTMCNC: 32.7 G/DL (ref 31.5–36.5)
MCV RBC AUTO: 101 FL (ref 78–100)
MONOCYTES # BLD AUTO: 0.6 10E3/UL (ref 0–1.3)
MONOCYTES NFR BLD AUTO: 9 %
NEUTROPHILS # BLD AUTO: 4.8 10E3/UL (ref 1.6–8.3)
NEUTROPHILS NFR BLD AUTO: 65 %
NRBC # BLD AUTO: 0 10E3/UL
NRBC BLD AUTO-RTO: 0 /100
P AXIS - MUSE: 51 DEGREES
PLATELET # BLD AUTO: 186 10E3/UL (ref 150–450)
POTASSIUM BLD-SCNC: 4.1 MMOL/L (ref 3.4–5.3)
PR INTERVAL - MUSE: 234 MS
QRS DURATION - MUSE: 86 MS
QT - MUSE: 388 MS
QTC - MUSE: 439 MS
R AXIS - MUSE: -20 DEGREES
RBC # BLD AUTO: 4.79 10E6/UL (ref 4.4–5.9)
SODIUM SERPL-SCNC: 140 MMOL/L (ref 133–144)
SYSTOLIC BLOOD PRESSURE - MUSE: NORMAL MMHG
T AXIS - MUSE: 45 DEGREES
TROPONIN I SERPL HS-MCNC: 17 NG/L
TROPONIN I SERPL HS-MCNC: 8 NG/L
VENTRICULAR RATE- MUSE: 77 BPM
WBC # BLD AUTO: 7.3 10E3/UL (ref 4–11)

## 2022-09-17 PROCEDURE — 85610 PROTHROMBIN TIME: CPT | Performed by: EMERGENCY MEDICINE

## 2022-09-17 PROCEDURE — 84484 ASSAY OF TROPONIN QUANT: CPT | Mod: 91 | Performed by: EMERGENCY MEDICINE

## 2022-09-17 PROCEDURE — 70498 CT ANGIOGRAPHY NECK: CPT | Mod: MG

## 2022-09-17 PROCEDURE — 99291 CRITICAL CARE FIRST HOUR: CPT | Mod: GC | Performed by: PSYCHIATRY & NEUROLOGY

## 2022-09-17 PROCEDURE — 99285 EMERGENCY DEPT VISIT HI MDM: CPT | Mod: 25

## 2022-09-17 PROCEDURE — 84484 ASSAY OF TROPONIN QUANT: CPT | Performed by: EMERGENCY MEDICINE

## 2022-09-17 PROCEDURE — 96361 HYDRATE IV INFUSION ADD-ON: CPT

## 2022-09-17 PROCEDURE — 250N000009 HC RX 250: Performed by: EMERGENCY MEDICINE

## 2022-09-17 PROCEDURE — 70496 CT ANGIOGRAPHY HEAD: CPT | Mod: MG

## 2022-09-17 PROCEDURE — 85025 COMPLETE CBC W/AUTO DIFF WBC: CPT | Performed by: EMERGENCY MEDICINE

## 2022-09-17 PROCEDURE — 250N000011 HC RX IP 250 OP 636: Performed by: EMERGENCY MEDICINE

## 2022-09-17 PROCEDURE — 80048 BASIC METABOLIC PNL TOTAL CA: CPT | Performed by: EMERGENCY MEDICINE

## 2022-09-17 PROCEDURE — 93005 ELECTROCARDIOGRAM TRACING: CPT

## 2022-09-17 PROCEDURE — G1010 CDSM STANSON: HCPCS

## 2022-09-17 PROCEDURE — 0042T CT HEAD PERFUSION W CONTRAST: CPT | Mod: GZ

## 2022-09-17 PROCEDURE — 96375 TX/PRO/DX INJ NEW DRUG ADDON: CPT

## 2022-09-17 PROCEDURE — 258N000003 HC RX IP 258 OP 636: Performed by: EMERGENCY MEDICINE

## 2022-09-17 PROCEDURE — 85730 THROMBOPLASTIN TIME PARTIAL: CPT | Performed by: EMERGENCY MEDICINE

## 2022-09-17 PROCEDURE — 250N000013 HC RX MED GY IP 250 OP 250 PS 637: Performed by: EMERGENCY MEDICINE

## 2022-09-17 PROCEDURE — 96374 THER/PROPH/DIAG INJ IV PUSH: CPT | Mod: 59

## 2022-09-17 PROCEDURE — 96376 TX/PRO/DX INJ SAME DRUG ADON: CPT

## 2022-09-17 PROCEDURE — 36415 COLL VENOUS BLD VENIPUNCTURE: CPT | Performed by: EMERGENCY MEDICINE

## 2022-09-17 RX ORDER — DIAZEPAM 5 MG
5 TABLET ORAL EVERY 6 HOURS PRN
Qty: 20 TABLET | Refills: 0 | Status: SHIPPED | OUTPATIENT
Start: 2022-09-17 | End: 2022-11-11

## 2022-09-17 RX ORDER — MECLIZINE HYDROCHLORIDE 25 MG/1
25 TABLET ORAL ONCE
Status: COMPLETED | OUTPATIENT
Start: 2022-09-17 | End: 2022-09-17

## 2022-09-17 RX ORDER — DIAZEPAM 10 MG/2ML
5 INJECTION, SOLUTION INTRAMUSCULAR; INTRAVENOUS ONCE
Status: COMPLETED | OUTPATIENT
Start: 2022-09-17 | End: 2022-09-17

## 2022-09-17 RX ORDER — ONDANSETRON 2 MG/ML
4 INJECTION INTRAMUSCULAR; INTRAVENOUS ONCE
Status: COMPLETED | OUTPATIENT
Start: 2022-09-17 | End: 2022-09-17

## 2022-09-17 RX ORDER — ONDANSETRON 4 MG/1
4 TABLET, ORALLY DISINTEGRATING ORAL EVERY 6 HOURS PRN
Qty: 15 TABLET | Refills: 0 | Status: SHIPPED | OUTPATIENT
Start: 2022-09-17 | End: 2023-08-08

## 2022-09-17 RX ORDER — MECLIZINE HYDROCHLORIDE 25 MG/1
25 TABLET ORAL 3 TIMES DAILY PRN
Qty: 30 TABLET | Refills: 0 | Status: SHIPPED | OUTPATIENT
Start: 2022-09-17 | End: 2022-10-20

## 2022-09-17 RX ORDER — IOPAMIDOL 755 MG/ML
125 INJECTION, SOLUTION INTRAVASCULAR ONCE
Status: COMPLETED | OUTPATIENT
Start: 2022-09-17 | End: 2022-09-17

## 2022-09-17 RX ADMIN — IOPAMIDOL 125 ML: 755 INJECTION, SOLUTION INTRAVENOUS at 10:13

## 2022-09-17 RX ADMIN — ONDANSETRON 4 MG: 2 INJECTION INTRAMUSCULAR; INTRAVENOUS at 12:50

## 2022-09-17 RX ADMIN — MECLIZINE HYDROCHLORIDE 25 MG: 25 TABLET ORAL at 10:08

## 2022-09-17 RX ADMIN — SODIUM CHLORIDE 1000 ML: 9 INJECTION, SOLUTION INTRAVENOUS at 09:49

## 2022-09-17 RX ADMIN — ONDANSETRON 4 MG: 2 INJECTION INTRAMUSCULAR; INTRAVENOUS at 09:49

## 2022-09-17 RX ADMIN — DIAZEPAM 5 MG: 5 INJECTION, SOLUTION INTRAMUSCULAR; INTRAVENOUS at 11:43

## 2022-09-17 RX ADMIN — SODIUM CHLORIDE 100 ML: 900 INJECTION INTRAVENOUS at 10:13

## 2022-09-17 RX ADMIN — DIAZEPAM 5 MG: 5 INJECTION, SOLUTION INTRAMUSCULAR; INTRAVENOUS at 10:10

## 2022-09-17 ASSESSMENT — ENCOUNTER SYMPTOMS
NAUSEA: 1
FEVER: 0
DIZZINESS: 1
WEAKNESS: 0

## 2022-09-17 ASSESSMENT — ACTIVITIES OF DAILY LIVING (ADL)
ADLS_ACUITY_SCORE: 35
ADLS_ACUITY_SCORE: 35

## 2022-09-17 NOTE — ED PROVIDER NOTES
History   Chief Complaint:  Dizziness       HPI   Hugo Prince is a 72 year old male who presents with dizziness. The patient reports that he woke up at 7:15 am today and then walked down the stairs. As he was walking down the stairs, he felt sweaty, dizzy and nausea that caused him to have off balance so he grabbed the stairs rail to keep him steady. He reports never experiencing this before. He denies having double vision, numbness or weakness, slurred speech, chest pain, trouble breathing, or fever.    Of note, the patient reports having stent placed 3 years ago.    Review of Systems   Constitutional: Negative for fever.   Eyes: Negative for visual disturbance.   Cardiovascular: Negative for chest pain.   Gastrointestinal: Positive for nausea.   Neurological: Positive for dizziness. Negative for weakness.   All other systems reviewed and are negative.    Allergies:  Chlorthalidone  Compazine  Erythromycin  Flomax [Tamsulosin Hcl]  Prochlorperazine  Seasonal Allergies    Medications:  Tylenol  ASA  Flonase  Zestril  Glucophage  Lopressor  Nitrostat  Prilosec  Senokot  Norco  Valium  Effient    Past Medical History:     Recurrent nephrolithiasis  RAY  ED  GERD  S/P total knee replacement  Dermatitis  Morbid obesity  CAD  History of non- ST elevation myocardial infarction  Dyslipidemia  Diabetes mellitus   Influenza vaccination declined  Pneumococcal vaccinated declined  Chronic rhinitis    Past Surgical History:    Arthroplasty knee  Colonoscopy  CV fractional flow rate wire  Cystoscopy  Extracorporal shock wave lithotripsy  Fusion lumbar anterior  Laser holmium Lithotripsy  Septoplasty  Vasectomy    Family History:    Melanoma - father  Cancer - father    Social History:  The patient presents to the ED alone.  PCP: Clay Luis       Physical Exam     Patient Vitals for the past 24 hrs:   BP Temp Temp src Pulse Resp SpO2 Height Weight   09/17/22 1255 (!) 159/86 -- -- 52 16 97 % -- --   09/17/22  "1145 137/77 -- -- 57 14 92 % -- --   09/17/22 1000 -- -- -- 71 10 94 % -- --   09/17/22 0945 -- -- -- 72 11 93 % -- --   09/17/22 0940 (!) 160/87 98.2  F (36.8  C) Oral 74 20 96 % 1.778 m (5' 10\") 134.7 kg (297 lb)       Physical Exam  Nursing note and vitals reviewed.  Constitutional:  Appears well-developed and well-nourished.   HENT:   Head:    Atraumatic. TM's are clear.  Mouth/Throat:   Oropharynx is clear and moist. No oropharyngeal exudate.   Eyes:    Pupils are equal, round, and reactive to light.   Neck:    Normal range of motion. Neck supple.      No tracheal deviation present. No thyromegaly present.   Cardiovascular:  Normal rate, regular rhythm, no murmur   Pulmonary/Chest: Breath sounds are clear and equal without wheezes or crackles.  Abdominal:   Soft. Bowel sounds are normal. Exhibits no distension and      no mass. There is no tenderness.      There is no rebound and no guarding.   Musculoskeletal:  Exhibits no edema.   Lymphadenopathy:  No cervical adenopathy.   Neurological:   Alert and oriented to person, place, and time. GCS 15.  CN 2-12 intact.  and proximal upper extremity strength strong and equal.  Bilateral lower extremity strength strong and equal, including strong dorsiflexion and plantarflexion strength.  Sensation intact and equal to the face, arms and legs.  No facial droop or weakness. Normal speech.  Follows commands and answers questions normally.    Skin:    Skin is warm and dry. No rash noted. No pallor.       Emergency Department Course   ECG  ECG:  ECG taken at 0938, ECG read at 1005  Sinus rhythm with 1st degree AV block   Otherwise normal ECG   Rate 77 bpm. AR interval 234 ms. QRS duration 86 ms. QT/QTc 388/439 ms. P-R-T axes 51 -20 45.      Imaging:  MR Brain w/o Contrast   Final Result   IMPRESSION:   1.  No acute infarct, intracranial hemorrhage or intracranial mass.   2.  Generalized brain atrophy and presumed microvascular ischemic changes as detailed above.      CT " Head Perfusion w Contrast   Final Result   IMPRESSION: No focal or regional cerebral perfusion asymmetry is identified.            CTA Head Neck w Contrast   Final Result   IMPRESSION:       HEAD CTA:    1.  The intracranial left vertebral artery and left posterior inferior cerebellar artery (PICA) are poorly seen. 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 This is likely a normal variant. It is difficult to    completely exclude stenosis/occlusion of the developmentally hypoplastic left vertebral artery or left PICA, but this is thought to be less likely.   2.  Atherosclerosis of the carotid siphons with mild stenosis.   3.  Otherwise, no high-grade stenosis or large vessel occlusion of the major intracranial arteries. No intracranial aneurysm or high-flow vascular malformation seen.   4.  Fetal origin of the right posterior cerebral artery from the anterior circulation, a normal variant.      NECK CTA:   1.  Mild bilateral carotid bifurcation atherosclerosis with less than 50% stenosis by NASCET criteria.   2.  No flow-limiting stenosis of the cervical vertebral arteries.   3.  Dominant right and developmentally small left vertebral arteries. The left vertebral artery arises directly from the aortic arch, a normal variant.      Findings discussed with Dr. Greene by myself at 10:35 AM on 09/17/2022.         CT Head w/o Contrast   Final Result   IMPRESSION:   No CT findings of acute intracranial process. No acute intracranial hemorrhage or mass effect. No definite CT findings of acute infarct.        Report per radiology    Laboratory:  Labs Ordered and Resulted from Time of ED Arrival to Time of ED Departure   BASIC METABOLIC PANEL - Abnormal       Result Value    Sodium 140      Potassium 4.1      Chloride 106      Carbon Dioxide (CO2) 27      Anion Gap 7      Urea Nitrogen 19      Creatinine 0.95      Calcium 9.3      Glucose 190 (*)     GFR Estimate 84     CBC WITH  PLATELETS AND DIFFERENTIAL - Abnormal    WBC Count 7.3      RBC Count 4.79      Hemoglobin 15.8      Hematocrit 48.3       (*)     MCH 33.0      MCHC 32.7      RDW 12.8      Platelet Count 186      % Neutrophils 65      % Lymphocytes 21      % Monocytes 9      % Eosinophils 2      % Basophils 1      % Immature Granulocytes 2      NRBCs per 100 WBC 0      Absolute Neutrophils 4.8      Absolute Lymphocytes 1.5      Absolute Monocytes 0.6      Absolute Eosinophils 0.1      Absolute Basophils 0.0      Absolute Immature Granulocytes 0.1      Absolute NRBCs 0.0     TROPONIN I - Normal    Troponin I High Sensitivity 8     INR - Normal    INR 1.01     PARTIAL THROMBOPLASTIN TIME - Normal    aPTT 25     TROPONIN I - Normal    Troponin I High Sensitivity 17     GLUCOSE MONITOR NURSING POCT        Emergency Department Course:     Reviewed:  I reviewed nursing notes, vitals, past medical history and Care Everywhere    Assessments:  949 I obtained history and examined the patient as noted above.   1418 I rechecked the patient and explained findings.   0949 Tier 1 code stroke called.    Consults:  1008 Consulted with kam Cortes neurology.  1037 Consulted with Dr. Lopez, radiology.    Interventions:  0949 Zofran 4 mg IV  0949 NS 1L IV   1010 Valium 5 mg IV  1008 Antivert 265 mg oral  1013  ml IV  1143 Valium 5 mg IV  1250 Zofran 4 mg IV    Disposition:  The patient was discharged to home.     Impression & Plan     Medical Decision Making:  This patient presents for evaluation of vertigo. The differential diagnosis of vertigo is broad and includes common etiologies such as menieres disease, labyrinthitis, benign positional vertigo, otitis media, etc.  More serious etiologies considered include central etiologies such as tumor, intracerebral bleed, dissection, ischemic cerebral vascular accident.  The history, physical exam including detailed neurologic exam, and workup in the emergency room suggests a benign  cause of vertigo today.  A code stroke was called and CT and MRI imaging were performed which did not show any stroke or signs of vertebral dissection or occlusion.  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  Patient feels improved after interventions noted above. Further outpatient management is indicated with vertigo medications. Vertigo precautions given for home.  He was instructed not to drive a car or drink alcohol for 8 hours after taking the Valium.  He was also prescribed meclizine and Zofran for nausea.  He was referred to the dizziness and balance center.  I discussed with him his hyperglycemia and to follow-up with his doctor this week for recheck.  There was no sign of myocardial infarction or arrhythmia.    Diagnosis:    ICD-10-CM    1. Benign paroxysmal positional vertigo, unspecified laterality  H81.10    2. Type 2 diabetes mellitus with hyperglycemia, without long-term current use of insulin (H)  E11.65        Discharge Medications:  Discharge Medication List as of 9/17/2022  2:26 PM      START taking these medications    Details   diazepam (VALIUM) 5 MG tablet Take 1 tablet (5 mg) by mouth every 6 hours as needed for anxiety, nausea or vomiting (dizziness) No driving a car or drinking alcohol for 8 hours after taking this medication., Disp-20 tablet, R-0, Local Print      meclizine (ANTIVERT) 25 MG tablet Take 1 tablet (25 mg) by mouth 3 times daily as needed for dizziness, Disp-30 tablet, R-0, Local Print      ondansetron (ZOFRAN ODT) 4 MG ODT tab Take 1 tablet (4 mg) by mouth every 6 hours as needed for nausea or vomiting, Disp-15 tablet, R-0, Local Print             Scribe Disclosure:  I, MODESTO FISH, am serving as a scribe at 9:49 AM on 9/17/2022 to document services personally performed by Indigo Greene MD, based on my observations and the provider's statements to me.              Indigo Greene MD  09/17/22 6049

## 2022-09-17 NOTE — CONSULTS
[facility]    Stroke Consult Note    Reason for Consult: Stroke Code     Chief Complaint: Dizziness      HPI  Hugo Prince is a 72 year old male who presents with dizziness, he woke up with symptoms at 715am, no focal deficits he also felt sweaty and dizzy as well as some nausea.   He had to grab the stair railing. He drank coffee didn't notice any dysphagia or dysarthria. He got more nauseous by reading the paper. But no vision loss. He received valium in the ED and has had improvement. He feels that he wants to decide on whether to get an MRI.       Imaging Findings  MRI brain WO    IMPRESSION:  1.  No acute infarct, intracranial hemorrhage or intracranial mass.  2.  Generalized brain atrophy and presumed microvascular ischemic changes as detailed       CTA head and neck:  1.  The intracranial left vertebral artery and left posterior inferior cerebellar artery (PICA) are poorly seen. 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 This is likely a normal variant. It is difficult to   completely exclude stenosis/occlusion of the developmentally hypoplastic left vertebral artery or left PICA, but this is thought to be less likely.  2.  Atherosclerosis of the carotid siphons with mild stenosis.  3.  Otherwise, no high-grade stenosis or large vessel occlusion of the major intracranial arteries. No intracranial aneurysm or high-flow vascular malformation seen.  4.  Fetal origin of the right posterior cerebral artery from the anterior circulation, a normal variant.     NECK CTA:  1.  Mild bilateral carotid bifurcation atherosclerosis with less than 50% stenosis by NASCET criteria.  2.  No flow-limiting stenosis of the cervical vertebral arteries.  3.  Dominant right and developmentally small left vertebral arteries. The left vertebral artery arises directly from the aortic arch, a normal variant.    Intravenous Thrombolysis  Not given due to:  "  - minor/isolated/quickly resolving symptoms  - unclear or unfavorable risk-benefit profile for extended window thrombolysis beyond the conventional 4.5 hour time window    Endovascular Treatment  Not initiated due to absence of proximal vessel occlusion    Impression   Vertigo- most likely peripheral given negative focal exam, and negative MRI with coronal DWI cuts. Peripheral vertigo most likely etiology, positional in nature but no tinnitus or ear pain.     Recommendations    Outpatient general neurology follow-up  -if back to baseline then can discharge from the ED    No further recommendations at this time. We will sign off at this time, please call with any questions or concerns.     Patient Follow-up     - in 6-8 weeks with general neurology ([phone])    Thank you for this consult. We will continue to follow.      The Stroke Staff is Dr. Stephenson.    Amna Luo MD  Vascular Neurology Fellow  To page me or covering stroke neurology team member, click here: AMCOM   Choose \"On Call\" tab at top, then search dropdown box for \"Neurology Adult\", select location, press Enter, then look for stroke/neuro ICU/telestroke.    ______________________________________________________    Clinically Significant Risk Factors Present on Admission      Past Medical History   Past Medical History:   Diagnosis Date     Anemia due to blood loss, acute 1/14/2015     Atopic dermatitis      CAD (coronary artery disease)      Diabetes mellitus, type 2 (H) 12/29/2021     DJD (degenerative joint disease) of knee 1/14/2015     Dyslipidemia      ED (erectile dysfunction) 4/30/2012     Esophageal reflux     Gastroesophageal reflux     NSTEMI (non-ST elevated myocardial infarction) (H) 7/15/2019     RAY (obstructive sleep apnea)      Other and unspecified disc disorder of unspecified region     Intervertebral disc disorders     Personal history of colonic polyps     Colon polyps     S/P lateral meniscectomy of left knee      S/P total " knee replacement 1/7/2015     Tick bite 4/20/15    amoxicillin treated     Unspecified sleep apnea     Sleep apnea     Urinary calculus, unspecified     Renal stones     Vitamin D deficiency 9-27-09     Past Surgical History   Past Surgical History:   Procedure Laterality Date     ARTHROPLASTY KNEE Left 1/7/2015    Procedure: ARTHROPLASTY KNEE;  Surgeon: Agustin Saenz MD;  Location:  OR     ARTHROSCOPY KNEE RT/LT  6-20-11     COLONOSCOPY  12/09     CV FRACTIONAL FLOW RATIO WIRE N/A 7/16/2019    Procedure: Fractional Flow Reserve;  Surgeon: Mick Thrasher MD;  Location:  HEART CARDIAC CATH LAB     CV HEART CATHETERIZATION WITH POSSIBLE INTERVENTION N/A 7/16/2019    successful PCI with MICAH placement to mid LAD      CYSTOSCOPY       EXTRACORPOREAL SHOCK WAVE LITHOTRIPSY (ESWL)  1/20/2012    Procedure:EXTRACORPOREAL SHOCK WAVE LITHOTRIPSY (ESWL); LEFT EXTRACORPOREAL SHOCK WAVE LITHOTRIPSY ; Surgeon:ANJUM BUNDY; Location: OR     EXTRACORPOREAL SHOCK WAVE LITHOTRIPSY, CYSTOSCOPY, INSERT STENT URETER(S), COMBINED  2/3/2012    Procedure:COMBINED EXTRACORPOREAL SHOCK WAVE LITHOTRIPSY, CYSTOSCOPY, INSERT STENT URETER(S); CYSTOSCOPY, RIGHT STENT; Surgeon:ANJUM BUNDY; Location: OR     FUSION LUMBAR ANTERIOR, FUSION LUMBAR POSTERIOR ONE LEVEL, COMBINED  11-1-10    L4-5     LASER HOLMIUM LITHOTRIPSY URETER(S), INSERT STENT, COMBINED  2/7/2012    Procedure:COMBINED CYSTOSCOPY, URETEROSCOPY, LASER HOLMIUM LITHOTRIPSY URETER(S), INSERT STENT; CYSTOSCOPY, RIGHT URETEROSCOPY, RIGHT RETROGRADES,  STONE EXTRACTION WITH HOLMIUM LASER AND RIGHT URETERAL STENT EXCHANGE ; Surgeon:DEEPTI MELVIN; Location: OR     LITHOTRIPSY      Lithotrypsy     SEPTOPLASTY       VASECTOMY       Medications   Home Meds  Prior to Admission medications    Medication Sig Start Date End Date Taking? Authorizing Provider   acetaminophen (TYLENOL) 325 MG tablet Take 2 tablets (650 mg) by mouth every 4 hours as  needed for mild pain 7/17/19   Vinayak Samaniego MD   aspirin (ASA) 81 MG EC tablet Take 1 tablet (81 mg) by mouth daily 7/18/19   Vinayak Samaniego MD   atorvastatin (LIPITOR) 40 MG tablet Take 1 tablet (40 mg) by mouth daily 4/27/22   May Zhou PA-C   fluticasone (FLONASE) 50 MCG/ACT nasal spray Spray 1 spray into both nostrils daily 12/29/21   Lanette Sutton APRN CNP   lisinopril (ZESTRIL) 5 MG tablet Take 1 tablet (5 mg) by mouth daily 4/13/22   May Zhou PA-C   metFORMIN (GLUCOPHAGE XR) 500 MG 24 hr tablet Take 1 tablet (500 mg) by mouth daily (with dinner) 6/20/22   Clay Luis MD   metoprolol tartrate (LOPRESSOR) 25 MG tablet Take 1 tablet (25 mg) by mouth 2 times daily 4/13/22   May Zhou PA-C   Multiple Vitamins-Minerals (PRESERVISION AREDS 2 PO)     Reported, Patient   nitroGLYcerin (NITROSTAT) 0.4 MG sublingual tablet As needed for chest pain place 1 tablet under the tongue every 5 minutes for 3 doses. If symptoms persist 5 minutes after 1st dose call 911. 6/10/22   Juan Moe MD   omeprazole (PRILOSEC) 20 MG DR capsule Take 1 capsule (20 mg) by mouth daily 6/20/22   Clay Luis MD   order for DME Equipment being ordered: CPAP    Reported, Patient       Scheduled Meds    ondansetron  4 mg Intravenous Once       Infusion Meds      PRN Meds      Allergies   Allergies   Allergen Reactions     Chlorthalidone GI Disturbance     Compazine Other (See Comments)     PSYCHOTIC     Erythromycin Nausea and Vomiting     Flomax [Tamsulosin Hcl]      FLUSHING, NASAl CONGESTION     Prochlorperazine      LW Reaction: anxiety     Seasonal Allergies      Family History   Family History   Problem Relation Age of Onset     Melanoma Father      Prostate Cancer Father      Cancer Father         neck cancer from radiation at work (urology)     Breast Cancer Mother      C.A.D. Paternal Grandfather 72     Social History   Social History      Tobacco Use     Smoking status: Former Smoker     Types: Cigars     Smokeless tobacco: Never Used     Tobacco comment: has cigars when it is warm out   Substance Use Topics     Alcohol use: Yes     Alcohol/week: 11.7 standard drinks     Types: 14 Standard drinks or equivalent per week     Comment: 1-2 drinks day/beer and scotch     Drug use: No       Review of Systems   The 5 point Review of Systems is negative other than noted in the HPI or here.        PHYSICAL EXAMINATION  Temp:  [98.2  F (36.8  C)] 98.2  F (36.8  C)  Pulse:  [57-74] 57  Resp:  [10-20] 14  BP: (137-160)/(77-87) 137/77  SpO2:  [92 %-96 %] 92 %     Neurologic  Mental Status:  alert, oriented x 3, follows commands, speech clear and fluent, naming and repetition normal  Cranial Nerves:  visual fields intact, PERRL, EOMI with normal smooth pursuit, facial sensation intact and symmetric, facial movements symmetric, hearing not formally tested but intact to conversation, no dysarthria, shoulder shrug strong bilaterally, tongue protrusion midline  Motor:  normal muscle tone and bulk, no abnormal movements, able to move all limbs spontaneously, strength 5/5 throughout upper and lower extremities, no pronator drift  Reflexes:  toes down-going  Sensory:  light touch sensation intact and symmetric throughout upper and lower extremities, no extinction on double simultaneous stimulation   Coordination:  normal finger-to-nose and heel-to-shin bilaterally without dysmetria, rapid alternating movements symmetric  Station/Gait:  deferred    Dysphagia Screen  Per Nursing    Stroke Scales    NIHSS  1a. Level of Consciousness 0-->Alert, keenly responsive   1b. LOC Questions 0-->Answers both questions correctly   1c. LOC Commands 0-->Performs both tasks correctly   2.   Best Gaze 0-->Normal   3.   Visual 0-->No visual loss   4.   Facial Palsy 0-->Normal symmetrical movements   5a. Motor Arm, Left 0-->No drift, limb holds 90 (or 45) degrees for full 10 secs   5b.  Motor Arm, Right 0-->No drift, limb holds 90 (or 45) degrees for full 10 secs   6a. Motor Leg, Left 0-->No drift, leg holds 30 degree position for full 5 secs   6b. Motor Leg, right 0-->No drift, leg holds 30 degree position for full 5 secs   7.   Limb Ataxia 0-->Absent   8.   Sensory 0-->Normal, no sensory loss   9.   Best Language 0-->No aphasia, normal   10. Dysarthria 0-->Normal   11. Extinction and Inattention  0-->No abnormality   Total 0 (09/17/22 1040)       Modified [place] Score (Pre-morbid)  0-No deficits    Imaging  I personally reviewed all imaging; relevant findings per HPI.     Lab Results Data   CBC  Recent Labs   Lab 09/17/22  0945   WBC 7.3   RBC 4.79   HGB 15.8   HCT 48.3        Basic Metabolic Panel    Recent Labs   Lab 09/17/22  0945      POTASSIUM 4.1   CHLORIDE 106   CO2 27   BUN 19   CR 0.95   *   NATHALIA 9.3     Liver Panel  No results for input(s): PROTTOTAL, ALBUMIN, BILITOTAL, ALKPHOS, AST, ALT, BILIDIRECT in the last 168 hours.  INR    Recent Labs   Lab Test 09/17/22  0945   INR 1.01      Lipid Profile    Recent Labs   Lab Test 02/14/22  1126 01/08/21  1011 10/28/19  0751   CHOL 128 95 145   HDL 43 40 48   LDL 60 36 72   TRIG 125 94 124     A1C    Recent Labs   Lab Test 05/03/22  0934 12/29/21  0000 03/19/18  1024   A1C 6.3* 6.8* 5.9*     Troponin    Recent Labs   Lab 09/17/22  0945   TROPONINIS 8          Stroke Code Data Data   Stroke Code Data  (for stroke code without tele)  Stroke code activated 09/17/22   1007   First stroke provider response 09/17/22   1040   Last known normal         Time of discovery   (or onset of symptoms)         Head CT read by Stroke Neuro Dr/Provider 09/17/22   1030   Was stroke code de-escalated? Yes 09/17/22 1051

## 2022-09-27 ENCOUNTER — OFFICE VISIT (OUTPATIENT)
Dept: FAMILY MEDICINE | Facility: CLINIC | Age: 72
End: 2022-09-27

## 2022-09-27 VITALS
SYSTOLIC BLOOD PRESSURE: 127 MMHG | DIASTOLIC BLOOD PRESSURE: 76 MMHG | WEIGHT: 298 LBS | HEART RATE: 66 BPM | TEMPERATURE: 97.6 F | BODY MASS INDEX: 42.76 KG/M2 | OXYGEN SATURATION: 97 %

## 2022-09-27 DIAGNOSIS — J31.0 CHRONIC RHINITIS: ICD-10-CM

## 2022-09-27 DIAGNOSIS — R42 VERTIGO: Primary | ICD-10-CM

## 2022-09-27 PROCEDURE — 99214 OFFICE O/P EST MOD 30 MIN: CPT | Performed by: FAMILY MEDICINE

## 2022-09-27 RX ORDER — FLUTICASONE PROPIONATE 50 MCG
1 SPRAY, SUSPENSION (ML) NASAL DAILY
Qty: 18.2 ML | Refills: 3 | Status: SHIPPED | OUTPATIENT
Start: 2022-09-27 | End: 2023-11-17

## 2022-10-20 ENCOUNTER — TELEPHONE (OUTPATIENT)
Dept: FAMILY MEDICINE | Facility: CLINIC | Age: 72
End: 2022-10-20

## 2022-10-20 DIAGNOSIS — R42 VERTIGO: Primary | ICD-10-CM

## 2022-10-20 RX ORDER — MECLIZINE HYDROCHLORIDE 25 MG/1
25 TABLET ORAL 3 TIMES DAILY PRN
Qty: 30 TABLET | Refills: 0 | Status: SHIPPED | OUTPATIENT
Start: 2022-10-20 | End: 2023-08-08

## 2022-10-20 NOTE — TELEPHONE ENCOUNTER
Patient called clinic to report that he has vertigo symptoms again. He is requesting a referral to [facility] and refill meclizine. Referral entered and medication refill ok'd per on call provider Lanette Sutton CNP. Wanda Bermudez

## 2022-10-22 ENCOUNTER — HEALTH MAINTENANCE LETTER (OUTPATIENT)
Age: 72
End: 2022-10-22

## 2022-11-01 ENCOUNTER — HOSPITAL ENCOUNTER (OUTPATIENT)
Dept: GENERAL RADIOLOGY | Facility: CLINIC | Age: 72
Discharge: HOME | End: 2022-11-01
Attending: UROLOGY | Admitting: UROLOGY
Payer: MEDICARE

## 2022-11-01 ENCOUNTER — VIRTUAL VISIT (OUTPATIENT)
Dept: UROLOGY | Facility: CLINIC | Age: 72
End: 2022-11-01
Payer: MEDICARE

## 2022-11-01 VITALS — BODY MASS INDEX: 42.23 KG/M2 | HEIGHT: 70 IN | WEIGHT: 295 LBS

## 2022-11-01 DIAGNOSIS — N20.0 BILATERAL NEPHROLITHIASIS: ICD-10-CM

## 2022-11-01 DIAGNOSIS — I21.4 NSTEMI (NON-ST ELEVATED MYOCARDIAL INFARCTION) (H): ICD-10-CM

## 2022-11-01 DIAGNOSIS — Z12.5 SCREENING FOR PROSTATE CANCER: Primary | ICD-10-CM

## 2022-11-01 DIAGNOSIS — N20.0 CALCULUS OF KIDNEY: ICD-10-CM

## 2022-11-01 PROCEDURE — 74018 RADEX ABDOMEN 1 VIEW: CPT

## 2022-11-01 PROCEDURE — 99214 OFFICE O/P EST MOD 30 MIN: CPT | Mod: 95 | Performed by: PHYSICIAN ASSISTANT

## 2022-11-01 RX ORDER — METOPROLOL TARTRATE 25 MG/1
25 TABLET, FILM COATED ORAL 2 TIMES DAILY
Qty: 180 TABLET | Refills: 1 | Status: SHIPPED | OUTPATIENT
Start: 2022-11-01 | End: 2023-02-13

## 2022-11-01 ASSESSMENT — PAIN SCALES - GENERAL: PAINLEVEL: NO PAIN (0)

## 2022-11-01 NOTE — LETTER
"11/1/2022       RE: Hugo Prince  [address]  [facility] [address]     Dear Colleague,    Thank you for referring your patient, Hugo Prince, to the [facility] at [facility]. Please see a copy of my visit note below.    PT WILL MEET YOU IN MYCHART    PT HAS BACK PAIN  HE CHOSE NOT TO SAY HOW MUCH PAIN HE IS IN    Hugo is a 72 year old who is being evaluated via a billable video visit.      How would you like to obtain your AVS? MyChart  If the video visit is dropped, the invitation should be resent by: Text to cell phone: [phone]  Will anyone else be joining your video visit? No        Video-Visit Details    Video Start Time: 2:05 PM    Type of service:  Video Visit    Video End Time:2:23 PM    Originating Location (pt. Location): Home        Distant Location (provider location):  On-site    Platform used for Video Visit: Elenita GRESHAM  CHIEF COMPLAINT   It was my pleasure to see Hugo Prince who is a 72 year old male for follow-up of NEPHROLITHIASIS.      HPI   Hugo Prince is a very pleasant 72 year old male   Prior Dr. Desai patient - last seen 4/29/21:  \"As we recall, he has a significant clinically challenging situation.  He has a history of recurrent urinary stone disease having had lithotripsy in the past on a number of occasions.  Previously, within the last few months he had been seen because of a significant stone burden in each kidney but without pain.  He has also has a history of major problems with the lumbar spine having had previous spinal fusion with ongoing pain and sciatica.  We had evaluated him closely and determined that he had a high stone burden in the right kidney almost all in the lower pole calyx.  There was a lesser volume in the left kidney and almost all of that at that time was in the lower pole calyx.  On top of that he was over 300 pounds " "which precluded doing ESWL present sometime although he has an active lifestyle.  We had decided at that point to proceed cautiously, to consider a percutaneous nephrolithotomy of the stones in the right kidney, and perhaps, if he could lose weight to a level where we could do ESWL to consider ESWL for the stones in the left kidney.  Recently however he suddenly developed chest pain, was evaluated in the coronary unit here and a cardiovascular stent was placed as treatment for occlusive coronary artery disease and is now on anticoagulants.  He was also seen at that time by them ourselves because a CT scan had been done while in the hospital was showing that the stone in the left kidney had migrated from the lower calyx into the region of the left ureteropelvic junction  We decided after careful evaluation at that time that we should be conservative.  His symptoms have subsided, and he then had a stent placed and was on anticoagulants which would last for a year.  He completed that 1 year course of treatment about 3 months ago and is now taking aspirin only.  Spinal fusion surgery and is still getting quite a significant amount of back pain.  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   Dad was a urologist.   Due for PSA    PHYSICAL EXAM  Patient is a 72 year old  male   Vitals: Height 1.765 m (5' 9.5\"), weight 133.8 kg (295 lb).  Body mass index is 42.94 kg/m .  General Appearance Adult:   Alert, no acute distress, oriented      UA RESULTS:  Recent Labs   Lab Test 10/20/21  1451 07/22/19  1351 07/15/19  0540   COLOR Yellow   < > Yellow   APPEARANCE Clear   < > Clear   URINEGLC Negative   < > " Negative   URINEBILI Negative   < > Negative   URINEKETONE Negative   < > Negative   SG 1.025   < > 1.015   UBLD Trace*   < > Moderate*   URINEPH 5.5   < > 5.5   PROTEIN Negative   < > 10*   UROBILINOGEN 1.0   < >  --    NITRITE Negative   < > Negative   LEUKEST Trace*   < > Negative   RBCU  --   --  8*   WBCU  --   --  1    < > = values in this interval not displayed.      Creatinine   Date Value Ref Range Status   09/17/2022 0.95 0.66 - 1.25 mg/dL Final   12/29/2021 1.15 0.76 - 1.27 mg/dL Final      IMAGING:  All pertinent imaging reviewed:    All imaging studies reviewed by me.  I personally reviewed these imaging films.  A formal report from radiology will follow.    CT ABD/PEL 7/15/2019:  FINDINGS:     Chest: Evaluation of the pulmonary arterial system shows no evidence  of embolus. There is no aortic aneurysm or dissection. The heart size  is normal. There is no mediastinal, hilar or axillary lymph node  enlargement. There is a small calcified granuloma in the right lung  base posteriorly. Mild dependent atelectasis bilaterally. No  pneumothorax or pleural effusion.     ABDOMEN: There is diffuse fatty infiltration of the liver. The spleen,  gallbladder, pancreas and adrenal glands are normal in appearance.  There is a 0.8 cm stone at the left ureteropelvic junction causing  mild dilatation of the left renal collecting system. There are several  stones within both kidneys. There is no abdominal or pelvic lymph node  enlargement.     Pelvis: There is no bowel obstruction or inflammation. No free  intraperitoneal gas or fluid. Postoperative changes in the lumbar  spine. Degenerative disease throughout the spine.                                                                      IMPRESSION:  1. There is no pulmonary embolus, aortic aneurysm or dissection.  2. There is a 0.8 cm left ureteropelvic junction stone causing mild  hydronephrosis.   3. Multiple bilateral renal stones.   4. Fatty infiltration of the  liver.     KUB 4/26/21:  IMPRESSION: Unremarkable bowel gas pattern. 1.6 cm stone on the right  is unchanged. Stones on the left measuring 1.2, 1.2, and 1.1 cm  evident on the left. Prostatic calcifications evident.    KUB 10/20/21:  Report pending I reviewed the images personally and the stones appear similar in size to his last KUB    KUB 11/1/22 reviewed with Dr. Ochoa, kathy [facility]    ASSESSMENT and PLAN  72-year-old man with recurrent bilateral nephrolithiasis with significant medical history of CAD, morbid obesity, and hypertension    Recurrent bilateral nephrolithiasis  -I reviewed his prior imaging including his CT scan from 2019 and stones are stable.   -We discussed continued observation versus consideration for intervention. He is not a good candidate for ESWL given cardiac hx.  -PSA lab ordered (last one if normal given his age)  -We will plan for follow-up in 12 months with a repeat KUB prior      Time spent: 26 minutes spent on the date of the encounter doing chart review, history and exam, documentation and further activities as noted above.    Jennifer Lynn PA-C  [facility]

## 2022-11-01 NOTE — PROGRESS NOTES
"PT WILL MEET YOU IN [place]    PT HAS BACK PAIN  HE CHOSE NOT TO SAY HOW MUCH PAIN HE IS IN    Hugo is a 72 year old who is being evaluated via a billable video visit.      How would you like to obtain your AVS? Faviola  If the video visit is dropped, the invitation should be resent by: Text to cell phone: [phone]  Will anyone else be joining your video visit? No        Video-Visit Details    Video Start Time: 2:05 PM    Type of service:  Video Visit    Video End Time:2:23 PM    Originating Location (pt. Location): Home        Distant Location (provider location):  On-site    Platform used for Video Visit: Elenita GRESHAM  CHIEF COMPLAINT   It was my pleasure to see Hugo Prince who is a 72 year old male for follow-up of NEPHROLITHIASIS.      HPI   Hugo Prince is a very pleasant 72 year old male   Prior Dr. Desai patient - last seen 4/29/21:  \"As we recall, he has a significant clinically challenging situation.  He has a history of recurrent urinary stone disease having had lithotripsy in the past on a number of occasions.  Previously, within the last few months he had been seen because of a significant stone burden in each kidney but without pain.  He has also has a history of major problems with the lumbar spine having had previous spinal fusion with ongoing pain and sciatica.  We had evaluated him closely and determined that he had a high stone burden in the right kidney almost all in the lower pole calyx.  There was a lesser volume in the left kidney and almost all of that at that time was in the lower pole calyx.  On top of that he was over 300 pounds which precluded doing ESWL present sometime although he has an active lifestyle.  We had decided at that point to proceed cautiously, to consider a percutaneous nephrolithotomy of the stones in the right kidney, and perhaps, if he could lose weight to a level where we could do ESWL to consider ESWL for the stones in the " "left kidney.  Recently however he suddenly developed chest pain, was evaluated in the coronary unit here and a cardiovascular stent was placed as treatment for occlusive coronary artery disease and is now on anticoagulants.  He was also seen at that time by them ourselves because a CT scan had been done while in the hospital was showing that the stone in the left kidney had migrated from the lower calyx into the region of the left ureteropelvic junction  We decided after careful evaluation at that time that we should be conservative.  His symptoms have subsided, and he then had a stent placed and was on anticoagulants which would last for a year.  He completed that 1 year course of treatment about 3 months ago and is now taking aspirin only.  Spinal fusion surgery and is still getting quite a significant amount of back pain.  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   Dad was a urologist.   Due for PSA    PHYSICAL EXAM  Patient is a 72 year old  male   Vitals: Height 1.765 m (5' 9.5\"), weight 133.8 kg (295 lb).  Body mass index is 42.94 kg/m .  General Appearance Adult:   Alert, no acute distress, oriented      UA RESULTS:  Recent Labs   Lab Test 10/20/21  1451 07/22/19  1351 07/15/19  0540   COLOR Yellow   < > Yellow   APPEARANCE Clear   < > Clear   URINEGLC Negative   < > Negative   URINEBILI Negative   < > Negative   URINEKETONE Negative   < > Negative   SG 1.025   < > 1.015   UBLD Trace*   < > Moderate*   URINEPH 5.5   < > 5.5   PROTEIN Negative   < > 10*   UROBILINOGEN 1.0   < >  --    NITRITE Negative   < > Negative   LEUKEST Trace*   < > Negative   RBCU  --   --  8*   WBCU  --   --  " 1    < > = values in this interval not displayed.      Creatinine   Date Value Ref Range Status   09/17/2022 0.95 0.66 - 1.25 mg/dL Final   12/29/2021 1.15 0.76 - 1.27 mg/dL Final      IMAGING:  All pertinent imaging reviewed:    All imaging studies reviewed by me.  I personally reviewed these imaging films.  A formal report from radiology will follow.    CT ABD/PEL 7/15/2019:  FINDINGS:     Chest: Evaluation of the pulmonary arterial system shows no evidence  of embolus. There is no aortic aneurysm or dissection. The heart size  is normal. There is no mediastinal, hilar or axillary lymph node  enlargement. There is a small calcified granuloma in the right lung  base posteriorly. Mild dependent atelectasis bilaterally. No  pneumothorax or pleural effusion.     ABDOMEN: There is diffuse fatty infiltration of the liver. The spleen,  gallbladder, pancreas and adrenal glands are normal in appearance.  There is a 0.8 cm stone at the left ureteropelvic junction causing  mild dilatation of the left renal collecting system. There are several  stones within both kidneys. There is no abdominal or pelvic lymph node  enlargement.     Pelvis: There is no bowel obstruction or inflammation. No free  intraperitoneal gas or fluid. Postoperative changes in the lumbar  spine. Degenerative disease throughout the spine.                                                                      IMPRESSION:  1. There is no pulmonary embolus, aortic aneurysm or dissection.  2. There is a 0.8 cm left ureteropelvic junction stone causing mild  hydronephrosis.   3. Multiple bilateral renal stones.   4. Fatty infiltration of the liver.     KUB 4/26/21:  IMPRESSION: Unremarkable bowel gas pattern. 1.6 cm stone on the right  is unchanged. Stones on the left measuring 1.2, 1.2, and 1.1 cm  evident on the left. Prostatic calcifications evident.    KUB 10/20/21:  Report pending I reviewed the images personally and the stones appear similar in size to his  last KUB    KUB 11/1/22 reviewed with Dr. Ochoa, no [facility]    ASSESSMENT and PLAN  72-year-old man with recurrent bilateral nephrolithiasis with significant medical history of CAD, morbid obesity, and hypertension    Recurrent bilateral nephrolithiasis  -I reviewed his prior imaging including his CT scan from 2019 and stones are stable.   -We discussed continued observation versus consideration for intervention. He is not a good candidate for ESWL given cardiac hx.  -PSA lab ordered (last one if normal given his age)  -We will plan for follow-up in 12 months with a repeat KUB prior      Time spent: 26 minutes spent on the date of the encounter doing chart review, history and exam, documentation and further activities as noted above.    Jennifer Lynn PA-C  [facility]

## 2022-11-03 ENCOUNTER — TELEPHONE (OUTPATIENT)
Dept: CARDIOLOGY | Facility: CLINIC | Age: 72
End: 2022-11-03

## 2022-11-03 ENCOUNTER — TRANSFERRED RECORDS (OUTPATIENT)
Dept: FAMILY MEDICINE | Facility: CLINIC | Age: 72
End: 2022-11-03

## 2022-11-03 DIAGNOSIS — I21.4 NSTEMI (NON-ST ELEVATED MYOCARDIAL INFARCTION) (H): ICD-10-CM

## 2022-11-03 RX ORDER — LISINOPRIL 5 MG/1
5 TABLET ORAL DAILY
Qty: 90 TABLET | Refills: 1 | Status: SHIPPED | OUTPATIENT
Start: 2022-11-03 | End: 2023-02-13

## 2022-11-03 NOTE — TELEPHONE ENCOUNTER
[facility] Call Center    Phone Message    May a detailed message be left on voicemail: yes     Reason for Call: Medication Refill Request    Has the patient contacted the pharmacy for the refill? Yes Name of medication being requested: lisinopril (ZESTRIL) 5 MG tablet   Provider who prescribed the medication: May Zhou PA-C  Pharmacy:    [id number] [address]  Date medication is needed: 11/3/22     Patient calling for refill on lisinopril. Please call him back to discuss, he has questions as to why his pill bottle says 0 refills and why this wasn't automatically renewed.    Thank you!  [facility]      Action Taken: Other: Cardiology    Travel Screening: Not Applicable

## 2022-11-03 NOTE — TELEPHONE ENCOUNTER
11/3/22 Called to patient - left message. Refill sent. Patient due for annual visit 2/2023.  [facility] Cardiology Refill Guideline reviewed. Medication meets criteria for refill. Refill sent.  Anabelle Long RN 11/03/22 10:27 AM

## 2022-11-07 ENCOUNTER — TRANSFERRED RECORDS (OUTPATIENT)
Dept: FAMILY MEDICINE | Facility: CLINIC | Age: 72
End: 2022-11-07

## 2022-11-11 ENCOUNTER — OFFICE VISIT (OUTPATIENT)
Dept: FAMILY MEDICINE | Facility: CLINIC | Age: 72
End: 2022-11-11

## 2022-11-11 VITALS
BODY MASS INDEX: 43.67 KG/M2 | HEART RATE: 54 BPM | DIASTOLIC BLOOD PRESSURE: 72 MMHG | OXYGEN SATURATION: 97 % | WEIGHT: 300 LBS | RESPIRATION RATE: 16 BRPM | SYSTOLIC BLOOD PRESSURE: 122 MMHG

## 2022-11-11 DIAGNOSIS — H61.22 IMPACTED CERUMEN OF LEFT EAR: Primary | ICD-10-CM

## 2022-11-11 DIAGNOSIS — R42 VERTIGO: ICD-10-CM

## 2022-11-11 PROCEDURE — 69209 REMOVE IMPACTED EAR WAX UNI: CPT | Mod: LT | Performed by: FAMILY MEDICINE

## 2022-11-11 NOTE — PROGRESS NOTES
SUBJECTIVE:    Hugo Prince, is a 72 year old male presenting for the below:     1. Cerumen impaction left ear. Noted while receiving therapy at [facility] [place].       OBJECTIVE:  Vitals:    11/11/22 1034   BP: 122/72   Pulse: 54   Resp: 16   SpO2: 97%   Weight: 136.1 kg (300 lb)    Body mass index is 43.67 kg/m .  General: no acute distress, cooperative with exam.  Eyes: no injection or drainage.  Ears: canals left with moderate soft wax occluding vision of TM's   Otoscopy after ear lavage: left canal clear. Bilateral TM's non erythematous.     ASSESSMENT / PLAN:      Impacted cerumen of left ear  Vertigo  -     Removal Impacted Cerumen Irrigation Unilateral (Ear Wash)

## 2022-11-29 ASSESSMENT — ENCOUNTER SYMPTOMS
DYSURIA: 0
WEAKNESS: 0
ABDOMINAL PAIN: 0
PALPITATIONS: 0
HEARTBURN: 0
ARTHRALGIAS: 1
PARESTHESIAS: 0
FEVER: 0
JOINT SWELLING: 0
EYE PAIN: 0
CONSTIPATION: 0
SHORTNESS OF BREATH: 0
FREQUENCY: 0
HEMATURIA: 0
MYALGIAS: 0
HEADACHES: 0
CHILLS: 0
DIZZINESS: 1
HEMATOCHEZIA: 0
NAUSEA: 0
DIARRHEA: 0
SORE THROAT: 0
COUGH: 0
NERVOUS/ANXIOUS: 0

## 2022-11-29 ASSESSMENT — ACTIVITIES OF DAILY LIVING (ADL): CURRENT_FUNCTION: NO ASSISTANCE NEEDED

## 2022-12-04 ENCOUNTER — HEALTH MAINTENANCE LETTER (OUTPATIENT)
Age: 72
End: 2022-12-04

## 2022-12-06 ENCOUNTER — OFFICE VISIT (OUTPATIENT)
Dept: FAMILY MEDICINE | Facility: CLINIC | Age: 72
End: 2022-12-06

## 2022-12-06 VITALS
WEIGHT: 296 LBS | BODY MASS INDEX: 42.37 KG/M2 | HEART RATE: 57 BPM | OXYGEN SATURATION: 97 % | DIASTOLIC BLOOD PRESSURE: 78 MMHG | SYSTOLIC BLOOD PRESSURE: 132 MMHG | HEIGHT: 70 IN

## 2022-12-06 DIAGNOSIS — I25.10 CORONARY ARTERY DISEASE INVOLVING NATIVE CORONARY ARTERY OF NATIVE HEART WITHOUT ANGINA PECTORIS: ICD-10-CM

## 2022-12-06 DIAGNOSIS — E66.01 MORBID OBESITY (H): ICD-10-CM

## 2022-12-06 DIAGNOSIS — Z00.00 ENCOUNTER FOR MEDICARE ANNUAL WELLNESS EXAM: Primary | ICD-10-CM

## 2022-12-06 DIAGNOSIS — E11.42 TYPE 2 DIABETES MELLITUS WITH DIABETIC POLYNEUROPATHY, WITHOUT LONG-TERM CURRENT USE OF INSULIN (H): ICD-10-CM

## 2022-12-06 DIAGNOSIS — Z23 ENCOUNTER FOR IMMUNIZATION: ICD-10-CM

## 2022-12-06 DIAGNOSIS — E78.5 DYSLIPIDEMIA: ICD-10-CM

## 2022-12-06 LAB — HBA1C MFR BLD: 6.1 % (ref 4–6)

## 2022-12-06 PROCEDURE — 99214 OFFICE O/P EST MOD 30 MIN: CPT | Mod: 25 | Performed by: FAMILY MEDICINE

## 2022-12-06 PROCEDURE — G0439 PPPS, SUBSEQ VISIT: HCPCS | Performed by: FAMILY MEDICINE

## 2022-12-06 PROCEDURE — G0008 ADMIN INFLUENZA VIRUS VAC: HCPCS | Mod: 59 | Performed by: FAMILY MEDICINE

## 2022-12-06 PROCEDURE — 90694 VACC AIIV4 NO PRSRV 0.5ML IM: CPT | Performed by: FAMILY MEDICINE

## 2022-12-06 PROCEDURE — 99207 PR FOOT EXAM NO CHARGE: CPT | Performed by: FAMILY MEDICINE

## 2022-12-06 PROCEDURE — 36415 COLL VENOUS BLD VENIPUNCTURE: CPT | Performed by: FAMILY MEDICINE

## 2022-12-06 PROCEDURE — 83036 HEMOGLOBIN GLYCOSYLATED A1C: CPT | Performed by: FAMILY MEDICINE

## 2022-12-06 ASSESSMENT — ENCOUNTER SYMPTOMS
DIARRHEA: 0
HEARTBURN: 0
CHILLS: 0
NAUSEA: 0
HEADACHES: 0
SORE THROAT: 0
DYSURIA: 0
MYALGIAS: 0
PALPITATIONS: 0
HEMATOCHEZIA: 0
HEMATURIA: 0
JOINT SWELLING: 0
EYE PAIN: 0
COUGH: 0
FREQUENCY: 0
SHORTNESS OF BREATH: 0
CONSTIPATION: 0
NERVOUS/ANXIOUS: 0
ARTHRALGIAS: 1
PARESTHESIAS: 0
DIZZINESS: 1
ABDOMINAL PAIN: 0
FEVER: 0
WEAKNESS: 0

## 2022-12-06 ASSESSMENT — ACTIVITIES OF DAILY LIVING (ADL): CURRENT_FUNCTION: NO ASSISTANCE NEEDED

## 2022-12-06 NOTE — PATIENT INSTRUCTIONS
Patient Education   Personalized Prevention Plan  You are due for the preventive services outlined below.  Your care team is available to assist you in scheduling these services.  If you have already completed any of these items, please share that information with your care team to update in your medical record.  Health Maintenance Due   Topic Date Due    Diabetic Foot Exam  Never done    Discuss Advance Care Planning  Never done    Pneumococcal Vaccine (1 - PCV) Never done    Zoster (Shingles) Vaccine (1 of 2) Never done    LUNG CANCER SCREENING  07/15/2020    Diptheria Tetanus Pertussis (DTAP/TDAP/TD) Vaccine (3 - Td or Tdap) 02/17/2022    Colorectal Cancer Screening  03/03/2022    A1C Lab  08/03/2022    Flu Vaccine (1) 09/01/2022

## 2022-12-06 NOTE — PROGRESS NOTES
"SUBJECTIVE:   Hugo is a 72 year old who presents for Preventive Visit.    Patient has been advised of split billing requirements and indicates understanding: Yes  Are you in the first 12 months of your Medicare coverage?  No    DM2: on Metformin 500, tolerating.  Trying to stay active but difficult with back.  Weight stable/slightly reduced.     CAD: He was found to have a mid LAD lesion in July 2019 which was treated with a drug-eluting stent.  He had a moderate proximal LAD lesion which was negative by IFR.  He also had moderate mid RCA disease.  He was started on dual antiplatelet therapy.  He had some shortness of breath with Brilinta thus was switched to a prasugrel. This has now been discontinued after 1 year of therapy.  He is maintained on aspirin.  Patient denies recent chest pain or NTG use, denies exercise induced dyspnea or PND. Last Stress test 5/2019.  He is also on metoprolol and lisinopril.      HLD: on statin     BMI 43: working on weight loss.  Challenging due to back    Healthy Habits:     In general, how would you rate your overall health?  Good    Frequency of exercise:  1 day/week    Duration of exercise:  N/A    Do you usually eat at least 4 servings of fruit and vegetables a day, include whole grains    & fiber and avoid regularly eating high fat or \"junk\" foods?  Yes    Taking medications regularly:  Yes    Medication side effects:  None    Ability to successfully perform activities of daily living:  No assistance needed    Home Safety:  No safety concerns identified    Hearing Impairment:  Difficulty following a conversation in a noisy restaurant or crowded room    In the past 6 months, have you been bothered by leaking of urine?  No    In general, how would you rate your overall mental or emotional health?  Excellent      PHQ-2 Total Score: 0    Additional concerns today:  No    Hearing Screening:   Right Ear   4000Hz: FAIL  2000Hz: FAIL  1000Hz: FAIL  500Hz: FAIL    Left Ear "   4000Hz: FAIL  2000Hz: FAIL  1000Hz: FAIL  500Hz: FAIL    Have you ever done Advance Care Planning? (For example, a Health Directive, POLST, or a discussion with a medical provider or your loved ones about your wishes): Yes, advance care planning is on file.     Fall risk  Fallen 2 or more times in the past year?: No  Any fall with injury in the past year?: No    Cognitive Screening   1) Repeat 3 items (Leader, Season, Table)    2) Clock draw: NORMAL  3) 3 item recall: Recalls 2 objects   Results: NORMAL clock, 1-2 items recalled: COGNITIVE IMPAIRMENT LESS LIKELY  Mini-CogTM Copyright NAHOMY Oliver. Licensed by the author for use in [facility]; reprinted with permission (gilles@[facility]). All rights reserved.      Do you have sleep apnea, excessive snoring or daytime drowsiness?: yes    Reviewed and updated as needed this visit by clinical staff   Tobacco  Allergies  Meds              Reviewed and updated as needed this visit by Provider                 Social History     Tobacco Use     Smoking status: Former     Types: Cigars     Smokeless tobacco: Never     Tobacco comments:     has cigars when it is warm out   Substance Use Topics     Alcohol use: Yes     Alcohol/week: 11.7 standard drinks     Types: 14 Standard drinks or equivalent per week     Comment: 1-2 drinks day/beer and scotch       Alcohol Use 11/29/2022   Prescreen: >3 drinks/day or >7 drinks/week? Yes   Prescreen: >3 drinks/day or >7 drinks/week? -     AUDIT - Alcohol Use Disorders Identification Test - Reproduced from the World Health Organization Audit 2001 (Second Edition) 11/29/2022   1.  How often do you have a drink containing alcohol? 4 or more times a week   2.  How many drinks containing alcohol do you have on a typical day when you are drinking? 1 or 2   3.  How often do you have five or more drinks on one occasion? Never   9.  Have you or someone else been injured because of your drinking? No   10. Has a relative, friend, doctor or  other health care worker been concerned about your drinking or suggested you cut down? No           -------------------------------------    Current providers sharing in care for this patient include:   Patient Care Team:  Clay Luis MD as PCP - General (Family Medicine)  Clay Luis MD as Assigned PCP  Ariel Ochoa MD as Assigned Surgical Provider  Leisa Farias RD as Diabetes Educator (Dietitian, Registered)  May Zhou PA-C as Assigned Heart and Vascular Provider  Juan Moe MD as MD (Cardiovascular Disease)  Jennifer yLnn PA-C as Assigned OBGYN Provider    The following health maintenance items are reviewed in Epic and correct as of today:  Health Maintenance   Topic Date Due     ANNUAL REVIEW OF HM ORDERS  Never done     Pneumococcal Vaccine: 65+ Years (1 - PCV) Never done     ZOSTER IMMUNIZATION (1 of 2) Never done     DTAP/TDAP/TD IMMUNIZATION (3 - Td or Tdap) 02/17/2022     COLORECTAL CANCER SCREENING  03/03/2022     LIPID  02/14/2023     A1C  03/06/2023     MICROALBUMIN  05/03/2023     EYE EXAM  07/27/2023     BMP  09/17/2023     MEDICARE ANNUAL WELLNESS VISIT  12/06/2023     DIABETIC FOOT EXAM  12/06/2023     FALL RISK ASSESSMENT  12/06/2023     LUNG CANCER SCREENING  12/06/2023     ADVANCE CARE PLANNING  12/06/2027     HEPATITIS C SCREENING  Completed     PHQ-2 (once per calendar year)  Completed     INFLUENZA VACCINE  Completed     AORTIC ANEURYSM SCREENING (SYSTEM ASSIGNED)  Completed     COVID-19 Vaccine  Completed     IPV IMMUNIZATION  Aged Out     MENINGITIS IMMUNIZATION  Aged Out     Lab work is in process          Review of Systems   Constitutional: Negative for chills and fever.   HENT: Negative for congestion, ear pain, hearing loss and sore throat.    Eyes: Negative for pain and visual disturbance.   Respiratory: Negative for cough and shortness of breath.    Cardiovascular: Negative for chest pain, palpitations and peripheral edema.  "  Gastrointestinal: Negative for abdominal pain, constipation, diarrhea, heartburn, hematochezia and nausea.   Genitourinary: Positive for impotence. Negative for dysuria, frequency, genital sores, hematuria and urgency.   Musculoskeletal: Positive for arthralgias. Negative for joint swelling and myalgias.   Skin: Negative for rash.   Neurological: Positive for dizziness. Negative for weakness, headaches and paresthesias.   Psychiatric/Behavioral: Negative for mood changes. The patient is not nervous/anxious.      Constitutional, HEENT, cardiovascular, pulmonary, GI, , musculoskeletal, neuro, skin, endocrine and psych systems are negative, except as otherwise noted.    OBJECTIVE:   /78   Pulse 57   Ht 1.765 m (5' 9.5\")   Wt 134.3 kg (296 lb)   SpO2 97%   BMI 43.08 kg/m   Estimated body mass index is 43.08 kg/m  as calculated from the following:    Height as of this encounter: 1.765 m (5' 9.5\").    Weight as of this encounter: 134.3 kg (296 lb).  Physical Exam  GENERAL: alert, no distress and obese  EYES: Eyes grossly normal to inspection, PERRL and conjunctivae and sclerae normal  HENT: ear canals and TM's normal, nose and mouth without ulcers or lesions  NECK: no adenopathy, no asymmetry, masses, or scars and thyroid normal to palpation  RESP: lungs clear to auscultation - no rales, rhonchi or wheezes  CV: regular rate and rhythm, normal S1 S2, no S3 or S4, no murmur, click or rub, no peripheral edema and peripheral pulses strong  ABDOMEN: soft, nontender, no hepatosplenomegaly, no masses and bowel sounds normal  MS: no gross musculoskeletal defects noted, no edema  SKIN: no suspicious lesions or rashes  NEURO: Normal strength and tone, mentation intact and speech normal  PSYCH: mentation appears normal, affect normal/bright  Diabetic foot exam: normal DP and PT pulses, no trophic changes or ulcerative lesions and reduced sensation diffusely plantar feet    Diagnostic Test Results:  Labs reviewed in " "Epic  Results for orders placed or performed in visit on 12/06/22 (from the past 24 hour(s))   Hemoglobin A1C ([facility])   Result Value Ref Range    Hemoglobin A1C 6.1 (A) 4.0 - 6.0 %       ASSESSMENT / PLAN:   Encounter for Medicare annual wellness exam  - discussed preventative guidelines, healthy diet, exercise and weight management  - VENOUS COLLECTION    Type 2 diabetes mellitus with diabetic polyneuropathy, without long-term current use of insulin (H)  a1c at goal.  Complicated by neuropathy.  Focus on diet, weight and monitoring feet nightly  - FOOT EXAM  - Hemoglobin A1C ([facility])  - VENOUS COLLECTION    Coronary artery disease involving native coronary artery of native heart without angina pectoris  Asymptomatic, cont secondary prevention  - VENOUS COLLECTION    Dyslipidemia  Cont statin, doing well  - VENOUS COLLECTION    Morbid obesity (H)  - Discussed importance of optimizing diet, exercise and healthy weight  - VENOUS COLLECTION    Encounter for immunization  - FLUAD QUADRIVALENT 65+ ([facility] ONLY)  - PNEUMOCOCCAL 20 VALENT CONJUGATE (PREVNAR 20); Future  - PNEUMOCOCCAL VACCINE ADMIN; Future      Patient has been advised of split billing requirements and indicates understanding: Yes      COUNSELING:  Reviewed preventive health counseling, as reflected in patient instructions       Regular exercise       Healthy diet/nutrition      BMI:   Estimated body mass index is 43.08 kg/m  as calculated from the following:    Height as of this encounter: 1.765 m (5' 9.5\").    Weight as of this encounter: 134.3 kg (296 lb).   Weight management plan: Discussed healthy diet and exercise guidelines      He reports that he has quit smoking. His smoking use included cigars. He has never used smokeless tobacco.      Appropriate preventive services were discussed with this patient, including applicable screening as appropriate for cardiovascular disease, diabetes, osteopenia/osteoporosis, and glaucoma.  As appropriate for " age/gender, discussed screening for colorectal cancer, prostate cancer, breast cancer, and cervical cancer. Checklist reviewing preventive services available has been given to the patient.    Reviewed patients plan of care and provided an AVS. The Basic Care Plan (routine screening as documented in Health Maintenance) for Hugo meets the Care Plan requirement. This Care Plan has been established and reviewed with the Patient.      Clay Luis MD  [facility]    Identified Health Risks:

## 2022-12-13 ENCOUNTER — OFFICE VISIT (OUTPATIENT)
Dept: FAMILY MEDICINE | Facility: CLINIC | Age: 72
End: 2022-12-13

## 2022-12-13 VITALS
DIASTOLIC BLOOD PRESSURE: 84 MMHG | HEART RATE: 52 BPM | WEIGHT: 269 LBS | SYSTOLIC BLOOD PRESSURE: 165 MMHG | BODY MASS INDEX: 38.51 KG/M2 | RESPIRATION RATE: 16 BRPM | TEMPERATURE: 97.1 F | OXYGEN SATURATION: 97 % | HEIGHT: 70 IN

## 2022-12-13 DIAGNOSIS — H11.31 SUBCONJUNCTIVAL HEMORRHAGE OF RIGHT EYE: Primary | ICD-10-CM

## 2022-12-13 PROCEDURE — 99213 OFFICE O/P EST LOW 20 MIN: CPT | Performed by: FAMILY MEDICINE

## 2022-12-13 NOTE — PROGRESS NOTES
Problem(s) Oriented visit        SUBJECTIVE:                                                    Hugo Prince is a 72 year old male who presents to clinic today for the following health issues :    1. Subconjunctival hemorrhage of right eye  Two days of blood in the right eye.    Problem list, Medication list, Allergies, and Medical/Social/Surgical histories reviewed in [facility] and updated as appropriate.   Additional history: as documented    ROS:  General and Resp. completed and negative except as noted above    Histories:   Patient Active Problem List   Diagnosis     Recurrent nephrolithiasis     RAY (obstructive sleep apnea)     ED (erectile dysfunction)     Gastroesophageal reflux disease, unspecified whether esophagitis present     S/P total knee replacement     Morbid obesity (H)     History of non-ST elevation myocardial infarction (NSTEMI)     CAD (coronary artery disease)     Dyslipidemia     Diabetes mellitus, type 2 (H)     Chronic rhinitis     Past Surgical History:   Procedure Laterality Date     ARTHROPLASTY KNEE Left 1/7/2015    Procedure: ARTHROPLASTY KNEE;  Surgeon: Agustin Saenz MD;  Location:  OR     ARTHROSCOPY KNEE RT/LT  6-20-11     COLONOSCOPY  12/09     CV FRACTIONAL FLOW RATIO WIRE N/A 7/16/2019    Procedure: Fractional Flow Reserve;  Surgeon: Mick Thrasher MD;  Location:  HEART CARDIAC CATH LAB     CV HEART CATHETERIZATION WITH POSSIBLE INTERVENTION N/A 7/16/2019    successful PCI with MICAH placement to mid LAD      CYSTOSCOPY       EXTRACORPOREAL SHOCK WAVE LITHOTRIPSY (ESWL)  1/20/2012    Procedure:EXTRACORPOREAL SHOCK WAVE LITHOTRIPSY (ESWL); LEFT EXTRACORPOREAL SHOCK WAVE LITHOTRIPSY ; Surgeon:ANJUM BUNDY; Location: OR     EXTRACORPOREAL SHOCK WAVE LITHOTRIPSY, CYSTOSCOPY, INSERT STENT URETER(S), COMBINED  2/3/2012    Procedure:COMBINED EXTRACORPOREAL SHOCK WAVE LITHOTRIPSY, CYSTOSCOPY, INSERT STENT URETER(S); CYSTOSCOPY, RIGHT STENT; Surgeon:GREGOR  "ANJUM JAMIL; Location:[facility] OR     FUSION LUMBAR ANTERIOR, FUSION LUMBAR POSTERIOR ONE LEVEL, COMBINED  11-1-10    L4-5     LASER HOLMIUM LITHOTRIPSY URETER(S), INSERT STENT, COMBINED  2/7/2012    Procedure:COMBINED CYSTOSCOPY, URETEROSCOPY, LASER HOLMIUM LITHOTRIPSY URETER(S), INSERT STENT; CYSTOSCOPY, RIGHT URETEROSCOPY, RIGHT RETROGRADES,  STONE EXTRACTION WITH HOLMIUM LASER AND RIGHT URETERAL STENT EXCHANGE ; Surgeon:DEEPTI MELVIN; Location:[facility] OR     LITHOTRIPSY      Lithotrypsy     SEPTOPLASTY       VASECTOMY         Social History     Tobacco Use     Smoking status: Former     Types: Cigars     Smokeless tobacco: Never     Tobacco comments:     has cigars when it is warm out   Substance Use Topics     Alcohol use: Yes     Alcohol/week: 11.7 standard drinks     Types: 14 Standard drinks or equivalent per week     Comment: 1-2 drinks day/beer and scotch     Family History   Problem Relation Age of Onset     Melanoma Father      Prostate Cancer Father      Cancer Father         neck cancer from radiation at work (urology)     Breast Cancer Mother      C.A.D. Paternal Grandfather 72           OBJECTIVE:                                                    BP (!) 165/84   Pulse 52   Temp 97.1  F (36.2  C) (Temporal)   Resp 16   Ht 1.765 m (5' 9.5\")   Wt 122 kg (269 lb)   SpO2 97%   BMI 39.15 kg/m    Body mass index is 39.15 kg/m .   APPEARANCE: = Relaxed and in no distress  PERRLA/Irises = Pupils Round Reactive to Light and Irisis without inflammation  PERRLA/Irises =Eyes grossly normal to inspection, conjunctivae and sclerae hem on the right lower eye     ASSESSMENT/PLAN:                                                        Hugo was seen today for eye problem.    Diagnoses and all orders for this visit:    Subconjunctival hemorrhage of right eye    observe  Work on weight loss  Regular exercise  There are no Patient Instructions on file for this visit.    The following health maintenance items " are reviewed in Epic and correct as of today:  Health Maintenance   Topic Date Due     Pneumococcal Vaccine: 65+ Years (1 - PCV) Never done     ZOSTER IMMUNIZATION (1 of 2) Never done     DTAP/TDAP/TD IMMUNIZATION (3 - Td or Tdap) 02/17/2022     COLORECTAL CANCER SCREENING  03/03/2022     LIPID  02/14/2023     A1C  03/06/2023     MICROALBUMIN  05/03/2023     EYE EXAM  07/27/2023     BMP  09/17/2023     MEDICARE ANNUAL WELLNESS VISIT  12/06/2023     DIABETIC FOOT EXAM  12/06/2023     FALL RISK ASSESSMENT  12/06/2023     LUNG CANCER SCREENING  12/06/2023     ADVANCE CARE PLANNING  12/06/2027     HEPATITIS C SCREENING  Completed     PHQ-2 (once per calendar year)  Completed     INFLUENZA VACCINE  Completed     AORTIC ANEURYSM SCREENING (SYSTEM ASSIGNED)  Completed     COVID-19 Vaccine  Completed     IPV IMMUNIZATION  Aged Out     MENINGITIS IMMUNIZATION  Aged Out       Ant Sawyer MD  [facility]  Family Practice  [facility]  [phone]    For any issues my office # is [phone]

## 2022-12-15 DIAGNOSIS — E11.9 TYPE 2 DIABETES MELLITUS WITHOUT COMPLICATION, WITHOUT LONG-TERM CURRENT USE OF INSULIN (H): ICD-10-CM

## 2022-12-15 RX ORDER — METFORMIN HCL 500 MG
500 TABLET, EXTENDED RELEASE 24 HR ORAL
Qty: 90 TABLET | Refills: 0 | Status: SHIPPED | OUTPATIENT
Start: 2022-12-15 | End: 2023-03-19

## 2023-01-27 DIAGNOSIS — I21.4 NSTEMI (NON-ST ELEVATED MYOCARDIAL INFARCTION) (H): ICD-10-CM

## 2023-01-27 RX ORDER — NITROGLYCERIN 0.4 MG/1
TABLET SUBLINGUAL
Qty: 25 TABLET | Refills: 0 | Status: SHIPPED | OUTPATIENT
Start: 2023-01-27

## 2023-01-27 NOTE — TELEPHONE ENCOUNTER
[facility] Refill Guideline reviewed.  Medication meets criteria for refill. NTG 0.4mg sent to [facility] [address] [place].

## 2023-02-02 ENCOUNTER — NURSE TRIAGE (OUTPATIENT)
Dept: CARDIOLOGY | Facility: CLINIC | Age: 73
End: 2023-02-02
Payer: MEDICARE

## 2023-02-02 ENCOUNTER — TELEPHONE (OUTPATIENT)
Dept: CARDIOLOGY | Facility: CLINIC | Age: 73
End: 2023-02-02
Payer: MEDICARE

## 2023-02-02 NOTE — TELEPHONE ENCOUNTER
Update noted. Pt has upcoming OV with Dr. Moe for care establishment with New MD. Agree with ED eval if recurrent sxs and need for nitro X3. Will advise pt of parameters should he call back with a recurrence.   MATTIE Jimenez RN, BSN.

## 2023-02-02 NOTE — TELEPHONE ENCOUNTER
See triage encounter. Pt was advised on ED precautions regarding recent CP reports. Pt has upcoming OV (scheduled next available) for care establishment with lauri Jimenez RN, BSN. 02/02/23 10:15 AM

## 2023-02-02 NOTE — TELEPHONE ENCOUNTER
"Warm transfer from scheduling team: patient states he has been having nighttime episodes of chest pain for about 10 days. He recalls 4-5 times where he was awakened at 4AM. Last night was the first time he tried taking a NTG and he used 3 before the discomfort subsided.    Patient denies any SOB or palpitations with these episodes. He thinks he may have gained about 3# since the holidays, but believes this is from eating and little exercise. He does not note any edema or fluid excess. He states he is compliant with his meds.    Patient states he is able to work and be as active as he wishes with no chest discomfort. He has had 2 back surgeries, so does not exercise as much as he would like. He does not recall any chest pain episodes during the day.    Patient states he had a stent placed about 4 years ago and is worried that this is a similar event. Reviewed with patient to see ED assessment if he needs to use NTG x3 or has additional symptoms such as SOB or palpitations.   Will update Dr. Moe and team    1. LOCATION: \"Where does it hurt?\" center chest  2. RADIATION: \"Does the pain go anywhere else?\" (e.g., into neck, jaw, arms, back) no  3. ONSET: \"When did the chest pain begin?\" (Minutes, hours or days) during the night  4. PATTERN \"Does the pain come and go, or has it been constant since it started?\" \"Does it get worse with exertion?\" intermittent  5. DURATION: \"How long does it last\" (e.g., seconds, minutes, hours) 15 min  6. SEVERITY: \"How bad is the pain?\" (e.g., Scale 1-10; mild, moderate, or severe) moderate  - MILD (1-3): doesn't interfere with normal activities   - MODERATE (4-7): interferes with normal activities or awakens from sleep  - SEVERE (8-10): excruciating pain, unable to do any normal activities   7. CARDIAC RISK FACTORS: \"Do you have any history of heart problems or risk factors for heart disease?\" (e.g., angina, prior heart attack; diabetes, high blood pressure, high cholesterol, smoker, or " "strong family history of heart disease) CAD history with stent 4 years ago  8. PULMONARY RISK FACTORS: \"Do you have any history of lung disease?\" (e.g., blood clots in lung, asthma, emphysema, birth control pills) sleep apnea  9. CAUSE: \"What do you think is causing the chest pain?\" patient is concerned this is his coronary artery again  10. OTHER SYMPTOMS: \"Do you have any other symptoms?\" (e.g., dizziness, nausea, vomiting, sweating, fever, difficulty breathing, cough) no    Reason for Disposition    All other patients with chest pain (Exception: fleeting chest pain lasting a few seconds)    Protocols used: CHEST PAIN-A-OH      "

## 2023-02-02 NOTE — TELEPHONE ENCOUNTER
Health Call Center    Phone Message    May a detailed message be left on voicemail: yes     Reason for Call: Symptoms or Concerns     If patient has red-flag symptoms, warm transfer to triage line    Current symptom or concern: Chest Pain last night at 4am he took Nitro and it went away.  This has been reoccurring periodic chest pain around 4am.  Past heart  at 2am.  Has sleep apnia      Symptoms have been present for:  One -two week(s)    Has patient previously been seen for this? Yes    By: Dr Moe     Appointment set on 2/13/23; would like to seen sooner    Are there any new or worsening symptoms? Yes: New       Action Taken: Other: Cardiology    Travel Screening: Not Applicable

## 2023-02-10 ENCOUNTER — LAB (OUTPATIENT)
Dept: LAB | Facility: CLINIC | Age: 73
End: 2023-02-10
Payer: MEDICARE

## 2023-02-10 DIAGNOSIS — I25.10 CORONARY ARTERY DISEASE INVOLVING NATIVE CORONARY ARTERY OF NATIVE HEART WITHOUT ANGINA PECTORIS: ICD-10-CM

## 2023-02-10 LAB
ALT SERPL W P-5'-P-CCNC: 34 U/L (ref 10–50)
CHOLEST SERPL-MCNC: 154 MG/DL
HDLC SERPL-MCNC: 44 MG/DL
LDLC SERPL CALC-MCNC: 84 MG/DL
NONHDLC SERPL-MCNC: 110 MG/DL
TRIGL SERPL-MCNC: 132 MG/DL

## 2023-02-10 PROCEDURE — 84460 ALANINE AMINO (ALT) (SGPT): CPT | Performed by: PHYSICIAN ASSISTANT

## 2023-02-10 PROCEDURE — 36415 COLL VENOUS BLD VENIPUNCTURE: CPT | Performed by: PHYSICIAN ASSISTANT

## 2023-02-10 PROCEDURE — 80061 LIPID PANEL: CPT | Performed by: PHYSICIAN ASSISTANT

## 2023-02-13 ENCOUNTER — OFFICE VISIT (OUTPATIENT)
Dept: CARDIOLOGY | Facility: CLINIC | Age: 73
End: 2023-02-13
Payer: MEDICARE

## 2023-02-13 ENCOUNTER — TELEPHONE (OUTPATIENT)
Dept: CARDIOLOGY | Facility: CLINIC | Age: 73
End: 2023-02-13

## 2023-02-13 VITALS
HEIGHT: 70 IN | DIASTOLIC BLOOD PRESSURE: 83 MMHG | SYSTOLIC BLOOD PRESSURE: 147 MMHG | HEART RATE: 58 BPM | BODY MASS INDEX: 39.15 KG/M2

## 2023-02-13 DIAGNOSIS — R07.89 ATYPICAL CHEST PAIN: ICD-10-CM

## 2023-02-13 DIAGNOSIS — I25.10 CORONARY ARTERY DISEASE INVOLVING NATIVE CORONARY ARTERY OF NATIVE HEART WITHOUT ANGINA PECTORIS: Primary | ICD-10-CM

## 2023-02-13 DIAGNOSIS — I21.4 NSTEMI (NON-ST ELEVATED MYOCARDIAL INFARCTION) (H): ICD-10-CM

## 2023-02-13 DIAGNOSIS — I25.10 CORONARY ARTERY DISEASE INVOLVING NATIVE CORONARY ARTERY OF NATIVE HEART WITHOUT ANGINA PECTORIS: ICD-10-CM

## 2023-02-13 DIAGNOSIS — I20.0 ACCELERATING ANGINA (H): ICD-10-CM

## 2023-02-13 PROCEDURE — 99215 OFFICE O/P EST HI 40 MIN: CPT | Performed by: INTERNAL MEDICINE

## 2023-02-13 RX ORDER — ATORVASTATIN CALCIUM 40 MG/1
40 TABLET, FILM COATED ORAL DAILY
Qty: 90 TABLET | Refills: 3 | Status: SHIPPED | OUTPATIENT
Start: 2023-02-13 | End: 2024-03-04

## 2023-02-13 RX ORDER — METOPROLOL TARTRATE 25 MG/1
25 TABLET, FILM COATED ORAL 2 TIMES DAILY
Qty: 180 TABLET | Refills: 3 | Status: SHIPPED | OUTPATIENT
Start: 2023-02-13 | End: 2024-03-18

## 2023-02-13 RX ORDER — AMLODIPINE BESYLATE 5 MG/1
5 TABLET ORAL DAILY
Qty: 90 TABLET | Refills: 3 | Status: SHIPPED | OUTPATIENT
Start: 2023-02-13 | End: 2024-01-18

## 2023-02-13 RX ORDER — LISINOPRIL 5 MG/1
5 TABLET ORAL DAILY
Qty: 90 TABLET | Refills: 3 | Status: SHIPPED | OUTPATIENT
Start: 2023-02-13 | End: 2024-03-18

## 2023-02-13 NOTE — PROGRESS NOTES
Cardiology Clinic Progress Note:    February 13, 2023   Patient Name: Hugo Prince  Patient MRN: [id number]     Consult indication: CAD    HPI:    I had the opportunity to see patient Hugo Prince in cardiology clinic for a follow up visit. Patient is followed by our colleague Clay Luis MD with Primary Care.     As you know, Hugo Prince is a 72-year-old male with a past medical history significant for former smoking, morbid obesity (weight 296 pounds), sleep apnea on CPAP therapy, coronary artery disease with NSTEMI status post MICAH to mid LAD (07/2019), who presents for follow up.     The patient was previously followed by my colleagues, Dr. Zhao, and had last been seen by HAILEY Peralta, on 07/14/2020.  The patient was [facility] to [facility] 07/2019 with chest discomfort, troponin peaked at 12.  Echocardiogram demonstrated LVEF 50%-55%, with apical wall hypokinesis.  He underwent a coronary angiogram 07/16/2019 with Dr. Thrasher, that demonstrated left main normal, proximal LAD 60% (iFR 0.91), mid LAD 95%, mid RCA 55% stenosis.  He underwent a PCI with MICAH to the mid LAD.      Subsequent TTE 10/28/2019 demonstrated recovery of LV function, LVEF 60%-65%, no regional wall motion abnormalities were noted.  He was found to have grade I diastolic dysfunction.  When he was last seen by Kaela Zhou 07/2020, he had been doing reasonably well, no changes were warranted to his cardiac regimen.  The patient was previously on atorvastatin 80 mg daily, which was decreased to 40 mg daily due to myalgias.     I had met him for the first time on 2/11/2021.  At that time, he had been doing well, no changes were warranted to his cardiac regimen.  Unfortunately, since then, over the past 2 weeks, he has had a couple of episodes of intermittent left-sided chest discomfort.  This chest discomfort is different from his prior anginal symptoms.  He reports waking up in the middle the  night with left-sided chest pain, this has been relieved with sublingual nitroglycerin.  He has not had recurrence for the past week or so.  At baseline his functional capacity is limited by back pain related to back surgeries, however he denies any recent change in exertional capacity.  He denies any exertional chest pain/discomfort.    Reviewed prior nuclear stress test and echocardiograms personally.    Blood pressure today in clinic is elevated at 147/83 mmHg.    Assessment and Plan/Recommendations:    # Coronary artery disease, NSTEMI 07/2019 status post coronary angiography and MICAH to mid LAD.  Residual proximal LAD 60%, mid RCA 55% stenoses.    Now with left-sided chest discomfort, somewhat atypical for angina and that it is nonexertional, different from his prior anginal symptoms, however the symptoms are relieved with sublingual nitroglycerin.  # TTE 10/2019, LVEF 60%-65%, normal wall motion (improved from TTE 07/2019 demonstrating LVEF 50%-55% with apical hypokinesis).  The patient denies any symptoms concerning for decompensated heart failure.   # HTN, BP elevated  # Dyslipidemia, prior statin intolerance (atorvastatin 80 mg daily).  LDL within goal of less than 70, on atorvastatin 40 mg daily.   # Sleep apnea, on CPAP therapy.   # Former smoking.   #  Morbid obesity    -Discussed options for further evaluation and management.  As aforementioned, symptoms do not seem classically characteristic of myocardial ischemia, however chest pain has been relieved with sublingual nitroglycerin, and he has known residual 60% LAD and 55% RCA disease.  Prior nuclear stress test was a false negative.  - Recommended cardiac catheterization/coronary angiography and possible intervention.  We discussed risk/benefits.  He is not able to have a cardiac MRI due to back pain and claustrophobia.  Repeat nuclear stress testing would not be appropriate given prior false negative.  A dobutamine stress echocardiogram would be  another option, though we discussed that sensitivity would be lower, particularly because of his body habitus.  Patient like to consider this further.  He will contact our team when he has reached a decision.  - In the interim, advised patient to seek medical care if develops any recurrence of concerning chest pain, significant shortness of breath, or other symptoms that are concerning for him, and he voiced understanding.  - Will start amlodipine 5 mg daily  - Continue aspirin, lisinopril, atorvastatin, sublingual nitroglycerin as needed  - Follow-up in 1 month with Kaela HART    Thank you for allowing our team to participate in the care of Hugo Prince.  Please do not hesitate to call or page me with any questions or concerns.    Sincerely,     Juan Moe MD, [facility]  Cardiology  Text Page   February 13, 2023  [place] [address]    Voice recognition software utilized.     Medical complexity: high    Past Medical History:     Past Medical History:   Diagnosis Date     Anemia due to blood loss, acute 1/14/2015     Atopic dermatitis      CAD (coronary artery disease)      Dermatitis 1/12/2015     Diabetes mellitus, type 2 (H) 12/29/2021     DJD (degenerative joint disease) of knee 1/14/2015     Dyslipidemia      ED (erectile dysfunction) 4/30/2012     Esophageal reflux     Gastroesophageal reflux     NSTEMI (non-ST elevated myocardial infarction) (H) 7/15/2019     RYA (obstructive sleep apnea)      Other and unspecified disc disorder of unspecified region     Intervertebral disc disorders     Personal history of colonic polyps     Colon polyps     S/P lateral meniscectomy of left knee      S/P total knee replacement 1/7/2015     Tick bite 4/20/15    amoxicillin treated     Unspecified sleep apnea     Sleep apnea     Urinary calculus, unspecified     Renal stones     Vitamin D deficiency 9-27-09        Past Surgical History:   Past Surgical History:   Procedure Laterality Date     ARTHROPLASTY  KNEE Left 1/7/2015    Procedure: ARTHROPLASTY KNEE;  Surgeon: Agustin Saenz MD;  Location:  OR     ARTHROSCOPY KNEE RT/LT  6-20-11     COLONOSCOPY  12/09     CV FRACTIONAL FLOW RATIO WIRE N/A 7/16/2019    Procedure: Fractional Flow Reserve;  Surgeon: Mick Thrasher MD;  Location:  HEART CARDIAC CATH LAB     CV HEART CATHETERIZATION WITH POSSIBLE INTERVENTION N/A 7/16/2019    successful PCI with MICAH placement to mid LAD      CYSTOSCOPY       EXTRACORPOREAL SHOCK WAVE LITHOTRIPSY (ESWL)  1/20/2012    Procedure:EXTRACORPOREAL SHOCK WAVE LITHOTRIPSY (ESWL); LEFT EXTRACORPOREAL SHOCK WAVE LITHOTRIPSY ; Surgeon:ANJUM BUNDY; Location: OR     EXTRACORPOREAL SHOCK WAVE LITHOTRIPSY, CYSTOSCOPY, INSERT STENT URETER(S), COMBINED  2/3/2012    Procedure:COMBINED EXTRACORPOREAL SHOCK WAVE LITHOTRIPSY, CYSTOSCOPY, INSERT STENT URETER(S); CYSTOSCOPY, RIGHT STENT; Surgeon:ANJUM BUNDY; Location: OR     FUSION LUMBAR ANTERIOR, FUSION LUMBAR POSTERIOR ONE LEVEL, COMBINED  11-1-10    L4-5     LASER HOLMIUM LITHOTRIPSY URETER(S), INSERT STENT, COMBINED  2/7/2012    Procedure:COMBINED CYSTOSCOPY, URETEROSCOPY, LASER HOLMIUM LITHOTRIPSY URETER(S), INSERT STENT; CYSTOSCOPY, RIGHT URETEROSCOPY, RIGHT RETROGRADES,  STONE EXTRACTION WITH HOLMIUM LASER AND RIGHT URETERAL STENT EXCHANGE ; Surgeon:DEEPTI MELVIN; Location: OR     LITHOTRIPSY      Lithotrypsy     SEPTOPLASTY       VASECTOMY         Medications (outpatient):  Current Outpatient Medications   Medication Sig Dispense Refill     acetaminophen (TYLENOL) 325 MG tablet Take 2 tablets (650 mg) by mouth every 4 hours as needed for mild pain       aspirin (ASA) 81 MG EC tablet Take 1 tablet (81 mg) by mouth daily       atorvastatin (LIPITOR) 40 MG tablet Take 1 tablet (40 mg) by mouth daily 90 tablet 2     fluticasone (FLONASE) 50 MCG/ACT nasal spray Spray 1 spray into both nostrils daily (Patient taking differently: Spray 1 spray into both  nostrils as needed) 18.2 mL 3     lisinopril (ZESTRIL) 5 MG tablet Take 1 tablet (5 mg) by mouth daily 90 tablet 1     meclizine (ANTIVERT) 25 MG tablet Take 1 tablet (25 mg) by mouth 3 times daily as needed for dizziness 30 tablet 0     metFORMIN (GLUCOPHAGE XR) 500 MG 24 hr tablet Take 1 tablet (500 mg) by mouth daily (with dinner) 90 tablet 0     metoprolol tartrate (LOPRESSOR) 25 MG tablet Take 1 tablet (25 mg) by mouth 2 times daily 180 tablet 1     Multiple Vitamins-Minerals (PRESERVISION AREDS 2 PO)        omeprazole (PRILOSEC) 20 MG DR capsule Take 1 capsule (20 mg) by mouth daily 90 capsule 3     ondansetron (ZOFRAN ODT) 4 MG ODT tab Take 1 tablet (4 mg) by mouth every 6 hours as needed for nausea or vomiting 15 tablet 0     nitroGLYcerin (NITROSTAT) 0.4 MG sublingual tablet As needed for chest pain place 1 tablet under the tongue every 5 minutes for 3 doses. If symptoms persist 5 minutes after 1st dose call 911. 25 tablet 0     order for DME Equipment being ordered: CPAP         Allergies:  Allergies   Allergen Reactions     Chlorthalidone GI Disturbance     Compazine Other (See Comments)     PSYCHOTIC     Erythromycin Nausea and Vomiting     Ethanol Nausea and Vomiting     Flomax [Tamsulosin Hcl]      FLUSHING, NASAl CONGESTION     Prochlorperazine Other (See Comments)     Other reaction(s): extreme anxiety     Seasonal Allergies        Social History:   History   Drug Use No      History   Smoking Status     Former     Types: Cigars   Smokeless Tobacco     Never     Social History    Substance and Sexual Activity      Alcohol use: Yes        Alcohol/week: 11.7 standard drinks        Types: 14 Standard drinks or equivalent per week        Comment: 1-2 drinks day/beer and scotch       Family History:  Family History   Problem Relation Age of Onset     Melanoma Father      Prostate Cancer Father      Cancer Father         neck cancer from radiation at work (urology)     Breast Cancer Mother      C.A.D.  "Paternal Grandfather 72       Review of Systems:   A complete review of systems was negative except as mentioned in the History of Present Illness.     Objective & Physical Exam:  BP (!) 147/83   Pulse 58   Ht 1.765 m (5' 9.5\")   BMI 39.15 kg/m    Wt Readings from Last 2 Encounters:   12/13/22 122 kg (269 lb)   12/06/22 134.3 kg (296 lb)     Body mass index is 39.15 kg/m .   Body surface area is 2.45 meters squared.    Constitutional: appears stated age, in no apparent distress, appears to be well nourished  Head: normocephalic, atraumatic  Neck: supple, trachea midline  Pulmonary: clear to auscultation bilaterally, no wheezes, no rales, no increased work of breathing  Cardiovascular: JVP normal, regular rate, regular rhythm, normal S1 and S2, no S3, S4, no murmur appreciated, trivial lower extremity edema  Gastrointestinal: no guarding, non-rigid   Neurologic: awake, alert, moves all extremities  Skin: no jaundice, warm on limited exam  Psychiatric: affect is normal, answers questions appropriately, oriented to self and place    Data reviewed:  Lab Results   Component Value Date    WBC 7.3 09/17/2022    WBC 5.5 12/29/2021    WBC 9.0 07/17/2019    RBC 4.79 09/17/2022    RBC 4.51 12/29/2021    RBC 4.50 07/17/2019    HGB 15.8 09/17/2022    HGB 14.5 12/29/2021    HCT 48.3 09/17/2022    HCT 44.5 12/29/2021     (H) 09/17/2022    MCV 98.7 12/29/2021    MCH 33.0 09/17/2022    MCH 32.3 (A) 12/29/2021    MCHC 32.7 09/17/2022    MCHC 32.7 (A) 12/29/2021    RDW 12.8 09/17/2022    RDW 12.7 07/17/2019     09/17/2022     12/29/2021     Sodium   Date Value Ref Range Status   09/17/2022 140 133 - 144 mmol/L Final   12/29/2021 139 134 - 144 mmol/L Final     Potassium   Date Value Ref Range Status   09/17/2022 4.1 3.4 - 5.3 mmol/L Final   12/29/2021 5.1 3.5 - 5.2 mmol/L Final     Chloride   Date Value Ref Range Status   09/17/2022 106 94 - 109 mmol/L Final   12/29/2021 102 96 - 106 mmol/L Final     Carbon " Dioxide   Date Value Ref Range Status   07/17/2019 24 20 - 32 mmol/L Final     Carbon Dioxide (CO2)   Date Value Ref Range Status   09/17/2022 27 20 - 32 mmol/L Final     Anion Gap   Date Value Ref Range Status   09/17/2022 7 3 - 14 mmol/L Final   07/17/2019 8 3 - 14 mmol/L Final     Glucose   Date Value Ref Range Status   09/17/2022 190 (H) 70 - 99 mg/dL Final   12/29/2021 200 (H) 65 - 99 mg/dL Final     Urea Nitrogen   Date Value Ref Range Status   09/17/2022 19 7 - 30 mg/dL Final   12/29/2021 17 8 - 27 mg/dL Final     BUN/Creatinine Ratio   Date Value Ref Range Status   12/29/2021 15 10 - 24 Final     Creatinine   Date Value Ref Range Status   09/17/2022 0.95 0.66 - 1.25 mg/dL Final   12/29/2021 1.15 0.76 - 1.27 mg/dL Final     GFR Estimate   Date Value Ref Range Status   09/17/2022 84 >60 mL/min/1.73m2 Final     Comment:     Effective December 21, 2021 eGFRcr in adults is calculated using the 2021 CKD-EPI creatinine equation which includes age and gender (Nohemi et al., NEJM, DOI: 10.1056/NVLZfh1594517)   07/17/2019 77 >60 mL/min/[1.73_m2] Final     Comment:     Non  GFR Calc  Starting 12/18/2018, serum creatinine based estimated GFR (eGFR) will be   calculated using the Chronic Kidney Disease Epidemiology Collaboration   (CKD-EPI) equation.       Calcium   Date Value Ref Range Status   09/17/2022 9.3 8.5 - 10.1 mg/dL Final   12/29/2021 9.5 8.6 - 10.2 mg/dL Final     Bilirubin Total   Date Value Ref Range Status   12/29/2021 0.6 0.0 - 1.2 mg/dL Final     Alkaline Phosphatase   Date Value Ref Range Status   12/29/2021 116 44 - 121 IU/L Final     Comment:                   **Please note reference interval change**     ALT   Date Value Ref Range Status   02/10/2023 34 10 - 50 U/L Final   12/29/2021 31 0 - 44 IU/L Final     AST   Date Value Ref Range Status   12/29/2021 32 0 - 40 IU/L Final     Recent Labs   Lab Test 02/10/23  0922 02/14/22  1126   CHOL 154 128   HDL 44 43   LDL 84 60   TRIG 132 125       Lab Results   Component Value Date    A1C 6.1 12/06/2022    A1C 6.3 05/03/2022    A1C 6.8 12/29/2021    A1C 5.9 03/19/2018    A1C 5.9 03/23/2016

## 2023-02-13 NOTE — PATIENT INSTRUCTIONS
February 13, 2023    Thank you for allowing our Cardiology team to participate in your care.     Please note the following changes to your heart treatment plan:     Medication changes:   - start amlodipine 5mg daily     Tests to be done:  - consider dobutamine stress echocardiogram vs cardiac catheterization    Follow up:  - Follow up in 1 month with Kaela HART, or sooner as needed.      For scheduling, please call [phone].    Please contact our team at [phone] (Missy IBARRA) or [phone] for any questions or concerns.     If you are having a medical emergency, please call [phone].     Sincerely,    Juan Moe MD, [facility]  Cardiology    [facility]

## 2023-02-13 NOTE — LETTER
2/13/2023    Clay Luis MD  [address]  [facility] [address]    RE: Hugo Prince       Dear Colleague,     I had the pleasure of seeing Hugo Prince in the [facility].      Cardiology Clinic Progress Note:    February 13, 2023   Patient Name: Hugo Prince  Patient MRN: [id number]     Consult indication: CAD    HPI:    I had the opportunity to see patient Hugo Prince in cardiology clinic for a follow up visit. Patient is followed by our colleague Clay Luis MD with Primary Care.     As you know, Hugo Prince is a 72-year-old male with a past medical history significant for former smoking, morbid obesity (weight 296 pounds), sleep apnea on CPAP therapy, coronary artery disease with NSTEMI status post MICAH to mid LAD (07/2019), who presents for follow up.     The patient was previously followed by my colleagues, Dr. Zhao, and had last been seen by HAILEY Peralta, on 07/14/2020.  The patient was admitted to [facility] 07/2019 with chest discomfort, troponin peaked at 12.  Echocardiogram demonstrated LVEF 50%-55%, with apical wall hypokinesis.  He underwent a coronary angiogram 07/16/2019 with Dr. Thrasher, that demonstrated left main normal, proximal LAD 60% (iFR 0.91), mid LAD 95%, mid RCA 55% stenosis.  He underwent a PCI with MICAH to the mid LAD.      Subsequent TTE 10/28/2019 demonstrated recovery of LV function, LVEF 60%-65%, no regional wall motion abnormalities were noted.  He was found to have grade I diastolic dysfunction.  When he was last seen by Kaela Zhou 07/2020, he had been doing reasonably well, no changes were warranted to his cardiac regimen.  The patient was previously on atorvastatin 80 mg daily, which was decreased to 40 mg daily due to myalgias.     I had met him for the first time on 2/11/2021.  At that time, he had been doing well, no changes were warranted to his cardiac regimen.  Unfortunately, since  then, over the past 2 weeks, he has had a couple of episodes of intermittent left-sided chest discomfort.  This chest discomfort is different from his prior anginal symptoms.  He reports waking up in the middle the night with left-sided chest pain, this has been relieved with sublingual nitroglycerin.  He has not had recurrence for the past week or so.  At baseline his functional capacity is limited by back pain related to back surgeries, however he denies any recent change in exertional capacity.  He denies any exertional chest pain/discomfort.    Reviewed prior nuclear stress test and echocardiograms personally.    Blood pressure today in clinic is elevated at 147/83 mmHg.    Assessment and Plan/Recommendations:    # Coronary artery disease, NSTEMI 07/2019 status post coronary angiography and MICAH to mid LAD.  Residual proximal LAD 60%, mid RCA 55% stenoses.    Now with left-sided chest discomfort, somewhat atypical for angina and that it is nonexertional, different from his prior anginal symptoms, however the symptoms are relieved with sublingual nitroglycerin.  # TTE 10/2019, LVEF 60%-65%, normal wall motion (improved from TTE 07/2019 demonstrating LVEF 50%-55% with apical hypokinesis).  The patient denies any symptoms concerning for decompensated heart failure.   # HTN, BP elevated  # Dyslipidemia, prior statin intolerance (atorvastatin 80 mg daily).  LDL within goal of less than 70, on atorvastatin 40 mg daily.   # Sleep apnea, on CPAP therapy.   # Former smoking.   #  Morbid obesity    -Discussed options for further evaluation and management.  As aforementioned, symptoms do not seem classically characteristic of myocardial ischemia, however chest pain has been relieved with sublingual nitroglycerin, and he has known residual 60% LAD and 55% RCA disease.  Prior nuclear stress test was a false negative.  - Recommended cardiac catheterization/coronary angiography and possible intervention.  We discussed  risk/benefits.  He is not able to have a cardiac MRI due to back pain and claustrophobia.  Repeat nuclear stress testing would not be appropriate given prior false negative.  A dobutamine stress echocardiogram would be another option, though we discussed that sensitivity would be lower, particularly because of his body habitus.  Patient like to consider this further.  He will contact our team when he has reached a decision.  - In the interim, advised patient to seek medical care if develops any recurrence of concerning chest pain, significant shortness of breath, or other symptoms that are concerning for him, and he voiced understanding.  - Will start amlodipine 5 mg daily  - Continue aspirin, lisinopril, atorvastatin, sublingual nitroglycerin as needed  - Follow-up in 1 month with Kaela HART    Thank you for allowing our team to participate in the care of Hugo Prince.  Please do not hesitate to call or page me with any questions or concerns.    Sincerely,     Juan Moe MD, [facility]  Cardiology  Text Page   February 13, 2023  [place] [address]    Voice recognition software utilized.     Medical complexity: high    Past Medical History:     Past Medical History:   Diagnosis Date     Anemia due to blood loss, acute 1/14/2015     Atopic dermatitis      CAD (coronary artery disease)      Dermatitis 1/12/2015     Diabetes mellitus, type 2 (H) 12/29/2021     DJD (degenerative joint disease) of knee 1/14/2015     Dyslipidemia      ED (erectile dysfunction) 4/30/2012     Esophageal reflux     Gastroesophageal reflux     NSTEMI (non-ST elevated myocardial infarction) (H) 7/15/2019     RAY (obstructive sleep apnea)      Other and unspecified disc disorder of unspecified region     Intervertebral disc disorders     Personal history of colonic polyps     Colon polyps     S/P lateral meniscectomy of left knee      S/P total knee replacement 1/7/2015     Tick bite 4/20/15    amoxicillin treated      Unspecified sleep apnea     Sleep apnea     Urinary calculus, unspecified     Renal stones     Vitamin D deficiency 9-27-09        Past Surgical History:   Past Surgical History:   Procedure Laterality Date     ARTHROPLASTY KNEE Left 1/7/2015    Procedure: ARTHROPLASTY KNEE;  Surgeon: Agustin Saenz MD;  Location:  OR     ARTHROSCOPY KNEE RT/LT  6-20-11     COLONOSCOPY  12/09     CV FRACTIONAL FLOW RATIO WIRE N/A 7/16/2019    Procedure: Fractional Flow Reserve;  Surgeon: Mick Thrasher MD;  Location:  HEART CARDIAC CATH LAB     CV HEART CATHETERIZATION WITH POSSIBLE INTERVENTION N/A 7/16/2019    successful PCI with MICAH placement to mid LAD      CYSTOSCOPY       EXTRACORPOREAL SHOCK WAVE LITHOTRIPSY (ESWL)  1/20/2012    Procedure:EXTRACORPOREAL SHOCK WAVE LITHOTRIPSY (ESWL); LEFT EXTRACORPOREAL SHOCK WAVE LITHOTRIPSY ; Surgeon:ANJUM BUNDY; Location: OR     EXTRACORPOREAL SHOCK WAVE LITHOTRIPSY, CYSTOSCOPY, INSERT STENT URETER(S), COMBINED  2/3/2012    Procedure:COMBINED EXTRACORPOREAL SHOCK WAVE LITHOTRIPSY, CYSTOSCOPY, INSERT STENT URETER(S); CYSTOSCOPY, RIGHT STENT; Surgeon:ANJUM BUNDY; Location: OR     FUSION LUMBAR ANTERIOR, FUSION LUMBAR POSTERIOR ONE LEVEL, COMBINED  11-1-10    L4-5     LASER HOLMIUM LITHOTRIPSY URETER(S), INSERT STENT, COMBINED  2/7/2012    Procedure:COMBINED CYSTOSCOPY, URETEROSCOPY, LASER HOLMIUM LITHOTRIPSY URETER(S), INSERT STENT; CYSTOSCOPY, RIGHT URETEROSCOPY, RIGHT RETROGRADES,  STONE EXTRACTION WITH HOLMIUM LASER AND RIGHT URETERAL STENT EXCHANGE ; Surgeon:DEEPTI MELVIN; Location: OR     LITHOTRIPSY      Lithotrypsy     SEPTOPLASTY       VASECTOMY         Medications (outpatient):  Current Outpatient Medications   Medication Sig Dispense Refill     acetaminophen (TYLENOL) 325 MG tablet Take 2 tablets (650 mg) by mouth every 4 hours as needed for mild pain       aspirin (ASA) 81 MG EC tablet Take 1 tablet (81 mg) by mouth daily        atorvastatin (LIPITOR) 40 MG tablet Take 1 tablet (40 mg) by mouth daily 90 tablet 2     fluticasone (FLONASE) 50 MCG/ACT nasal spray Spray 1 spray into both nostrils daily (Patient taking differently: Spray 1 spray into both nostrils as needed) 18.2 mL 3     lisinopril (ZESTRIL) 5 MG tablet Take 1 tablet (5 mg) by mouth daily 90 tablet 1     meclizine (ANTIVERT) 25 MG tablet Take 1 tablet (25 mg) by mouth 3 times daily as needed for dizziness 30 tablet 0     metFORMIN (GLUCOPHAGE XR) 500 MG 24 hr tablet Take 1 tablet (500 mg) by mouth daily (with dinner) 90 tablet 0     metoprolol tartrate (LOPRESSOR) 25 MG tablet Take 1 tablet (25 mg) by mouth 2 times daily 180 tablet 1     Multiple Vitamins-Minerals (PRESERVISION AREDS 2 PO)        omeprazole (PRILOSEC) 20 MG DR capsule Take 1 capsule (20 mg) by mouth daily 90 capsule 3     ondansetron (ZOFRAN ODT) 4 MG ODT tab Take 1 tablet (4 mg) by mouth every 6 hours as needed for nausea or vomiting 15 tablet 0     nitroGLYcerin (NITROSTAT) 0.4 MG sublingual tablet As needed for chest pain place 1 tablet under the tongue every 5 minutes for 3 doses. If symptoms persist 5 minutes after 1st dose call 911. 25 tablet 0     order for DME Equipment being ordered: CPAP         Allergies:  Allergies   Allergen Reactions     Chlorthalidone GI Disturbance     Compazine Other (See Comments)     PSYCHOTIC     Erythromycin Nausea and Vomiting     Ethanol Nausea and Vomiting     Flomax [Tamsulosin Hcl]      FLUSHING, NASAl CONGESTION     Prochlorperazine Other (See Comments)     Other reaction(s): extreme anxiety     Seasonal Allergies        Social History:   History   Drug Use No      History   Smoking Status     Former     Types: Cigars   Smokeless Tobacco     Never     Social History    Substance and Sexual Activity      Alcohol use: Yes        Alcohol/week: 11.7 standard drinks        Types: 14 Standard drinks or equivalent per week        Comment: 1-2 drinks day/beer and scotch    "    Family History:  Family History   Problem Relation Age of Onset     Melanoma Father      Prostate Cancer Father      Cancer Father         neck cancer from radiation at work (urology)     Breast Cancer Mother      C.A.D. Paternal Grandfather 72       Review of Systems:   A complete review of systems was negative except as mentioned in the History of Present Illness.     Objective & Physical Exam:  BP (!) 147/83   Pulse 58   Ht 1.765 m (5' 9.5\")   BMI 39.15 kg/m    Wt Readings from Last 2 Encounters:   12/13/22 122 kg (269 lb)   12/06/22 134.3 kg (296 lb)     Body mass index is 39.15 kg/m .   Body surface area is 2.45 meters squared.    Constitutional: appears stated age, in no apparent distress, appears to be well nourished  Head: normocephalic, atraumatic  Neck: supple, trachea midline  Pulmonary: clear to auscultation bilaterally, no wheezes, no rales, no increased work of breathing  Cardiovascular: JVP normal, regular rate, regular rhythm, normal S1 and S2, no S3, S4, no murmur appreciated, trivial lower extremity edema  Gastrointestinal: no guarding, non-rigid   Neurologic: awake, alert, moves all extremities  Skin: no jaundice, warm on limited exam  Psychiatric: affect is normal, answers questions appropriately, oriented to self and place    Data reviewed:  Lab Results   Component Value Date    WBC 7.3 09/17/2022    WBC 5.5 12/29/2021    WBC 9.0 07/17/2019    RBC 4.79 09/17/2022    RBC 4.51 12/29/2021    RBC 4.50 07/17/2019    HGB 15.8 09/17/2022    HGB 14.5 12/29/2021    HCT 48.3 09/17/2022    HCT 44.5 12/29/2021     (H) 09/17/2022    MCV 98.7 12/29/2021    MCH 33.0 09/17/2022    MCH 32.3 (A) 12/29/2021    MCHC 32.7 09/17/2022    MCHC 32.7 (A) 12/29/2021    RDW 12.8 09/17/2022    RDW 12.7 07/17/2019     09/17/2022     12/29/2021     Sodium   Date Value Ref Range Status   09/17/2022 140 133 - 144 mmol/L Final   12/29/2021 139 134 - 144 mmol/L Final     Potassium   Date Value Ref " Range Status   09/17/2022 4.1 3.4 - 5.3 mmol/L Final   12/29/2021 5.1 3.5 - 5.2 mmol/L Final     Chloride   Date Value Ref Range Status   09/17/2022 106 94 - 109 mmol/L Final   12/29/2021 102 96 - 106 mmol/L Final     Carbon Dioxide   Date Value Ref Range Status   07/17/2019 24 20 - 32 mmol/L Final     Carbon Dioxide (CO2)   Date Value Ref Range Status   09/17/2022 27 20 - 32 mmol/L Final     Anion Gap   Date Value Ref Range Status   09/17/2022 7 3 - 14 mmol/L Final   07/17/2019 8 3 - 14 mmol/L Final     Glucose   Date Value Ref Range Status   09/17/2022 190 (H) 70 - 99 mg/dL Final   12/29/2021 200 (H) 65 - 99 mg/dL Final     Urea Nitrogen   Date Value Ref Range Status   09/17/2022 19 7 - 30 mg/dL Final   12/29/2021 17 8 - 27 mg/dL Final     BUN/Creatinine Ratio   Date Value Ref Range Status   12/29/2021 15 10 - 24 Final     Creatinine   Date Value Ref Range Status   09/17/2022 0.95 0.66 - 1.25 mg/dL Final   12/29/2021 1.15 0.76 - 1.27 mg/dL Final     GFR Estimate   Date Value Ref Range Status   09/17/2022 84 >60 mL/min/1.73m2 Final     Comment:     Effective December 21, 2021 eGFRcr in adults is calculated using the 2021 CKD-EPI creatinine equation which includes age and gender (Nohemi et al., NE, DOI: 10.1056/TDQQne3182835)   07/17/2019 77 >60 mL/min/[1.73_m2] Final     Comment:     Non  GFR Calc  Starting 12/18/2018, serum creatinine based estimated GFR (eGFR) will be   calculated using the Chronic Kidney Disease Epidemiology Collaboration   (CKD-EPI) equation.       Calcium   Date Value Ref Range Status   09/17/2022 9.3 8.5 - 10.1 mg/dL Final   12/29/2021 9.5 8.6 - 10.2 mg/dL Final     Bilirubin Total   Date Value Ref Range Status   12/29/2021 0.6 0.0 - 1.2 mg/dL Final     Alkaline Phosphatase   Date Value Ref Range Status   12/29/2021 116 44 - 121 IU/L Final     Comment:                   **Please note reference interval change**     ALT   Date Value Ref Range Status   02/10/2023 34 10 - 50 U/L  Final   12/29/2021 31 0 - 44 IU/L Final     AST   Date Value Ref Range Status   12/29/2021 32 0 - 40 IU/L Final     Recent Labs   Lab Test 02/10/23  0922 02/14/22  1126   CHOL 154 128   HDL 44 43   LDL 84 60   TRIG 132 125      Lab Results   Component Value Date    A1C 6.1 12/06/2022    A1C 6.3 05/03/2022    A1C 6.8 12/29/2021    A1C 5.9 03/19/2018    A1C 5.9 03/23/2016        Thank you for allowing me to participate in the care of your patient.      Sincerely,     Juan Moe MD     [facility]  cc:   Juan Moe MD  [address]  [place] [address]

## 2023-02-13 NOTE — TELEPHONE ENCOUNTER
"Case reviewed with MD. ROMAN to place orders for cath and arrange procedure.   Indication:   indication for cath to \"accelerating angina\" or if they don't have that then \"unstable angina\"?     Orders placed per provider.   MATTIE Jimenez RN, BSN. 02/13/23 1:11 PM   "

## 2023-02-13 NOTE — TELEPHONE ENCOUNTER
Call received from pt to review recommendations after OV today with Dr. Moe:   - Recommended cardiac catheterization/coronary angiography and possible intervention.  We discussed risk/benefits.  He is not able to have a cardiac MRI due to back pain and claustrophobia.  Repeat nuclear stress testing would not be appropriate given prior false negative.  A dobutamine stress echocardiogram would be another option, though we discussed that sensitivity would be lower, particularly because of his body habitus.  Patient like to consider this further.  He will contact our team when he has reached a decision.    Pt has decided to proceed with cardiac cath. Will update MD and place order as needed.   MATTIE Jimenez RN, BSN.

## 2023-02-15 ENCOUNTER — TELEPHONE (OUTPATIENT)
Dept: CARDIOLOGY | Facility: CLINIC | Age: 73
End: 2023-02-15
Payer: MEDICARE

## 2023-02-15 DIAGNOSIS — I25.10 CORONARY ARTERY DISEASE INVOLVING NATIVE CORONARY ARTERY OF NATIVE HEART WITHOUT ANGINA PECTORIS: Primary | ICD-10-CM

## 2023-02-17 DIAGNOSIS — R07.89 ATYPICAL CHEST PAIN: Primary | ICD-10-CM

## 2023-02-17 DIAGNOSIS — I25.10 CORONARY ARTERY DISEASE INVOLVING NATIVE CORONARY ARTERY OF NATIVE HEART WITHOUT ANGINA PECTORIS: ICD-10-CM

## 2023-02-17 RX ORDER — ASPIRIN 325 MG
325 TABLET ORAL ONCE
Status: CANCELLED | OUTPATIENT
Start: 2023-02-17 | End: 2023-02-17

## 2023-02-17 RX ORDER — SODIUM CHLORIDE 9 MG/ML
INJECTION, SOLUTION INTRAVENOUS CONTINUOUS
Status: CANCELLED | OUTPATIENT
Start: 2023-02-17

## 2023-02-17 RX ORDER — POTASSIUM CHLORIDE 1500 MG/1
20 TABLET, EXTENDED RELEASE ORAL
Status: CANCELLED | OUTPATIENT
Start: 2023-02-17

## 2023-02-17 RX ORDER — LIDOCAINE 40 MG/G
CREAM TOPICAL
Status: CANCELLED | OUTPATIENT
Start: 2023-02-17

## 2023-02-17 RX ORDER — ASPIRIN 81 MG/1
243 TABLET, CHEWABLE ORAL ONCE
Status: CANCELLED | OUTPATIENT
Start: 2023-02-17

## 2023-02-20 ENCOUNTER — TELEPHONE (OUTPATIENT)
Dept: CARDIOLOGY | Facility: CLINIC | Age: 73
End: 2023-02-20
Payer: MEDICARE

## 2023-02-20 NOTE — TELEPHONE ENCOUNTER
Patient called again today and states he is sneezing and has a sever runny nose and is concerned about upcoming angiogram 2-21-23.  Recommend patient take a Covid test today and if negative check his temperature if < than 100.0 F tomorrow to call [facility].  Patient requestend we send information Via My Chart.      Patient will check their temperature the morning of procedure and call [facility] at [phone] if temp is >100.0.    Violet Chisholm RN on 2/20/2023 at 2:15 PM

## 2023-02-20 NOTE — TELEPHONE ENCOUNTER
Patient is scheduled for an angiogram 2-21-23 and has sleep apnea.  His wondering if he needs to bring his sleep apnea machine with him to the out patient surgery?  He does not need to bring his sleep apnea machine to the procedure.  He was reassured they will watch his ON saturations during the angiogram.  Patient verbalized understanding.

## 2023-02-21 ENCOUNTER — TELEPHONE (OUTPATIENT)
Dept: CARDIOLOGY | Facility: CLINIC | Age: 73
End: 2023-02-21
Payer: MEDICARE

## 2023-02-21 NOTE — TELEPHONE ENCOUNTER
Patient left  on nursing line early this morning stating he woke up with runny stools and is going to cancel his angiogram today. Message was sent to scheduling to notify. Will send message to ordering provider's team to follow up on patient.     FRED Kirby

## 2023-02-21 NOTE — TELEPHONE ENCOUNTER
Cath cancellation noted. Will reach out to pt 1-2 days to review need for reschedule.   MATTIE Jimenez RN, BSN. 02/21/23 10:45 AM

## 2023-03-06 ENCOUNTER — TELEPHONE (OUTPATIENT)
Dept: CARDIOLOGY | Facility: CLINIC | Age: 73
End: 2023-03-06
Payer: MEDICARE

## 2023-03-06 NOTE — TELEPHONE ENCOUNTER
----- Message from Missy Jimenez RN sent at 2/24/2023  9:54 AM CST -----  Regarding: FW: [facility] DOS UPDATED DATE- 3/9/23    ----- Message -----  From: Leonor Greene  Sent: 2/24/2023   9:32 AM CST  To: Chacha Velasco Missy Jimenez RN  Subject: [facility] DOS UPDATED DATE- 3/9/23                     Angiogram Orders    Location: [facility] - [address] [place] [address] [address], [place] [address] - Main Entrance of the [facility]    Procedure: Coronary Angiogram    Diagnosis: Accelerating angina    Procedure Date: 3/9/23    Patient Arrival Time: 830am    Procedure Time: 3rd case to follow    Ordering Cardiologist: Dr. Moe    Performing Cardiologist: Dr. Chew    Cardiac Assessment Completed: Yes  Date: 2/13/23  Provider: Abhi    Pre-Procedure Labs completed: on admit    Post Procedure DUNG appointment scheduled: Yes  Date: 3/24/23  Provider: Kaela    Patient Diabetic on Meds/Insulin:  Yes  If on Metformin, has 2 day post procedure BMP been scheduled: Yes  (Thursday and Friday procedures should have Monday BMP)  Patient on Coumadin/Warfarin:  No  Patient on Pradaxa/Xarelto/Eliquis:  No  Patient on Invokana/Farxiga/Jardiance/Steglatro:  No    Does Patient have a history of bypass:  No      Pt advised to report any COVID like symptoms to care team prior to procedure.    Appointment was scheduled: Over the phone    Special instructions:  NONE

## 2023-03-07 NOTE — TELEPHONE ENCOUNTER
Coronary angiogram/PCI/Right Heart Cath prep instructions.     Patient is scheduled for a Coronary Angiogram at [facility] - [address], [place] [address] - Main Entrance of the Hospital on 3/9/2023.  Check in time is at 0830 and procedure to follow.    Patient instructed to remain NPO for solid foods 8 hours prior to arrival and may have clear liquids up to 2 hours prior to arrival.    Patient Patient does not require extra fluids prior to procedure.    Patient is on metformin and has been advised to hold Metformin the day of the procedure. They should continue to hold until after follow up BMP scheduled 3/11/2023 at [facility] is reviewed.  Pt will be called and advised when they can resume their metformin.    Patient is not on anticoagulation.    Patient is not on diuretics.     Patient is taking ASA 81mg daily and will take 4 tabs (324mg) the morning of the procedure.    Pt is not on a SGLT2 inhibitor.    Patient advised to take their other daily medications the morning of the procedure with small sips of water.     Verified patient does not have a contrast allergy.    Verified patient has someone available to drive them home from the hospital and can stay with them for 24 hours after the procedure.     Patient advised to notify care team with any new COVID like symptoms prior to procedure.    Patient will check their temperature the morning of procedure and call [facility] at [phone] if temp is >100.0.    Patient is aware of visitor policy.    Patient expresses understanding of above instructions and denies further questions at this time.      MATTIE Jimenez RN, BSN.   [facility]   03/07/23 2:13 PM

## 2023-03-09 ENCOUNTER — HOSPITAL ENCOUNTER (OUTPATIENT)
Facility: CLINIC | Age: 73
Discharge: HOME | End: 2023-03-09
Admitting: INTERNAL MEDICINE
Payer: MEDICARE

## 2023-03-09 VITALS
WEIGHT: 303 LBS | DIASTOLIC BLOOD PRESSURE: 57 MMHG | RESPIRATION RATE: 18 BRPM | BODY MASS INDEX: 43.38 KG/M2 | SYSTOLIC BLOOD PRESSURE: 123 MMHG | HEIGHT: 70 IN | OXYGEN SATURATION: 96 % | TEMPERATURE: 97.8 F | HEART RATE: 63 BPM

## 2023-03-09 DIAGNOSIS — R07.89 ATYPICAL CHEST PAIN: ICD-10-CM

## 2023-03-09 DIAGNOSIS — I25.10 CORONARY ARTERY DISEASE INVOLVING NATIVE CORONARY ARTERY OF NATIVE HEART WITHOUT ANGINA PECTORIS: ICD-10-CM

## 2023-03-09 DIAGNOSIS — I20.0 ACCELERATING ANGINA (H): ICD-10-CM

## 2023-03-09 PROBLEM — Z98.890 STATUS POST CORONARY ANGIOGRAM: Status: ACTIVE | Noted: 2023-03-09

## 2023-03-09 LAB
ACT BLD: 245 SECONDS (ref 74–150)
ACT BLD: 254 SECONDS (ref 74–150)
ANION GAP SERPL CALCULATED.3IONS-SCNC: 10 MMOL/L (ref 7–15)
APTT PPP: 25 SECONDS (ref 22–38)
BUN SERPL-MCNC: 18.8 MG/DL (ref 8–23)
CALCIUM SERPL-MCNC: 9.4 MG/DL (ref 8.8–10.2)
CHLORIDE SERPL-SCNC: 100 MMOL/L (ref 98–107)
CREAT SERPL-MCNC: 0.93 MG/DL (ref 0.67–1.17)
DEPRECATED HCO3 PLAS-SCNC: 30 MMOL/L (ref 22–29)
ERYTHROCYTE [DISTWIDTH] IN BLOOD BY AUTOMATED COUNT: 12.1 % (ref 10–15)
GFR SERPL CREATININE-BSD FRML MDRD: 86 ML/MIN/1.73M2
GLUCOSE SERPL-MCNC: 146 MG/DL (ref 70–99)
HCT VFR BLD AUTO: 46.8 % (ref 40–53)
HGB BLD-MCNC: 15.5 G/DL (ref 13.3–17.7)
INR PPP: 1.03 (ref 0.85–1.15)
MCH RBC QN AUTO: 33 PG (ref 26.5–33)
MCHC RBC AUTO-ENTMCNC: 33.1 G/DL (ref 31.5–36.5)
MCV RBC AUTO: 100 FL (ref 78–100)
PLATELET # BLD AUTO: 174 10E3/UL (ref 150–450)
POTASSIUM SERPL-SCNC: 4.8 MMOL/L (ref 3.4–5.3)
RBC # BLD AUTO: 4.7 10E6/UL (ref 4.4–5.9)
SODIUM SERPL-SCNC: 140 MMOL/L (ref 136–145)
WBC # BLD AUTO: 7.9 10E3/UL (ref 4–11)

## 2023-03-09 PROCEDURE — C1894 INTRO/SHEATH, NON-LASER: HCPCS | Performed by: INTERNAL MEDICINE

## 2023-03-09 PROCEDURE — 999N000071 HC STATISTIC HEART CATH LAB OR EP LAB

## 2023-03-09 PROCEDURE — 92928 PRQ TCAT PLMT NTRAC ST 1 LES: CPT | Mod: LD | Performed by: INTERNAL MEDICINE

## 2023-03-09 PROCEDURE — C1874 STENT, COATED/COV W/DEL SYS: HCPCS | Performed by: INTERNAL MEDICINE

## 2023-03-09 PROCEDURE — 36415 COLL VENOUS BLD VENIPUNCTURE: CPT | Performed by: INTERNAL MEDICINE

## 2023-03-09 PROCEDURE — 85730 THROMBOPLASTIN TIME PARTIAL: CPT | Performed by: INTERNAL MEDICINE

## 2023-03-09 PROCEDURE — 99153 MOD SED SAME PHYS/QHP EA: CPT | Performed by: INTERNAL MEDICINE

## 2023-03-09 PROCEDURE — 36591 DRAW BLOOD OFF VENOUS DEVICE: CPT

## 2023-03-09 PROCEDURE — C1887 CATHETER, GUIDING: HCPCS | Performed by: INTERNAL MEDICINE

## 2023-03-09 PROCEDURE — C9600 PERC DRUG-EL COR STENT SING: HCPCS | Performed by: INTERNAL MEDICINE

## 2023-03-09 PROCEDURE — 93010 ELECTROCARDIOGRAM REPORT: CPT | Mod: 76 | Performed by: INTERNAL MEDICINE

## 2023-03-09 PROCEDURE — 258N000003 HC RX IP 258 OP 636: Performed by: INTERNAL MEDICINE

## 2023-03-09 PROCEDURE — 93799 UNLISTED CV SVC/PROCEDURE: CPT | Performed by: INTERNAL MEDICINE

## 2023-03-09 PROCEDURE — C1725 CATH, TRANSLUMIN NON-LASER: HCPCS | Performed by: INTERNAL MEDICINE

## 2023-03-09 PROCEDURE — 250N000013 HC RX MED GY IP 250 OP 250 PS 637: Performed by: INTERNAL MEDICINE

## 2023-03-09 PROCEDURE — 250N000011 HC RX IP 250 OP 636: Performed by: INTERNAL MEDICINE

## 2023-03-09 PROCEDURE — 999N000054 HC STATISTIC EKG NON-CHARGEABLE

## 2023-03-09 PROCEDURE — 250N000009 HC RX 250: Performed by: INTERNAL MEDICINE

## 2023-03-09 PROCEDURE — 93454 CORONARY ARTERY ANGIO S&I: CPT | Performed by: INTERNAL MEDICINE

## 2023-03-09 PROCEDURE — 272N000001 HC OR GENERAL SUPPLY STERILE: Performed by: INTERNAL MEDICINE

## 2023-03-09 PROCEDURE — 85610 PROTHROMBIN TIME: CPT | Performed by: INTERNAL MEDICINE

## 2023-03-09 PROCEDURE — C1760 CLOSURE DEV, VASC: HCPCS | Performed by: INTERNAL MEDICINE

## 2023-03-09 PROCEDURE — 80048 BASIC METABOLIC PNL TOTAL CA: CPT | Performed by: INTERNAL MEDICINE

## 2023-03-09 PROCEDURE — 85027 COMPLETE CBC AUTOMATED: CPT | Performed by: INTERNAL MEDICINE

## 2023-03-09 PROCEDURE — 99152 MOD SED SAME PHYS/QHP 5/>YRS: CPT | Performed by: INTERNAL MEDICINE

## 2023-03-09 PROCEDURE — 93571 IV DOP VEL&/PRESS C FLO 1ST: CPT | Mod: 26 | Performed by: INTERNAL MEDICINE

## 2023-03-09 PROCEDURE — 93005 ELECTROCARDIOGRAM TRACING: CPT

## 2023-03-09 PROCEDURE — C1769 GUIDE WIRE: HCPCS | Performed by: INTERNAL MEDICINE

## 2023-03-09 PROCEDURE — 85347 COAGULATION TIME ACTIVATED: CPT

## 2023-03-09 DEVICE — STENT COR ONYX FRONTIER 12X3.50MM ONYXNG35012UX: Type: IMPLANTABLE DEVICE | Status: FUNCTIONAL

## 2023-03-09 DEVICE — CLOSURE ANGIOSEAL 6FR 610130: Type: IMPLANTABLE DEVICE | Status: FUNCTIONAL

## 2023-03-09 RX ORDER — ATROPINE SULFATE 0.1 MG/ML
0.5 INJECTION INTRAVENOUS
Status: DISCONTINUED | OUTPATIENT
Start: 2023-03-09 | End: 2023-03-09 | Stop reason: HOSPADM

## 2023-03-09 RX ORDER — HYDRALAZINE HYDROCHLORIDE 20 MG/ML
10 INJECTION INTRAMUSCULAR; INTRAVENOUS EVERY 4 HOURS PRN
Status: DISCONTINUED | OUTPATIENT
Start: 2023-03-09 | End: 2023-03-09 | Stop reason: HOSPADM

## 2023-03-09 RX ORDER — NITROGLYCERIN 0.4 MG/1
0.4 TABLET SUBLINGUAL EVERY 5 MIN PRN
Status: DISCONTINUED | OUTPATIENT
Start: 2023-03-09 | End: 2023-03-09 | Stop reason: HOSPADM

## 2023-03-09 RX ORDER — SODIUM CHLORIDE 9 MG/ML
INJECTION, SOLUTION INTRAVENOUS CONTINUOUS
Status: DISCONTINUED | OUTPATIENT
Start: 2023-03-09 | End: 2023-03-09 | Stop reason: HOSPADM

## 2023-03-09 RX ORDER — NALOXONE HYDROCHLORIDE 0.4 MG/ML
0.4 INJECTION, SOLUTION INTRAMUSCULAR; INTRAVENOUS; SUBCUTANEOUS
Status: DISCONTINUED | OUTPATIENT
Start: 2023-03-09 | End: 2023-03-09 | Stop reason: HOSPADM

## 2023-03-09 RX ORDER — METOPROLOL TARTRATE 1 MG/ML
5 INJECTION, SOLUTION INTRAVENOUS
Status: DISCONTINUED | OUTPATIENT
Start: 2023-03-09 | End: 2023-03-09 | Stop reason: HOSPADM

## 2023-03-09 RX ORDER — NALOXONE HYDROCHLORIDE 0.4 MG/ML
0.2 INJECTION, SOLUTION INTRAMUSCULAR; INTRAVENOUS; SUBCUTANEOUS
Status: DISCONTINUED | OUTPATIENT
Start: 2023-03-09 | End: 2023-03-09 | Stop reason: HOSPADM

## 2023-03-09 RX ORDER — HEPARIN SODIUM 1000 [USP'U]/ML
INJECTION, SOLUTION INTRAVENOUS; SUBCUTANEOUS
Status: DISCONTINUED | OUTPATIENT
Start: 2023-03-09 | End: 2023-03-09 | Stop reason: HOSPADM

## 2023-03-09 RX ORDER — ASPIRIN 81 MG/1
243 TABLET, CHEWABLE ORAL ONCE
Status: DISCONTINUED | OUTPATIENT
Start: 2023-03-09 | End: 2023-03-09 | Stop reason: HOSPADM

## 2023-03-09 RX ORDER — OXYCODONE HYDROCHLORIDE 5 MG/1
5 TABLET ORAL EVERY 4 HOURS PRN
Status: DISCONTINUED | OUTPATIENT
Start: 2023-03-09 | End: 2023-03-09 | Stop reason: HOSPADM

## 2023-03-09 RX ORDER — OXYCODONE HYDROCHLORIDE 5 MG/1
10 TABLET ORAL EVERY 4 HOURS PRN
Status: DISCONTINUED | OUTPATIENT
Start: 2023-03-09 | End: 2023-03-09 | Stop reason: HOSPADM

## 2023-03-09 RX ORDER — NITROGLYCERIN 5 MG/ML
VIAL (ML) INTRAVENOUS
Status: DISCONTINUED | OUTPATIENT
Start: 2023-03-09 | End: 2023-03-09 | Stop reason: HOSPADM

## 2023-03-09 RX ORDER — HEPARIN SODIUM 10000 [USP'U]/100ML
100-1000 INJECTION, SOLUTION INTRAVENOUS CONTINUOUS PRN
Status: DISCONTINUED | OUTPATIENT
Start: 2023-03-09 | End: 2023-03-09 | Stop reason: HOSPADM

## 2023-03-09 RX ORDER — FENTANYL CITRATE 50 UG/ML
INJECTION, SOLUTION INTRAMUSCULAR; INTRAVENOUS
Status: DISCONTINUED | OUTPATIENT
Start: 2023-03-09 | End: 2023-03-09 | Stop reason: HOSPADM

## 2023-03-09 RX ORDER — ASPIRIN 81 MG/1
81 TABLET ORAL DAILY
Status: DISCONTINUED | OUTPATIENT
Start: 2023-03-10 | End: 2023-03-09 | Stop reason: HOSPADM

## 2023-03-09 RX ORDER — ACETAMINOPHEN 325 MG/1
650 TABLET ORAL EVERY 4 HOURS PRN
Status: DISCONTINUED | OUTPATIENT
Start: 2023-03-09 | End: 2023-03-09 | Stop reason: HOSPADM

## 2023-03-09 RX ORDER — FENTANYL CITRATE 50 UG/ML
25 INJECTION, SOLUTION INTRAMUSCULAR; INTRAVENOUS
Status: DISCONTINUED | OUTPATIENT
Start: 2023-03-09 | End: 2023-03-09 | Stop reason: HOSPADM

## 2023-03-09 RX ORDER — ASPIRIN 325 MG
325 TABLET ORAL ONCE
Status: DISCONTINUED | OUTPATIENT
Start: 2023-03-09 | End: 2023-03-09 | Stop reason: HOSPADM

## 2023-03-09 RX ORDER — ASPIRIN 81 MG/1
81 TABLET, CHEWABLE ORAL ONCE
Status: DISCONTINUED | OUTPATIENT
Start: 2023-03-09 | End: 2023-03-09 | Stop reason: HOSPADM

## 2023-03-09 RX ORDER — POTASSIUM CHLORIDE 1500 MG/1
20 TABLET, EXTENDED RELEASE ORAL
Status: DISCONTINUED | OUTPATIENT
Start: 2023-03-09 | End: 2023-03-09 | Stop reason: HOSPADM

## 2023-03-09 RX ORDER — FLUMAZENIL 0.1 MG/ML
0.2 INJECTION, SOLUTION INTRAVENOUS
Status: DISCONTINUED | OUTPATIENT
Start: 2023-03-09 | End: 2023-03-09 | Stop reason: HOSPADM

## 2023-03-09 RX ORDER — VERAPAMIL HYDROCHLORIDE 2.5 MG/ML
INJECTION, SOLUTION INTRAVENOUS
Status: DISCONTINUED | OUTPATIENT
Start: 2023-03-09 | End: 2023-03-09 | Stop reason: HOSPADM

## 2023-03-09 RX ORDER — LIDOCAINE 40 MG/G
CREAM TOPICAL
Status: DISCONTINUED | OUTPATIENT
Start: 2023-03-09 | End: 2023-03-09 | Stop reason: HOSPADM

## 2023-03-09 RX ORDER — ONDANSETRON 2 MG/ML
4 INJECTION INTRAMUSCULAR; INTRAVENOUS EVERY 6 HOURS PRN
Status: DISCONTINUED | OUTPATIENT
Start: 2023-03-09 | End: 2023-03-09 | Stop reason: HOSPADM

## 2023-03-09 RX ORDER — ONDANSETRON 4 MG/1
4 TABLET, ORALLY DISINTEGRATING ORAL EVERY 6 HOURS PRN
Status: DISCONTINUED | OUTPATIENT
Start: 2023-03-09 | End: 2023-03-09 | Stop reason: HOSPADM

## 2023-03-09 RX ORDER — IOPAMIDOL 755 MG/ML
INJECTION, SOLUTION INTRAVASCULAR
Status: DISCONTINUED | OUTPATIENT
Start: 2023-03-09 | End: 2023-03-09 | Stop reason: HOSPADM

## 2023-03-09 RX ADMIN — SODIUM CHLORIDE: 9 INJECTION, SOLUTION INTRAVENOUS at 09:23

## 2023-03-09 ASSESSMENT — ACTIVITIES OF DAILY LIVING (ADL)
ADLS_ACUITY_SCORE: 35

## 2023-03-09 NOTE — DISCHARGE INSTRUCTIONS
Cardiac Angioplasty/Stent Discharge Instructions - Femoral & Radial    After you go home:    Have an adult stay with you until tomorrow.  Drink extra fluids for 2 days.  You may resume your normal diet.  No smoking       For 24 hours - due to the sedation you received:  Relax and take it easy.  Do NOT make any important or legal decisions.  Do NOT drive or operate machines at home or at work.  Do NOT drink alcohol.    Care of Wrist & Groin Puncture Sites:    For the first 24 hrs - check the puncture site every 1-2 hours while awake.  For 2 days, when you cough, sneeze, laugh or move your bowels,  hold your hand over the puncture site and press firmly on/above the site  Remove the bandaid after 24 hours. If there is minor oozing, apply another bandaid and remove it after 12 hours.  It is normal to have a small bruise or pea size lump at the site.  You may shower tomorrow. Do NOT take a bath, or use a hot tub or pool for at least 3 days. Do NOT scrub the site. Do not use lotion or powder near the puncture site.    Activity - For 2 days:    Wrist site:    do not use your hand or arm to support your weight (such as rising from a chair)   do not bend your wrist (such as lifting a garage door).  do not lift more than 5 pounds or exercise your arm (such as tennis, golf or bowling).  Do NOT do any heavy activity such as exercise, lifting, or straining.     Groin site:      No stooping or squatting  Do NOT do any heavy activity such as exercise, lifting, or straining.   No housework, yard work or any activity that make you sweat  Do NOT lift more than 10 pounds    Bleeding:    If you start bleeding from the site in your wrist:    Sit down and press firmly on/above the site with your fingers for 10 minutes.   Once bleeding stops, keep your arm still for 2 hours.   Call [facility] as soon as you can.    If you start bleeding from the site in your groin:    Lie down flat and press firmly on/above the site for 10 minutes.  Once  bleeding stops, lay flat for 2 hours.   Call [facility] as soon as you can.    Call 911 right away if you have heavy bleeding or bleeding that does not stop.      Medicines:    If you are taking an antiplatelet medication such as Plavix, Brilinta or Effient, do not stop taking it until you talk to your cardiologist.      If you are on Metformin (Glucophage), do not restart it until you have blood tests (within 2 to 3 days after discharge).  After you have your blood drawn, you may restart the Metformin.   Take your medications, including blood thinners, unless your provider tells you not to.    If you take Coumadin (Warfarin), have your INR checked by your provider in  3-5 days. Call your clinic to schedule this.  If you have stopped any medicines, check with your provider about when to restart them.    Follow Up Appointments:    Follow up with [facility] Nurse Practitioner at [facility] of patient preference in 7-10 days.  Cardiac Rehab will contact you for follow up care.    Call the clinic if:    You have increased pain or a large or growing hard lump around the site.  The site is red, swollen, hot or tender.  Blood or fluid is draining from the site.  You have chills or a fever greater than 101 F (38 C).  Your arm or leg feels numb, cool or changes color.  You have hives, a rash or unusual itching.  New pain in the back or belly that you cannot control with Tylenol.  Any questions or concerns.    Other Instructions:    If you received a stent - carry your stent card with you at all times.        [facility]:    [phone] [facility] (7 days a week)

## 2023-03-09 NOTE — PROGRESS NOTES
Care Suites Admission Nursing Note    Patient Information  Name: Hugo Prince  Age: 72 year old  Reason for admission: heart cath  Care Suites arrival time: 0835    Visitor Information  Name: n/a  Informed of visitor restrictions: Yes  2 visitor allowed per patient   Visitor must wear a mask    Patient Admission/Assessment   Pre-procedure assessment complete: Yes  If abnormal assessment/labs, provider notified: N/A  NPO: Yes  Medications held per instructions/orders: Yes  Consent: obtained  If applicable, pregnancy test status: n/a  Patient oriented to room: Yes  Education/questions answered: Yes  Plan/other: proceed    Discharge Planning  Discharge name/phone number: kishor- [phone]  Overnight post sedation caregiver: kishor  Discharge location: home    Morgan Young RN

## 2023-03-09 NOTE — PROGRESS NOTES
1210:  Pt returned from Cath Lab. TR band intact to right wrist.  No oozing or hematoma noted. Area soft & flat. R arm elevated on pillows. Right groin site also dry and intact with no bleeding, angio sealed.  Pt denies pain. Pt instructed on activity restrictions with right wrist. Verbal understanding received from pt.  Pt taking diet & flds well. No complaints.    1430: Discharge teaching & instructions given to pt w/ verbal understanding received. All questions & concerns addressed. Stent card and angio seal card also given to pt. Detailed report given to FRED Marie.

## 2023-03-09 NOTE — Clinical Note
Stent deployed in the proximal left anterior descending. Max pressure = 12 jose. Total duration = 22 seconds.

## 2023-03-09 NOTE — Clinical Note
The first balloon was inserted into the left anterior descending and distal left anterior descending.Max pressure = 10 jose. Total duration = 16 seconds.

## 2023-03-09 NOTE — Clinical Note
The first balloon was inserted into the left anterior descending and proximal left anterior descending.Max pressure = 16 jose. Total duration = 14 seconds.     Max pressure = 18 jose. Total duration = 12 seconds.    Balloon reinflated a second time: Max pressure = 18 jose. Total duration = 12 seconds.

## 2023-03-09 NOTE — PRE-PROCEDURE
GENERAL PRE-PROCEDURE:   Procedure:  Coronary angiogram  Date/Time:  3/9/2023 10:52 AM    Verbal consent obtained?: Yes    Written consent obtained?: Yes    Risks and benefits: Risks, benefits and alternatives were discussed    Consent given by:  Patient  Patient states understanding of procedure being performed: Yes    Patient's understanding of procedure matches consent: Yes    Procedure consent matches procedure scheduled: Yes    Expected level of sedation:  Moderate  Appropriately NPO:  Yes  ASA Class:  2  Mallampati  :  Grade 3- soft palate visible, posterior pharyngeal wall not visible  Lungs:  Lungs clear with good breath sounds bilaterally  Heart:  Normal heart sounds and rate  History & Physical reviewed:  History and physical reviewed and no updates needed  Statement of review:  I have reviewed the lab findings, diagnostic data, medications, and the plan for sedation

## 2023-03-09 NOTE — PROGRESS NOTES
Care Suites Discharge Nursing Note    Patient Information  Name: Hugo Prince  Age: 72 year old    Discharge Education:  Discharge instructions reviewed: Yes  Additional education/resources provided: stent card and medications  Patient/patient representative verbalizes understanding: Yes  Patient discharging on new medications: Yes  Medication education completed: Yes    Discharge Plans:   Discharge location: home  Discharge ride contacted: Yes  Approximate discharge time: 1600    Discharge Criteria:  Discharge criteria met and vital signs stable: Yes    Patient Belongs:  Patient belongings returned to patient: Yes    Cynthia Baltazar RN

## 2023-03-10 ENCOUNTER — TELEPHONE (OUTPATIENT)
Dept: CARDIOLOGY | Facility: CLINIC | Age: 73
End: 2023-03-10
Payer: MEDICARE

## 2023-03-10 NOTE — TELEPHONE ENCOUNTER
Patient was admitted to [facility] on 3/9/23 with chest pain. Here for OP LHC.    PMH: CAD with PCI to LAD in 2019.    3/9/23: Coronary angiogram via RRA single-vessel occlusive coronary artery disease namely ostial LAD with IFR of 0.86 suggestive of obstructive disease. Patent previously placed mid LAD stent. Successful PCI of ostial LAD with placement of a 3.5 x 12 mm Garrett stent.    Pt was started on ASA and Brilinta. PTA NTG was continued at time of discharge.    Called patient to discuss any post hospital d/c questions, review medication changes, and confirm f/u appts. Patient denied any questions regarding new medications or changes to PTA medications.     RN confirmed with patient that he was d/c with an adequate supply of the antiplatelet Brilinta, and reminded of importance of taking without interruption.     Pt has an Rx for PRN SL Nitroglycerin.     Patient denied any SOB, chest pain, fever or light headedness.     RRA cardiac cath site is without bleeding, swelling, redness or signs of infection.     RN confirmed with patient that he is scheduled for an OV on 3/24/23 at 0855 with JORGE ALBERTO Kaela Zhou at our [place] Office.    Cardiac rehab still needs to be scheduled.    Patient advised to call clinic with any cardiac related questions or concerns prior to this jorge alberto't. Patient verbalized understanding and agreed with plan. LISA Flores RN.

## 2023-03-11 ENCOUNTER — LAB (OUTPATIENT)
Dept: LAB | Facility: CLINIC | Age: 73
End: 2023-03-11
Payer: MEDICARE

## 2023-03-11 DIAGNOSIS — I25.10 CORONARY ARTERY DISEASE INVOLVING NATIVE CORONARY ARTERY OF NATIVE HEART WITHOUT ANGINA PECTORIS: ICD-10-CM

## 2023-03-11 LAB
ANION GAP SERPL CALCULATED.3IONS-SCNC: 11 MMOL/L (ref 7–15)
BUN SERPL-MCNC: 19 MG/DL (ref 8–23)
CALCIUM SERPL-MCNC: 9.5 MG/DL (ref 8.8–10.2)
CHLORIDE SERPL-SCNC: 101 MMOL/L (ref 98–107)
CREAT SERPL-MCNC: 0.98 MG/DL (ref 0.67–1.17)
DEPRECATED HCO3 PLAS-SCNC: 24 MMOL/L (ref 22–29)
GFR SERPL CREATININE-BSD FRML MDRD: 81 ML/MIN/1.73M2
GLUCOSE SERPL-MCNC: 158 MG/DL (ref 70–99)
POTASSIUM SERPL-SCNC: 4.8 MMOL/L (ref 3.4–5.3)
SODIUM SERPL-SCNC: 136 MMOL/L (ref 136–145)

## 2023-03-11 PROCEDURE — 80048 BASIC METABOLIC PNL TOTAL CA: CPT

## 2023-03-11 PROCEDURE — 36415 COLL VENOUS BLD VENIPUNCTURE: CPT

## 2023-03-13 ENCOUNTER — TELEPHONE (OUTPATIENT)
Dept: CARDIOLOGY | Facility: CLINIC | Age: 73
End: 2023-03-13
Payer: MEDICARE

## 2023-03-13 LAB
ATRIAL RATE - MUSE: 62 BPM
ATRIAL RATE - MUSE: 65 BPM
DIASTOLIC BLOOD PRESSURE - MUSE: NORMAL MMHG
DIASTOLIC BLOOD PRESSURE - MUSE: NORMAL MMHG
INTERPRETATION ECG - MUSE: NORMAL
INTERPRETATION ECG - MUSE: NORMAL
P AXIS - MUSE: 27 DEGREES
P AXIS - MUSE: 43 DEGREES
PR INTERVAL - MUSE: 220 MS
PR INTERVAL - MUSE: 236 MS
QRS DURATION - MUSE: 88 MS
QRS DURATION - MUSE: 94 MS
QT - MUSE: 416 MS
QT - MUSE: 456 MS
QTC - MUSE: 432 MS
QTC - MUSE: 462 MS
R AXIS - MUSE: -20 DEGREES
R AXIS - MUSE: 2 DEGREES
SYSTOLIC BLOOD PRESSURE - MUSE: NORMAL MMHG
SYSTOLIC BLOOD PRESSURE - MUSE: NORMAL MMHG
T AXIS - MUSE: 12 DEGREES
T AXIS - MUSE: 35 DEGREES
VENTRICULAR RATE- MUSE: 62 BPM
VENTRICULAR RATE- MUSE: 65 BPM

## 2023-03-13 NOTE — TELEPHONE ENCOUNTER
Call received from pt to review some questions regarding recent procedure:     Review of post procedure instructions   Post angio discharge instructions:   1. Remove bandage after 24 hours. Leave open to air or ok to apply bandaid if small oozing.   2. Ok to take a shower, but no baths, pools or still bodies of water for 3-5 days post procedure (this includes washing dishes if radial access).   3. No prolonged bending or stooping for 3-5 days or lifting things >20lbs if groin access. If radial access then no lifting >10lbs for 3-5 days (gallon of milk) or pushing/pulling.   4. If develop fever >101 call or go to ED if weekend.   5. If any redness/edema or pus-like discharge from puncture site, call team for further review.   6. If significant bleeding occurs sit down, apply pressure and call 911    - Pt had puncture sites to both right wrist and right groin. Unclear reasoning as care team was not in on case. Will review with provider team for further explanations. likely needed wider access for balloon / stent placement.     - pt reports he has had a moderate amount of discoloration around both access points but no redness / warmth, or swelling. Reviewed with pt that bruising and discoloration are to be expected post procedure. Pt advised to continue monitoring.     BMP post procedure completed:   Component      Latest Ref Rng & Units 3/11/2023   Sodium      136 - 145 mmol/L 136   Potassium      3.4 - 5.3 mmol/L 4.8   Chloride      98 - 107 mmol/L 101   Carbon Dioxide (CO2)      22 - 29 mmol/L 24   Anion Gap      7 - 15 mmol/L 11   Urea Nitrogen      8.0 - 23.0 mg/dL 19.0   Creatinine      0.67 - 1.17 mg/dL 0.98   Calcium      8.8 - 10.2 mg/dL 9.5   Glucose      70 - 99 mg/dL 158 (H)   GFR Estimate      >60 mL/min/1.73m2 81       - OK to restart metformin based on most recent labs.   MATTIE Jimenez RN, BSN.

## 2023-03-17 ENCOUNTER — TELEPHONE (OUTPATIENT)
Dept: CARDIOLOGY | Facility: CLINIC | Age: 73
End: 2023-03-17
Payer: MEDICARE

## 2023-03-17 NOTE — TELEPHONE ENCOUNTER
Call received from pt to review an episode that occurred last night. Pt reports he woke up about 430AM and fealt short of breath with some mild chest discomfort. Pt reports he does have RAY and had CPAP on. Pt reports he took nitro X1 with resolution of sxs. Pt has been reading reports regarding failed stenting procedures and endorses increased anxiety regarding potential issues. Pt has been able to complete ADL's without any sxs today. No recurrence of any pain / SOB. Pt voices Concerns regarding sxs as he associates this with his prior heart attack which woke him from sleep in the middle of the night.     Pt advised that should he have a recurrence and sxs are persistent in nature we would recommend evaluation through ED. Sxs not classic for re-occlusion or stent failure and resolution of sxs without recurrence is reassuring. Pt reports he will plan to report to ED should he have recurrent persistent sxs.   MATTIE Jimenez RN, BSN. 03/17/23 1:57 PM

## 2023-03-19 DIAGNOSIS — E11.9 TYPE 2 DIABETES MELLITUS WITHOUT COMPLICATION, WITHOUT LONG-TERM CURRENT USE OF INSULIN (H): ICD-10-CM

## 2023-03-19 RX ORDER — METFORMIN HCL 500 MG
500 TABLET, EXTENDED RELEASE 24 HR ORAL
Qty: 90 TABLET | Refills: 0 | Status: SHIPPED | OUTPATIENT
Start: 2023-03-19 | End: 2023-05-30

## 2023-03-20 DIAGNOSIS — I25.10 CORONARY ARTERY DISEASE INVOLVING NATIVE CORONARY ARTERY OF NATIVE HEART WITHOUT ANGINA PECTORIS: ICD-10-CM

## 2023-03-21 ENCOUNTER — HOSPITAL ENCOUNTER (OUTPATIENT)
Dept: CARDIAC REHAB | Facility: CLINIC | Age: 73
Discharge: HOME | End: 2023-03-21
Attending: INTERNAL MEDICINE
Payer: MEDICARE

## 2023-03-21 DIAGNOSIS — I25.10 CORONARY ARTERY DISEASE INVOLVING NATIVE CORONARY ARTERY OF NATIVE HEART WITHOUT ANGINA PECTORIS: ICD-10-CM

## 2023-03-21 PROCEDURE — 93798 PHYS/QHP OP CAR RHAB W/ECG: CPT | Performed by: REHABILITATION PRACTITIONER

## 2023-03-21 PROCEDURE — 93797 PHYS/QHP OP CAR RHAB WO ECG: CPT | Mod: 59 | Performed by: REHABILITATION PRACTITIONER

## 2023-03-21 NOTE — PROGRESS NOTES
Cardiology Progress Note    Patient seen today in follow up of: Post angiogram  Primary cardiologist: Dr. Moe    HPI:  Hugo Prince is a very pleasant 72 year old male with a history of coronary artery disease status post stenting of the mid LAD in 2019, obstructive sleep apnea treated with CPAP, former tobacco use, morbid obesity, recurrent nephrolithiasis, diabetes mellitus type 2 who presents today for follow-up after his recent angiogram.    He was admitted in July 2019 with chest discomfort and shortness of breath.  Troponin was elevated to 12 consistent with a non-ST elevation myocardial infarction.  Echo showed an EF of 50 to 55% with severe apical wall hypokinesis.  Coronary angiography showed a culprit lesion in the mid LAD which was treated with a drug-eluting stent.  He had a moderate proximal LAD lesion which was negative by IFR at that time and moderate mid RCA disease.  He was started on appropriate medical therapy.    Follow-up echo in October 2019 showed normal LV function and wall motion abnormality.  He has been on only 40 mg of Lipitor due to muscle aches.    He saw Dr. Moe for annual visit on 2/13/2023.  He reported some episodes of intermittent left-sided chest discomfort at that visit.  He was waking up in the middle of the night with left-sided chest pain that was relieved by sublingual nitro.  They discussed options for management.  He had a prior false negative nuclear stress test.  An MRI was not an option due to back pain and claustrophobia.  They talked about a dobutamine stress echo versus coronary angiography and ultimately elected to proceed with coronary angiogram.  He was also started on 5 mg of amlodipine due to elevated blood pressures.    Coronary angiography on 3/9/2023 showed a 60% ostial to proximal LAD lesion with an IFR of 0.86 suggesting obstructive disease.  This was treated with a single drug-eluting stent.  His previously placed mid LAD stent was patent.  He still  had a 40% residual mid RCA lesion.  He was started on Brilinta in addition to his aspirin for dual antiplatelet therapy.    He presents today for follow-up after his angiogram. He reports some shortness of breath symptoms since his angiogram and wonders if it is related to Brilinta. I note back in 2020 he was switched from Brilinta to plavix due to dyspnea.  He had 1 episode of chest discomfort during the night about a week ago.  He had some shortness of breath with this.  This was relieved with 1 sublingual nitroglycerin.  He admits he is somewhat anxious whenever he has symptoms especially in the middle of the night as he had maintained symptoms with his MI back in 2019.  His radial and femoral access sites have healed well.  He did start cardiac rehab.  Blood pressures have been under good control with the addition of amlodipine.    ASSESSMENT/PLAN:  Hugo Prince is a very pleasant 72 year old male coronary artery disease status post stenting of the mid LAD in 2019, obstructive sleep apnea treated with CPAP, former tobacco use, morbid obesity, recurrent nephrolithiasis, diabetes mellitus type 2 who presents today for post angiogram follow-up.    Today, we reviewed his angiogram results in detail today.  He had a 60% ostial to proximal LAD lesion with a positive IFR and thus a stent was placed.  Fortunately, his previously placed mid LAD stent was patent.  He has residual 40% mid LAD disease and we will continue with aggressive medical management and risk factor modification.  He is on Brilinta and aspirin for dual antiplatelet therapy but is having some shortness of breath possibly from the Brilinta.  He did not tolerate Brilinta several years ago.  We will switch to Plavix I sent him a prescription for 75 mg daily with a loading dose.  If he has ongoing symptoms, I asked him to let us know or certainly if he has ongoing issues with chest discomfort.    His blood pressure is under good control today with  the recent addition of amlodipine 5 mg daily.  He is also on lisinopril 5 mg daily and metoprolol tartrate 25 mg twice daily.    He takes Lipitor 40 mg daily.  Last LDL was in the 80s.  He did not tolerate higher doses of atorvastatin due to myalgias.  We will follow his lipid panel today.  We could consider Zetia in the future if LDL remains not at goal.    It was a pleasure seeing Hugo in clinic today.  I have ordered follow-up with Dr. Moe.  As above, he will call if he has any ongoing chest discomfort or shortness of breath symptoms or certainly if he has any other questions or concerns.    Orders this Visit:  Orders Placed This Encounter   Procedures     Lipid Profile     ALT     Follow-Up with Cardiology     Orders Placed This Encounter   Medications     clopidogrel (PLAVIX) 75 MG tablet     Sig: Take 300 mg (4 tablets) for one day followed by 75 mg (1 tablet) daily.     Dispense:  90 tablet     Refill:  3     Medications Discontinued During This Encounter   Medication Reason     ticagrelor (BRILINTA) 90 MG tablet      aspirin (ASA) 81 MG EC tablet Duplicate Therapy (No eCancel)     acetaminophen (TYLENOL) 325 MG tablet        CURRENT MEDICATIONS:  Current Outpatient Medications   Medication Sig Dispense Refill     amLODIPine (NORVASC) 5 MG tablet Take 1 tablet (5 mg) by mouth daily 90 tablet 3     aspirin (ASA) 81 MG EC tablet Take 1 tablet (81 mg) by mouth daily       atorvastatin (LIPITOR) 40 MG tablet Take 1 tablet (40 mg) by mouth daily 90 tablet 3     clopidogrel (PLAVIX) 75 MG tablet Take 300 mg (4 tablets) for one day followed by 75 mg (1 tablet) daily. 90 tablet 3     fluticasone (FLONASE) 50 MCG/ACT nasal spray Spray 1 spray into both nostrils daily (Patient taking differently: Spray 1 spray into both nostrils as needed) 18.2 mL 3     lisinopril (ZESTRIL) 5 MG tablet Take 1 tablet (5 mg) by mouth daily 90 tablet 3     metFORMIN (GLUCOPHAGE XR) 500 MG 24 hr tablet Take 1 tablet (500 mg) by mouth  daily (with dinner) 90 tablet 0     metoprolol tartrate (LOPRESSOR) 25 MG tablet Take 1 tablet (25 mg) by mouth 2 times daily 180 tablet 3     Multiple Vitamins-Minerals (PRESERVISION AREDS 2 PO)        nitroGLYcerin (NITROSTAT) 0.4 MG sublingual tablet As needed for chest pain place 1 tablet under the tongue every 5 minutes for 3 doses. If symptoms persist 5 minutes after 1st dose call 911. 25 tablet 0     omeprazole (PRILOSEC) 20 MG DR capsule Take 1 capsule (20 mg) by mouth daily 90 capsule 3     order for DME Equipment being ordered: CPAP       meclizine (ANTIVERT) 25 MG tablet Take 1 tablet (25 mg) by mouth 3 times daily as needed for dizziness 30 tablet 0     ondansetron (ZOFRAN ODT) 4 MG ODT tab Take 1 tablet (4 mg) by mouth every 6 hours as needed for nausea or vomiting (Patient not taking: Reported on 3/24/2023) 15 tablet 0     ALLERGIES  Allergies   Allergen Reactions     Chlorthalidone GI Disturbance     Compazine Other (See Comments)     PSYCHOTIC     Erythromycin Nausea and Vomiting     Ethanol Nausea and Vomiting     Flomax [Tamsulosin Hcl]      FLUSHING, NASAl CONGESTION     Prochlorperazine Other (See Comments)     Other reaction(s): extreme anxiety     Seasonal Allergies      PAST MEDICAL HISTORY:  Past Medical History:   Diagnosis Date     Anemia due to blood loss, acute 1/14/2015     Atopic dermatitis      CAD (coronary artery disease)      Dermatitis 1/12/2015     Diabetes mellitus, type 2 (H) 12/29/2021     DJD (degenerative joint disease) of knee 1/14/2015     Dyslipidemia      ED (erectile dysfunction) 4/30/2012     Esophageal reflux     Gastroesophageal reflux     NSTEMI (non-ST elevated myocardial infarction) (H) 7/15/2019     RAY (obstructive sleep apnea)      Other and unspecified disc disorder of unspecified region     Intervertebral disc disorders     Personal history of colonic polyps     Colon polyps     S/P lateral meniscectomy of left knee      S/P total knee replacement 1/7/2015      Tick bite 4/20/15    amoxicillin treated     Unspecified sleep apnea     Sleep apnea     Urinary calculus, unspecified     Renal stones     Vitamin D deficiency 9-27-09     PAST SURGICAL HISTORY:  Past Surgical History:   Procedure Laterality Date     ARTHROPLASTY KNEE Left 1/7/2015    Procedure: ARTHROPLASTY KNEE;  Surgeon: Agustin Saenz MD;  Location:  OR     ARTHROSCOPY KNEE RT/LT  6-20-11     COLONOSCOPY  12/09     CV CORONARY ANGIOGRAM N/A 3/9/2023    Procedure: Coronary Angiogram;  Surgeon: Dalia Chew MD;  Location:  [facility]     CV FRACTIONAL FLOW RATIO WIRE N/A 7/16/2019    Procedure: Fractional Flow Reserve;  Surgeon: Mick Thrasher MD;  Location:  [facility] C[facility] LAB     CV HEART CATHETERIZATION WITH POSSIBLE INTERVENTION N/A 7/16/2019    successful PCI with MICAH placement to mid LAD      CV PCI N/A 3/9/2023    Procedure: Percutaneous Coronary Intervention;  Surgeon: Dalia Chew MD;  Location:  HEART CARD[facility] CATH LAB     CYSTOSCOPY       EXTRACORPOREAL SHOCK WAVE LITHOTRIPSY (ESWL)  1/20/2012    Procedure:EXTRACORPOREAL SHOCK WAVE LITHOTRIPSY (ESWL); LEFT EXTRACORPOREAL SHOCK WAVE LITHOTRIPSY ; Surgeon:ANJUM BUNDY; Location: OR     EXTRACORPOREAL SHOCK WAVE LITHOTRIPSY, CYSTOSCOPY, INSERT STENT URETER(S), COMBINED  2/3/2012    Procedure:COMBINED EXTRACORPOREAL SHOCK WAVE LITHOTRIPSY, CYSTOSCOPY, INSERT STENT URETER(S); CYSTOSCOPY, RIGHT STENT; Surgeon:ANJUM BUNDY; Location: OR     FUSION LUMBAR ANTERIOR, FUSION LUMBAR POSTERIOR ONE LEVEL, COMBINED  11-1-10    L4-5     LASER HOLMIUM LITHOTRIPSY URETER(S), INSERT STENT, COMBINED  2/7/2012    Procedure:COMBINED CYSTOSCOPY, URETEROSCOPY, LASER HOLMIUM LITHOTRIPSY URETER(S), INSERT STENT; CYSTOSCOPY, RIGHT URETEROSCOPY, RIGHT RETROGRADES,  STONE EXTRACTION WITH HOLMIUM LASER AND RIGHT URETERAL STENT EXCHANGE ; Surgeon:DEEPTI MELVIN; Location: OR     LITHOTRIPSY       "Lithotrypsy     SEPTOPLASTY       VASECTOMY       FAMILY HISTORY:  Family History   Problem Relation Age of Onset     Melanoma Father      Prostate Cancer Father      Cancer Father         neck cancer from radiation at work (urology)     Breast Cancer Mother      C.A.D. Paternal Grandfather 72     SOCIAL HISTORY:  Social History     Socioeconomic History     Marital status:      Spouse name: None     Number of children: None     Years of education: None     Highest education level: None   Tobacco Use     Smoking status: Former     Types: Cigars     Smokeless tobacco: Never     Tobacco comments:     has cigars when it is warm out   Substance and Sexual Activity     Alcohol use: Yes     Alcohol/week: 11.7 standard drinks     Types: 14 Standard drinks or equivalent per week     Comment: 1-2 drinks day/beer and scotch     Drug use: No   Social History Narrative    Lives with girlfriend    Retired teacher    Loves fly fishing     Physical Exam:  Vitals: /66   Pulse 66   Resp 17   Ht 1.778 m (5' 10\")   Wt 135.6 kg (299 lb)   SpO2 98%   BMI 42.90 kg/m     Wt Readings from Last 4 Encounters:   03/24/23 135.6 kg (299 lb)   03/09/23 137.4 kg (303 lb)   12/13/22 122 kg (269 lb)   12/06/22 134.3 kg (296 lb)     GEN: well nourished, in no acute distress.  HEENT:  Pupils equal, round. Sclerae nonicteric.   C/V:  Regular rate and rhythm, no murmur, rub or gallop.    RESP: Respirations are unlabored. Clear to auscultation bilaterally without wheezing, rales, or rhonchi.  GI: Abdomen soft, nontender.  EXTREM: no LE edema.  NEURO: Alert and oriented, cooperative.  SKIN: Warm and dry.     Recent Lab Results:  LIPID RESULTS:  Lab Results   Component Value Date    CHOL 154 02/10/2023    CHOL 95 01/08/2021    HDL 44 02/10/2023    HDL 40 01/08/2021    LDL 84 02/10/2023    LDL 36 01/08/2021    TRIG 132 02/10/2023    TRIG 94 01/08/2021     LIVER ENZYME RESULTS:  Lab Results   Component Value Date    AST 32 12/29/2021    " ALT 34 02/10/2023    ALT 31 12/29/2021     CBC RESULTS:  Lab Results   Component Value Date    WBC 7.9 03/09/2023    WBC 5.5 12/29/2021    WBC 9.0 07/17/2019    RBC 4.70 03/09/2023    RBC 4.51 12/29/2021    RBC 4.50 07/17/2019    HGB 15.5 03/09/2023    HGB 14.5 12/29/2021    HCT 46.8 03/09/2023    HCT 44.5 12/29/2021     03/09/2023    MCV 98.7 12/29/2021    MCH 33.0 03/09/2023    MCH 32.3 (A) 12/29/2021    MCHC 33.1 03/09/2023    MCHC 32.7 (A) 12/29/2021    RDW 12.1 03/09/2023    RDW 12.7 07/17/2019     03/09/2023     12/29/2021     BMP RESULTS:  Lab Results   Component Value Date     03/11/2023     12/29/2021    POTASSIUM 4.8 03/11/2023    POTASSIUM 4.1 09/17/2022    POTASSIUM 5.1 12/29/2021    CHLORIDE 101 03/11/2023    CHLORIDE 106 09/17/2022    CHLORIDE 102 12/29/2021    CO2 24 03/11/2023    CO2 27 09/17/2022    CO2 24 07/17/2019    ANIONGAP 11 03/11/2023    ANIONGAP 7 09/17/2022    ANIONGAP 8 07/17/2019     (H) 03/11/2023     (H) 09/17/2022     (H) 12/29/2021    BUN 19.0 03/11/2023    BUN 19 09/17/2022    BUN 17 12/29/2021    BUN 15 12/29/2021    CR 0.98 03/11/2023    CR 1.15 12/29/2021    GFRESTIMATED 81 03/11/2023    GFRESTIMATED 77 07/17/2019    GFRESTBLACK 89 07/17/2019    NATHALIA 9.5 03/11/2023    NATHALIA 9.5 12/29/2021      A1C RESULTS:  Lab Results   Component Value Date    A1C 6.1 (A) 12/06/2022     INR RESULTS:  Lab Results   Component Value Date    INR 1.03 03/09/2023    INR 1.01 09/17/2022       May Zhou PA-C  [facility]

## 2023-03-23 ENCOUNTER — HOSPITAL ENCOUNTER (OUTPATIENT)
Dept: CARDIAC REHAB | Facility: CLINIC | Age: 73
Discharge: HOME | End: 2023-03-23
Attending: INTERNAL MEDICINE
Payer: MEDICARE

## 2023-03-23 PROCEDURE — 93798 PHYS/QHP OP CAR RHAB W/ECG: CPT | Mod: KX | Performed by: REHABILITATION PRACTITIONER

## 2023-03-24 ENCOUNTER — OFFICE VISIT (OUTPATIENT)
Dept: CARDIOLOGY | Facility: CLINIC | Age: 73
End: 2023-03-24
Payer: MEDICARE

## 2023-03-24 VITALS
HEIGHT: 70 IN | OXYGEN SATURATION: 98 % | WEIGHT: 299 LBS | DIASTOLIC BLOOD PRESSURE: 66 MMHG | HEART RATE: 66 BPM | RESPIRATION RATE: 17 BRPM | SYSTOLIC BLOOD PRESSURE: 126 MMHG | BODY MASS INDEX: 42.8 KG/M2

## 2023-03-24 DIAGNOSIS — I21.4 NSTEMI (NON-ST ELEVATED MYOCARDIAL INFARCTION) (H): ICD-10-CM

## 2023-03-24 DIAGNOSIS — Z98.890 STATUS POST CORONARY ANGIOGRAM: ICD-10-CM

## 2023-03-24 DIAGNOSIS — I25.10 CORONARY ARTERY DISEASE INVOLVING NATIVE HEART WITHOUT ANGINA PECTORIS, UNSPECIFIED VESSEL OR LESION TYPE: Primary | ICD-10-CM

## 2023-03-24 PROCEDURE — 99214 OFFICE O/P EST MOD 30 MIN: CPT | Performed by: PHYSICIAN ASSISTANT

## 2023-03-24 RX ORDER — CLOPIDOGREL BISULFATE 75 MG/1
TABLET ORAL
Qty: 90 TABLET | Refills: 3 | Status: SHIPPED | OUTPATIENT
Start: 2023-03-24 | End: 2024-02-29

## 2023-03-24 NOTE — LETTER
3/24/2023    Clay Luis MD  [address]  [facility] [address]    RE: Hugo Prince       Dear Colleague,     I had the pleasure of seeing Hugo Prince in the [facility].    Cardiology Progress Note    Patient seen today in follow up of: Post angiogram  Primary cardiologist: Dr. Moe    HPI:  Hugo Prince is a very pleasant 72 year old male with a history of coronary artery disease status post stenting of the mid LAD in 2019, obstructive sleep apnea treated with CPAP, former tobacco use, morbid obesity, recurrent nephrolithiasis, diabetes mellitus type 2 who presents today for follow-up after his recent angiogram.    He was admitted in July 2019 with chest discomfort and shortness of breath.  Troponin was elevated to 12 consistent with a non-ST elevation myocardial infarction.  Echo showed an EF of 50 to 55% with severe apical wall hypokinesis.  Coronary angiography showed a culprit lesion in the mid LAD which was treated with a drug-eluting stent.  He had a moderate proximal LAD lesion which was negative by IFR at that time and moderate mid RCA disease.  He was started on appropriate medical therapy.    Follow-up echo in October 2019 showed normal LV function and wall motion abnormality.  He has been on only 40 mg of Lipitor due to muscle aches.    He saw Dr. Moe for annual visit on 2/13/2023.  He reported some episodes of intermittent left-sided chest discomfort at that visit.  He was waking up in the middle of the night with left-sided chest pain that was relieved by sublingual nitro.  They discussed options for management.  He had a prior false negative nuclear stress test.  An MRI was not an option due to back pain and claustrophobia.  They talked about a dobutamine stress echo versus coronary angiography and ultimately elected to proceed with coronary angiogram.  He was also started on 5 mg of amlodipine due to elevated blood pressures.    Coronary angiography  on 3/9/2023 showed a 60% ostial to proximal LAD lesion with an IFR of 0.86 suggesting obstructive disease.  This was treated with a single drug-eluting stent.  His previously placed mid LAD stent was patent.  He still had a 40% residual mid RCA lesion.  He was started on Brilinta in addition to his aspirin for dual antiplatelet therapy.    He presents today for follow-up after his angiogram. He reports some shortness of breath symptoms since his angiogram and wonders if it is related to Brilinta. I note back in 2020 he was switched from Brilinta to plavix due to dyspnea.  He had 1 episode of chest discomfort during the night about a week ago.  He had some shortness of breath with this.  This was relieved with 1 sublingual nitroglycerin.  He admits he is somewhat anxious whenever he has symptoms especially in the middle of the night as he had maintained symptoms with his MI back in 2019.  His radial and femoral access sites have healed well.  He did start cardiac rehab.  Blood pressures have been under good control with the addition of amlodipine.    ASSESSMENT/PLAN:  Hugo Prince is a very pleasant 72 year old male coronary artery disease status post stenting of the mid LAD in 2019, obstructive sleep apnea treated with CPAP, former tobacco use, morbid obesity, recurrent nephrolithiasis, diabetes mellitus type 2 who presents today for post angiogram follow-up.    Today, we reviewed his angiogram results in detail today.  He had a 60% ostial to proximal LAD lesion with a positive IFR and thus a stent was placed.  Fortunately, his previously placed mid LAD stent was patent.  He has residual 40% mid LAD disease and we will continue with aggressive medical management and risk factor modification.  He is on Brilinta and aspirin for dual antiplatelet therapy but is having some shortness of breath possibly from the Brilinta.  He did not tolerate Brilinta several years ago.  We will switch to Plavix I sent him a  prescription for 75 mg daily with a loading dose.  If he has ongoing symptoms, I asked him to let us know or certainly if he has ongoing issues with chest discomfort.    His blood pressure is under good control today with the recent addition of amlodipine 5 mg daily.  He is also on lisinopril 5 mg daily and metoprolol tartrate 25 mg twice daily.    He takes Lipitor 40 mg daily.  Last LDL was in the 80s.  He did not tolerate higher doses of atorvastatin due to myalgias.  We will follow his lipid panel today.  We could consider Zetia in the future if LDL remains not at goal.    It was a pleasure seeing Hugo in clinic today.  I have ordered follow-up with Dr. Moe.  As above, he will call if he has any ongoing chest discomfort or shortness of breath symptoms or certainly if he has any other questions or concerns.    Orders this Visit:  Orders Placed This Encounter   Procedures     Lipid Profile     ALT     Follow-Up with Cardiology     Orders Placed This Encounter   Medications     clopidogrel (PLAVIX) 75 MG tablet     Sig: Take 300 mg (4 tablets) for one day followed by 75 mg (1 tablet) daily.     Dispense:  90 tablet     Refill:  3     Medications Discontinued During This Encounter   Medication Reason     ticagrelor (BRILINTA) 90 MG tablet      aspirin (ASA) 81 MG EC tablet Duplicate Therapy (No eCancel)     acetaminophen (TYLENOL) 325 MG tablet        CURRENT MEDICATIONS:  Current Outpatient Medications   Medication Sig Dispense Refill     amLODIPine (NORVASC) 5 MG tablet Take 1 tablet (5 mg) by mouth daily 90 tablet 3     aspirin (ASA) 81 MG EC tablet Take 1 tablet (81 mg) by mouth daily       atorvastatin (LIPITOR) 40 MG tablet Take 1 tablet (40 mg) by mouth daily 90 tablet 3     clopidogrel (PLAVIX) 75 MG tablet Take 300 mg (4 tablets) for one day followed by 75 mg (1 tablet) daily. 90 tablet 3     fluticasone (FLONASE) 50 MCG/ACT nasal spray Spray 1 spray into both nostrils daily (Patient taking  differently: Spray 1 spray into both nostrils as needed) 18.2 mL 3     lisinopril (ZESTRIL) 5 MG tablet Take 1 tablet (5 mg) by mouth daily 90 tablet 3     metFORMIN (GLUCOPHAGE XR) 500 MG 24 hr tablet Take 1 tablet (500 mg) by mouth daily (with dinner) 90 tablet 0     metoprolol tartrate (LOPRESSOR) 25 MG tablet Take 1 tablet (25 mg) by mouth 2 times daily 180 tablet 3     Multiple Vitamins-Minerals (PRESERVISION AREDS 2 PO)        nitroGLYcerin (NITROSTAT) 0.4 MG sublingual tablet As needed for chest pain place 1 tablet under the tongue every 5 minutes for 3 doses. If symptoms persist 5 minutes after 1st dose call 911. 25 tablet 0     omeprazole (PRILOSEC) 20 MG DR capsule Take 1 capsule (20 mg) by mouth daily 90 capsule 3     order for DME Equipment being ordered: CPAP       meclizine (ANTIVERT) 25 MG tablet Take 1 tablet (25 mg) by mouth 3 times daily as needed for dizziness 30 tablet 0     ondansetron (ZOFRAN ODT) 4 MG ODT tab Take 1 tablet (4 mg) by mouth every 6 hours as needed for nausea or vomiting (Patient not taking: Reported on 3/24/2023) 15 tablet 0     ALLERGIES  Allergies   Allergen Reactions     Chlorthalidone GI Disturbance     Compazine Other (See Comments)     PSYCHOTIC     Erythromycin Nausea and Vomiting     Ethanol Nausea and Vomiting     Flomax [Tamsulosin Hcl]      FLUSHING, NASAl CONGESTION     Prochlorperazine Other (See Comments)     Other reaction(s): extreme anxiety     Seasonal Allergies      PAST MEDICAL HISTORY:  Past Medical History:   Diagnosis Date     Anemia due to blood loss, acute 1/14/2015     Atopic dermatitis      CAD (coronary artery disease)      Dermatitis 1/12/2015     Diabetes mellitus, type 2 (H) 12/29/2021     DJD (degenerative joint disease) of knee 1/14/2015     Dyslipidemia      ED (erectile dysfunction) 4/30/2012     Esophageal reflux     Gastroesophageal reflux     NSTEMI (non-ST elevated myocardial infarction) (H) 7/15/2019     RAY (obstructive sleep apnea)       Other and unspecified disc disorder of unspecified region     Intervertebral disc disorders     Personal history of colonic polyps     Colon polyps     S/P lateral meniscectomy of left knee      S/P total knee replacement 1/7/2015     Tick bite 4/20/15    amoxicillin treated     Unspecified sleep apnea     Sleep apnea     Urinary calculus, unspecified     Renal stones     Vitamin D deficiency 9-27-09     PAST SURGICAL HISTORY:  Past Surgical History:   Procedure Laterality Date     ARTHROPLASTY KNEE Left 1/7/2015    Procedure: ARTHROPLASTY KNEE;  Surgeon: Agustin Saenz MD;  Location:  OR     ARTHROSCOPY KNEE RT/LT  6-20-11     COLONOSCOPY  12/09     CV CORONARY ANGIOGRAM N/A 3/9/2023    Procedure: Coronary Angiogram;  Surgeon: Dalia Chew MD;  Location:  HEART CARDIAC CATH LAB     CV FRACTIONAL FLOW RATIO WIRE N/A 7/16/2019    Procedure: Fractional Flow Reserve;  Surgeon: Mick Thrasher MD;  Location:  HEART CARDIAC CATH LAB     CV HEART CATHETERIZATION WITH POSSIBLE INTERVENTION N/A 7/16/2019    successful PCI with MICAH placement to mid LAD      CV PCI N/A 3/9/2023    Procedure: Percutaneous Coronary Intervention;  Surgeon: Dalia Chew MD;  Location:  HEART CARDIAC CATH LAB     CYSTOSCOPY       EXTRACORPOREAL SHOCK WAVE LITHOTRIPSY (ESWL)  1/20/2012    Procedure:EXTRACORPOREAL SHOCK WAVE LITHOTRIPSY (ESWL); LEFT EXTRACORPOREAL SHOCK WAVE LITHOTRIPSY ; Surgeon:ANJUM BUNDY; Location: OR     EXTRACORPOREAL SHOCK WAVE LITHOTRIPSY, CYSTOSCOPY, INSERT STENT URETER(S), COMBINED  2/3/2012    Procedure:COMBINED EXTRACORPOREAL SHOCK WAVE LITHOTRIPSY, CYSTOSCOPY, INSERT STENT URETER(S); CYSTOSCOPY, RIGHT STENT; Surgeon:ANJUM BUNDY; Location: OR     FUSION LUMBAR ANTERIOR, FUSION LUMBAR POSTERIOR ONE LEVEL, COMBINED  11-1-10    L4-5     LASER HOLMIUM LITHOTRIPSY URETER(S), INSERT STENT, COMBINED  2/7/2012    Procedure:COMBINED CYSTOSCOPY, URETEROSCOPY, LASER  "HOLMIUM LITHOTRIPSY URETER(S), INSERT STENT; CYSTOSCOPY, RIGHT URETEROSCOPY, RIGHT RETROGRADES,  STONE EXTRACTION WITH HOLMIUM LASER AND RIGHT URETERAL STENT EXCHANGE ; Surgeon:DEEPTI MELVIN; Location:[facility]     LITHOTRIPSY      Lithotrypsy     SEPTOPLASTY       VASECTOMY       FAMILY HISTORY:  Family History   Problem Relation Age of Onset     Melanoma Father      Prostate Cancer Father      Cancer Father         neck cancer from radiation at work (urology)     Breast Cancer Mother      C.A.D. Paternal Grandfather 72     SOCIAL HISTORY:  Social History     Socioeconomic History     Marital status:      Spouse name: None     Number of children: None     Years of education: None     Highest education level: None   Tobacco Use     Smoking status: Former     Types: Cigars     Smokeless tobacco: Never     Tobacco comments:     has cigars when it is warm out   Substance and Sexual Activity     Alcohol use: Yes     Alcohol/week: 11.7 standard drinks     Types: 14 Standard drinks or equivalent per week     Comment: 1-2 drinks day/beer and scotch     Drug use: No   Social History Narrative    Lives with girlfriend    Retired teacher    Loves fly fishing     Physical Exam:  Vitals: /66   Pulse 66   Resp 17   Ht 1.778 m (5' 10\")   Wt 135.6 kg (299 lb)   SpO2 98%   BMI 42.90 kg/m     Wt Readings from Last 4 Encounters:   03/24/23 135.6 kg (299 lb)   03/09/23 137.4 kg (303 lb)   12/13/22 122 kg (269 lb)   12/06/22 134.3 kg (296 lb)     GEN: well nourished, in no acute distress.  HEENT:  Pupils equal, round. Sclerae nonicteric.   C/V:  Regular rate and rhythm, no murmur, rub or gallop.    RESP: Respirations are unlabored. Clear to auscultation bilaterally without wheezing, rales, or rhonchi.  GI: Abdomen soft, nontender.  EXTREM: no LE edema.  NEURO: Alert and oriented, cooperative.  SKIN: Warm and dry.     Recent Lab Results:  LIPID RESULTS:  Lab Results   Component Value Date    CHOL 154 02/10/2023 "    CHOL 95 01/08/2021    HDL 44 02/10/2023    HDL 40 01/08/2021    LDL 84 02/10/2023    LDL 36 01/08/2021    TRIG 132 02/10/2023    TRIG 94 01/08/2021     LIVER ENZYME RESULTS:  Lab Results   Component Value Date    AST 32 12/29/2021    ALT 34 02/10/2023    ALT 31 12/29/2021     CBC RESULTS:  Lab Results   Component Value Date    WBC 7.9 03/09/2023    WBC 5.5 12/29/2021    WBC 9.0 07/17/2019    RBC 4.70 03/09/2023    RBC 4.51 12/29/2021    RBC 4.50 07/17/2019    HGB 15.5 03/09/2023    HGB 14.5 12/29/2021    HCT 46.8 03/09/2023    HCT 44.5 12/29/2021     03/09/2023    MCV 98.7 12/29/2021    MCH 33.0 03/09/2023    MCH 32.3 (A) 12/29/2021    MCHC 33.1 03/09/2023    MCHC 32.7 (A) 12/29/2021    RDW 12.1 03/09/2023    RDW 12.7 07/17/2019     03/09/2023     12/29/2021     BMP RESULTS:  Lab Results   Component Value Date     03/11/2023     12/29/2021    POTASSIUM 4.8 03/11/2023    POTASSIUM 4.1 09/17/2022    POTASSIUM 5.1 12/29/2021    CHLORIDE 101 03/11/2023    CHLORIDE 106 09/17/2022    CHLORIDE 102 12/29/2021    CO2 24 03/11/2023    CO2 27 09/17/2022    CO2 24 07/17/2019    ANIONGAP 11 03/11/2023    ANIONGAP 7 09/17/2022    ANIONGAP 8 07/17/2019     (H) 03/11/2023     (H) 09/17/2022     (H) 12/29/2021    BUN 19.0 03/11/2023    BUN 19 09/17/2022    BUN 17 12/29/2021    BUN 15 12/29/2021    CR 0.98 03/11/2023    CR 1.15 12/29/2021    GFRESTIMATED 81 03/11/2023    GFRESTIMATED 77 07/17/2019    GFRESTBLACK 89 07/17/2019    NATHALIA 9.5 03/11/2023    NATHALIA 9.5 12/29/2021      A1C RESULTS:  Lab Results   Component Value Date    A1C 6.1 (A) 12/06/2022     INR RESULTS:  Lab Results   Component Value Date    INR 1.03 03/09/2023    INR 1.01 09/17/2022       May Zhou PA-C  [facility]      Thank you for allowing me to participate in the care of your patient.      Sincerely,     May Zhou PA-C     [facility]  cc:    Juan Moe MD  [address], [address]  [place] [id number]

## 2023-03-24 NOTE — PATIENT INSTRUCTIONS
"Thank you for your [facility] visit today. Your provider has recommended the following:  Medication Changes:  Stop Brilinta when you get your plavix prescription. When you start plavix - on the first morning take 300 mg (4 tablets) for a \"loading dose.\" Then you'll take 1 tablet (75 mg) daily going forward.  Recommendations:  Continue with cardiac rehab.  Call with more chest pain or any other concerns.  Reminder:  Please bring in all current medications, over the counter supplements and vitamin bottles to your next appointment.  Important \"Risa Stanton\" telephone numbers for your reference:  Cardiology Scheduling - [phone]  Cardiology Clinic RN-  [phone]     [facility]    "

## 2023-03-27 ENCOUNTER — HOSPITAL ENCOUNTER (OUTPATIENT)
Dept: CARDIAC REHAB | Facility: CLINIC | Age: 73
Discharge: HOME | End: 2023-03-27
Attending: INTERNAL MEDICINE
Payer: MEDICARE

## 2023-03-27 PROCEDURE — 93798 PHYS/QHP OP CAR RHAB W/ECG: CPT | Mod: KX

## 2023-03-30 ENCOUNTER — HOSPITAL ENCOUNTER (OUTPATIENT)
Dept: CARDIAC REHAB | Facility: CLINIC | Age: 73
Discharge: HOME | End: 2023-03-30
Attending: INTERNAL MEDICINE
Payer: MEDICARE

## 2023-03-30 PROCEDURE — 93798 PHYS/QHP OP CAR RHAB W/ECG: CPT | Mod: KX

## 2023-04-01 ENCOUNTER — HEALTH MAINTENANCE LETTER (OUTPATIENT)
Age: 73
End: 2023-04-01

## 2023-04-04 ENCOUNTER — HOSPITAL ENCOUNTER (OUTPATIENT)
Dept: CARDIAC REHAB | Facility: CLINIC | Age: 73
Discharge: HOME | End: 2023-04-04
Attending: INTERNAL MEDICINE
Payer: MEDICARE

## 2023-04-04 PROCEDURE — 93798 PHYS/QHP OP CAR RHAB W/ECG: CPT | Mod: KX | Performed by: REHABILITATION PRACTITIONER

## 2023-04-11 ENCOUNTER — HOSPITAL ENCOUNTER (OUTPATIENT)
Dept: CARDIAC REHAB | Facility: CLINIC | Age: 73
Discharge: HOME | End: 2023-04-11
Attending: INTERNAL MEDICINE
Payer: MEDICARE

## 2023-04-11 PROCEDURE — 93798 PHYS/QHP OP CAR RHAB W/ECG: CPT | Mod: KX

## 2023-04-13 ENCOUNTER — HOSPITAL ENCOUNTER (OUTPATIENT)
Dept: CARDIAC REHAB | Facility: CLINIC | Age: 73
Discharge: HOME | End: 2023-04-13
Attending: INTERNAL MEDICINE
Payer: MEDICARE

## 2023-04-13 PROCEDURE — 93798 PHYS/QHP OP CAR RHAB W/ECG: CPT | Mod: KX | Performed by: OCCUPATIONAL THERAPIST

## 2023-04-27 ENCOUNTER — HOSPITAL ENCOUNTER (OUTPATIENT)
Dept: CARDIAC REHAB | Facility: CLINIC | Age: 73
Discharge: HOME | End: 2023-04-27
Attending: INTERNAL MEDICINE
Payer: MEDICARE

## 2023-04-27 PROCEDURE — 93798 PHYS/QHP OP CAR RHAB W/ECG: CPT | Mod: KX | Performed by: REHABILITATION PRACTITIONER

## 2023-05-02 ENCOUNTER — HOSPITAL ENCOUNTER (OUTPATIENT)
Dept: CARDIAC REHAB | Facility: CLINIC | Age: 73
Discharge: HOME | End: 2023-05-02
Attending: INTERNAL MEDICINE
Payer: MEDICARE

## 2023-05-02 PROCEDURE — 93798 PHYS/QHP OP CAR RHAB W/ECG: CPT | Mod: KX | Performed by: REHABILITATION PRACTITIONER

## 2023-05-04 ENCOUNTER — HOSPITAL ENCOUNTER (OUTPATIENT)
Dept: CARDIAC REHAB | Facility: CLINIC | Age: 73
Discharge: HOME | End: 2023-05-04
Attending: INTERNAL MEDICINE
Payer: MEDICARE

## 2023-05-04 PROCEDURE — 93798 PHYS/QHP OP CAR RHAB W/ECG: CPT | Mod: KX | Performed by: REHABILITATION PRACTITIONER

## 2023-05-09 ENCOUNTER — HOSPITAL ENCOUNTER (OUTPATIENT)
Dept: CARDIAC REHAB | Facility: CLINIC | Age: 73
Discharge: HOME | End: 2023-05-09
Attending: INTERNAL MEDICINE
Payer: MEDICARE

## 2023-05-09 PROCEDURE — 93798 PHYS/QHP OP CAR RHAB W/ECG: CPT | Mod: KX | Performed by: OCCUPATIONAL THERAPIST

## 2023-05-30 DIAGNOSIS — E11.9 TYPE 2 DIABETES MELLITUS WITHOUT COMPLICATION, WITHOUT LONG-TERM CURRENT USE OF INSULIN (H): ICD-10-CM

## 2023-05-30 RX ORDER — METFORMIN HCL 500 MG
500 TABLET, EXTENDED RELEASE 24 HR ORAL
Qty: 90 TABLET | Refills: 0 | Status: SHIPPED | OUTPATIENT
Start: 2023-05-30 | End: 2023-09-15

## 2023-05-30 NOTE — TELEPHONE ENCOUNTER
MED: METFORMIN  LOV: (RELATED) 12/6/22    LAST LAB: 12/6/22    UPCOMING APPT: NONE    MYCHART MESSAGE SENT - PT DUE FOR DM CHECK

## 2023-06-09 DIAGNOSIS — K21.9 GASTROESOPHAGEAL REFLUX DISEASE, UNSPECIFIED WHETHER ESOPHAGITIS PRESENT: ICD-10-CM

## 2023-07-28 ENCOUNTER — TRANSFERRED RECORDS (OUTPATIENT)
Dept: FAMILY MEDICINE | Facility: CLINIC | Age: 73
End: 2023-07-28

## 2023-07-28 LAB — RETINOPATHY: NEGATIVE

## 2023-08-02 DIAGNOSIS — N20.0 CALCULUS OF KIDNEY: Primary | ICD-10-CM

## 2023-08-08 ENCOUNTER — OFFICE VISIT (OUTPATIENT)
Dept: FAMILY MEDICINE | Facility: CLINIC | Age: 73
End: 2023-08-08

## 2023-08-08 VITALS — DIASTOLIC BLOOD PRESSURE: 61 MMHG | OXYGEN SATURATION: 97 % | SYSTOLIC BLOOD PRESSURE: 104 MMHG | HEART RATE: 65 BPM

## 2023-08-08 DIAGNOSIS — H61.20 WAX IN EAR: Primary | ICD-10-CM

## 2023-08-08 DIAGNOSIS — R42 VERTIGO: ICD-10-CM

## 2023-08-08 DIAGNOSIS — E11.42 TYPE 2 DIABETES MELLITUS WITH DIABETIC POLYNEUROPATHY, WITHOUT LONG-TERM CURRENT USE OF INSULIN (H): ICD-10-CM

## 2023-08-08 DIAGNOSIS — E66.01 MORBID OBESITY (H): ICD-10-CM

## 2023-08-08 LAB — HBA1C MFR BLD: 6.5 % (ref 4–6)

## 2023-08-08 PROCEDURE — 83036 HEMOGLOBIN GLYCOSYLATED A1C: CPT | Performed by: FAMILY MEDICINE

## 2023-08-08 PROCEDURE — 36415 COLL VENOUS BLD VENIPUNCTURE: CPT | Performed by: FAMILY MEDICINE

## 2023-08-08 PROCEDURE — 99214 OFFICE O/P EST MOD 30 MIN: CPT | Performed by: FAMILY MEDICINE

## 2023-08-08 RX ORDER — MECLIZINE HYDROCHLORIDE 25 MG/1
25 TABLET ORAL 3 TIMES DAILY PRN
Qty: 30 TABLET | Refills: 1 | Status: SHIPPED | OUTPATIENT
Start: 2023-08-08 | End: 2024-01-24

## 2023-08-08 RX ORDER — ONDANSETRON 4 MG/1
4 TABLET, ORALLY DISINTEGRATING ORAL EVERY 6 HOURS PRN
Qty: 15 TABLET | Refills: 0 | Status: SHIPPED | OUTPATIENT
Start: 2023-08-08 | End: 2024-01-24

## 2023-08-08 NOTE — PROGRESS NOTES
Has been having Vertigo and wonders if his ears need to be cleaned    Problem(s) Oriented visit      ROS:  General and Resp. completed and negative except as noted above     HISTORY:   reports current alcohol use of about 11.7 standard drinks of alcohol per week.   reports that he has quit smoking. His smoking use included cigars. He has never used smokeless tobacco.    Past Medical History:   Diagnosis Date     Anemia due to blood loss, acute 1/14/2015     Atopic dermatitis      CAD (coronary artery disease)      Dermatitis 1/12/2015     Diabetes mellitus, type 2 (H) 12/29/2021     DJD (degenerative joint disease) of knee 1/14/2015     Dyslipidemia      ED (erectile dysfunction) 4/30/2012     Esophageal reflux      NSTEMI (non-ST elevated myocardial infarction) (H) 7/15/2019     RAY (obstructive sleep apnea)      Other and unspecified disc disorder of unspecified region      Personal history of colonic polyps      S/P lateral meniscectomy of left knee      S/P total knee replacement 1/7/2015     Tick bite 4/20/15     Unspecified sleep apnea      Urinary calculus, unspecified      Vitamin D deficiency 9-27-09     Past Surgical History:   Procedure Laterality Date     ARTHROPLASTY KNEE Left 1/7/2015    Procedure: ARTHROPLASTY KNEE;  Surgeon: Agustin Saenz MD;  Location:  OR     ARTHROSCOPY KNEE RT/LT  6-20-11     COLONOSCOPY  12/09     CV CORONARY ANGIOGRAM N/A 3/9/2023    Procedure: Coronary Angiogram;  Surgeon: Dalia Chew MD;  Location:  HEART CARDIAC CATH LAB     CV FRACTIONAL FLOW RATIO WIRE N/A 7/16/2019    Procedure: Fractional Flow Reserve;  Surgeon: Mick Thrasher MD;  Location:  HEART CARDIAC CATH LAB     CV HEART CATHETERIZATION WITH POSSIBLE INTERVENTION N/A 7/16/2019    successful PCI with MICAH placement to mid LAD      CV PCI N/A 3/9/2023    Procedure: Percutaneous Coronary Intervention;  Surgeon: Dalia Chew MD;  Location:  HEART CARDIAC CATH LAB     CYSTOSCOPY        EXTRACORPOREAL SHOCK WAVE LITHOTRIPSY (ESWL)  1/20/2012    Procedure:EXTRACORPOREAL SHOCK WAVE LITHOTRIPSY (ESWL); LEFT EXTRACORPOREAL SHOCK WAVE LITHOTRIPSY ; Surgeon:ANJUM BUNDY; Location:[facility]     EXTRACORPOREAL SHOCK WAVE LITHOTRIPSY, CYSTOSCOPY, INSERT STENT URETER(S), COMBINED  2/3/2012    Procedure:COMBINED EXTRACORPOREAL SHOCK WAVE LITHOTRIPSY, CYSTOSCOPY, INSERT STENT URETER(S); CYSTOSCOPY, RIGHT STENT; Surgeon:ANJUM BUNDY; Location:[facility] OR     FUSION LUMBAR ANTERIOR, FUSION LUMBAR POSTERIOR ONE LEVEL, COMBINED  11-1-10    L4-5     LASER HOLMIUM LITHOTRIPSY URETER(S), INSERT STENT, COMBINED  2/7/2012    Procedure:COMBINED CYSTOSCOPY, URETEROSCOPY, LASER HOLMIUM LITHOTRIPSY URETER(S), INSERT STENT; CYSTOSCOPY, RIGHT URETEROSCOPY, RIGHT RETROGRADES,  STONE EXTRACTION WITH HOLMIUM LASER AND RIGHT URETERAL STENT EXCHANGE ; Surgeon:DEEPTI MELVIN; Location:[facility] OR     LITHOTRIPSY      Lithotrypsy     SEPTOPLASTY       VASECTOMY         EXAM:  BP: 104/61   Pulse: 65    Temp: Data Unavailable    Wt Readings from Last 2 Encounters:   03/24/23 135.6 kg (299 lb)   03/09/23 137.4 kg (303 lb)       BMI= There is no height or weight on file to calculate BMI.    EXAM:  APPEARANCE: = Relaxed and in no distress  PERRLA/Irises = Pupils Round Reactive to Light and Irisis without inflammation  Ears/Nose = bilateral wax  Ear canals and TM's = Canals are not inflammed and have none or little wax and the drums are not injected and have a light reflex   Lips/Teeth/Gums = No lesions seen, good dentition, and gums seem healthy      Assessment/Plan:  Hugo was seen today for dizziness.    Diagnoses and all orders for this visit:    Vertigo  -     meclizine (ANTIVERT) 25 MG tablet; Take 1 tablet (25 mg) by mouth 3 times daily as needed for dizziness  -     ondansetron (ZOFRAN ODT) 4 MG ODT tab; Take 1 tablet (4 mg) by mouth every 6 hours as needed for nausea or vomiting    Morbid obesity  (H)    Type 2 diabetes mellitus with diabetic polyneuropathy, without long-term current use of insulin (H)  As a direct cause of their history of diabetes they have the following Diabetic complications: peripheral neuropathy  Most  recent A1C:     The last available A1C values from [facility], [facility]:  No components found for: [id number]     Lab Results   Component Value Date    A1C 6.1 12/06/2022    A1C 6.3 05/03/2022    A1C 6.8 12/29/2021    History   Smoking Status     Former     Types: Cigars   Smokeless Tobacco     Never    @     Kidney studies:  Creatinine   Date Value Ref Range Status   03/11/2023 0.98 0.67 - 1.17 mg/dL Final   03/09/2023 0.93 0.67 - 1.17 mg/dL Final   09/17/2022 0.95 0.66 - 1.25 mg/dL Final   12/29/2021 1.15 0.76 - 1.27 mg/dL Final   12/07/2020 1.05 0.76 - 1.27 mg/dL Final   10/19/2020 1.08 0.76 - 1.27 mg/dL Final   ]    GFR Estimate   Date Value Ref Range Status   03/11/2023 81 >60 mL/min/1.73m2 Final     Comment:     eGFR calculated using 2021 CKD-EPI equation.   07/17/2019 77 >60 mL/min/[1.73_m2] Final     Comment:     Non  GFR Calc  Starting 12/18/2018, serum creatinine based estimated GFR (eGFR) will be   calculated using the Chronic Kidney Disease Epidemiology Collaboration   (CKD-EPI) equation.                  No components found for: MICORALBUMINLC      GFR Estimate If Black   Date Value Ref Range Status   07/17/2019 89 >60 mL/min/[1.73_m2] Final     Comment:      GFR Calc  Starting 12/18/2018, serum creatinine based estimated GFR (eGFR) will be   calculated using the Chronic Kidney Disease Epidemiology Collaboration   (CKD-EPI) equation.       Medication (Note: This includes the hypertensive combination subclass to make sure to show all ACEI/ARB's.)     ACE Inhibitors       lisinopril (ZESTRIL) 5 MG tablet    Take 1 tablet (5 mg) by mouth daily                 Discussed importance in compliance in all areas of diabetic control including  "diet, routine BS checks, absolute medication compliance, laboratory monitoring, and attending regular follow up appointments as ordered.  Reviewed that failure to comply with instructions regarding diabetes will lead to a greater chance of poor diabetic control and therefore a greater chance of diabetes related complications such as CAD, CVA, PVD, and retinopathy/neuropathy/nephropathy.  We today managed prescriptions with refills ensured to ensure appropriate availability of current medications.  Next follow up will be in 3 months.          COUNSELING:   reports that he has quit smoking. His smoking use included cigars. He has never used smokeless tobacco.    Estimated body mass index is 42.9 kg/m  as calculated from the following:    Height as of 3/24/23: 1.778 m (5' 10\").    Weight as of 3/24/23: 135.6 kg (299 lb).   Weight management plan: Discussed healthy diet and exercise guidelines    Appropriate preventive services were discussed with this patient, including applicable screening as appropriate for cardiovascular disease, diabetes, osteopenia/osteoporosis, and glaucoma.  As appropriate for age/gender, discussed screening for colorectal cancer, prostate cancer, breast cancer, and cervical cancer. Checklist reviewing preventive services available has been given to the patient.    Reviewed patients plan of care and provided an AVS. The Basic Care Plan (routine screening as documented in Health Maintenance) for Hugo meets the Care Plan requirement. This Care Plan has been established and reviewed with the  Patient.      The following health maintenance items are reviewed in Epic and correct as of today:  Health Maintenance   Topic Date Due     Pneumococcal Vaccine: 65+ Years (1 - PCV) Never done     ZOSTER IMMUNIZATION (1 of 2) Never done     DTAP/TDAP/TD IMMUNIZATION (2 - Td or Tdap) 02/17/2022     COLORECTAL CANCER SCREENING  03/03/2022     COVID-19 Vaccine (6 - Pfizer series) 08/31/2022     PHQ-2 (once " per calendar year)  01/01/2023     A1C  03/06/2023     MICROALBUMIN  05/03/2023     EYE EXAM  07/27/2023     INFLUENZA VACCINE (1) 09/01/2023     MEDICARE ANNUAL WELLNESS VISIT  12/06/2023     DIABETIC FOOT EXAM  12/06/2023     FALL RISK ASSESSMENT  12/06/2023     LUNG CANCER SCREENING  12/06/2023     LIPID  02/10/2024     BMP  03/11/2024     ADVANCE CARE PLANNING  12/06/2027     HEPATITIS C SCREENING  Completed     AORTIC ANEURYSM SCREENING (SYSTEM ASSIGNED)  Completed     IPV IMMUNIZATION  Aged Out     MENINGITIS IMMUNIZATION  Aged Out       Ant Sawyer  [facility]  For any issues my office # is [phone]

## 2023-08-29 ENCOUNTER — TELEPHONE (OUTPATIENT)
Dept: CARDIOLOGY | Facility: CLINIC | Age: 73
End: 2023-08-29
Payer: MEDICARE

## 2023-08-29 NOTE — TELEPHONE ENCOUNTER
8/29/23 Called to patient. Patient reports took PM medications in pill box this am (atorvastatin. Amlodipine, and metoprolol). Patient denies complaints and some of his am pills also when he realized what happened with exception of lisinopril which he will take when he gets home. Patient did not want to take lisinopril and amlodipine at the same time. Patient reports knows to take twice daily medication tonight - metoprolol.  Patient reports he did not miss doses of plavix or aspirin and understands the importance of not missing doses. Patient will monitor and call if symptoms or concerns, at this time reports doing well without symptoms or concerns. Patient will be going back to normal times and dosing tomorrow.  Anabelle Long RN 08/29/23 11:34 AM

## 2023-08-29 NOTE — TELEPHONE ENCOUNTER
Health Call Center    Phone Message    May a detailed message be left on voicemail: yes     Reason for Call: Other: Hugo called to inform Kaela that he accidentally took his PM meds this morning rather than his AM meds. Hugo isn't experiencing any negative side effects and feels fine and just wanted to let Kaela know what is going on in case she has any concerns. Please reach out to Hugo with any concerns, otherwise no further action is needed. Thank you!     Action Taken: Other: Cardiology    Travel Screening: Not Applicable    Thank you!  [facility]

## 2023-09-07 ENCOUNTER — TRANSFERRED RECORDS (OUTPATIENT)
Dept: FAMILY MEDICINE | Facility: CLINIC | Age: 73
End: 2023-09-07

## 2023-09-12 ENCOUNTER — OFFICE VISIT (OUTPATIENT)
Dept: FAMILY MEDICINE | Facility: CLINIC | Age: 73
End: 2023-09-12

## 2023-09-12 VITALS
DIASTOLIC BLOOD PRESSURE: 49 MMHG | OXYGEN SATURATION: 96 % | BODY MASS INDEX: 42.47 KG/M2 | SYSTOLIC BLOOD PRESSURE: 115 MMHG | HEART RATE: 67 BPM | WEIGHT: 296 LBS

## 2023-09-12 DIAGNOSIS — Z23 NEED FOR TDAP VACCINATION: ICD-10-CM

## 2023-09-12 DIAGNOSIS — S81.802A LEG WOUND, LEFT, INITIAL ENCOUNTER: Primary | ICD-10-CM

## 2023-09-12 PROCEDURE — 99213 OFFICE O/P EST LOW 20 MIN: CPT | Mod: 25 | Performed by: FAMILY MEDICINE

## 2023-09-12 PROCEDURE — 90715 TDAP VACCINE 7 YRS/> IM: CPT | Performed by: FAMILY MEDICINE

## 2023-09-12 PROCEDURE — 90471 IMMUNIZATION ADMIN: CPT | Mod: 59 | Performed by: FAMILY MEDICINE

## 2023-09-12 RX ORDER — DIAZEPAM 5 MG
TABLET ORAL
COMMUNITY
Start: 2023-09-07 | End: 2024-01-24

## 2023-09-12 NOTE — PROGRESS NOTES
Jatinder Arias is a 73 year old patient who presents to clinic for evaluation.  He cut his left lower leg 8 days ago.  Unsure how.  Thinks he might have scraped them on two screws on a chair but did not feel it.  Two puncture wounds bled briskly but stopped.  Now with some residual bruising and swelling but healing.          Review of Systems   Constitutional, HEENT, cardiovascular, pulmonary, GI, , musculoskeletal, neuro, skin, endocrine and psych systems are negative, except as otherwise noted.      Objective    /49   Pulse 67   Wt 134.3 kg (296 lb)   SpO2 96%   BMI 42.47 kg/m      General: Well appearing, NAD  Skin: there are two scabs on left lower lateral leg with surrounding mild swelling and ecchymosis.  No erythema or warmth.  No drainage.    Psych: normal mood and affect        No results found for this or any previous visit (from the past 24 hour(s)).    Leg wound, left, initial encounter  Reassurance.  Healing well.  No sign of infection.  Will update tdap due to wound from possible screws on a chair  - VACCINE ADMINISTRATION, INITIAL

## 2023-09-15 DIAGNOSIS — E11.9 TYPE 2 DIABETES MELLITUS WITHOUT COMPLICATION, WITHOUT LONG-TERM CURRENT USE OF INSULIN (H): ICD-10-CM

## 2023-09-15 RX ORDER — METFORMIN HCL 500 MG
500 TABLET, EXTENDED RELEASE 24 HR ORAL
Qty: 90 TABLET | Refills: 0 | Status: SHIPPED | OUTPATIENT
Start: 2023-09-15 | End: 2023-12-12

## 2023-09-15 NOTE — CONFIDENTIAL NOTE
Med: METFORMIN    LOV (related): 8/8/23    Last Lab: 8/8/23      Due for F/U around: 11/8/23 FOR DM CHECK    Next Appt: NONE        Lab Results   Component Value Date    A1C 6.5 08/08/2023    A1C 6.1 12/06/2022    A1C 6.3 05/03/2022    A1C 6.8 12/29/2021    A1C 5.9 03/19/2018

## 2023-09-25 ENCOUNTER — TELEPHONE (OUTPATIENT)
Dept: CARDIOLOGY | Facility: CLINIC | Age: 73
End: 2023-09-25
Payer: MEDICARE

## 2023-09-25 NOTE — TELEPHONE ENCOUNTER
925/2023  Received fax from [facility] to May Zhou PA-C to  review and signature if she is agreeable to the plavix hold request for 7 days.    Called to patient. Patient reports potential upcoming procedures where he would need to hold plavix.  [facility] - spinal injection for back pain - not scheduled but if approved for plavix hold would proceed.   History of large kidney stones - not candidate for lithotripsy so would be having an invasive procedure to access kidney stones for removal - seeing Urology in October.     Reviewed with patient recommendation not to hold plavix for 1 year post stenting. Following this recommendation he would have to wait until 3/2024 for procedures. Patient states understanding and would delay procedures if May Zhou PA-C recommends not to hold plavix until 3/2024.    Last seen by May Zhou PA-C 3/24/2023:  Coronary angiography on 3/9/2023 showed a 60% ostial to proximal LAD lesion with an IFR of 0.86 suggesting obstructive disease. This was treated with a single drug-eluting stent. His previously placed mid LAD stent was patent. He still had a 40% residual mid RCA lesion. He was started on Brilinta in addition to his aspirin for dual antiplatelet therapy.   He is on Brilinta and aspirin for dual antiplatelet therapy but is having some shortness of breath possibly from the Brilinta. He did not tolerate Brilinta several years ago. We will switch to Plavix I sent him a prescription for 75 mg daily with a loading dose.     Messaged to Kaela for review and recommendations.  Anabelle Long RN 09/25/23 10:02 AM

## 2023-09-26 NOTE — TELEPHONE ENCOUNTER
Called to patient with DUNG response.      Ideally we'd wait. If the procedure for his kidney stones is more urgent, we can discuss holding if needed.     Patient states understanding of Kaela Zhou PA-C recommendations. Patient will discuss alternative pain relief recommendations with [facility] and will contact us if Urology recommends surgery for kidney stones when he is seen in October. Patient reports agreement to this plan.  Anabelle Long RN 09/26/23 1:37 PM

## 2023-09-26 NOTE — TELEPHONE ENCOUNTER
Ideally we'd wait. If the procedure for his kidney stones is more urgent, we can discuss holding if needed.

## 2023-11-06 ENCOUNTER — HOSPITAL ENCOUNTER (OUTPATIENT)
Dept: GENERAL RADIOLOGY | Facility: CLINIC | Age: 73
Discharge: HOME | End: 2023-11-06
Attending: PHYSICIAN ASSISTANT | Admitting: PHYSICIAN ASSISTANT
Payer: MEDICARE

## 2023-11-06 ENCOUNTER — OFFICE VISIT (OUTPATIENT)
Dept: UROLOGY | Facility: CLINIC | Age: 73
End: 2023-11-06
Payer: MEDICARE

## 2023-11-06 VITALS — SYSTOLIC BLOOD PRESSURE: 150 MMHG | DIASTOLIC BLOOD PRESSURE: 70 MMHG | HEART RATE: 62 BPM | OXYGEN SATURATION: 93 %

## 2023-11-06 DIAGNOSIS — N20.0 NEPHROLITHIASIS: Primary | ICD-10-CM

## 2023-11-06 DIAGNOSIS — R35.1 NOCTURIA: ICD-10-CM

## 2023-11-06 DIAGNOSIS — N20.0 CALCULUS OF KIDNEY: ICD-10-CM

## 2023-11-06 LAB
ALBUMIN UR-MCNC: NEGATIVE MG/DL
APPEARANCE UR: CLEAR
BILIRUB UR QL STRIP: NEGATIVE
COLOR UR AUTO: YELLOW
GLUCOSE UR STRIP-MCNC: NEGATIVE MG/DL
HGB UR QL STRIP: ABNORMAL
KETONES UR STRIP-MCNC: NEGATIVE MG/DL
LEUKOCYTE ESTERASE UR QL STRIP: ABNORMAL
NITRATE UR QL: NEGATIVE
PH UR STRIP: 5.5 [PH] (ref 5–7)
SP GR UR STRIP: 1.02 (ref 1–1.03)
UROBILINOGEN UR STRIP-ACNC: 0.2 E.U./DL

## 2023-11-06 PROCEDURE — 74018 RADEX ABDOMEN 1 VIEW: CPT

## 2023-11-06 PROCEDURE — 99214 OFFICE O/P EST MOD 30 MIN: CPT | Performed by: NURSE PRACTITIONER

## 2023-11-06 PROCEDURE — 81003 URINALYSIS AUTO W/O SCOPE: CPT | Performed by: NURSE PRACTITIONER

## 2023-11-06 NOTE — LETTER
"11/6/2023       RE: Hugo Prince  [address]  [facility] [address]     Dear Colleague,    Thank you for referring your patient, Hugo Prince, to the [facility] at [facility]. Please see a copy of my visit note below.      Urology Outpatient Visit    Name: Hugo Prince    MRN: [id number]   YOB: 1950               Chief Complaint:   Nephrolithiasis          Impression and Plan:   Impression / Plan:   Hugo Prince is a very pleasant 72 year old male with a history of coronary artery disease status post stenting of the LAD in 2019 & 3/2023, RAY treated with CPAP, former tobacco use, morbid obesity, recurrent nephrolithiasis, diabetes mellitus type 2.     -Some increase in stone burden since last year on XR KUB: There are four left renal calculi, the largest measuring  1.5 cm, similar to prior x-ray. Three probable right renal calculi measuring up to 1.3 cm. Only one  calculus visible on the prior x-ray.      -We discussed that he is not a good surgical candidate at this time w recent cardiac stent placement/Eliquis use, given his lack of symptomology, stable renal Fx, we will plan for follow-up in 12 months with a repeat KUB prior.    Thank you for the opportunity to participate in the care of Hugo Prince.     DEMETRI Angel, CNP  [facility] - Department of Urology  [phone]          History of Present Illness:     Hugo Prince is a very pleasant 72 year old male with a history of coronary artery disease status post stenting of the LAD in 2019 & 3/2023, RAY treated with CPAP, former tobacco use, morbid obesity, recurrent nephrolithiasis, diabetes mellitus type 2 who presents today for follow-up on stone burden.    Prior Dr. Desai patient - last seen 4/29/21:  \"As we recall, he has a significant clinically challenging situation.  He has a history of recurrent " "urinary stone disease having had lithotripsy in the past on a number of occasions.  Previously, within the last few months he had been seen because of a significant stone burden in each kidney but without pain.  He has also has a history of major problems with the lumbar spine having had previous spinal fusion with ongoing pain and sciatica.  We had evaluated him closely and determined that he had a high stone burden in the right kidney almost all in the lower pole calyx.  There was a lesser volume in the left kidney and almost all of that at that time was in the lower pole calyx.  On top of that he was over 300 pounds which precluded doing ESWL present sometime although he has an active lifestyle.  We had decided at that point to proceed cautiously, to consider a percutaneous nephrolithotomy of the stones in the right kidney, and perhaps, if he could lose weight to a level where we could do ESWL to consider ESWL for the stones in the left kidney.  Recently however he suddenly developed chest pain, was evaluated in the coronary unit here and a cardiovascular stent was placed as treatment for occlusive coronary artery disease and is now on anticoagulants.  He was also seen at that time by them ourselves because a CT scan had been done while in the hospital was showing that the stone in the left kidney had migrated from the lower calyx into the region of the left ureteropelvic junction  We decided after careful evaluation at that time that we should be conservative.  His symptoms have subsided, and he then had a stent placed and was on anticoagulants which would last for a year.  He completed that 1 year course of treatment about 3 months ago and is now taking aspirin only.  Spinal fusion surgery and is still getting quite a significant amount of back pain.  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   Dad was a urologist.   Stones are stable.   -We discussed continued observation versus consideration for intervention. He is not a good candidate for ESWL given cardiac hx.  -We will plan for follow-up in 12 months with a repeat KUB prior    TODAY 11/6/2023  -Had another cardiac stent placed March 2023 for CAD.   -On Plavix now, reports his cardiologist told him he cannot stop taking this for at least 1 yr total.   - Cr 0.98 eGFR 81 (3/11/23)  -XR KUB 11/6/2022 Impression:  \"There are four left renal calculi, the largest measuring  1.5 cm. Findings have minimally increased when compared to previous.  Three probable right renal calculi measuring up to 1.3 cm. Only one  calculus visible on the prior x-ray.\"    History is obtained from patient & EMR              Past Medical History:     Past Medical History:   Diagnosis Date    Anemia due to blood loss, acute 1/14/2015    Atopic dermatitis     CAD (coronary artery disease)     Dermatitis 1/12/2015    Diabetes mellitus, type 2 (H) 12/29/2021    DJD (degenerative joint disease) of knee 1/14/2015    Dyslipidemia     ED (erectile dysfunction) 4/30/2012    Esophageal reflux     Gastroesophageal reflux    NSTEMI (non-ST elevated myocardial infarction) (H) 7/15/2019    RAY (obstructive sleep apnea)     Other and unspecified disc disorder of unspecified region     Intervertebral disc disorders    Personal history of colonic polyps     Colon polyps    S/P lateral meniscectomy of left knee     S/P total knee replacement 1/7/2015    Tick bite 4/20/15    amoxicillin treated    Unspecified sleep apnea     Sleep apnea    Urinary calculus, unspecified     Renal stones    Vitamin D deficiency 9-27-09            Past Surgical History:     Past Surgical History:   Procedure " Laterality Date    ARTHROPLASTY KNEE Left 1/7/2015    Procedure: ARTHROPLASTY KNEE;  Surgeon: Agustin Saenz MD;  Location:  OR    ARTHROSCOPY KNEE RT/LT  6-20-11    COLONOSCOPY  12/09    CV CORONARY ANGIOGRAM N/A 3/9/2023    Procedure: Coronary Angiogram;  Surgeon: Dalia Chew MD;  Location:  HEART CARDIAC CATH LAB    CV FRACTIONAL FLOW RATIO WIRE N/A 7/16/2019    Procedure: Fractional Flow Reserve;  Surgeon: Mick Thrasher MD;  Location:  HEART CARDIAC CATH LAB    CV HEART CATHETERIZATION WITH POSSIBLE INTERVENTION N/A 7/16/2019    successful PCI with MICAH placement to mid LAD     CV PCI N/A 3/9/2023    Procedure: Percutaneous Coronary Intervention;  Surgeon: Dalia Chew MD;  Location:  HEART CARDIAC CATH LAB    CYSTOSCOPY      EXTRACORPOREAL SHOCK WAVE LITHOTRIPSY (ESWL)  1/20/2012    Procedure:EXTRACORPOREAL SHOCK WAVE LITHOTRIPSY (ESWL); LEFT EXTRACORPOREAL SHOCK WAVE LITHOTRIPSY ; Surgeon:ANJUM BUNDY; Location: OR    EXTRACORPOREAL SHOCK WAVE LITHOTRIPSY, CYSTOSCOPY, INSERT STENT URETER(S), COMBINED  2/3/2012    Procedure:COMBINED EXTRACORPOREAL SHOCK WAVE LITHOTRIPSY, CYSTOSCOPY, INSERT STENT URETER(S); CYSTOSCOPY, RIGHT STENT; Surgeon:ANJUM BUNDY; Location: OR    FUSION LUMBAR ANTERIOR, FUSION LUMBAR POSTERIOR ONE LEVEL, COMBINED  11-1-10    L4-5    LASER HOLMIUM LITHOTRIPSY URETER(S), INSERT STENT, COMBINED  2/7/2012    Procedure:COMBINED CYSTOSCOPY, URETEROSCOPY, LASER HOLMIUM LITHOTRIPSY URETER(S), INSERT STENT; CYSTOSCOPY, RIGHT URETEROSCOPY, RIGHT RETROGRADES,  STONE EXTRACTION WITH HOLMIUM LASER AND RIGHT URETERAL STENT EXCHANGE ; Surgeon:DEEPTI MELVIN; Location: OR    LITHOTRIPSY      Lithotrypsy    SEPTOPLASTY      VASECTOMY              Social History:     Social History     Tobacco Use    Smoking status: Former     Types: Cigars    Smokeless tobacco: Never    Tobacco comments:     has cigars when it is warm out   Substance  Use Topics    Alcohol use: Yes     Alcohol/week: 11.7 standard drinks of alcohol     Types: 14 Standard drinks or equivalent per week     Comment: 1-2 drinks day/beer and scotch            Family History:     Family History   Problem Relation Age of Onset    Melanoma Father     Prostate Cancer Father     Cancer Father         neck cancer from radiation at work (urology)    Breast Cancer Mother     C.A.D. Paternal Grandfather 72            Allergies:     Allergies   Allergen Reactions    Chlorthalidone GI Disturbance    Compazine Other (See Comments)     PSYCHOTIC    Erythromycin Nausea and Vomiting    Ethanol Nausea and Vomiting    Flomax [Tamsulosin Hcl]      FLUSHING, NASAl CONGESTION    Prochlorperazine Other (See Comments)     Other reaction(s): extreme anxiety    Seasonal Allergies             Medications:     Current Outpatient Medications   Medication Sig    amLODIPine (NORVASC) 5 MG tablet Take 1 tablet (5 mg) by mouth daily    aspirin (ASA) 81 MG EC tablet Take 1 tablet (81 mg) by mouth daily    atorvastatin (LIPITOR) 40 MG tablet Take 1 tablet (40 mg) by mouth daily    clopidogrel (PLAVIX) 75 MG tablet Take 300 mg (4 tablets) for one day followed by 75 mg (1 tablet) daily.    fluticasone (FLONASE) 50 MCG/ACT nasal spray Spray 1 spray into both nostrils daily (Patient taking differently: Spray 1 spray into both nostrils as needed)    lisinopril (ZESTRIL) 5 MG tablet Take 1 tablet (5 mg) by mouth daily    metFORMIN (GLUCOPHAGE XR) 500 MG 24 hr tablet Take 1 tablet (500 mg) by mouth daily (with dinner)    metoprolol tartrate (LOPRESSOR) 25 MG tablet Take 1 tablet (25 mg) by mouth 2 times daily    Multiple Vitamins-Minerals (PRESERVISION AREDS 2 PO)     omeprazole (PRILOSEC) 20 MG DR capsule Take 1 capsule (20 mg) by mouth daily    order for DME Equipment being ordered: CPAP    diazepam (VALIUM) 5 MG tablet TAKE 1 TABLET BY MOUTH 1 HOUR PRIOR TO MRI, 2ND TABLET 15 MINUTES BEFORE MRI IF NEEDED*    meclizine  "(ANTIVERT) 25 MG tablet Take 1 tablet (25 mg) by mouth 3 times daily as needed for dizziness    nitroGLYcerin (NITROSTAT) 0.4 MG sublingual tablet As needed for chest pain place 1 tablet under the tongue every 5 minutes for 3 doses. If symptoms persist 5 minutes after 1st dose call 911. (Patient not taking: Reported on 11/6/2023)    ondansetron (ZOFRAN ODT) 4 MG ODT tab Take 1 tablet (4 mg) by mouth every 6 hours as needed for nausea or vomiting     No current facility-administered medications for this visit.             Review of Systems:    ROS: See HPI for pertinent details.  Remainder of 10-point ROS negative.         Physical Exam:   VS:  T: Data Unavailable    HR: 62    BP: 150/70    RR: Data Unavailable     GEN:  Alert.  NAD. Pt is not diaphoretic.   HEENT:  Sclerae anicteric.    CV:  No obvious jugular venous distension  LUNGS: No respiratory distress, breathing comfortably wo accessory muscle use.  ABD:  ND.    EXT:  Warm, well perfused.  SKIN:  Warm.  Dry.   NEURO:  CN grossly intact.           Data:   All laboratory data reviewed:    No results found for: \"PSA\"  Lab Results   Component Value Date    CR 0.98 03/11/2023    CR 0.93 03/09/2023    CR 0.95 09/17/2022    CR 1.15 12/29/2021    CR 1.05 12/07/2020    CR 1.08 10/19/2020    CR 0.98 07/17/2019    CR 0.99 07/15/2019    CR 1.09 12/17/2018    CR 1.01 01/08/2015    CR 1.15 06/30/2014    CR 1.00 11/28/2012     Lab Results   Component Value Date    GFRESTIMATED 81 03/11/2023    GFRESTIMATED 86 03/09/2023    GFRESTIMATED 84 09/17/2022    GFRESTIMATED 77 07/17/2019    GFRESTIMATED 76 07/15/2019    GFRESTIMATED 67 12/17/2018    GFRESTIMATED 74 01/08/2015     Color Urine (no units)   Date Value   10/20/2021 Yellow   09/28/2020 Yellow     Appearance Urine (no units)   Date Value   10/20/2021 Clear   09/28/2020 Clear     Glucose Urine (mg/dL)   Date Value   10/20/2021 Negative   09/28/2020 Negative     Bilirubin Urine (no units)   Date Value   10/20/2021 " Negative   09/28/2020 Negative     Ketones Urine (mg/dL)   Date Value   10/20/2021 Negative   09/28/2020 Negative     Specific Gravity Urine (no units)   Date Value   10/20/2021 1.025   09/28/2020 1.025     pH Urine   Date Value   10/20/2021 5.5   09/28/2020 6.5 pH     Protein Albumin Urine (mg/dL)   Date Value   10/20/2021 Negative   09/28/2020 Negative     Urobilinogen Urine   Date Value   10/20/2021 1.0 E.U./dL   09/28/2020 1.0 EU/dL     Nitrite Urine (no units)   Date Value   10/20/2021 Negative   09/28/2020 Negative     Leukocyte Esterase Urine (no units)   Date Value   10/20/2021 Trace (A)   09/28/2020 Negative

## 2023-11-06 NOTE — PROGRESS NOTES
"  Urology Outpatient Visit    Name: Hugo Prince    MRN: [id number]   YOB: 1950               Chief Complaint:   Nephrolithiasis          Impression and Plan:   Impression / Plan:   Hugo Prince is a very pleasant 72 year old male with a history of coronary artery disease status post stenting of the LAD in 2019 & 3/2023, RAY treated with CPAP, former tobacco use, morbid obesity, recurrent nephrolithiasis, diabetes mellitus type 2.     -Some increase in stone burden since last year on XR KUB: There are four left renal calculi, the largest measuring  1.5 cm, similar to prior x-ray. Three probable right renal calculi measuring up to 1.3 cm. Only one  calculus visible on the prior x-ray.      -We discussed that he is not a good surgical candidate at this time w recent cardiac stent placement/Eliquis use, given his lack of symptomology, stable renal Fx, we will plan for follow-up in 12 months with a repeat KUB prior.    Thank you for the opportunity to participate in the care of Hugo Prince.     DEMETRI Angel, CNP  [facility] - Department of Urology  [phone]          History of Present Illness:     Hugo Prince is a very pleasant 72 year old male with a history of coronary artery disease status post stenting of the LAD in 2019 & 3/2023, RAY treated with CPAP, former tobacco use, morbid obesity, recurrent nephrolithiasis, diabetes mellitus type 2 who presents today for follow-up on stone burden.    Prior Dr. Desai patient - last seen 4/29/21:  \"As we recall, he has a significant clinically challenging situation.  He has a history of recurrent urinary stone disease having had lithotripsy in the past on a number of occasions.  Previously, within the last few months he had been seen because of a significant stone burden in each kidney but without pain.  He has also has a history of major problems with the lumbar spine having had previous spinal fusion with ongoing " "pain and sciatica.  We had evaluated him closely and determined that he had a high stone burden in the right kidney almost all in the lower pole calyx.  There was a lesser volume in the left kidney and almost all of that at that time was in the lower pole calyx.  On top of that he was over 300 pounds which precluded doing ESWL present sometime although he has an active lifestyle.  We had decided at that point to proceed cautiously, to consider a percutaneous nephrolithotomy of the stones in the right kidney, and perhaps, if he could lose weight to a level where we could do ESWL to consider ESWL for the stones in the left kidney.  Recently however he suddenly developed chest pain, was evaluated in the coronary unit here and a cardiovascular stent was placed as treatment for occlusive coronary artery disease and is now on anticoagulants.  He was also seen at that time by them ourselves because a CT scan had been done while in the hospital was showing that the stone in the left kidney had migrated from the lower calyx into the region of the left ureteropelvic junction  We decided after careful evaluation at that time that we should be conservative.  His symptoms have subsided, and he then had a stent placed and was on anticoagulants which would last for a year.  He completed that 1 year course of treatment about 3 months ago and is now taking aspirin only.  Spinal fusion surgery and is still getting quite a significant amount of back pain.  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   Dad was a urologist.   Stones are stable.   -We discussed continued observation versus consideration for " "intervention. He is not a good candidate for ESWL given cardiac hx.  -We will plan for follow-up in 12 months with a repeat KUB prior    TODAY 11/6/2023  -Had another cardiac stent placed March 2023 for CAD.   -On Plavix now, reports his cardiologist told him he cannot stop taking this for at least 1 yr total.   - Cr 0.98 eGFR 81 (3/11/23)  -XR KUB 11/6/2022 Impression:  \"There are four left renal calculi, the largest measuring  1.5 cm. Findings have minimally increased when compared to previous.  Three probable right renal calculi measuring up to 1.3 cm. Only one  calculus visible on the prior x-ray.\"    History is obtained from patient & EMR              Past Medical History:     Past Medical History:   Diagnosis Date    Anemia due to blood loss, acute 1/14/2015    Atopic dermatitis     CAD (coronary artery disease)     Dermatitis 1/12/2015    Diabetes mellitus, type 2 (H) 12/29/2021    DJD (degenerative joint disease) of knee 1/14/2015    Dyslipidemia     ED (erectile dysfunction) 4/30/2012    Esophageal reflux     Gastroesophageal reflux    NSTEMI (non-ST elevated myocardial infarction) (H) 7/15/2019    RAY (obstructive sleep apnea)     Other and unspecified disc disorder of unspecified region     Intervertebral disc disorders    Personal history of colonic polyps     Colon polyps    S/P lateral meniscectomy of left knee     S/P total knee replacement 1/7/2015    Tick bite 4/20/15    amoxicillin treated    Unspecified sleep apnea     Sleep apnea    Urinary calculus, unspecified     Renal stones    Vitamin D deficiency 9-27-09            Past Surgical History:     Past Surgical History:   Procedure Laterality Date    ARTHROPLASTY KNEE Left 1/7/2015    Procedure: ARTHROPLASTY KNEE;  Surgeon: Agustin Saenz MD;  Location:  OR    ARTHROSCOPY KNEE RT/LT  6-20-11    COLONOSCOPY  12/09    CV CORONARY ANGIOGRAM N/A 3/9/2023    Procedure: Coronary Angiogram;  Surgeon: Dalia Chew MD;  Location:  " [facility]ATH [facility]    CV FRACTIONAL FLOW RATIO WIRE N/A 7/16/2019    Procedure: Fractional Flow Reserve;  Surgeon: Mick Thrasher MD;  Location:  HE[facility]ARD[facility] CATH LAB    CV HEART CATHETERIZATION WITH POSSIBLE INTERVENTION N/A 7/16/2019    successful PCI with MICAH placement to mid LAD     CV PCI N/A 3/9/2023    Procedure: Percutaneous Coronary Intervention;  Surgeon: Dalia Chew MD;  Location:  HE[facility]ARD[facility] CATH LAB    CYSTOSCOPY      EXTRACORPOREAL SHOCK WAVE LITHOTRIPSY (ESWL)  1/20/2012    Procedure:EXTRACORPOREAL SHOCK WAVE LITHOTRIPSY (ESWL); LEFT EXTRACORPOREAL SHOCK WAVE LITHOTRIPSY ; Surgeon:ANUJM BUNDY; Location: OR    EXTRACORPOREAL SHOCK WAVE LITHOTRIPSY, CYSTOSCOPY, INSERT STENT URETER(S), COMBINED  2/3/2012    Procedure:COMBINED EXTRACORPOREAL SHOCK WAVE LITHOTRIPSY, CYSTOSCOPY, INSERT STENT URETER(S); CYSTOSCOPY, RIGHT STENT; Surgeon:ANJUM BUNDY; Location: OR    FUSION LUMBAR ANTERIOR, FUSION LUMBAR POSTERIOR ONE LEVEL, COMBINED  11-1-10    L4-5    LASER HOLMIUM LITHOTRIPSY URETER(S), INSERT STENT, COMBINED  2/7/2012    Procedure:COMBINED CYSTOSCOPY, URETEROSCOPY, LASER HOLMIUM LITHOTRIPSY URETER(S), INSERT STENT; CYSTOSCOPY, RIGHT URETEROSCOPY, RIGHT RETROGRADES,  STONE EXTRACTION WITH HOLMIUM LASER AND RIGHT URETERAL STENT EXCHANGE ; Surgeon:DEEPTI MELVIN; Location: OR    LITHOTRIPSY      Lithotrypsy    SEPTOPLASTY      VASECTOMY              Social History:     Social History     Tobacco Use    Smoking status: Former     Types: Cigars    Smokeless tobacco: Never    Tobacco comments:     has cigars when it is warm out   Substance Use Topics    Alcohol use: Yes     Alcohol/week: 11.7 standard drinks of alcohol     Types: 14 Standard drinks or equivalent per week     Comment: 1-2 drinks day/beer and scotch            Family History:     Family History   Problem Relation Age of Onset    Melanoma Father     Prostate Cancer Father     Cancer  Father         neck cancer from radiation at work (urology)    Breast Cancer Mother     C.A.PETRONA. Paternal Grandfather 72            Allergies:     Allergies   Allergen Reactions    Chlorthalidone GI Disturbance    Compazine Other (See Comments)     PSYCHOTIC    Erythromycin Nausea and Vomiting    Ethanol Nausea and Vomiting    Flomax [Tamsulosin Hcl]      FLUSHING, NASAl CONGESTION    Prochlorperazine Other (See Comments)     Other reaction(s): extreme anxiety    Seasonal Allergies             Medications:     Current Outpatient Medications   Medication Sig    amLODIPine (NORVASC) 5 MG tablet Take 1 tablet (5 mg) by mouth daily    aspirin (ASA) 81 MG EC tablet Take 1 tablet (81 mg) by mouth daily    atorvastatin (LIPITOR) 40 MG tablet Take 1 tablet (40 mg) by mouth daily    clopidogrel (PLAVIX) 75 MG tablet Take 300 mg (4 tablets) for one day followed by 75 mg (1 tablet) daily.    fluticasone (FLONASE) 50 MCG/ACT nasal spray Spray 1 spray into both nostrils daily (Patient taking differently: Spray 1 spray into both nostrils as needed)    lisinopril (ZESTRIL) 5 MG tablet Take 1 tablet (5 mg) by mouth daily    metFORMIN (GLUCOPHAGE XR) 500 MG 24 hr tablet Take 1 tablet (500 mg) by mouth daily (with dinner)    metoprolol tartrate (LOPRESSOR) 25 MG tablet Take 1 tablet (25 mg) by mouth 2 times daily    Multiple Vitamins-Minerals (PRESERVISION AREDS 2 PO)     omeprazole (PRILOSEC) 20 MG DR capsule Take 1 capsule (20 mg) by mouth daily    order for DME Equipment being ordered: CPAP    diazepam (VALIUM) 5 MG tablet TAKE 1 TABLET BY MOUTH 1 HOUR PRIOR TO MRI, 2ND TABLET 15 MINUTES BEFORE MRI IF NEEDED*    meclizine (ANTIVERT) 25 MG tablet Take 1 tablet (25 mg) by mouth 3 times daily as needed for dizziness    nitroGLYcerin (NITROSTAT) 0.4 MG sublingual tablet As needed for chest pain place 1 tablet under the tongue every 5 minutes for 3 doses. If symptoms persist 5 minutes after 1st dose call 911. (Patient not taking:  "Reported on 11/6/2023)    ondansetron (ZOFRAN ODT) 4 MG ODT tab Take 1 tablet (4 mg) by mouth every 6 hours as needed for nausea or vomiting     No current facility-administered medications for this visit.             Review of Systems:    ROS: See HPI for pertinent details.  Remainder of 10-point ROS negative.         Physical Exam:   VS:  T: Data Unavailable    HR: 62    BP: 150/70    RR: Data Unavailable     GEN:  Alert.  NAD. Pt is not diaphoretic.   HEENT:  Sclerae anicteric.    CV:  No obvious jugular venous distension  LUNGS: No respiratory distress, breathing comfortably wo accessory muscle use.  ABD:  ND.    EXT:  Warm, well perfused.  SKIN:  Warm.  Dry.   NEURO:  CN grossly intact.           Data:   All laboratory data reviewed:    No results found for: \"PSA\"  Lab Results   Component Value Date    CR 0.98 03/11/2023    CR 0.93 03/09/2023    CR 0.95 09/17/2022    CR 1.15 12/29/2021    CR 1.05 12/07/2020    CR 1.08 10/19/2020    CR 0.98 07/17/2019    CR 0.99 07/15/2019    CR 1.09 12/17/2018    CR 1.01 01/08/2015    CR 1.15 06/30/2014    CR 1.00 11/28/2012     Lab Results   Component Value Date    GFRESTIMATED 81 03/11/2023    GFRESTIMATED 86 03/09/2023    GFRESTIMATED 84 09/17/2022    GFRESTIMATED 77 07/17/2019    GFRESTIMATED 76 07/15/2019    GFRESTIMATED 67 12/17/2018    GFRESTIMATED 74 01/08/2015     Color Urine (no units)   Date Value   10/20/2021 Yellow   09/28/2020 Yellow     Appearance Urine (no units)   Date Value   10/20/2021 Clear   09/28/2020 Clear     Glucose Urine (mg/dL)   Date Value   10/20/2021 Negative   09/28/2020 Negative     Bilirubin Urine (no units)   Date Value   10/20/2021 Negative   09/28/2020 Negative     Ketones Urine (mg/dL)   Date Value   10/20/2021 Negative   09/28/2020 Negative     Specific Gravity Urine (no units)   Date Value   10/20/2021 1.025   09/28/2020 1.025     pH Urine   Date Value   10/20/2021 5.5   09/28/2020 6.5 pH     Protein Albumin Urine (mg/dL)   Date Value "   10/20/2021 Negative   09/28/2020 Negative     Urobilinogen Urine   Date Value   10/20/2021 1.0 E.U./dL   09/28/2020 1.0 EU/dL     Nitrite Urine (no units)   Date Value   10/20/2021 Negative   09/28/2020 Negative     Leukocyte Esterase Urine (no units)   Date Value   10/20/2021 Trace (A)   09/28/2020 Negative

## 2023-11-06 NOTE — NURSING NOTE
Pt left JIC urine.  Pt states he is having back pain today.  Pt had KUB today.      LISA Duenas CMA

## 2023-11-14 ENCOUNTER — TELEPHONE (OUTPATIENT)
Dept: CARDIOLOGY | Facility: CLINIC | Age: 73
End: 2023-11-14
Payer: MEDICARE

## 2023-11-14 NOTE — TELEPHONE ENCOUNTER
[facility] Call Center    Phone Message    May a detailed message be left on voicemail: yes     Reason for Call: Other: The patient is at a medical store to get a new CPAP and he said the new equipment has magnets now that will be close to his ears. He would like to know if this will be safe for him to wear with his stents     Action Taken: Other: Cardiology    Travel Screening: Not Applicable    Thank you!  [facility]

## 2023-11-14 NOTE — TELEPHONE ENCOUNTER
Attempted to call the patient back - phone is busy.    1300 phone is still busy. Left a message with Significant Other voice mail.  1310 spoke with patient to review that the stents are not affected by magnets. He is ordering a new unit and wanted to check. He has had MRIs with no issues in the past.

## 2023-11-16 DIAGNOSIS — J31.0 CHRONIC RHINITIS: ICD-10-CM

## 2023-11-16 NOTE — CONFIDENTIAL NOTE
Med: LESLIE GAN (related): 12/6/22 - CPX      Due for F/U around: 11/2023 FOR DM CHECK    Next Appt: NONE

## 2023-11-17 RX ORDER — FLUTICASONE PROPIONATE 50 MCG
1 SPRAY, SUSPENSION (ML) NASAL DAILY
Qty: 16 G | Refills: 0 | Status: SHIPPED | OUTPATIENT
Start: 2023-11-17

## 2023-11-30 DIAGNOSIS — K21.9 GASTROESOPHAGEAL REFLUX DISEASE, UNSPECIFIED WHETHER ESOPHAGITIS PRESENT: ICD-10-CM

## 2023-11-30 NOTE — TELEPHONE ENCOUNTER
Med: omeprazole     LOV (related): 12/6/22      Due for F/U around: 6 months     Next Appt: None

## 2023-12-11 DIAGNOSIS — E11.9 TYPE 2 DIABETES MELLITUS WITHOUT COMPLICATION, WITHOUT LONG-TERM CURRENT USE OF INSULIN (H): ICD-10-CM

## 2023-12-11 NOTE — CONFIDENTIAL NOTE
Med: METFORMIN    LOV (related): 8/8/23    Last Lab: 8/8/23      Due for F/U around: 11/2023 - OVERDUE    Next Appt: 2/2/24 & 2/12/24 (AWV & CPX)        Lab Results   Component Value Date    A1C 6.5 08/08/2023    A1C 6.1 12/06/2022    A1C 6.3 05/03/2022    A1C 6.8 12/29/2021    A1C 5.9 03/19/2018

## 2023-12-12 RX ORDER — METFORMIN HCL 500 MG
500 TABLET, EXTENDED RELEASE 24 HR ORAL
Qty: 90 TABLET | Refills: 0 | Status: SHIPPED | OUTPATIENT
Start: 2023-12-12 | End: 2024-02-08

## 2023-12-18 ENCOUNTER — MYC MEDICAL ADVICE (OUTPATIENT)
Dept: CARDIOLOGY | Facility: CLINIC | Age: 73
End: 2023-12-18
Payer: MEDICARE

## 2024-01-05 ENCOUNTER — TELEPHONE (OUTPATIENT)
Dept: FAMILY MEDICINE | Facility: CLINIC | Age: 74
End: 2024-01-05

## 2024-01-05 DIAGNOSIS — G47.33 OSA (OBSTRUCTIVE SLEEP APNEA): Primary | ICD-10-CM

## 2024-01-05 NOTE — TELEPHONE ENCOUNTER
Patient calls stating needs CPAP supplies urgently. Scheduled appt with his sleep provider at [facility] for 2/9/24 but needs supplies sooner and asks if Dr. Luis could send order for him. States got new mask 6 weeks ago for his Resmed CPAP machine and needs order for replacement cushions to use with this mask.     Has used CPAP therapy for 20+ years.   Last comprehensive visit with Dr. Luis: 12/2022 AWV/med check  Future visits: 2/2/24 AWV with Dr. Luis and 2/12/24 CPE with Dr. Luis     Plan: okay per Dr. Luis to send DME order for CPAP supplies to [facility]. Patient aware [facility] may require a current Face to Face  documentation regarding his RAY and need for continued CPAP therapy. If this is the case, will need to wait for his appt with sleep clinic or his CPE with Dr. Luis. Order faxed to [id number] at fax: [phone].   Sherry Case RN

## 2024-01-14 ENCOUNTER — HEALTH MAINTENANCE LETTER (OUTPATIENT)
Age: 74
End: 2024-01-14

## 2024-01-18 DIAGNOSIS — I21.4 NSTEMI (NON-ST ELEVATED MYOCARDIAL INFARCTION) (H): ICD-10-CM

## 2024-01-18 RX ORDER — AMLODIPINE BESYLATE 5 MG/1
5 TABLET ORAL DAILY
Qty: 90 TABLET | Refills: 0 | Status: SHIPPED | OUTPATIENT
Start: 2024-01-18 | End: 2024-03-18

## 2024-01-22 ENCOUNTER — MYC MEDICAL ADVICE (OUTPATIENT)
Dept: CARDIOLOGY | Facility: CLINIC | Age: 74
End: 2024-01-22
Payer: MEDICARE

## 2024-01-24 ENCOUNTER — VIRTUAL VISIT (OUTPATIENT)
Dept: FAMILY MEDICINE | Facility: CLINIC | Age: 74
End: 2024-01-24

## 2024-01-24 DIAGNOSIS — U07.1 INFECTION DUE TO 2019 NOVEL CORONAVIRUS: Primary | ICD-10-CM

## 2024-01-24 PROCEDURE — 99443 PR PHYSICIAN TELEPHONE EVALUATION 21-30 MIN: CPT | Mod: 95 | Performed by: FAMILY MEDICINE

## 2024-01-24 NOTE — PROGRESS NOTES
"  Problem(s) Oriented visit      Video-Visit Details    Type of service:  Video Visit    Video Start Time (time video started): 1233    Video End Time (time video stopped): 1242    Originating Location (pt. Location): Home    Distant Location (provider location):  [facility]     Mode of Communication:  Video Conference via [employer]    Physician has received verbal consent for a Video Visit from the patient? Yes      Clay Luis MD        Hugo Prince is being evaluated via a billable/telephone video visit.      The patient has been notified of following:     \"This video visit will be conducted via a call between you and your physician/provider. We have found that certain health care needs can be provided without the need for an in-person physical exam.  This service lets us provide the care you need with a video conversation.  If a prescription is necessary we can send it directly to your pharmacy.  If lab work is needed we can place an order for that and you can then stop by our lab to have the test done at a later time.    If during the course of the call the physician/provider feels a video visit is not appropriate, you will not be charged for this service.\"     Physician has received verbal consent for a Video Visit from the patient? Yes    SUBJECTIVE:                                                      Hugo Prince is a 73 year old male who is being seen through video consult for evaluation.  He tested positive this morning for covid.  His girlfriend has been ill and also tested positive.  Mild symptoms a couple weeks ago but more significant last night.  Cough, some SOB after coughing, headache, sore throat and chills.  No definite fever.  Mild chest pain with coughing.  None at rest.  Feels ok right now.            ROS:    A 10 system review was completed and is as noted in HPI and otherwise negative.            OBJECTIVE:                                                 "      VS not able to be assessed      Gen: Well appearing, NAD  Eyes: Clear  Resp: Breathing comfortably, NAD  Skin: Clear  Psych: normal mood and affect  Neuro: grossly normal              ASSESSMENT/PLAN:                                                        Hugo was seen today for + covid.    Diagnoses and all orders for this visit:    Infection due to 2019 novel coronavirus  -     nirmatrelvir and ritonavir (PAXLOVID) 300 mg/100 mg therapy pack; Take 3 tablets by mouth 2 times daily for 5 days    Discussed diagnosis, pathophysiology, symptomatic cares and potential harms and benefits of antivirals.  We discussed what is know and what is not known regarding these medication treatments.  After a thorough discussion she has elected to proceed with treatment.  Medication interactions and adjustments discussed.    Patient is agreement with the assessment and plan as outlined above.  All questions answered.  Red flag symptoms that should prompt emergent evaluation discussed and understood.

## 2024-02-05 SDOH — HEALTH STABILITY: PHYSICAL HEALTH: ON AVERAGE, HOW MANY MINUTES DO YOU ENGAGE IN EXERCISE AT THIS LEVEL?: 20 MIN

## 2024-02-05 SDOH — HEALTH STABILITY: PHYSICAL HEALTH: ON AVERAGE, HOW MANY DAYS PER WEEK DO YOU ENGAGE IN MODERATE TO STRENUOUS EXERCISE (LIKE A BRISK WALK)?: 0 DAYS

## 2024-02-05 ASSESSMENT — SOCIAL DETERMINANTS OF HEALTH (SDOH): HOW OFTEN DO YOU GET TOGETHER WITH FRIENDS OR RELATIVES?: THREE TIMES A WEEK

## 2024-02-07 ENCOUNTER — TRANSFERRED RECORDS (OUTPATIENT)
Dept: FAMILY MEDICINE | Facility: CLINIC | Age: 74
End: 2024-02-07

## 2024-02-08 NOTE — PROGRESS NOTES
"Preventive Care Visit  [facility]  Clay Luis MD, Family Medicine  Feb 12, 2024    Assessment & Plan     Encounter for Medicare annual wellness exam  - discussed preventative guidelines, healthy diet, exercise and weight management    Gastroesophageal reflux disease, unspecified whether esophagitis present  Advise switching to pantoprazole given potential reduced efficacy of clopidogrel on omeprazole  - pantoprazole (PROTONIX) 40 MG EC tablet; Take 1 tablet (40 mg) by mouth daily    Morbid obesity (H)  Discussed in detail regarding dietary and exercise changes that are advised    Encouraged to try swimming or stationary bike.      Type 2 diabetes mellitus with diabetic polyneuropathy, without long-term current use of insulin (H)  A1c increasing but at goal of <7.5% given age and comorbidities  -increase metformin to 1000 mg and recheck in 3 months  -would be good candidate for GLP1a  - SC FOOT EXAM NO CHARGE  - metFORMIN (GLUCOPHAGE XR) 500 MG 24 hr tablet; Take 2 tablets (1,000 mg) by mouth daily (with dinner) for 90 days    Coronary artery disease involving native coronary artery of native heart without angina pectoris  Asymptomatic  -cont DAPT until cardiology re-eval and secondary prevention  - Lipid Profile (RMG)    RAY (obstructive sleep apnea)  Cont CPAP  Weight loss    Dyslipidemia  stable/controlled. Cont current medication(s) and treatment    Screen for colon cancer  - Colonoscopy Screening  Referral; Future    Encounter for immunization  - ADMIN PNEUMOCOCCAL VACCINE    Nausea  - ondansetron (ZOFRAN ODT) 4 MG ODT tab; Take 1 tablet (4 mg) by mouth every 8 hours as needed for nausea      MED REC REQUIRED  Post Medication Reconciliation Status:  Discharge medications reconciled, continue medications without change  BMI  Estimated body mass index is 42.47 kg/m  as calculated from the following:    Height as of 3/24/23: 1.778 m (5' 10\").    Weight as of this encounter: 134.3 kg " (296 lb).   Weight management plan: Discussed healthy diet and exercise guidelines    Counseling  Appropriate preventive services were discussed with this patient, including applicable screening as appropriate for fall prevention, nutrition, physical activity, Tobacco-use cessation, weight loss and cognition.  Checklist reviewing preventive services available has been given to the patient.  Reviewed patient's diet, addressing concerns and/or questions.   The patient reports drinking more than one alcoholic drink per day and sometimes engages in binge or excessive drinking. The patient was counseled and given information about possible harmful effects of excessive alcohol intake as well as where to get help for alcohol problems. The patient was provided with written information regarding signs of hearing loss.     Patient has been advised of split billing requirements and indicates understanding: Yes    Work on weight loss  Regular exercise  See Patient Instructions    No follow-ups on file.    Jatinder Arias is a 73 year old, presenting for the following:  Physical, Hearing Screening (Failed all), Diabetes, and ER F/U (2/10 - for back pain -- pain better by 90%. Given lidocaine patch but not had to use yet)          Health Care Directive  Patient has a Health Care Directive on file  Advance care planning document is on file and is current.    HPI    DM2: on Metformin 500, tolerating.  Trying to stay active but difficult with back.  Weight stable/slightly reduced.     CAD: He was found to have a mid LAD lesion in July 2019 which was treated with a drug-eluting stent.  He had a moderate proximal LAD lesion which was negative by IFR.  He also had moderate mid RCA disease.  He was started on dual antiplatelet therapy.  He had some shortness of breath with Brilinta thus was switched to a prasugrel which was then discontinued. In March of 2023 he had new symptoms and an angiogram was performed showing 60% ostial  to proximal LAD lesion with IFR of 0.86 indicating obstructive disease.  A single MICAH was placed.  He was again started on Brilinta but caused SOB again and is now on Clopidogrel and ASA.  He is also on metoprolol and lisinopril.  No recurrence of chest discomfort.     HLD: on statin     BMI 42: exercise is difficult to his back.  Not following a diabetic or cardiac diet.      GERD: on omeprazole daily    RAY: uses CPAP    HEARING FREQUENCY:     Right Ear:    Failed All  Left Ear:     Failed All            2/5/2024   General Health   How would you rate your overall physical health? (!) FAIR   Feel stress (tense, anxious, or unable to sleep) Not at all         2/5/2024   Nutrition   Diet: Other   If other, please elaborate: low carb low sugar         2/5/2024   Exercise   Days per week of moderate/strenous exercise 0 days   Average minutes spent exercising at this level 20 min   (!) EXERCISE CONCERN      2/5/2024   Social Factors   Frequency of gathering with friends or relatives Three times a week   Worry food won't last until get money to buy more No   Food not last or not have enough money for food? No   Do you have housing?  Yes   Are you worried about losing your housing? No   Lack of transportation? No   Unable to get utilities (heat,electricity)? No         2/5/2024   Fall Risk   Fallen 2 or more times in the past year? No   Trouble with walking or balance? Yes           2/5/2024   Activities of Daily Living- Home Safety   Needs help with the following daily activites None of the above   Safety concerns in the home None of the above         2/5/2024   Dental   Dentist two times every year? Yes         2/5/2024   Hearing Screening   Hearing concerns? (!) IT'S HARD TO FOLLOW A CONVERSATION IN A NOISY RESTAURANT OR CROWDED ROOM.         2/5/2024   Driving Risk Screening   Patient/family members have concerns about driving No         2/5/2024   General Alertness/Fatigue Screening   Have you been more tired than  usual lately? No         2/5/2024   Urinary Incontinence Screening   Bothered by leaking urine in past 6 months No         2/5/2024   TB Screening   Were you born outside of [place]?  No           Today's PHQ-2 Score:       1/24/2024    11:21 AM   PHQ-2 ( 1999 [employer])   Q1: Little interest or pleasure in doing things 0   Q2: Feeling down, depressed or hopeless 0   PHQ-2 Score 0         2/5/2024   Substance Use   Alcohol more than 3/day or more than 7/wk Yes   How often do you have a drink containing alcohol 4 or more times a week   How many alcohol drinks on typical day 3 or 4   How often do you have 5+ drinks at one occasion Less than monthly   Audit 2/3 Score 2   How often not able to stop drinking once started Never   How often failed to do what normally expected Never   How often needed first drink in am after a heavy drinking session Never   How often feeling of guilt or remorse after drinking Never   How often unable to remember what happened the night before Never   Have you or someone else been injured because of your drinking No   Has anyone been concerned or suggested you cut down on drinking No   TOTAL SCORE - AUDIT 6   Do you have a current opioid prescription? No   How severe/bad is pain from 1 to 10? 4/10   Do you use any other substances recreationally? No     Social History     Tobacco Use    Smoking status: Former     Types: Cigars    Smokeless tobacco: Never    Tobacco comments:     has cigars when it is warm out   Substance Use Topics    Alcohol use: Yes     Alcohol/week: 11.7 standard drinks of alcohol     Types: 14 Standard drinks or equivalent per week     Comment: 1-2 drinks day/beer and scotch    Drug use: No       The ASCVD Risk score (Wili KEY, et al., 2019) failed to calculate for the following reasons:    The patient has a prior MI or stroke diagnosis        Reviewed and updated as needed this visit by Provider   Tobacco  Allergies  Meds  Problems  Med Hx  Surg Hx  Fam Hx             Lab work is in process  Current providers sharing in care for this patient include:  Patient Care Team:  Clay Luis MD as PCP - General (Family Medicine)  Clay Luis MD as Assigned PCP  Leisa Farias RD as Diabetes Educator (Dietitian, Registered)  Juan Moe MD as MD (Cardiovascular Disease)  Jennifer Lynn PA-C as Assigned OBGYN Provider  Jordan Werner EP as Cardiac Rehabilitation Therapist  May Zhou PA-C as Assigned Heart and Vascular Provider  Darlene Wyatt APRN CNP as Assigned Surgical Provider    The following health maintenance items are reviewed in Epic and correct as of today:  Health Maintenance   Topic Date Due    ZOSTER IMMUNIZATION (1 of 2) Never done    RSV VACCINE (Pregnancy & 60+) (1 - 1-dose 60+ series) Never done    COLORECTAL CANCER SCREENING  03/03/2022    MICROALBUMIN  05/03/2023    LIPID  02/10/2024    A1C  05/12/2024    EYE EXAM  07/28/2024    BMP  02/10/2025    MEDICARE ANNUAL WELLNESS VISIT  02/12/2025    DIABETIC FOOT EXAM  02/12/2025    FALL RISK ASSESSMENT  02/12/2025    ADVANCE CARE PLANNING  12/06/2027    DTAP/TDAP/TD IMMUNIZATION (3 - Td or Tdap) 09/12/2033    HEPATITIS C SCREENING  Completed    PHQ-2 (once per calendar year)  Completed    INFLUENZA VACCINE  Completed    Pneumococcal Vaccine: 65+ Years  Completed    AORTIC ANEURYSM SCREENING (SYSTEM ASSIGNED)  Completed    COVID-19 Vaccine  Completed    IPV IMMUNIZATION  Aged Out    HPV IMMUNIZATION  Aged Out    MENINGITIS IMMUNIZATION  Aged Out    RSV MONOCLONAL ANTIBODY  Aged Out    LUNG CANCER SCREENING  Discontinued     Review of Systems    Review of Systems  Constitutional, HEENT, cardiovascular, pulmonary, GI, , musculoskeletal, neuro, skin, endocrine and psych systems are negative, except as otherwise noted.     Objective    Exam  /71   Pulse 63   Wt 134.3 kg (296 lb)   SpO2 97%   BMI 42.47 kg/m     Estimated body mass index is 42.47 kg/m  as calculated  "from the following:    Height as of 3/24/23: 1.778 m (5' 10\").    Weight as of this encounter: 134.3 kg (296 lb).    Physical Exam  GENERAL: alert and no distress  EYES: Eyes grossly normal to inspection, PERRL and conjunctivae and sclerae normal  HENT: ear canals and TM's normal, nose and mouth without ulcers or lesions  NECK: no adenopathy, no asymmetry, masses, or scars  RESP: lungs clear to auscultation - no rales, rhonchi or wheezes  CV: regular rate and rhythm, normal S1 S2, no S3 or S4, no murmur, click or rub, no peripheral edema  ABDOMEN: soft, nontender, no hepatosplenomegaly, no masses and bowel sounds normal  MS: no gross musculoskeletal defects noted, no edema  SKIN: no suspicious lesions or rashes  NEURO: Normal strength and tone, mentation intact and speech normal  PSYCH: mentation appears normal, affect normal/bright  Diabetic foot exam: normal DP and PT pulses, no trophic changes or ulcerative lesions, and reduced sensation in toes bilaterally         2/12/2024   Mini Cog   Clock Draw Score 2 Normal   3 Item Recall 2 objects recalled   Mini Cog Total Score 4            Signed Electronically by: Clay Luis MD    "

## 2024-02-08 NOTE — PATIENT INSTRUCTIONS
Your Health Risk Assessment indicates you feel you are not in good health    A healthy lifestyle helps keep the body fit and the mind alert. It helps protect you from disease, helps you fight disease, and helps prevent chronic disease (disease that doesn't go away) from getting worse. This is important as you get older and begin to notice twinges in muscles and joints and a decline in the strength and stamina you once took for granted. A healthy lifestyle includes good healthcare, good nutrition, weight control, recreation, and regular exercise. Avoid harmful substances and do what you can to keep safe. Another part of a healthy lifestyle is stay mentally active and socially involved.    Good healthcare   Have a wellness visit every year.   If you have new symptoms, let us know right away. Don't wait until the next checkup.   Take medicines exactly as prescribed and keep your medicines in a safe place. Tell us if your medicine causes problems.   Healthy diet and weight control   Eat 3 or 4 small, nutritious, low-fat, high-fiber meals a day. Include a variety of fruits, vegetables, and whole-grain foods.   Make sure you get enough calcium in your diet. Calcium, vitamin D, and exercise help prevent osteoporosis (bone thinning).   If you live alone, try eating with others when you can. That way you get a good meal and have company while you eat it.   Try to keep a healthy weight. If you eat more calories than your body uses for energy, it will be stored as fat and you will gain weight.     Recreation   Recreation is not limited to sports and team events. It includes any activity that provides relaxation, interest, enjoyment, and exercise. Recreation provides an outlet for physical, mental, and social energy. It can give a sense of worth and achievement. It can help you stay healthy.    Mental Exercise and Social Involvement  Mental and emotional health is as important as physical health. Keep in touch with friends and  "family. Stay as active as possible. Continue to learn and challenge yourself.   Things you can do to stay mentally active are:  Learn something new, like a foreign language or musical instrument.   Play S[employer] or do crossword puzzles. If you cannot find people to play these games with you at home, you can play them with others on your computer through the Internet.   Join a games club--anything from card games to chess or checkers or lawn bowling.   Start a new hobby.   Go back to school.   Volunteer.   Read.   Keep up with world events.  Learning About Being Physically Active  What is physical activity?     Being physically active means doing any kind of activity that gets your body moving.  The types of physical activity that can help you get fit and stay healthy include:  Aerobic or \"cardio\" activities. These make your heart beat faster and make you breathe harder, such as brisk walking, riding a bike, or running. They strengthen your heart and lungs and build up your endurance.  Strength training activities. These make your muscles work against, or \"resist,\" something. Examples include lifting weights or doing push-ups. These activities help tone and strengthen your muscles and bones.  Stretches. These let you move your joints and muscles through their full range of motion. Stretching helps you be more flexible.  Reaching a balance between these three types of physical activity is important because each one contributes to your overall fitness.  What are the benefits of being active?  Being active is one of the best things you can do for your health. It helps you to:  Feel stronger and have more energy to do all the things you like to do.  Focus better at school or work.  Feel, think, and sleep better.  Reach and stay at a healthy weight.  Lose fat and build lean muscle.  Lower your risk for serious health problems, including diabetes, heart attack, high blood pressure, and some cancers.  Keep your heart, " "lungs, bones, muscles, and joints strong and healthy.  How can you make being active part of your life?  Start slowly. Make it your long-term goal to get at least 30 minutes of exercise on most days of the week. Walking is a good choice. You also may want to do other activities, such as running, swimming, cycling, or playing tennis or team sports.  Pick activities that you like--ones that make your heart beat faster, your muscles stronger, and your muscles and joints more flexible. If you find more than one thing you like doing, do them all. You don't have to do the same thing every day.  Get your heart pumping every day. Any activity that makes your heart beat faster and keeps it at that rate for a while counts.  Here are some great ways to get your heart beating faster:  Go for a brisk walk, run, or hike.  Go for a swim or bike ride.  Take an online exercise class or dance.  Play a game of touch football, basketball, or soccer.  Play tennis, pickleball, or racquetball.  Climb stairs.  Even some household chores can be aerobic. Just do them at a faster pace. Raking or mowing the lawn, sweeping the garage, and vacuuming and cleaning your home all can help get your heart rate up.  Strengthen your muscles during the week. You don't have to lift heavy weights or grow big, bulky muscles to get stronger. Doing a few simple activities that make your muscles work against, or \"resist,\" something can help you get stronger. Aim for at least twice a week.  For example, you can:  Do push-ups or sit-ups, which use your own body weight as resistance.  Lift weights or dumbbells or use stretch bands at home or in a gym or community center.  Stretch your muscles often. Stretching will help you as you become more active. It can help you stay flexible and loosen tight muscles. It can also help improve your balance and posture and can be a great way to relax.  Be sure to stretch the muscles you'll be using when you work out. It's best " "to warm your muscles slightly before you stretch them. Walk or do some other light aerobic activity for a few minutes. Then start stretching.  When you stretch your muscles:  Do it slowly. Stretching is not about going fast or making sudden movements.  Don't push or bounce during a stretch.  Hold each stretch for at least 15 to 30 seconds, if you can. You should feel a stretch in the muscle, but not pain.  Breathe out as you do the stretch. Then breathe in as you hold the stretch. Don't hold your breath.  If you're worried about how more activity might affect your health, have a checkup before you start. Follow any special advice your doctor gives you for getting a smart start.  Where can you learn more?  Go to https://www.[employer].net/patiented  Enter W332 in the search box to learn more about \"Learning About Being Physically Active.\"  Current as of: June 6, 2023               Content Version: 13.8    2202-0827 [employer].   Care instructions adapted under license by your healthcare professional. If you have questions about a medical condition or this instruction, always ask your healthcare professional. [employer] disclaims any warranty or liability for your use of this information.      Eating Healthy Foods: Care Instructions  With every meal, you can make healthy food choices. Try to eat a variety of fruits, vegetables, whole grains, lean proteins, and low-fat dairy products. This can help you get the right balance of nutrients, including vitamins and minerals. Small changes add up over time. You can start by adding one healthy food to your meals each day.    Try to make half your plate fruits and vegetables, one-fourth whole grains, and one-fourth lean proteins. Try including dairy with your meals.   Eat more fruits and vegetables. Try to have them with most meals and snacks.   Foods for healthy eating    Fruits    These can be fresh, frozen, canned, or dried.  Try to choose whole " "fruit rather than fruit juice.  Eat a variety of colors.    Vegetables    These can be fresh, frozen, canned, or dried.  Beans, peas, and lentils count too.    Whole grains    Choose whole-grain breads, cereals, and noodles.  Try brown rice.    Lean proteins    These can include lean meat, poultry, fish, and eggs.  You can also have tofu, beans, peas, lentils, nuts, and seeds.    Dairy    Try milk, yogurt, and cheese.  Choose low-fat or fat-free when you can.  If you need to, use lactose-free milk or fortified plant-based milk products, such as soy milk.    Water    Drink water when you're thirsty.  Limit sugar-sweetened drinks, including soda, fruit drinks, and sports drinks.  Where can you learn more?  Go to https://www.[employer].net/patiented  Enter T756 in the search box to learn more about \"Eating Healthy Foods: Care Instructions.\"  Current as of: February 28, 2023               Content Version: 13.8    2189-4803 [employer].   Care instructions adapted under license by your healthcare professional. If you have questions about a medical condition or this instruction, always ask your healthcare professional. [employer] disclaims any warranty or liability for your use of this information.      Nutrition for Older Adults: Care Instructions  Overview     Good nutrition is important at any age. But it is especially important for older adults. Eating a healthy diet helps keep your body strong. And it can help lower your risk for disease.  As you get older, your body needs more of certain nutrients. These include vitamin B12, calcium, and vitamin D. But it may be harder for you to get these and other important nutrients. This could be for many reasons. You may not feel as hungry as you used to. Or you could have problems with your teeth or mouth that make it hard to chew. Or you may not enjoy planning and preparing meals, especially if you live alone.  Talk with your doctor if you want help " getting the most nutrition from what you eat. The doctor may have you work with a dietitian to help you plan meals.  Follow-up care is a key part of your treatment and safety. Be sure to make and go to all appointments, and call your doctor if you are having problems. It's also a good idea to know your test results and keep a list of the medicines you take.  How can you care for yourself at home?  To stay healthy  Eat a variety of foods. The more you vary the foods you eat, the more vitamins, minerals, and other nutrients you get.  Take a multivitamin every day. Choose one with about 100% of the daily value (DV) for vitamins and minerals. Do not take more than 100% of the daily value for any vitamin or mineral unless your doctor tells you to. Talk with your doctor if you are not sure which multivitamin is right for you.  Eat lots of fruits and vegetables. Fresh, frozen, or no-salt canned vegetables and fruits in their own juice or light syrup are good choices.  Include foods that are high in vitamin B12 in your diet. Good choices are fortified breakfast cereal, nonfat or low-fat milk and other dairy products, meat, poultry, fish, and eggs.  Get enough calcium and vitamin D. Good choices include nonfat or low-fat milk, cheese, and yogurt. Other good options are tofu, orange juice with added calcium, and some leafy green vegetables, such as boo greens and kale. If you don't use milk products, talk to your doctor about calcium and vitamin D supplements.  Eat protein foods every day. Good choices include lean meat, fish, poultry, eggs, and cheese. Other good options are cooked beans, peanut butter, and nuts and seeds.  Choose whole grains for half of the grains you eat. Look for 100% whole wheat bread, whole-grain cereals, brown rice, and other whole grains.  If you have constipation  Eat high-fiber foods every day. These include fruits, vegetables, cooked dried beans, and whole grains.  Drink plenty of fluids. If  you have kidney, heart, or liver disease and have to limit fluids, talk with your doctor before you increase the amount of fluids you drink.  Ask your doctor if stool softeners may help keep your bowels regular.  If you have mouth problems that make chewing hard  Pick canned or cooked fruits and vegetables. These are often softer.  Chop or shred meat, poultry, and fish. Add sauce or gravy to the meat to help keep it moist.  Pick other protein foods that are soft. These include cheese, peanut butter, cooked beans, cottage cheese, and eggs.  If you have trouble shopping for yourself  Ask a local food store to deliver groceries to your home.  Contact a volunteer center and ask for help.  Ask a family member or neighbor to help you.  If you have trouble preparing meals  If you are able, take a cooking class.  Use a microwave oven to cook TV dinners and other frozen or prepared foods.  Take part in group meal programs. You can find these through senior citizen programs.  Have meals brought to your home. Your community may offer programs that deliver meals, such as Meals on Wheels.  If your appetite is poor  Try eating smaller amounts of food more often. For example, eat 4 or 5 small meals a day instead of 1 or 2 large meals.  Eat with family and friends. Or take part in group meal programs offered through volunteer programs. Eating with others may help your appetite. And it helps you be more social.  Ask your doctor if your medicines could cause appetite or taste problems. If so, ask about changing medicines.  Add spices and herbs to increase the flavor of food.  If you think you are depressed, ask your doctor for help. Depression can affect your appetite. And it can make it hard to do everyday activities like grocery shopping and making meals. Treatment can help.  When should you call for help?  Watch closely for changes in your health, and be sure to contact your doctor if you have any problems.  Where can you learn  "more?  Go to https://www.[employer].net/patiented  Enter L643 in the search box to learn more about \"Nutrition for Older Adults: Care Instructions.\"  Current as of: February 26, 2023               Content Version: 13.8    9525-7186 [employer].   Care instructions adapted under license by your healthcare professional. If you have questions about a medical condition or this instruction, always ask your healthcare professional. [employer] disclaims any warranty or liability for your use of this information.      Preventing Falls: Care Instructions  Injuries and health problems such as trouble walking or poor eyesight can increase your risk of falling. So can some medicines. But there are things you can do to help prevent falls. You can exercise to get stronger. You can also arrange your home to make it safer.    Talk to your doctor about the medicines you take. Ask if any of them increase the risk of falls and whether they can be changed or stopped.   Try to exercise regularly. It can help improve your strength and balance. This can help lower your risk of falling.     Practice fall safety and prevention.    Wear low-heeled shoes that fit well and give your feet good support. Talk to your doctor if you have foot problems that make this hard.  Carry a cellphone or wear a medical alert device that you can use to call for help.  Use stepladders instead of chairs to reach high objects. Don't climb if you're at risk for falls. Ask for help, if needed.  Wear the correct eyeglasses, if you need them.    Make your home safer.    Remove rugs, cords, clutter, and furniture from walkways.  Keep your house well lit. Use night-lights in hallways and bathrooms.  Install and use sturdy handrails on stairways.  Wear nonskid footwear, even inside. Don't walk barefoot or in socks without shoes.    Be safe outside.    Use handrails, curb cuts, and ramps whenever possible.  Keep your hands free by using a " "shoulder bag or backpack.  Try to walk in well-lit areas. Watch out for uneven ground, changes in pavement, and debris.  Be careful in the winter. Walk on the grass or gravel when sidewalks are slippery. Use de-icer on steps and walkways. Add non-slip devices to shoes.    Put grab bars and nonskid mats in your shower or tub and near the toilet. Try to use a shower chair or bath bench when bathing.   Get into a tub or shower by putting in your weaker leg first. Get out with your strong side first. Have a phone or medical alert device in the bathroom with you.   Where can you learn more?  Go to https://www.[employer].net/patiented  Enter G117 in the search box to learn more about \"Preventing Falls: Care Instructions.\"  Current as of: July 18, 2023               Content Version: 13.8    0626-7621 [employer].   Care instructions adapted under license by your healthcare professional. If you have questions about a medical condition or this instruction, always ask your healthcare professional. [employer] disclaims any warranty or liability for your use of this information.      Hearing Loss: Care Instructions  Overview     Hearing loss is a sudden or slow decrease in how well you hear. It can range from slight to profound. Permanent hearing loss can occur with aging. It also can happen when you are exposed long-term to loud noise. Examples include listening to loud music, riding motorcycles, or being around other loud machines.  Hearing loss can affect your work and home life. It can make you feel lonely or depressed. You may feel that you have lost your independence. But hearing aids and other devices can help you hear better and feel connected to others.  Follow-up care is a key part of your treatment and safety. Be sure to make and go to all appointments, and call your doctor if you are having problems. It's also a good idea to know your test results and keep a list of the medicines you " take.  How can you care for yourself at home?  Avoid loud noises whenever possible. This helps keep your hearing from getting worse.  Always wear hearing protection around loud noises.  Wear a hearing aid as directed.  A professional can help you pick a hearing aid that will work best for you.  You can also get hearing aids over the counter for mild to moderate hearing loss.  Have hearing tests as your doctor suggests. They can show whether your hearing has changed. Your hearing aid may need to be adjusted.  Use other devices as needed. These may include:  Telephone amplifiers and hearing aids that can connect to a television, stereo, radio, or microphone.  Devices that use lights or vibrations. These alert you to the doorbell, a ringing telephone, or a baby monitor.  Television closed-captioning. This shows the words at the bottom of the screen. Most new TVs can do this.  TTY (text telephone). This lets you type messages back and forth on the telephone instead of talking or listening. These devices are also called TDD. When messages are typed on the keyboard, they are sent over the phone line to a receiving TTY. The message is shown on a monitor.  Use text messaging, social media, and email if it is hard for you to communicate by telephone.  Try to learn a listening technique called speechreading. It is not lipreading. You pay attention to people's gestures, expressions, posture, and tone of voice. These clues can help you understand what a person is saying. Face the person you are talking to, and have them face you. Make sure the lighting is good. You need to see the other person's face clearly.  Think about counseling if you need help to adjust to your hearing loss.  When should you call for help?  Watch closely for changes in your health, and be sure to contact your doctor if:    You think your hearing is getting worse.     You have new symptoms, such as dizziness or nausea.   Where can you learn more?  Go to  "https://www.[employer].net/patiented  Enter R798 in the search box to learn more about \"Hearing Loss: Care Instructions.\"  Current as of: February 28, 2023               Content Version: 13.8 2006-2023 [employer].   Care instructions adapted under license by your healthcare professional. If you have questions about a medical condition or this instruction, always ask your healthcare professional. [employer] disclaims any warranty or liability for your use of this information.      Learning About Alcohol Use Disorder  What is alcohol use disorder?  Alcohol use disorder means that a person drinks alcohol even though it causes harm to themselves or others. It can range from mild to severe. The more symptoms of this disorder you have, the more severe it may be. People who have it may find it hard to control their use of alcohol.  People who have this disorder may argue with others about how much they're drinking. Their job may be affected because of drinking. They may drink when it's dangerous or illegal, such as when they drive. They also may have a strong need, or craving, to drink. They may feel like they must drink just to get by. Their drinking may increase their risk of getting hurt or being in a car crash.  Over time, drinking too much alcohol may cause health problems. These may include high blood pressure, liver problems, or problems with digestion.  What are the symptoms?  Maybe you've wondered about your alcohol habits or how to tell if your drinking is becoming a problem.  Here are some of the symptoms of alcohol use disorder. You may have it if you have two or more of the following symptoms:  You drink larger amounts of alcohol than you ever meant to. Or you've been drinking for a longer time than you ever meant to.  You can't cut down or control your use. Or you constantly wish you could cut down.  You spend a lot of time getting or drinking alcohol or recovering from its " effects.  You have strong cravings for alcohol.  You can no longer do your main jobs at work, at school, or at home.  You keep drinking alcohol, even though your use hurts your relationships.  You have stopped doing important activities because of your alcohol use.  You drink alcohol in situations where doing so is dangerous.  You keep drinking alcohol even though you know it's causing health problems.  You need more and more alcohol to get the same effect, or you get less effect from the same amount over time. This is called tolerance.  You have uncomfortable symptoms when you stop drinking alcohol or use less. This is called withdrawal.  Alcohol use disorder can range from mild to severe. The more symptoms you have, the more severe the disorder may be.  You might not realize that your drinking is a problem. You might not drink large amounts when you drink. Or you might go for days or weeks between drinking episodes. But even if you don't drink very often, your drinking could still be harmful and put you at risk.  How is alcohol use disorder treated?  Getting help is up to you. But you don't have to do it alone. There are many people and kinds of treatments that can help.  Treatment for alcohol use disorder can include:  Group therapy, one or more types of counseling, and alcohol education.  Medicines that help to:  Reduce withdrawal symptoms and help you safely stop drinking.  Reduce cravings for alcohol.  Support groups. These groups include Alcoholics Anonymous and [employer] (Self-Management and Recovery Training).  Some people are able to stop or cut back on drinking with help from a counselor. People who have moderate to severe alcohol use disorder may need medical treatment. They may need to stay in a hospital or treatment center.  You may have a treatment team to help you. This team may include a psychologist or psychiatrist, counselors, doctors, social workers, nurses, and a . A case  "manager helps plan and manage your treatment.  Follow-up care is a key part of your treatment and safety. Be sure to make and go to all appointments, and call your doctor if you are having problems. It's also a good idea to know your test results and keep a list of the medicines you take.  Where can you learn more?  Go to https://www.[employer].net/patiented  Enter H758 in the search box to learn more about \"Learning About Alcohol Use Disorder.\"  Current as of: March 21, 2023               Content Version: 13.8    5589-7958 [employer].   Care instructions adapted under license by your healthcare professional. If you have questions about a medical condition or this instruction, always ask your healthcare professional. [employer] disclaims any warranty or liability for your use of this information.      Preventive Care Advice   This is general advice given by our system to help you stay healthy. However, your care team may have specific advice just for you. Please talk to your care team about your preventive care needs.  Nutrition  Eat 5 or more servings of fruits and vegetables each day.  Try wheat bread, brown rice and whole grain pasta (instead of white bread, rice, and pasta).  Get enough calcium and vitamin D. Check the label on foods and aim for 100% of the RDA (recommended daily allowance).  Lifestyle  Exercise at least 150 minutes each week  (30 minutes a day, 5 days a week).  Do muscle strengthening activities 2 days a week. These help control your weight and prevent disease.  No smoking.  Wear sunscreen to prevent skin cancer.  Have a dental exam and cleaning every 6 months.  Yearly exams  See your health care team every year to talk about:  Any changes in your health.  Any medicines your care team has prescribed.  Preventive care, family planning, and ways to prevent chronic diseases.  Shots (vaccines)   HPV shots (up to age 26), if you've never had them before.  Hepatitis B " shots (up to age 59), if you've never had them before.  COVID-19 shot: Get this shot when it's due.  Flu shot: Get a flu shot every year.  Tetanus shot: Get a tetanus shot every 10 years.  Pneumococcal, hepatitis A, and RSV shots: Ask your care team if you need these based on your risk.  Shingles shot (for age 50 and up)  General health tests  Diabetes screening:  Starting at age 35, Get screened for diabetes at least every 3 years.  If you are younger than age 35, ask your care team if you should be screened for diabetes.  Cholesterol test: At age 39, start having a cholesterol test every 5 years, or more often if advised.  Bone density scan (DEXA): At age 50, ask your care team if you should have this scan for osteoporosis (brittle bones).  Hepatitis C: Get tested at least once in your life.  STIs (sexually transmitted infections)  Before age 24: Ask your care team if you should be screened for STIs.  After age 24: Get screened for STIs if you're at risk. You are at risk for STIs (including HIV) if:  You are sexually active with more than one person.  You don't use condoms every time.  You or a partner was diagnosed with a sexually transmitted infection.  If you are at risk for HIV, ask about PrEP medicine to prevent HIV.  Get tested for HIV at least once in your life, whether you are at risk for HIV or not.  Cancer screening tests  Cervical cancer screening: If you have a cervix, begin getting regular cervical cancer screening tests starting at age 21.  Breast cancer scan (mammogram): If you've ever had breasts, begin having regular mammograms starting at age 40. This is a scan to check for breast cancer.  Colon cancer screening: It is important to start screening for colon cancer at age 45.  Have a colonoscopy test every 10 years (or more often if you're at risk) Or, ask your provider about stool tests like a FIT test every year or Cologuard test every 3 years.  To learn more about your testing options, visit:    https://www.[email or web address]/824258.pdf.  For help making a decision, visit:   https://bit.ly/rv38841.  Prostate cancer screening test: If you have a prostate, ask your care team if a prostate cancer screening test (PSA) at age 55 is right for you.  Lung cancer screening: If you are a current or former smoker ages 50 to 80, ask your care team if ongoing lung cancer screenings are right for you.  For informational purposes only. Not to replace the advice of your health care provider. Copyright   2023 [employer]. All rights reserved. Clinically reviewed by the [facility] Transitions Program. [employer] 436290 - REV 01/24.

## 2024-02-10 ENCOUNTER — APPOINTMENT (OUTPATIENT)
Dept: GENERAL RADIOLOGY | Facility: CLINIC | Age: 74
End: 2024-02-10
Attending: EMERGENCY MEDICINE
Payer: MEDICARE

## 2024-02-10 ENCOUNTER — HOSPITAL ENCOUNTER (EMERGENCY)
Facility: CLINIC | Age: 74
Discharge: HOME | End: 2024-02-10
Attending: EMERGENCY MEDICINE | Admitting: EMERGENCY MEDICINE
Payer: MEDICARE

## 2024-02-10 VITALS
DIASTOLIC BLOOD PRESSURE: 73 MMHG | HEART RATE: 61 BPM | OXYGEN SATURATION: 95 % | RESPIRATION RATE: 18 BRPM | TEMPERATURE: 97.8 F | SYSTOLIC BLOOD PRESSURE: 169 MMHG

## 2024-02-10 DIAGNOSIS — M54.6 ACUTE RIGHT-SIDED THORACIC BACK PAIN: ICD-10-CM

## 2024-02-10 LAB
ALBUMIN SERPL BCG-MCNC: 4.2 G/DL (ref 3.5–5.2)
ALP SERPL-CCNC: 116 U/L (ref 40–150)
ALT SERPL W P-5'-P-CCNC: 36 U/L (ref 0–70)
ANION GAP SERPL CALCULATED.3IONS-SCNC: 11 MMOL/L (ref 7–15)
AST SERPL W P-5'-P-CCNC: 43 U/L (ref 0–45)
ATRIAL RATE - MUSE: 81 BPM
BASOPHILS # BLD AUTO: 0 10E3/UL (ref 0–0.2)
BASOPHILS NFR BLD AUTO: 0 %
BILIRUB SERPL-MCNC: 0.5 MG/DL
BUN SERPL-MCNC: 14.6 MG/DL (ref 8–23)
CALCIUM SERPL-MCNC: 9.3 MG/DL (ref 8.8–10.2)
CHLORIDE SERPL-SCNC: 98 MMOL/L (ref 98–107)
CREAT SERPL-MCNC: 1.03 MG/DL (ref 0.67–1.17)
D DIMER PPP FEU-MCNC: 0.37 UG/ML FEU (ref 0–0.5)
DEPRECATED HCO3 PLAS-SCNC: 26 MMOL/L (ref 22–29)
DIASTOLIC BLOOD PRESSURE - MUSE: NORMAL MMHG
EGFRCR SERPLBLD CKD-EPI 2021: 76 ML/MIN/1.73M2
EOSINOPHIL # BLD AUTO: 0.2 10E3/UL (ref 0–0.7)
EOSINOPHIL NFR BLD AUTO: 2 %
ERYTHROCYTE [DISTWIDTH] IN BLOOD BY AUTOMATED COUNT: 12.1 % (ref 10–15)
GLUCOSE SERPL-MCNC: 177 MG/DL (ref 70–99)
HCT VFR BLD AUTO: 41.5 % (ref 40–53)
HGB BLD-MCNC: 14 G/DL (ref 13.3–17.7)
HOLD SPECIMEN: NORMAL
HOLD SPECIMEN: NORMAL
IMM GRANULOCYTES # BLD: 0.1 10E3/UL
IMM GRANULOCYTES NFR BLD: 2 %
INTERPRETATION ECG - MUSE: NORMAL
LYMPHOCYTES # BLD AUTO: 2.2 10E3/UL (ref 0.8–5.3)
LYMPHOCYTES NFR BLD AUTO: 31 %
MCH RBC QN AUTO: 33.3 PG (ref 26.5–33)
MCHC RBC AUTO-ENTMCNC: 33.7 G/DL (ref 31.5–36.5)
MCV RBC AUTO: 99 FL (ref 78–100)
MONOCYTES # BLD AUTO: 0.7 10E3/UL (ref 0–1.3)
MONOCYTES NFR BLD AUTO: 10 %
NEUTROPHILS # BLD AUTO: 4 10E3/UL (ref 1.6–8.3)
NEUTROPHILS NFR BLD AUTO: 55 %
NRBC # BLD AUTO: 0 10E3/UL
NRBC BLD AUTO-RTO: 0 /100
P AXIS - MUSE: 39 DEGREES
PLATELET # BLD AUTO: 206 10E3/UL (ref 150–450)
POTASSIUM SERPL-SCNC: 4.4 MMOL/L (ref 3.4–5.3)
PR INTERVAL - MUSE: 202 MS
PROT SERPL-MCNC: 7.3 G/DL (ref 6.4–8.3)
QRS DURATION - MUSE: 88 MS
QT - MUSE: 404 MS
QTC - MUSE: 469 MS
R AXIS - MUSE: -20 DEGREES
RBC # BLD AUTO: 4.21 10E6/UL (ref 4.4–5.9)
SODIUM SERPL-SCNC: 135 MMOL/L (ref 135–145)
SYSTOLIC BLOOD PRESSURE - MUSE: NORMAL MMHG
T AXIS - MUSE: 22 DEGREES
TROPONIN T SERPL HS-MCNC: 23 NG/L
TROPONIN T SERPL HS-MCNC: 27 NG/L
VENTRICULAR RATE- MUSE: 81 BPM
WBC # BLD AUTO: 7.1 10E3/UL (ref 4–11)

## 2024-02-10 PROCEDURE — 36415 COLL VENOUS BLD VENIPUNCTURE: CPT | Performed by: EMERGENCY MEDICINE

## 2024-02-10 PROCEDURE — 250N000013 HC RX MED GY IP 250 OP 250 PS 637: Performed by: EMERGENCY MEDICINE

## 2024-02-10 PROCEDURE — 93005 ELECTROCARDIOGRAM TRACING: CPT

## 2024-02-10 PROCEDURE — 84484 ASSAY OF TROPONIN QUANT: CPT | Performed by: EMERGENCY MEDICINE

## 2024-02-10 PROCEDURE — 71046 X-RAY EXAM CHEST 2 VIEWS: CPT

## 2024-02-10 PROCEDURE — 85379 FIBRIN DEGRADATION QUANT: CPT | Performed by: EMERGENCY MEDICINE

## 2024-02-10 PROCEDURE — 85025 COMPLETE CBC W/AUTO DIFF WBC: CPT | Performed by: EMERGENCY MEDICINE

## 2024-02-10 PROCEDURE — 250N000011 HC RX IP 250 OP 636: Performed by: EMERGENCY MEDICINE

## 2024-02-10 PROCEDURE — 80053 COMPREHEN METABOLIC PANEL: CPT | Performed by: EMERGENCY MEDICINE

## 2024-02-10 PROCEDURE — 96374 THER/PROPH/DIAG INJ IV PUSH: CPT

## 2024-02-10 PROCEDURE — 99285 EMERGENCY DEPT VISIT HI MDM: CPT | Mod: 25

## 2024-02-10 PROCEDURE — 85004 AUTOMATED DIFF WBC COUNT: CPT | Performed by: EMERGENCY MEDICINE

## 2024-02-10 RX ORDER — LIDOCAINE 4 G/G
1 PATCH TOPICAL ONCE
Status: DISCONTINUED | OUTPATIENT
Start: 2024-02-10 | End: 2024-02-10 | Stop reason: HOSPADM

## 2024-02-10 RX ORDER — TIZANIDINE 2 MG/1
2 TABLET ORAL 3 TIMES DAILY PRN
Qty: 60 TABLET | Refills: 0 | Status: SHIPPED | OUTPATIENT
Start: 2024-02-10 | End: 2024-03-01

## 2024-02-10 RX ORDER — HYDROMORPHONE HYDROCHLORIDE 1 MG/ML
0.5 INJECTION, SOLUTION INTRAMUSCULAR; INTRAVENOUS; SUBCUTANEOUS ONCE
Status: COMPLETED | OUTPATIENT
Start: 2024-02-10 | End: 2024-02-10

## 2024-02-10 RX ORDER — LIDOCAINE 50 MG/G
1 PATCH TOPICAL EVERY 24 HOURS
Qty: 10 PATCH | Refills: 0 | Status: SHIPPED | OUTPATIENT
Start: 2024-02-10 | End: 2024-02-12

## 2024-02-10 RX ADMIN — HYDROMORPHONE HYDROCHLORIDE 0.5 MG: 1 INJECTION, SOLUTION INTRAMUSCULAR; INTRAVENOUS; SUBCUTANEOUS at 06:40

## 2024-02-10 RX ADMIN — LIDOCAINE 1 PATCH: 4 PATCH TOPICAL at 06:41

## 2024-02-10 ASSESSMENT — ACTIVITIES OF DAILY LIVING (ADL)
ADLS_ACUITY_SCORE: 37

## 2024-02-10 NOTE — ED PROVIDER NOTES
History     Chief Complaint:  Back Pain     The history is provided by the patient.      Hugo Prince is a 73 year old male with a history of type 2 diabetes mellitus, kidney stones, and NSTEMI s/p stent placement who presents with right upper-mid back pain, beginning about a week ago. He had trouble sleeping last night due to pain. Report he had COVID 2 weeks ago and had severe cough. He also had chest pain with cough, now resolved. He was on Paxlovid and has finished his course. Back pain is exacerbated by deep breathing and certain movements. No fever or chills.    Independent Historian:   None - Patient Only    Review of External Notes:       Medications:    Norvasc   Aspirin 81 mg   Lipitor  Plavix  Lisinopril   Metformin   Metoprolol   Prilosec    Past Medical History:    Coronary artery disease   Type 2 diabetes mellitus   Degenerative joint disease   GERD  NSTEMI  Obstructive sleep apnea   Colon polyps  Hyperlipidemia   Morbid obesity  Nephrolithiasis    Past Surgical History:    Total knee arthroplasty, left  Coronary angiogram with stent placement  Extracorporeal shock wave lithotripsy  Lumbar fusion  Laser holmium lithotripsy, insert ureteral stent  Septoplasty  Vasectomy     Physical Exam   Patient Vitals for the past 24 hrs:   BP Temp Pulse Resp SpO2   02/10/24 0800 -- -- 61 -- 95 %   02/10/24 0138 (!) 169/73 97.8  F (36.6  C) 88 18 97 %        Physical Exam  Constitutional: Well appearing.  HEENT: Atraumatic Moist mucous membranes.  Neck: Soft.  Supple.  No JVD.  Cardiac: Regular rate and rhythm.  No murmur or rub.  Respiratory: Clear to auscultation bilaterally.  No respiratory distress.  No wheezing, rhonchi, or rales.  Abdomen: Soft and nontender. Nondistended.  Musculoskeletal: No visible abnormality of the back.  Minimal reproducible tenderness to the right thoracic paraspinal muscle area.  No bony tenderness or crepitus. No edema.  Normal range of motion.  Neurologic: Alert and oriented  x3.  Normal tone and bulk.   Normal gait.  Skin: No rashes.  No edema.  Psych: Normal affect.  Normal behavior.            Emergency Department Course   ECG:  ECG results from 02/10/24   EKG 12 lead     Value    Systolic Blood Pressure     Diastolic Blood Pressure     Ventricular Rate 81    Atrial Rate 81    UT Interval 202    QRS Duration 88        QTc 469    P Axis 39    R AXIS -20    T Axis 22    Interpretation ECG      Sinus rhythm with occasional Premature ventricular complexes  Cannot rule out Anterior infarct , age undetermined  Abnormal ECG  No significant change as compared to prior, 3/9/23.  Read by me 0840         Imaging:  XR Chest 2 Views   Final Result   IMPRESSION: No infiltrate, pleural effusion or pneumothorax. The cardiac and mediastinal silhouettes are normal.         Report per radiology    Laboratory:  Labs Ordered and Resulted from Time of ED Arrival to Time of ED Departure   COMPREHENSIVE METABOLIC PANEL - Abnormal       Result Value    Sodium 135      Potassium 4.4      Carbon Dioxide (CO2) 26      Anion Gap 11      Urea Nitrogen 14.6      Creatinine 1.03      GFR Estimate 76      Calcium 9.3      Chloride 98      Glucose 177 (*)     Alkaline Phosphatase 116      AST 43      ALT 36      Protein Total 7.3      Albumin 4.2      Bilirubin Total 0.5     CBC WITH PLATELETS AND DIFFERENTIAL - Abnormal    WBC Count 7.1      RBC Count 4.21 (*)     Hemoglobin 14.0      Hematocrit 41.5      MCV 99      MCH 33.3 (*)     MCHC 33.7      RDW 12.1      Platelet Count 206      % Neutrophils 55      % Lymphocytes 31      % Monocytes 10      % Eosinophils 2      % Basophils 0      % Immature Granulocytes 2      NRBCs per 100 WBC 0      Absolute Neutrophils 4.0      Absolute Lymphocytes 2.2      Absolute Monocytes 0.7      Absolute Eosinophils 0.2      Absolute Basophils 0.0      Absolute Immature Granulocytes 0.1      Absolute NRBCs 0.0     TROPONIN T, HIGH SENSITIVITY - Abnormal    Troponin T, High  Sensitivity 27 (*)    TROPONIN T, HIGH SENSITIVITY - Abnormal    Troponin T, High Sensitivity 23 (*)    D DIMER QUANTITATIVE - Normal    D-Dimer Quantitative 0.37        Emergency Department Course & Assessments:       Interventions:  Medications   Lidocaine (LIDOCARE) 4 % Patch 1 patch (1 patch Transdermal $Patch/Med Applied 2/10/24 0641)   HYDROmorphone (PF) (DILAUDID) injection 0.5 mg (0.5 mg Intravenous $Given 2/10/24 0640)        Independent Interpretation (X-rays, CTs, rhythm strip):  Chest x-ray without acute infiltrate or pneumothorax    Assessments/Consultations/Discussion of Management or Tests:  ED Course as of 02/10/24 0844   Sat Feb 10, 2024   0629 I obtained the history and examined the patient as noted above.    0844 I rechecked and updated the patient. They were amenable to plan for discharge.        Social Determinants of Health affecting care:   None    Disposition:  The patient was discharged to home.     Impression & Plan    Medical Decision Making:  Hugo Prince is a 73-year-old man who is afebrile and hemodynamically stable.  He has tenderness in the right upper thoracic paraspinal muscle area with no visible abnormalities.  He is neurologically intact.  EKG reveals no acute ischemic changes.  Troponin mildly elevated but very atypical symptoms as he only has pain with movement.  Repeat delta troponins are flat.  No evidence of ACS and very atypical symptoms.  He is low risk [place] for tier for PE and a D-dimer is within normal limits making PE exceedingly unlikely.  Chest x-ray without acute abnormality such as pneumothorax, rib fracture, or other abnormalities.  I discussed plan for home as he had improvement with pain medication and discussed plan for home with muscle relaxer, Lidoderm patch, Tylenol, and rest and supportive care.  Discussed likely muscle strain versus other.  This does not appear to be rib fracture as she has not have any exquisite pain with palpation and his pain  only occurs with movement.  We discussed rest and time.  Strict return precautions were given.  His questions were answered and he was in no distress at time of discharge.    Diagnosis:    ICD-10-CM    1. Acute right-sided thoracic back pain  M54.6            Discharge Medications:  Discharge Medication List as of 2/10/2024  8:57 AM        START taking these medications    Details   lidocaine (LIDODERM) 5 % patch Place 1 patch onto the skin every 24 hours for 10 daysDisp-10 patch, [id number]      tiZANidine (ZANAFLEX) 2 MG tablet Take 1 tablet (2 mg) by mouth 3 times daily as needed for muscle spasms, Disp-60 tablet, R-0, E-Prescribe            Scribe Disclosure:  Jada SMITH, am serving as a scribe at 6:17 AM on 2/10/2024 to document services personally performed by Luigi Jaramillo MD based on my observations and the provider's statements to me.     2/10/2024   Luigi Jaramillo MD Salay, Nicholas J, MD  02/10/24 [id number]

## 2024-02-10 NOTE — ED TRIAGE NOTES
Pt reports recent covid diagnosis 2 weeks ago - reports worsening right mid back pain for past week. Worse with deep breath and movement.      Triage Assessment (Adult)       Row Name 02/10/24 0137          Respiratory WDL    Respiratory WDL WDL        Cardiac WDL    Cardiac WDL WDL        Peripheral/Neurovascular WDL    Peripheral Neurovascular WDL WDL

## 2024-02-12 ENCOUNTER — OFFICE VISIT (OUTPATIENT)
Dept: FAMILY MEDICINE | Facility: CLINIC | Age: 74
End: 2024-02-12

## 2024-02-12 VITALS
OXYGEN SATURATION: 97 % | WEIGHT: 296 LBS | BODY MASS INDEX: 42.47 KG/M2 | HEART RATE: 63 BPM | SYSTOLIC BLOOD PRESSURE: 121 MMHG | DIASTOLIC BLOOD PRESSURE: 71 MMHG

## 2024-02-12 DIAGNOSIS — K21.9 GASTROESOPHAGEAL REFLUX DISEASE, UNSPECIFIED WHETHER ESOPHAGITIS PRESENT: ICD-10-CM

## 2024-02-12 DIAGNOSIS — R11.0 NAUSEA: ICD-10-CM

## 2024-02-12 DIAGNOSIS — Z23 ENCOUNTER FOR IMMUNIZATION: ICD-10-CM

## 2024-02-12 DIAGNOSIS — Z00.00 ENCOUNTER FOR MEDICARE ANNUAL WELLNESS EXAM: Primary | ICD-10-CM

## 2024-02-12 DIAGNOSIS — E11.42 TYPE 2 DIABETES MELLITUS WITH DIABETIC POLYNEUROPATHY, WITHOUT LONG-TERM CURRENT USE OF INSULIN (H): ICD-10-CM

## 2024-02-12 DIAGNOSIS — E78.5 DYSLIPIDEMIA: ICD-10-CM

## 2024-02-12 DIAGNOSIS — G47.33 OSA (OBSTRUCTIVE SLEEP APNEA): ICD-10-CM

## 2024-02-12 DIAGNOSIS — E66.01 MORBID OBESITY (H): ICD-10-CM

## 2024-02-12 DIAGNOSIS — I25.10 CORONARY ARTERY DISEASE INVOLVING NATIVE CORONARY ARTERY OF NATIVE HEART WITHOUT ANGINA PECTORIS: ICD-10-CM

## 2024-02-12 DIAGNOSIS — Z12.11 SCREEN FOR COLON CANCER: ICD-10-CM

## 2024-02-12 LAB
CHOLESTEROL: 117 MG/DL (ref 100–199)
FASTING?: YES
HBA1C MFR BLD: 6.9 % (ref 4–6)
HDL (RMG): 24 MG/DL (ref 40–?)
LDL CALCULATED (RMG): 70 MG/DL (ref 0–130)
TRIGLYCERIDES (RMG): 111 MG/DL (ref 0–149)

## 2024-02-12 PROCEDURE — G0009 ADMIN PNEUMOCOCCAL VACCINE: HCPCS | Mod: 59 | Performed by: FAMILY MEDICINE

## 2024-02-12 PROCEDURE — 90694 VACC AIIV4 NO PRSRV 0.5ML IM: CPT | Performed by: FAMILY MEDICINE

## 2024-02-12 PROCEDURE — 80061 LIPID PANEL: CPT | Mod: QW | Performed by: FAMILY MEDICINE

## 2024-02-12 PROCEDURE — 83036 HEMOGLOBIN GLYCOSYLATED A1C: CPT | Performed by: FAMILY MEDICINE

## 2024-02-12 PROCEDURE — 36415 COLL VENOUS BLD VENIPUNCTURE: CPT | Performed by: FAMILY MEDICINE

## 2024-02-12 PROCEDURE — 90677 PCV20 VACCINE IM: CPT | Performed by: FAMILY MEDICINE

## 2024-02-12 PROCEDURE — 99207 PR FOOT EXAM NO CHARGE: CPT | Performed by: FAMILY MEDICINE

## 2024-02-12 PROCEDURE — G0008 ADMIN INFLUENZA VIRUS VAC: HCPCS | Mod: 59 | Performed by: FAMILY MEDICINE

## 2024-02-12 PROCEDURE — G0439 PPPS, SUBSEQ VISIT: HCPCS | Performed by: FAMILY MEDICINE

## 2024-02-12 PROCEDURE — 99214 OFFICE O/P EST MOD 30 MIN: CPT | Mod: 25 | Performed by: FAMILY MEDICINE

## 2024-02-12 RX ORDER — ONDANSETRON 4 MG/1
4 TABLET, ORALLY DISINTEGRATING ORAL EVERY 8 HOURS PRN
Qty: 15 TABLET | Refills: 0 | Status: SHIPPED | OUTPATIENT
Start: 2024-02-12

## 2024-02-12 RX ORDER — PANTOPRAZOLE SODIUM 40 MG/1
40 TABLET, DELAYED RELEASE ORAL DAILY
Qty: 90 TABLET | Refills: 1 | Status: SHIPPED | OUTPATIENT
Start: 2024-02-12

## 2024-02-12 RX ORDER — METFORMIN HCL 500 MG
1000 TABLET, EXTENDED RELEASE 24 HR ORAL
Qty: 180 TABLET | Refills: 0 | Status: SHIPPED | OUTPATIENT
Start: 2024-02-12 | End: 2024-05-13

## 2024-02-29 DIAGNOSIS — I21.4 NSTEMI (NON-ST ELEVATED MYOCARDIAL INFARCTION) (H): ICD-10-CM

## 2024-02-29 RX ORDER — CLOPIDOGREL BISULFATE 75 MG/1
TABLET ORAL
Qty: 90 TABLET | Refills: 0 | Status: SHIPPED | OUTPATIENT
Start: 2024-02-29 | End: 2024-03-18

## 2024-03-04 DIAGNOSIS — I21.4 NSTEMI (NON-ST ELEVATED MYOCARDIAL INFARCTION) (H): ICD-10-CM

## 2024-03-04 RX ORDER — ATORVASTATIN CALCIUM 40 MG/1
40 TABLET, FILM COATED ORAL DAILY
Qty: 90 TABLET | Refills: 0 | Status: SHIPPED | OUTPATIENT
Start: 2024-03-04 | End: 2024-03-18

## 2024-03-13 ENCOUNTER — TELEPHONE (OUTPATIENT)
Dept: CARDIOLOGY | Facility: CLINIC | Age: 74
End: 2024-03-13
Payer: MEDICARE

## 2024-03-13 NOTE — TELEPHONE ENCOUNTER
Called patient to review that labs tomorrow are not needed, already done 2/12/24. He verbalized understanding. Orders removed. He will see Dr Moe on 3/18 as scheduled.

## 2024-03-18 ENCOUNTER — OFFICE VISIT (OUTPATIENT)
Dept: CARDIOLOGY | Facility: CLINIC | Age: 74
End: 2024-03-18
Payer: MEDICARE

## 2024-03-18 VITALS
SYSTOLIC BLOOD PRESSURE: 133 MMHG | DIASTOLIC BLOOD PRESSURE: 65 MMHG | HEIGHT: 70 IN | WEIGHT: 295 LBS | BODY MASS INDEX: 42.23 KG/M2 | HEART RATE: 62 BPM | OXYGEN SATURATION: 97 %

## 2024-03-18 DIAGNOSIS — I25.10 CORONARY ARTERY DISEASE INVOLVING NATIVE HEART WITHOUT ANGINA PECTORIS, UNSPECIFIED VESSEL OR LESION TYPE: ICD-10-CM

## 2024-03-18 DIAGNOSIS — I21.4 NSTEMI (NON-ST ELEVATED MYOCARDIAL INFARCTION) (H): ICD-10-CM

## 2024-03-18 PROCEDURE — 99215 OFFICE O/P EST HI 40 MIN: CPT | Performed by: INTERNAL MEDICINE

## 2024-03-18 RX ORDER — ATORVASTATIN CALCIUM 40 MG/1
40 TABLET, FILM COATED ORAL DAILY
Qty: 90 TABLET | Refills: 3 | Status: SHIPPED | OUTPATIENT
Start: 2024-03-18

## 2024-03-18 RX ORDER — CLOPIDOGREL BISULFATE 75 MG/1
TABLET ORAL
Qty: 90 TABLET | Refills: 3 | Status: CANCELLED | OUTPATIENT
Start: 2024-03-18

## 2024-03-18 RX ORDER — AMLODIPINE BESYLATE 5 MG/1
5 TABLET ORAL DAILY
Qty: 90 TABLET | Refills: 3 | Status: SHIPPED | OUTPATIENT
Start: 2024-03-18

## 2024-03-18 RX ORDER — METOPROLOL TARTRATE 25 MG/1
25 TABLET, FILM COATED ORAL 2 TIMES DAILY
Qty: 180 TABLET | Refills: 3 | Status: SHIPPED | OUTPATIENT
Start: 2024-03-18

## 2024-03-18 RX ORDER — LISINOPRIL 5 MG/1
5 TABLET ORAL DAILY
Qty: 90 TABLET | Refills: 3 | Status: SHIPPED | OUTPATIENT
Start: 2024-03-18

## 2024-03-18 NOTE — LETTER
3/18/2024    Clay Luis MD  [address]  [facility] [address]    RE: Hugo Prince       Dear Colleague,     I had the pleasure of seeing Hugo Prince in the [facility].      Cardiology Clinic Progress Note:    March 18, 2024   Patient Name: Hugo Prince  Patient MRN: [id number]     Consult indication: CAD    HPI:    I had the opportunity to see patient Hugo Prince in cardiology clinic for a follow up visit. Patient is followed by our colleague Clay Luis MD with Primary Care.     As you know patient is a very pleasant 73-year-old male with a past medical history significant for CAD status post stenting to ostial LAD 2013, mid LAD 2019, sleep apnea (on CPAP therapy), smoking, obesity, recurrent nephrolithiasis, chronic back pain, diabetes, who presents for follow-up.    Prior cardiac history notable for NSTEMI 7/2019, coronary angiogram demonstrated culprit lesion of the mid LAD that was treated with a drug-eluting stent.  He was noted to have residual moderate proximal LAD lesion which was negative by IFR, moderate mid RCA disease.  Follow-up TTE demonstrated normal cardiac function.  When I had seen him in follow-up 2/2023, he had been experiencing intermittent chest discomfort.  Notably patient has a history of a false negative nuclear stress test.  He has a history of claustrophobia and back pain, so cardiac stress MRI was considered but was felt to be a suboptimal option for further evaluation.  Given overall clinical presentation, we had recommended cardiac catheterization/coronary angiography and possible intervention.  Patient wanted to consider this further, ultimately underwent repeat coronary angiogram 3/9/2023 which demonstrated a 60% ostial to proximal LAD lesion with IFR 0.86, consistent with obstructive disease, this was treated with a single drug-eluting stent.  Previously placed mid LAD stent was patent.  He has a residual 40% mid  RCA lesion.    Overall patient reports that he has been doing well.  No recurrence of chest pain since stenting.  He has had issues with being on dual antiplatelet therapy due to severe back pain for which he has required injections, he was unable to have this done while on dual antiplatelet therapy.  Patient also is a very active outdoorsman, particularly fishing (flyfishing) and notes issues with bleeding with nicks and cuts on his hands and so forth.  [place] has also resulted in some minor bleeding issues with injuries.  BP today in clinic 133/65 mmHg.  LDL 70 2/2024.    Assessment and Plan/Recommendations:    # CAD s/p PCI with MICAH to mLAD 2019, repeat PCI with MICAH to ostial to proximal LAD 2023. No angina.   # HTN, stable  # HL, on statin  # DM2    -Overall patient is in stable cardiac health without symptoms concerning for angina or decompensated heart failure  - It has been over 12 months since PCI, will stop dual antiplatelet therapy and continue with aspirin only since patient has required injections for back pain which cannot be performed while on clopidogrel  - Continue current cardiac regimen of amlodipine, lisinopril, metoprolol, atorvastatin, sublingual nitroglycerin as needed  - Follow-up in cardiology clinic as needed    Thank you for allowing our team to participate in the care of Hugo Prince.  Please do not hesitate to call or page me with any questions or concerns.    Sincerely,     Juan Moe MD, [facility]  Cardiology  Text Page   March 18, 2024      Voice recognition software utilized.     Past Medical History:   The ASCVD Risk score (Wili DK, et al., 2019) failed to calculate for the following reasons:    The patient has a prior MI or stroke diagnosis  Past Medical History:   Diagnosis Date    Anemia due to blood loss, acute 1/14/2015    Atopic dermatitis     CAD (coronary artery disease)     Dermatitis  1/12/2015    Diabetes mellitus, type 2 (H) 12/29/2021    DJD (degenerative joint disease) of knee 1/14/2015    Dyslipidemia     ED (erectile dysfunction) 4/30/2012    Esophageal reflux     Gastroesophageal reflux    NSTEMI (non-ST elevated myocardial infarction) (H) 7/15/2019    RAY (obstructive sleep apnea)     Other and unspecified disc disorder of unspecified region     Intervertebral disc disorders    Personal history of colonic polyps     Colon polyps    S/P lateral meniscectomy of left knee     S/P total knee replacement 1/7/2015    Tick bite 4/20/15    amoxicillin treated    Unspecified sleep apnea     Sleep apnea    Urinary calculus, unspecified     Renal stones    Vitamin D deficiency 9-27-09        Past Surgical History:   Past Surgical History:   Procedure Laterality Date    ARTHROPLASTY KNEE Left 1/7/2015    Procedure: ARTHROPLASTY KNEE;  Surgeon: Agustin Saenz MD;  Location:  OR    ARTHROSCOPY KNEE RT/LT  6-20-11    COLONOSCOPY  12/09    CV CORONARY ANGIOGRAM N/A 3/9/2023    Procedure: Coronary Angiogram;  Surgeon: Dalia Chew MD;  Location:  HEART CARDIAC CATH LAB    CV FRACTIONAL FLOW RATIO WIRE N/A 7/16/2019    Procedure: Fractional Flow Reserve;  Surgeon: Mick Thrasher MD;  Location:  HEART CARDIAC CATH LAB    CV HEART CATHETERIZATION WITH POSSIBLE INTERVENTION N/A 7/16/2019    successful PCI with MICAH placement to mid LAD     CV PCI N/A 3/9/2023    Procedure: Percutaneous Coronary Intervention;  Surgeon: Dalia Chew MD;  Location:  HEART CARDIAC CATH LAB    CYSTOSCOPY      EXTRACORPOREAL SHOCK WAVE LITHOTRIPSY (ESWL)  1/20/2012    Procedure:EXTRACORPOREAL SHOCK WAVE LITHOTRIPSY (ESWL); LEFT EXTRACORPOREAL SHOCK WAVE LITHOTRIPSY ; Surgeon:ANJUM BUNDY; Location: OR    EXTRACORPOREAL SHOCK WAVE LITHOTRIPSY, CYSTOSCOPY, INSERT STENT URETER(S), COMBINED  2/3/2012    Procedure:COMBINED EXTRACORPOREAL SHOCK WAVE LITHOTRIPSY, CYSTOSCOPY, INSERT STENT  URETER(S); CYSTOSCOPY, RIGHT STENT; Surgeon:ANJUM BUNDY; Location:[facility] OR    FUSION LUMBAR ANTERIOR, FUSION LUMBAR POSTERIOR ONE LEVEL, COMBINED  11-1-10    L4-5    LASER HOLMIUM LITHOTRIPSY URETER(S), INSERT STENT, COMBINED  2/7/2012    Procedure:COMBINED CYSTOSCOPY, URETEROSCOPY, LASER HOLMIUM LITHOTRIPSY URETER(S), INSERT STENT; CYSTOSCOPY, RIGHT URETEROSCOPY, RIGHT RETROGRADES,  STONE EXTRACTION WITH HOLMIUM LASER AND RIGHT URETERAL STENT EXCHANGE ; Surgeon:DEEPTI MELVIN; Location:[facility] OR    LITHOTRIPSY      Lithotrypsy    SEPTOPLASTY      VASECTOMY         Medications (outpatient):  Current Outpatient Medications   Medication Sig Dispense Refill    amLODIPine (NORVASC) 5 MG tablet Take 1 tablet (5 mg) by mouth daily 90 tablet 3    aspirin (ASA) 81 MG EC tablet Take 1 tablet (81 mg) by mouth daily      atorvastatin (LIPITOR) 40 MG tablet Take 1 tablet (40 mg) by mouth daily 90 tablet 3    fluticasone (FLONASE) 50 MCG/ACT nasal spray Spray 1 spray into both nostrils daily (Patient taking differently: Spray 1 spray into both nostrils as needed) 16 g 0    lisinopril (ZESTRIL) 5 MG tablet Take 1 tablet (5 mg) by mouth daily 90 tablet 3    metFORMIN (GLUCOPHAGE XR) 500 MG 24 hr tablet Take 2 tablets (1,000 mg) by mouth daily (with dinner) for 90 days 180 tablet 0    metoprolol tartrate (LOPRESSOR) 25 MG tablet Take 1 tablet (25 mg) by mouth 2 times daily 180 tablet 3    Multiple Vitamins-Minerals (PRESERVISION AREDS 2 PO) Take by mouth daily OTC      nitroGLYcerin (NITROSTAT) 0.4 MG sublingual tablet As needed for chest pain place 1 tablet under the tongue every 5 minutes for 3 doses. If symptoms persist 5 minutes after 1st dose call 911. 25 tablet 0    ondansetron (ZOFRAN ODT) 4 MG ODT tab Take 1 tablet (4 mg) by mouth every 8 hours as needed for nausea 15 tablet 0    order for DME Equipment being ordered: CPAP      pantoprazole (PROTONIX) 40 MG EC tablet Take 1 tablet (40 mg) by mouth daily 90  "tablet 1       Allergies:  Allergies   Allergen Reactions    Chlorthalidone GI Disturbance    Compazine Other (See Comments)     PSYCHOTIC    Erythromycin Nausea and Vomiting    Ethanol Nausea and Vomiting    Flomax [Tamsulosin Hcl]      FLUSHING, NASAl CONGESTION    Prochlorperazine Other (See Comments)     Other reaction(s): extreme anxiety    Seasonal Allergies        Social History:   History   Drug Use No      History   Smoking Status    Former    Types: Cigars   Smokeless Tobacco    Never     Social History    Substance and Sexual Activity      Alcohol use: Yes        Alcohol/week: 11.7 standard drinks of alcohol        Types: 14 Standard drinks or equivalent per week        Comment: 1-2 drinks day/beer and scotch       Family History:  Family History   Problem Relation Age of Onset    Melanoma Father     Prostate Cancer Father     Cancer Father         neck cancer from radiation at work (urology)    Breast Cancer Mother     C.A.D. Paternal Grandfather 72       Review of Systems:   A complete review of systems was negative except as mentioned in the History of Present Illness.     Objective & Physical Exam:  /65   Pulse 62   Ht 1.778 m (5' 10\")   Wt 133.8 kg (295 lb)   SpO2 97%   BMI 42.33 kg/m    Wt Readings from Last 2 Encounters:   03/18/24 133.8 kg (295 lb)   02/12/24 134.3 kg (296 lb)     Body mass index is 42.33 kg/m .   Body surface area is 2.57 meters squared.    Constitutional: appears stated age, in no apparent distress, appears to be well nourished  Pulmonary: clear to auscultation bilaterally, no wheezes, no rales, no increased work of breathing  Cardiovascular: JVP normal, regular rate, regular rhythm, normal S1 and S2, no S3, S4, no murmur appreciated      Data reviewed:  Lab Results   Component Value Date    WBC 7.1 02/10/2024    WBC 5.5 12/29/2021    WBC 9.0 07/17/2019    RBC 4.21 (L) 02/10/2024    RBC 4.51 12/29/2021    RBC 4.50 07/17/2019    HGB 14.0 02/10/2024    HGB 14.5 " 12/29/2021    HCT 41.5 02/10/2024    HCT 44.5 12/29/2021    MCV 99 02/10/2024    MCV 98.7 12/29/2021    MCH 33.3 (H) 02/10/2024    MCH 32.3 (A) 12/29/2021    MCHC 33.7 02/10/2024    MCHC 32.7 (A) 12/29/2021    RDW 12.1 02/10/2024    RDW 12.7 07/17/2019     02/10/2024     12/29/2021     Sodium   Date Value Ref Range Status   02/10/2024 135 135 - 145 mmol/L Final     Comment:     Reference intervals for this test were updated on 09/26/2023 to more accurately reflect our healthy population. There may be differences in the flagging of prior results with similar values performed with this method. Interpretation of those prior results can be made in the context of the updated reference intervals.    12/29/2021 139 134 - 144 mmol/L Final     Potassium   Date Value Ref Range Status   02/10/2024 4.4 3.4 - 5.3 mmol/L Final   09/17/2022 4.1 3.4 - 5.3 mmol/L Final   12/29/2021 5.1 3.5 - 5.2 mmol/L Final     Chloride   Date Value Ref Range Status   02/10/2024 98 98 - 107 mmol/L Final   09/17/2022 106 94 - 109 mmol/L Final   12/29/2021 102 96 - 106 mmol/L Final     Carbon Dioxide   Date Value Ref Range Status   07/17/2019 24 20 - 32 mmol/L Final     Carbon Dioxide (CO2)   Date Value Ref Range Status   02/10/2024 26 22 - 29 mmol/L Final   09/17/2022 27 20 - 32 mmol/L Final     Anion Gap   Date Value Ref Range Status   02/10/2024 11 7 - 15 mmol/L Final   09/17/2022 7 3 - 14 mmol/L Final   07/17/2019 8 3 - 14 mmol/L Final     Glucose   Date Value Ref Range Status   02/10/2024 177 (H) 70 - 99 mg/dL Final   09/17/2022 190 (H) 70 - 99 mg/dL Final   12/29/2021 200 (H) 65 - 99 mg/dL Final     Urea Nitrogen   Date Value Ref Range Status   02/10/2024 14.6 8.0 - 23.0 mg/dL Final   09/17/2022 19 7 - 30 mg/dL Final   12/29/2021 17 8 - 27 mg/dL Final     BUN/Creatinine Ratio   Date Value Ref Range Status   12/29/2021 15 10 - 24 Final     Creatinine   Date Value Ref Range Status   02/10/2024 1.03 0.67 - 1.17 mg/dL Final    12/29/2021 1.15 0.76 - 1.27 mg/dL Final     GFR Estimate   Date Value Ref Range Status   02/10/2024 76 >60 mL/min/1.73m2 Final   07/17/2019 77 >60 mL/min/[1.73_m2] Final     Comment:     Non  GFR Calc  Starting 12/18/2018, serum creatinine based estimated GFR (eGFR) will be   calculated using the Chronic Kidney Disease Epidemiology Collaboration   (CKD-EPI) equation.       Calcium   Date Value Ref Range Status   02/10/2024 9.3 8.8 - 10.2 mg/dL Final   12/29/2021 9.5 8.6 - 10.2 mg/dL Final     Bilirubin Total   Date Value Ref Range Status   02/10/2024 0.5 <=1.2 mg/dL Final   12/29/2021 0.6 0.0 - 1.2 mg/dL Final     Alkaline Phosphatase   Date Value Ref Range Status   02/10/2024 116 40 - 150 U/L Final     Comment:     Reference intervals for this test were updated on 11/14/2023 to more accurately reflect our healthy population. There may be differences in the flagging of prior results with similar values performed with this method. Interpretation of those prior results can be made in the context of the updated reference intervals.   12/29/2021 116 44 - 121 IU/L Final     Comment:                   **Please note reference interval change**     ALT   Date Value Ref Range Status   02/10/2024 36 0 - 70 U/L Final     Comment:     Reference intervals for this test were updated on 6/12/2023 to more accurately reflect our healthy population. There may be differences in the flagging of prior results with similar values performed with this method. Interpretation of those prior results can be made in the context of the updated reference intervals.     12/29/2021 31 0 - 44 IU/L Final     AST   Date Value Ref Range Status   02/10/2024 43 0 - 45 U/L Final     Comment:     Reference intervals for this test were updated on 6/12/2023 to more accurately reflect our healthy population. There may be differences in the flagging of prior results with similar values performed with this method. Interpretation of those prior  results can be made in the context of the updated reference intervals.   12/29/2021 32 0 - 40 IU/L Final     Recent Labs   Lab Test 02/12/24  0000 02/10/23  0922   CHOL 117 154   HDL 24* 44   LDL 70 84   TRIG 111 132      Lab Results   Component Value Date    A1C 6.9 02/12/2024    A1C 6.5 08/08/2023    A1C 6.1 12/06/2022    A1C 6.3 05/03/2022    A1C 6.8 12/29/2021        No results found for this or any previous visit (from the past 4320 hour(s)).     Thank you for allowing me to participate in the care of your patient.      Sincerely,     Juan Moe MD     [facility]  cc:   May Zhou PA-C

## 2024-03-18 NOTE — PATIENT INSTRUCTIONS
March 18, 2024    Thank you for allowing our Cardiology team to participate in your care.     Please note the following changes to your heart treatment plan:     Medication changes:   - stop clopidogrel, continue aspirin 81mg daily     Tests to be done:  - none    Follow up:  - Follow up as needed      For scheduling, please call [phone].      Please contact our team through [facility] or our Nurse Team Voicemail service [phone], or the General Clinic [phone] for any questions or concerns.     If you are having a medical emergency, please call [phone].     Sincerely,    Juan Moe MD, FACC  Cardiology    [facility] [place]

## 2024-03-18 NOTE — PROGRESS NOTES
Cardiology Clinic Progress Note:    March 18, 2024   Patient Name: Hugo Prince  Patient MRN: [id number]     Consult indication: CAD    HPI:    I had the opportunity to see patient Hugo Prince in cardiology clinic for a follow up visit. Patient is followed by our colleague Clay Luis MD with Primary Care.     As you know patient is a very pleasant 73-year-old male with a past medical history significant for CAD status post stenting to ostial LAD 2013, mid LAD 2019, sleep apnea (on CPAP therapy), smoking, obesity, recurrent nephrolithiasis, chronic back pain, diabetes, who presents for follow-up.    Prior cardiac history notable for NSTEMI 7/2019, coronary angiogram demonstrated culprit lesion of the mid LAD that was treated with a drug-eluting stent.  He was noted to have residual moderate proximal LAD lesion which was negative by IFR, moderate mid RCA disease.  Follow-up TTE demonstrated normal cardiac function.  When I had seen him in follow-up 2/2023, he had been experiencing intermittent chest discomfort.  Notably patient has a history of a false negative nuclear stress test.  He has a history of claustrophobia and back pain, so cardiac stress MRI was considered but was felt to be a suboptimal option for further evaluation.  Given overall clinical presentation, we had recommended cardiac catheterization/coronary angiography and possible intervention.  Patient wanted to consider this further, ultimately underwent repeat coronary angiogram 3/9/2023 which demonstrated a 60% ostial to proximal LAD lesion with IFR 0.86, consistent with obstructive disease, this was treated with a single drug-eluting stent.  Previously placed mid LAD stent was patent.  He has a residual 40% mid RCA lesion.    Overall patient reports that he has been doing well.  No recurrence of chest pain since stenting.  He has had issues with being on dual antiplatelet therapy due to severe back pain for which he has  required injections, he was unable to have this done while on dual antiplatelet therapy.  Patient also is a very active outdoorsman, particularly fishing (flyfishing) and notes issues with bleeding with nicks and cuts on his hands and so forth.  [place] has also resulted in some minor bleeding issues with injuries.  BP today in clinic 133/65 mmHg.  LDL 70 2/2024.    Assessment and Plan/Recommendations:    # CAD s/p PCI with MICAH to mLAD 2019, repeat PCI with MICAH to ostial to proximal LAD 2023. No angina.   # HTN, stable  # HL, on statin  # DM2    -Overall patient is in stable cardiac health without symptoms concerning for angina or decompensated heart failure  - It has been over 12 months since PCI, will stop dual antiplatelet therapy and continue with aspirin only since patient has required injections for back pain which cannot be performed while on clopidogrel  - Continue current cardiac regimen of amlodipine, lisinopril, metoprolol, atorvastatin, sublingual nitroglycerin as needed  - Follow-up in cardiology clinic as needed    Thank you for allowing our team to participate in the care of Hugo Prince.  Please do not hesitate to call or page me with any questions or concerns.    Sincerely,     Juan Moe MD, [facility]  Cardiology  Text Page   March 18, 2024      Voice recognition software utilized.     Past Medical History:   The ASCVD Risk score (Wili DK, et al., 2019) failed to calculate for the following reasons:    The patient has a prior MI or stroke diagnosis  Past Medical History:   Diagnosis Date    Anemia due to blood loss, acute 1/14/2015    Atopic dermatitis     CAD (coronary artery disease)     Dermatitis 1/12/2015    Diabetes mellitus, type 2 (H) 12/29/2021    DJD (degenerative joint disease) of knee 1/14/2015    Dyslipidemia     ED (erectile dysfunction) 4/30/2012    Esophageal reflux     Gastroesophageal reflux     NSTEMI (non-ST elevated myocardial infarction) (H) 7/15/2019    RAY (obstructive sleep apnea)     Other and unspecified disc disorder of unspecified region     Intervertebral disc disorders    Personal history of colonic polyps     Colon polyps    S/P lateral meniscectomy of left knee     S/P total knee replacement 1/7/2015    Tick bite 4/20/15    amoxicillin treated    Unspecified sleep apnea     Sleep apnea    Urinary calculus, unspecified     Renal stones    Vitamin D deficiency 9-27-09        Past Surgical History:   Past Surgical History:   Procedure Laterality Date    ARTHROPLASTY KNEE Left 1/7/2015    Procedure: ARTHROPLASTY KNEE;  Surgeon: Agustin Saenz MD;  Location:  OR    ARTHROSCOPY KNEE RT/LT  6-20-11    COLONOSCOPY  12/09    CV CORONARY ANGIOGRAM N/A 3/9/2023    Procedure: Coronary Angiogram;  Surgeon: Dalia Chew MD;  Location:  HEART CARDIAC CATH LAB    CV FRACTIONAL FLOW RATIO WIRE N/A 7/16/2019    Procedure: Fractional Flow Reserve;  Surgeon: Mick Thrasher MD;  Location:  HEART CARDIAC CATH LAB    CV HEART CATHETERIZATION WITH POSSIBLE INTERVENTION N/A 7/16/2019    successful PCI with MICAH placement to mid LAD     CV PCI N/A 3/9/2023    Procedure: Percutaneous Coronary Intervention;  Surgeon: Dalia Chew MD;  Location:  HEART CARDIAC CATH LAB    CYSTOSCOPY      EXTRACORPOREAL SHOCK WAVE LITHOTRIPSY (ESWL)  1/20/2012    Procedure:EXTRACORPOREAL SHOCK WAVE LITHOTRIPSY (ESWL); LEFT EXTRACORPOREAL SHOCK WAVE LITHOTRIPSY ; Surgeon:ANJUM BUNDY; Location: OR    EXTRACORPOREAL SHOCK WAVE LITHOTRIPSY, CYSTOSCOPY, INSERT STENT URETER(S), COMBINED  2/3/2012    Procedure:COMBINED EXTRACORPOREAL SHOCK WAVE LITHOTRIPSY, CYSTOSCOPY, INSERT STENT URETER(S); CYSTOSCOPY, RIGHT STENT; Surgeon:ANJUM BUNDY; Location: OR    FUSION LUMBAR ANTERIOR, FUSION LUMBAR POSTERIOR ONE LEVEL, COMBINED  11-1-10    L4-5    LASER HOLMIUM LITHOTRIPSY URETER(S),  INSERT STENT, COMBINED  2/7/2012    Procedure:COMBINED CYSTOSCOPY, URETEROSCOPY, LASER HOLMIUM LITHOTRIPSY URETER(S), INSERT STENT; CYSTOSCOPY, RIGHT URETEROSCOPY, RIGHT RETROGRADES,  STONE EXTRACTION WITH HOLMIUM LASER AND RIGHT URETERAL STENT EXCHANGE ; Surgeon:DEEPTI MELVIN; Location:[facility] OR    LITHOTRIPSY      Lithotrypsy    SEPTOPLASTY      VASECTOMY         Medications (outpatient):  Current Outpatient Medications   Medication Sig Dispense Refill    amLODIPine (NORVASC) 5 MG tablet Take 1 tablet (5 mg) by mouth daily 90 tablet 3    aspirin (ASA) 81 MG EC tablet Take 1 tablet (81 mg) by mouth daily      atorvastatin (LIPITOR) 40 MG tablet Take 1 tablet (40 mg) by mouth daily 90 tablet 3    fluticasone (FLONASE) 50 MCG/ACT nasal spray Spray 1 spray into both nostrils daily (Patient taking differently: Spray 1 spray into both nostrils as needed) 16 g 0    lisinopril (ZESTRIL) 5 MG tablet Take 1 tablet (5 mg) by mouth daily 90 tablet 3    metFORMIN (GLUCOPHAGE XR) 500 MG 24 hr tablet Take 2 tablets (1,000 mg) by mouth daily (with dinner) for 90 days 180 tablet 0    metoprolol tartrate (LOPRESSOR) 25 MG tablet Take 1 tablet (25 mg) by mouth 2 times daily 180 tablet 3    Multiple Vitamins-Minerals (PRESERVISION AREDS 2 PO) Take by mouth daily OTC      nitroGLYcerin (NITROSTAT) 0.4 MG sublingual tablet As needed for chest pain place 1 tablet under the tongue every 5 minutes for 3 doses. If symptoms persist 5 minutes after 1st dose call 911. 25 tablet 0    ondansetron (ZOFRAN ODT) 4 MG ODT tab Take 1 tablet (4 mg) by mouth every 8 hours as needed for nausea 15 tablet 0    order for DME Equipment being ordered: CPAP      pantoprazole (PROTONIX) 40 MG EC tablet Take 1 tablet (40 mg) by mouth daily 90 tablet 1       Allergies:  Allergies   Allergen Reactions    Chlorthalidone GI Disturbance    Compazine Other (See Comments)     PSYCHOTIC    Erythromycin Nausea and Vomiting    Ethanol Nausea and Vomiting    Flomax  "[Tamsulosin Hcl]      FLUSHING, NASAl CONGESTION    Prochlorperazine Other (See Comments)     Other reaction(s): extreme anxiety    Seasonal Allergies        Social History:   History   Drug Use No      History   Smoking Status    Former    Types: Cigars   Smokeless Tobacco    Never     Social History    Substance and Sexual Activity      Alcohol use: Yes        Alcohol/week: 11.7 standard drinks of alcohol        Types: 14 Standard drinks or equivalent per week        Comment: 1-2 drinks day/beer and scotch       Family History:  Family History   Problem Relation Age of Onset    Melanoma Father     Prostate Cancer Father     Cancer Father         neck cancer from radiation at work (urology)    Breast Cancer Mother     C.A.D. Paternal Grandfather 72       Review of Systems:   A complete review of systems was negative except as mentioned in the History of Present Illness.     Objective & Physical Exam:  /65   Pulse 62   Ht 1.778 m (5' 10\")   Wt 133.8 kg (295 lb)   SpO2 97%   BMI 42.33 kg/m    Wt Readings from Last 2 Encounters:   03/18/24 133.8 kg (295 lb)   02/12/24 134.3 kg (296 lb)     Body mass index is 42.33 kg/m .   Body surface area is 2.57 meters squared.    Constitutional: appears stated age, in no apparent distress, appears to be well nourished  Pulmonary: clear to auscultation bilaterally, no wheezes, no rales, no increased work of breathing  Cardiovascular: JVP normal, regular rate, regular rhythm, normal S1 and S2, no S3, S4, no murmur appreciated      Data reviewed:  Lab Results   Component Value Date    WBC 7.1 02/10/2024    WBC 5.5 12/29/2021    WBC 9.0 07/17/2019    RBC 4.21 (L) 02/10/2024    RBC 4.51 12/29/2021    RBC 4.50 07/17/2019    HGB 14.0 02/10/2024    HGB 14.5 12/29/2021    HCT 41.5 02/10/2024    HCT 44.5 12/29/2021    MCV 99 02/10/2024    MCV 98.7 12/29/2021    MCH 33.3 (H) 02/10/2024    MCH 32.3 (A) 12/29/2021    MCHC 33.7 02/10/2024    MCHC 32.7 (A) 12/29/2021    RDW 12.1 " 02/10/2024    RDW 12.7 07/17/2019     02/10/2024     12/29/2021     Sodium   Date Value Ref Range Status   02/10/2024 135 135 - 145 mmol/L Final     Comment:     Reference intervals for this test were updated on 09/26/2023 to more accurately reflect our healthy population. There may be differences in the flagging of prior results with similar values performed with this method. Interpretation of those prior results can be made in the context of the updated reference intervals.    12/29/2021 139 134 - 144 mmol/L Final     Potassium   Date Value Ref Range Status   02/10/2024 4.4 3.4 - 5.3 mmol/L Final   09/17/2022 4.1 3.4 - 5.3 mmol/L Final   12/29/2021 5.1 3.5 - 5.2 mmol/L Final     Chloride   Date Value Ref Range Status   02/10/2024 98 98 - 107 mmol/L Final   09/17/2022 106 94 - 109 mmol/L Final   12/29/2021 102 96 - 106 mmol/L Final     Carbon Dioxide   Date Value Ref Range Status   07/17/2019 24 20 - 32 mmol/L Final     Carbon Dioxide (CO2)   Date Value Ref Range Status   02/10/2024 26 22 - 29 mmol/L Final   09/17/2022 27 20 - 32 mmol/L Final     Anion Gap   Date Value Ref Range Status   02/10/2024 11 7 - 15 mmol/L Final   09/17/2022 7 3 - 14 mmol/L Final   07/17/2019 8 3 - 14 mmol/L Final     Glucose   Date Value Ref Range Status   02/10/2024 177 (H) 70 - 99 mg/dL Final   09/17/2022 190 (H) 70 - 99 mg/dL Final   12/29/2021 200 (H) 65 - 99 mg/dL Final     Urea Nitrogen   Date Value Ref Range Status   02/10/2024 14.6 8.0 - 23.0 mg/dL Final   09/17/2022 19 7 - 30 mg/dL Final   12/29/2021 17 8 - 27 mg/dL Final     BUN/Creatinine Ratio   Date Value Ref Range Status   12/29/2021 15 10 - 24 Final     Creatinine   Date Value Ref Range Status   02/10/2024 1.03 0.67 - 1.17 mg/dL Final   12/29/2021 1.15 0.76 - 1.27 mg/dL Final     GFR Estimate   Date Value Ref Range Status   02/10/2024 76 >60 mL/min/1.73m2 Final   07/17/2019 77 >60 mL/min/[1.73_m2] Final     Comment:     Non  GFR Calc  Starting  12/18/2018, serum creatinine based estimated GFR (eGFR) will be   calculated using the Chronic Kidney Disease Epidemiology Collaboration   (CKD-EPI) equation.       Calcium   Date Value Ref Range Status   02/10/2024 9.3 8.8 - 10.2 mg/dL Final   12/29/2021 9.5 8.6 - 10.2 mg/dL Final     Bilirubin Total   Date Value Ref Range Status   02/10/2024 0.5 <=1.2 mg/dL Final   12/29/2021 0.6 0.0 - 1.2 mg/dL Final     Alkaline Phosphatase   Date Value Ref Range Status   02/10/2024 116 40 - 150 U/L Final     Comment:     Reference intervals for this test were updated on 11/14/2023 to more accurately reflect our healthy population. There may be differences in the flagging of prior results with similar values performed with this method. Interpretation of those prior results can be made in the context of the updated reference intervals.   12/29/2021 116 44 - 121 IU/L Final     Comment:                   **Please note reference interval change**     ALT   Date Value Ref Range Status   02/10/2024 36 0 - 70 U/L Final     Comment:     Reference intervals for this test were updated on 6/12/2023 to more accurately reflect our healthy population. There may be differences in the flagging of prior results with similar values performed with this method. Interpretation of those prior results can be made in the context of the updated reference intervals.     12/29/2021 31 0 - 44 IU/L Final     AST   Date Value Ref Range Status   02/10/2024 43 0 - 45 U/L Final     Comment:     Reference intervals for this test were updated on 6/12/2023 to more accurately reflect our healthy population. There may be differences in the flagging of prior results with similar values performed with this method. Interpretation of those prior results can be made in the context of the updated reference intervals.   12/29/2021 32 0 - 40 IU/L Final     Recent Labs   Lab Test 02/12/24  0000 02/10/23  0922   CHOL 117 154   HDL 24* 44   LDL 70 84   TRIG 111 132      Lab  Results   Component Value Date    A1C 6.9 02/12/2024    A1C 6.5 08/08/2023    A1C 6.1 12/06/2022    A1C 6.3 05/03/2022    A1C 6.8 12/29/2021        No results found for this or any previous visit (from the past 4320 hour(s)).

## 2024-03-26 ENCOUNTER — TELEPHONE (OUTPATIENT)
Dept: FAMILY MEDICINE | Facility: CLINIC | Age: 74
End: 2024-03-26

## 2024-03-26 DIAGNOSIS — E11.42 TYPE 2 DIABETES MELLITUS WITH DIABETIC POLYNEUROPATHY, WITHOUT LONG-TERM CURRENT USE OF INSULIN (H): Primary | ICD-10-CM

## 2024-03-26 NOTE — TELEPHONE ENCOUNTER
"Patient interested in starting Ozempic.   Last related visit: 2/12/24 -- metformin doubled   Hemoglobin A1C        Date                     Value               Ref Range           Status               02/12/2024               6.9 (A)             4.0 - 6.0 %         Final                 08/08/2023               6.5 (A)             4.0 - 6.0 %         Final                 12/06/2022               6.1 (A)             4.0 - 6.0 %         Final            ----------      Estimated body mass index is 42.33 kg/m  as calculated from the following:     Height as of 3/18/24: 1.778 m (5' 10\").     Weight as of 3/18/24: 133.8 kg (295 lb).     Plan: Dr. Luis agrees with starting Ozempic 0.25mg weekly and to follow up for visit one month later.   Patient agrees. Rx sent. Patient would like to RTC for nurse to instruct on injections. He will call when he is able to  rx to set up this appt.  Sherry Case RN    "

## 2024-03-28 NOTE — TELEPHONE ENCOUNTER
3/27/24  Patient called office reporting decided not to proceed with Ozempic due to cost. First month=$503 and next would be over $100.   Has deductible to meet.   Dr. Luis informed. They can discuss at future visit

## 2024-05-12 DIAGNOSIS — E11.42 TYPE 2 DIABETES MELLITUS WITH DIABETIC POLYNEUROPATHY, WITHOUT LONG-TERM CURRENT USE OF INSULIN (H): ICD-10-CM

## 2024-05-13 RX ORDER — METFORMIN HCL 500 MG
1000 TABLET, EXTENDED RELEASE 24 HR ORAL
Qty: 180 TABLET | Refills: 1 | Status: SHIPPED | OUTPATIENT
Start: 2024-05-13 | End: 2024-11-09

## 2024-05-13 NOTE — CONFIDENTIAL NOTE
Med: METFORMIN    LOV (related): 2/12/24 - CPX    Last Lab: 2/12/24      Due for F/U around: 8/2024 FOR DM CHECK    Next Appt: 5/31/24        Lab Results   Component Value Date    A1C 6.9 02/12/2024    A1C 6.5 08/08/2023    A1C 6.1 12/06/2022    A1C 6.3 05/03/2022    A1C 6.8 12/29/2021

## 2024-05-31 ENCOUNTER — OFFICE VISIT (OUTPATIENT)
Dept: FAMILY MEDICINE | Facility: CLINIC | Age: 74
End: 2024-05-31

## 2024-05-31 VITALS
HEART RATE: 52 BPM | SYSTOLIC BLOOD PRESSURE: 127 MMHG | WEIGHT: 291 LBS | DIASTOLIC BLOOD PRESSURE: 72 MMHG | BODY MASS INDEX: 41.75 KG/M2 | OXYGEN SATURATION: 98 %

## 2024-05-31 DIAGNOSIS — E66.01 MORBID OBESITY (H): ICD-10-CM

## 2024-05-31 DIAGNOSIS — E11.42 TYPE 2 DIABETES MELLITUS WITH DIABETIC POLYNEUROPATHY, WITHOUT LONG-TERM CURRENT USE OF INSULIN (H): Primary | ICD-10-CM

## 2024-05-31 DIAGNOSIS — E78.5 DYSLIPIDEMIA: ICD-10-CM

## 2024-05-31 DIAGNOSIS — I25.10 CORONARY ARTERY DISEASE INVOLVING NATIVE CORONARY ARTERY OF NATIVE HEART WITHOUT ANGINA PECTORIS: ICD-10-CM

## 2024-05-31 LAB — HBA1C MFR BLD: 6.6 % (ref 4–6)

## 2024-05-31 PROCEDURE — 83036 HEMOGLOBIN GLYCOSYLATED A1C: CPT | Performed by: FAMILY MEDICINE

## 2024-05-31 PROCEDURE — G2211 COMPLEX E/M VISIT ADD ON: HCPCS | Performed by: FAMILY MEDICINE

## 2024-05-31 PROCEDURE — 99214 OFFICE O/P EST MOD 30 MIN: CPT | Performed by: FAMILY MEDICINE

## 2024-05-31 PROCEDURE — 36415 COLL VENOUS BLD VENIPUNCTURE: CPT | Performed by: FAMILY MEDICINE

## 2024-05-31 NOTE — PROGRESS NOTES
Answers submitted by the patient for this visit:  Diabetes Visit (Submitted on 5/24/2024)  Chief Complaint: Chronic problems general questions HPI Form  Frequency of checking blood sugars:: not at all  Diabetic concerns:: none  Paraesthesia present:: numbness in feet  General Questionnaire (Submitted on 5/24/2024)  Chief Complaint: Chronic problems general questions HPI Form  How many servings of fruits and vegetables do you eat daily?: 4 or more  On average, how many sweetened beverages do you drink each day (Examples: soda, juice, sweet tea, etc.  Do NOT count diet or artificially sweetened beverages)?: 0  How many minutes a day do you exercise enough to make your heart beat faster?: 9 or less  How many days a week do you exercise enough to make your heart beat faster?: 3 or less  How many days per week do you miss taking your medication?: 0      Subjective     Hugo is a 74 year old patient who presents to clinic for follow up.    DM2: on Metformin 1000, tolerating.  not able to afford ozempic.  Trying to stay active but difficult with back.  Weight down 5 lbs     CAD: Follows with Dr Moe.  He was found to have a mid LAD lesion in July 2019 which was treated with a drug-eluting stent.  He had a moderate proximal LAD lesion which was negative by IFR.  He also had moderate mid RCA disease.  He was started on dual antiplatelet therapy.  He had some shortness of breath with Brilinta thus was switched to a prasugrel which was then discontinued. In March of 2023 he had new symptoms and an angiogram was performed showing 60% ostial to proximal LAD lesion with IFR of 0.86 indicating obstructive disease.  A single MICAH was placed.  He was again started on Brilinta but caused SOB again and was on Clopidogrel and ASA but DAPT recently stopped after 1 year.  He is also on metoprolol and lisinopril.  No recurrence of chest discomfort.     HLD: on statin     BMI 42: exercise is difficult to his back.  Not following a  diabetic or cardiac diet.       GERD: on omeprazole daily     RAY: uses CPAP    Review of Systems   Constitutional, HEENT, cardiovascular, pulmonary, GI, , musculoskeletal, neuro, skin, endocrine and psych systems are negative, except as otherwise noted.      Objective    /72   Pulse 52   Wt 132 kg (291 lb)   SpO2 98%   BMI 41.75 kg/m      General: Well appearing, NAD  Psych: normal mood and affect        Results for orders placed or performed in visit on 05/31/24 (from the past 24 hour(s))   Hemoglobin A1C (RMG)   Result Value Ref Range    Hemoglobin A1C 6.6 (A) 4.0 - 6.0 %       Type 2 diabetes mellitus with diabetic polyneuropathy, without long-term current use of insulin (H)  At goal, cont current meds  Restart ozempic if becomes more affordable  Consider metformin increase next visit  - Hemoglobin A1C (RMG)  - VENOUS COLLECTION  - Microalbumin (RMG); Future    Morbid obesity (H)  - Discussed importance of optimizing diet, exercise and healthy weight    Dyslipidemia  stable/controlled. Cont current medication(s) and treatment    Coronary artery disease involving native coronary artery of native heart without angina pectoris  Asymptomatic, cont aggressive secondary prevention  Consider increasing atorvastatin in future    Follow up in 3 months

## 2024-06-24 ENCOUNTER — TELEPHONE (OUTPATIENT)
Dept: CARDIOLOGY | Facility: CLINIC | Age: 74
End: 2024-06-24
Payer: MEDICARE

## 2024-06-24 NOTE — TELEPHONE ENCOUNTER
Received an update from [facility] that patient's lopressor 25mg BID was recalled as a contaminated LOT# [id number], exp 6/2027    Called patient and he states he already checked with his [facility] pharmacy. His script was not from the same LOT # so he is ok.

## 2024-08-12 ENCOUNTER — TELEPHONE (OUTPATIENT)
Dept: UROLOGY | Facility: CLINIC | Age: 74
End: 2024-08-12
Payer: MEDICARE

## 2024-08-12 NOTE — TELEPHONE ENCOUNTER
[facility] Call Center    Phone Message    May a detailed message be left on voicemail: yes     Reason for Call: Other: pt needs his referral updated, needs new expiring a date, please call     Action Taken: Other: urology    Travel Screening: Not Applicable     Date of Service:

## 2024-08-18 ENCOUNTER — TRANSFERRED RECORDS (OUTPATIENT)
Dept: MULTI SPECIALTY CLINIC | Facility: CLINIC | Age: 74
End: 2024-08-18
Payer: MEDICARE

## 2024-08-18 LAB — RETINOPATHY: NORMAL

## 2024-08-30 ENCOUNTER — OFFICE VISIT (OUTPATIENT)
Dept: FAMILY MEDICINE | Facility: CLINIC | Age: 74
End: 2024-08-30

## 2024-08-30 VITALS
WEIGHT: 287 LBS | DIASTOLIC BLOOD PRESSURE: 69 MMHG | HEART RATE: 69 BPM | BODY MASS INDEX: 41.18 KG/M2 | SYSTOLIC BLOOD PRESSURE: 131 MMHG | OXYGEN SATURATION: 97 %

## 2024-08-30 DIAGNOSIS — I25.10 CORONARY ARTERY DISEASE INVOLVING NATIVE CORONARY ARTERY OF NATIVE HEART WITHOUT ANGINA PECTORIS: ICD-10-CM

## 2024-08-30 DIAGNOSIS — E11.42 TYPE 2 DIABETES MELLITUS WITH DIABETIC POLYNEUROPATHY, WITHOUT LONG-TERM CURRENT USE OF INSULIN (H): Primary | ICD-10-CM

## 2024-08-30 DIAGNOSIS — E78.5 DYSLIPIDEMIA: ICD-10-CM

## 2024-08-30 DIAGNOSIS — G47.33 OSA (OBSTRUCTIVE SLEEP APNEA): ICD-10-CM

## 2024-08-30 DIAGNOSIS — E66.01 MORBID OBESITY (H): ICD-10-CM

## 2024-08-30 LAB — HBA1C MFR BLD: 6.4 % (ref 4–6)

## 2024-08-30 PROCEDURE — 99214 OFFICE O/P EST MOD 30 MIN: CPT | Performed by: FAMILY MEDICINE

## 2024-08-30 PROCEDURE — 36415 COLL VENOUS BLD VENIPUNCTURE: CPT | Performed by: FAMILY MEDICINE

## 2024-08-30 PROCEDURE — 83036 HEMOGLOBIN GLYCOSYLATED A1C: CPT | Performed by: FAMILY MEDICINE

## 2024-08-30 PROCEDURE — G2211 COMPLEX E/M VISIT ADD ON: HCPCS | Performed by: FAMILY MEDICINE

## 2024-08-30 PROCEDURE — 3075F SYST BP GE 130 - 139MM HG: CPT | Performed by: FAMILY MEDICINE

## 2024-08-30 PROCEDURE — 3044F HG A1C LEVEL LT 7.0%: CPT | Performed by: FAMILY MEDICINE

## 2024-08-30 NOTE — PROGRESS NOTES
Answers submitted by the patient for this visit:  Diabetes Visit (Submitted on 8/29/2024)  Chief Complaint: Chronic problems general questions HPI Form  Frequency of checking blood sugars:: not at all  Diabetic concerns:: none  Paraesthesia present:: numbness in feet  Have you had a diabetic eye exam within the last year?: Yes  Date of last eye exam: : 8/18/24  Where was this eye exam done?: [facility]  General Questionnaire (Submitted on 8/29/2024)  Chief Complaint: Chronic problems general questions HPI Form  On average, how many sweetened beverages do you drink each day (Examples: soda, juice, sweet tea, etc.  Do NOT count diet or artificially sweetened beverages)?: 0  How many days a week do you exercise enough to make your heart beat faster?: 3 or less  How many days per week do you miss taking your medication?: 0      Subjective     Hugo is a 74 year old patient who presents to clinic for follow up.    DM2: on Metformin 1000, tolerating.  not able to afford ozempic.  Trying to stay active but difficult with back.  Weight down 4 more pounds     CAD: Follows with Dr Moe.  He was found to have a mid LAD lesion in July 2019 which was treated with a drug-eluting stent.  He had a moderate proximal LAD lesion which was negative by IFR.  He also had moderate mid RCA disease.  He was started on dual antiplatelet therapy.  He had some shortness of breath with Brilinta thus was switched to a prasugrel which was then discontinued. In March of 2023 he had new symptoms and an angiogram was performed showing 60% ostial to proximal LAD lesion with IFR of 0.86 indicating obstructive disease.  A single MICAH was placed.  He was again started on Brilinta but caused SOB again and was on Clopidogrel and ASA but DAPT recently stopped after 1 year.  He is also on metoprolol and lisinopril.  No recurrence of chest discomfort.     HLD: on statin     BMI 42: exercise is difficult to his back.  Not following a diabetic or  cardiac diet.       GERD: on omeprazole daily     RAY: uses CPAP    Review of Systems   Constitutional, HEENT, cardiovascular, pulmonary, GI, , musculoskeletal, neuro, skin, endocrine and psych systems are negative, except as otherwise noted.      Objective    /69   Pulse 69   Wt 130.2 kg (287 lb)   SpO2 97%   BMI 41.18 kg/m      General: Well appearing, NAD  Psych: normal mood and affect        Results for orders placed or performed in visit on 08/30/24 (from the past 24 hour(s))   Hemoglobin A1C (RMG)   Result Value Ref Range    Hemoglobin A1C 6.4 (A) 4.0 - 6.0 %       Type 2 diabetes mellitus with diabetic polyneuropathy, without long-term current use of insulin (H)  At goal  Cont current meds and weight loss efforts  Follow up in 6 months for AWV  - Hemoglobin A1C (RMG)  - VENOUS COLLECTION  - MOST RECENT SYSTOLIC BLOOD PRESS -139MM HG  - MOST RECENT HEMOGLOBIN A1C LEVEL <7.0%    RAY (obstructive sleep apnea)  Cont CPAP  Weight loss    Morbid obesity (H)  Cont all efforts at weight loss    Coronary artery disease involving native coronary artery of native heart without angina pectoris  Asymptomatic  Cont current meds  - MOST RECENT SYSTOLIC BLOOD PRESS -139MM HG    Dyslipidemia  stable/controlled. Cont current medication(s) and treatment    Follow up in 6 months

## 2024-11-08 ENCOUNTER — OFFICE VISIT (OUTPATIENT)
Dept: UROLOGY | Facility: CLINIC | Age: 74
End: 2024-11-08
Payer: MEDICARE

## 2024-11-08 ENCOUNTER — HOSPITAL ENCOUNTER (OUTPATIENT)
Dept: GENERAL RADIOLOGY | Facility: CLINIC | Age: 74
Discharge: HOME | End: 2024-11-08
Attending: NURSE PRACTITIONER | Admitting: NURSE PRACTITIONER
Payer: MEDICARE

## 2024-11-08 VITALS — SYSTOLIC BLOOD PRESSURE: 122 MMHG | OXYGEN SATURATION: 96 % | HEART RATE: 65 BPM | DIASTOLIC BLOOD PRESSURE: 72 MMHG

## 2024-11-08 DIAGNOSIS — N20.0 NEPHROLITHIASIS: ICD-10-CM

## 2024-11-08 DIAGNOSIS — N20.0 NEPHROLITHIASIS: Primary | ICD-10-CM

## 2024-11-08 PROCEDURE — 74018 RADEX ABDOMEN 1 VIEW: CPT

## 2024-11-08 PROCEDURE — 99213 OFFICE O/P EST LOW 20 MIN: CPT | Performed by: NURSE PRACTITIONER

## 2024-11-08 NOTE — PROGRESS NOTES
Urology Outpatient Visit    Name: Hugo Prince    MRN: [id number]   YOB: 1950               Chief Complaint:   Nephrolithiasis         Impression and Plan:   Impression / Plan:   Hugo Prince is a 74 year old male with bilateral nonobstructing nephrolithiasis, slightly increased stone burden over the last year.       Discussed treatment options available for his stone burden including PCNL, URS w steerable suction.    Discussed with patient that renal stones >2cm in maximal diameter are best treated by percutaneous nephrolithotomy (PCNL) under general anesthesia. Patient informed that the procedure entails making small 1 cm incision in the flank area and placing a nephrostomy catheter through the incision into the kidney under x-ray guidance, then a small telescope is passed through to visualize the stone, break it up and remove it from the body. Discussed overnight hospital stay post-procedure. Discussed the risk of progression to symptomatic pain/infection, and the possibility of renal failure/kidney damage if left untreated.     We discussed URS w steerable suction. Discussed that this is typically a same-day-procedure. Risks including need for secondary procedure, incomplete stone clearance, ureteral or bladder injury, hematuria, stent pain and irritation were discussed.    He will think on his options and let us know if he desires to pursue surgical management.     We also discussed active surveillance w XR KUB in 1 yrs time (ordered). We discussed potentially performing litholink at a later date to assess stone forming risk factors. Provided w stone prevention information on patient instructions.     Thank you for the opportunity to participate in the care of Hugo Prince.     DEMETRI Angel, CNP  [facility] - Department of Urology  [phone]          History of Present Illness:   Hugo Prince is a 74 year old male with T2DM, morbid obesity, RAY w CPAP  use, CAD status post stenting of the LAD in 2019 & 3/2023, dyslipidemia, ED, former tobacco use, here for follow-up on bilateral nephrolithiasis. Slightly increased stone burden compared to 1 yr ago, see XR impression below.     History is obtained from patient & EMR    Pertinent imaging reviewed:  KUB November 8, 2024 7:56 AM  IMPRESSION: Bilateral renal calculi. The largest left renal calculus  measures 1.6 cm, previously 1.5 cm. The largest right renal calculus  measures 1.4 cm, previously 1.3 cm. The other calculi bilaterally have  also minimally increased in size. Stable postoperative changes lumbar  spine. Probable prostatic calcifications in the pelvis     KUB November 6, 2023 9:05 AM   IMPRESSION: There are four left renal calculi, the largest measuring  1.5 cm. Findings have minimally increased when compared to previous.  Three probable right renal calculi measuring up to 1.3 cm. Only one  calculus visible on the prior x-ray. Stable postop changes in the  lumbar spine.          Past Medical History:     Past Medical History:   Diagnosis Date    Anemia due to blood loss, acute 1/14/2015    Atopic dermatitis     CAD (coronary artery disease)     Dermatitis 1/12/2015    Diabetes mellitus, type 2 (H) 12/29/2021    DJD (degenerative joint disease) of knee 1/14/2015    Dyslipidemia     ED (erectile dysfunction) 4/30/2012    Esophageal reflux     Gastroesophageal reflux    NSTEMI (non-ST elevated myocardial infarction) (H) 7/15/2019    RAY (obstructive sleep apnea)     Other and unspecified disc disorder of unspecified region     Intervertebral disc disorders    Personal history of colonic polyps     Colon polyps    S/P lateral meniscectomy of left knee     S/P total knee replacement 1/7/2015    Tick bite 4/20/15    amoxicillin treated    Unspecified sleep apnea     Sleep apnea    Urinary calculus, unspecified     Renal stones    Vitamin D deficiency 9-27-09          Past Surgical History:     Past Surgical  History:   Procedure Laterality Date    ARTHROPLASTY KNEE Left 1/7/2015    Procedure: ARTHROPLASTY KNEE;  Surgeon: Agustin Saenz MD;  Location:  OR    ARTHROSCOPY KNEE RT/LT  6-20-11    COLONOSCOPY  12/09    CV CORONARY ANGIOGRAM N/A 3/9/2023    Procedure: Coronary Angiogram;  Surgeon: Dalia Chew MD;  Location:  [facility]ATH LAB    CV FRACTIONAL FLOW RATIO WIRE N/A 7/16/2019    Procedure: Fractional Flow Reserve;  Surgeon: Mick Thrasher MD;  Location:  HE[facility]ARDI[facility] CATH LAB    CV HEART CATHETERIZATION WITH POSSIBLE INTERVENTION N/A 7/16/2019    successful PCI with MICAH placement to mid LAD     CV PCI N/A 3/9/2023    Procedure: Percutaneous Coronary Intervention;  Surgeon: Dalia Chew MD;  Location:  HE[facility]ARD[facility] CATH LAB    CYSTOSCOPY      EXTRACORPOREAL SHOCK WAVE LITHOTRIPSY (ESWL)  1/20/2012    Procedure:EXTRACORPOREAL SHOCK WAVE LITHOTRIPSY (ESWL); LEFT EXTRACORPOREAL SHOCK WAVE LITHOTRIPSY ; Surgeon:ANJUM BUNDY; Location: OR    EXTRACORPOREAL SHOCK WAVE LITHOTRIPSY, CYSTOSCOPY, INSERT STENT URETER(S), COMBINED  2/3/2012    Procedure:COMBINED EXTRACORPOREAL SHOCK WAVE LITHOTRIPSY, CYSTOSCOPY, INSERT STENT URETER(S); CYSTOSCOPY, RIGHT STENT; Surgeon:ANJUM BNUDY; Location: OR    FUSION LUMBAR ANTERIOR, FUSION LUMBAR POSTERIOR ONE LEVEL, COMBINED  11-1-10    L4-5    LASER HOLMIUM LITHOTRIPSY URETER(S), INSERT STENT, COMBINED  2/7/2012    Procedure:COMBINED CYSTOSCOPY, URETEROSCOPY, LASER HOLMIUM LITHOTRIPSY URETER(S), INSERT STENT; CYSTOSCOPY, RIGHT URETEROSCOPY, RIGHT RETROGRADES,  STONE EXTRACTION WITH HOLMIUM LASER AND RIGHT URETERAL STENT EXCHANGE ; Surgeon:DEEPTI MELVIN; Location: OR    LITHOTRIPSY      Lithotrypsy    SEPTOPLASTY      VASECTOMY            Social History:     Social History     Tobacco Use    Smoking status: Former     Types: Cigars    Smokeless tobacco: Never    Tobacco comments:     has cigars when it is  warm out   Substance Use Topics    Alcohol use: Yes     Alcohol/week: 11.7 standard drinks of alcohol     Types: 14 Standard drinks or equivalent per week     Comment: 1-2 drinks day/beer and scotch          Family History:     Family History   Problem Relation Age of Onset    Melanoma Father     Prostate Cancer Father     Cancer Father         neck cancer from radiation at work (urology)    Breast Cancer Mother     C.A.D. Paternal Grandfather 72          Allergies:     Allergies   Allergen Reactions    Chlorthalidone GI Disturbance    Compazine Other (See Comments)     PSYCHOTIC    Erythromycin Nausea and Vomiting    Ethanol Nausea and Vomiting    Flomax [Tamsulosin Hcl]      FLUSHING, NASAl CONGESTION    Prochlorperazine Other (See Comments)     Other reaction(s): extreme anxiety    Seasonal Allergies           Medications:     Current Outpatient Medications   Medication Sig Dispense Refill    amLODIPine (NORVASC) 5 MG tablet Take 1 tablet (5 mg) by mouth daily 90 tablet 3    aspirin (ASA) 81 MG EC tablet Take 1 tablet (81 mg) by mouth daily      atorvastatin (LIPITOR) 40 MG tablet Take 1 tablet (40 mg) by mouth daily 90 tablet 3    fluticasone (FLONASE) 50 MCG/ACT nasal spray Spray 1 spray into both nostrils daily (Patient taking differently: Spray 1 spray into both nostrils as needed.) 16 g 0    lisinopril (ZESTRIL) 5 MG tablet Take 1 tablet (5 mg) by mouth daily 90 tablet 3    metFORMIN (GLUCOPHAGE XR) 500 MG 24 hr tablet Take 2 tablets (1,000 mg) by mouth daily (with dinner) for 90 days 180 tablet 1    metoprolol tartrate (LOPRESSOR) 25 MG tablet Take 1 tablet (25 mg) by mouth 2 times daily 180 tablet 3    Multiple Vitamins-Minerals (PRESERVISION AREDS 2 PO) Take by mouth daily OTC      nitroGLYcerin (NITROSTAT) 0.4 MG sublingual tablet As needed for chest pain place 1 tablet under the tongue every 5 minutes for 3 doses. If symptoms persist 5 minutes after 1st dose call 911. 25 tablet 0    ondansetron (ZOFRAN  "ODT) 4 MG ODT tab Take 1 tablet (4 mg) by mouth every 8 hours as needed for nausea 15 tablet 0    order for DME Equipment being ordered: CPAP      pantoprazole (PROTONIX) 40 MG EC tablet Take 1 tablet (40 mg) by mouth daily 90 tablet 1     No current facility-administered medications for this visit.          Review of Systems:    ROS: See HPI for pertinent details.  Remainder of 10-point ROS negative.         Physical Exam:   VS:  T: Data Unavailable    HR: 65    BP: 122/72    RR: Data Unavailable     GEN:  Alert.  NAD.   HEENT:  Sclerae anicteric.    CV:  No obvious jugular venous distension.  LUNGS: No respiratory distress, breathing comfortably wo accessory muscle use.  ABD:  ND.     SKIN:  Dry. No visible rashes.   NEURO:  CN grossly intact.          Laboratory Data:   No results found for: \"PSA\"  Lab Results   Component Value Date    CR 1.03 02/10/2024    CR 0.98 03/11/2023    CR 0.93 03/09/2023    CR 0.95 09/17/2022    CR 1.15 12/29/2021    CR 1.05 12/07/2020    CR 1.08 10/19/2020    CR 0.98 07/17/2019    CR 0.99 07/15/2019    CR 1.09 12/17/2018    CR 1.01 01/08/2015    CR 1.15 06/30/2014    CR 1.00 11/28/2012     Lab Results   Component Value Date    GFRESTIMATED 76 02/10/2024    GFRESTIMATED 81 03/11/2023    GFRESTIMATED 86 03/09/2023    GFRESTIMATED 84 09/17/2022    GFRESTIMATED 77 07/17/2019    GFRESTIMATED 76 07/15/2019    GFRESTIMATED 67 12/17/2018    GFRESTIMATED 74 01/08/2015        "

## 2024-11-08 NOTE — LETTER
11/8/2024       RE: Hugo Prince  [address]  [facility] [address]     Dear Colleague,    Thank you for referring your patient, Hugo Prince, to the [facility] at [facility]. Please see a copy of my visit note below.      Urology Outpatient Visit    Name: Hugo Prince    MRN: [id number]   YOB: 1950               Chief Complaint:   Nephrolithiasis         Impression and Plan:   Impression / Plan:   Hugo Prince is a 74 year old male with bilateral nonobstructing nephrolithiasis, slightly increased stone burden over the last year.       Discussed treatment options available for his stone burden including PCNL, URS w steerable suction.    Discussed with patient that renal stones >2cm in maximal diameter are best treated by percutaneous nephrolithotomy (PCNL) under general anesthesia. Patient informed that the procedure entails making small 1 cm incision in the flank area and placing a nephrostomy catheter through the incision into the kidney under x-ray guidance, then a small telescope is passed through to visualize the stone, break it up and remove it from the body. Discussed overnight hospital stay post-procedure. Discussed the risk of progression to symptomatic pain/infection, and the possibility of renal failure/kidney damage if left untreated.     We discussed URS w steerable suction. Discussed that this is typically a same-day-procedure. Risks including need for secondary procedure, incomplete stone clearance, ureteral or bladder injury, hematuria, stent pain and irritation were discussed.    He will think on his options and let us know if he desires to pursue surgical management.     We also discussed active surveillance w XR KUB in 1 yrs time (ordered). We discussed potentially performing litholink at a later date to assess stone forming risk factors. Provided w stone prevention  information on patient instructions.     Thank you for the opportunity to participate in the care of Hugo Prince.     DEMETRI Angel, CNP  [facility] - Department of Urology  [phone]          History of Present Illness:   Hugo Prince is a 74 year old male with T2DM, morbid obesity, RAY w CPAP use, CAD status post stenting of the LAD in 2019 & 3/2023, dyslipidemia, ED, former tobacco use, here for follow-up on bilateral nephrolithiasis. Slightly increased stone burden compared to 1 yr ago, see XR impression below.     History is obtained from patient & EMR    Pertinent imaging reviewed:  KUB November 8, 2024 7:56 AM  IMPRESSION: Bilateral renal calculi. The largest left renal calculus  measures 1.6 cm, previously 1.5 cm. The largest right renal calculus  measures 1.4 cm, previously 1.3 cm. The other calculi bilaterally have  also minimally increased in size. Stable postoperative changes lumbar  spine. Probable prostatic calcifications in the pelvis     KUB November 6, 2023 9:05 AM   IMPRESSION: There are four left renal calculi, the largest measuring  1.5 cm. Findings have minimally increased when compared to previous.  Three probable right renal calculi measuring up to 1.3 cm. Only one  calculus visible on the prior x-ray. Stable postop changes in the  lumbar spine.          Past Medical History:     Past Medical History:   Diagnosis Date     Anemia due to blood loss, acute 1/14/2015     Atopic dermatitis      CAD (coronary artery disease)      Dermatitis 1/12/2015     Diabetes mellitus, type 2 (H) 12/29/2021     DJD (degenerative joint disease) of knee 1/14/2015     Dyslipidemia      ED (erectile dysfunction) 4/30/2012     Esophageal reflux     Gastroesophageal reflux     NSTEMI (non-ST elevated myocardial infarction) (H) 7/15/2019     RAY (obstructive sleep apnea)      Other and unspecified disc disorder of unspecified region     Intervertebral disc disorders     Personal history of  colonic polyps     Colon polyps     S/P lateral meniscectomy of left knee      S/P total knee replacement 1/7/2015     Tick bite 4/20/15    amoxicillin treated     Unspecified sleep apnea     Sleep apnea     Urinary calculus, unspecified     Renal stones     Vitamin D deficiency 9-27-09          Past Surgical History:     Past Surgical History:   Procedure Laterality Date     ARTHROPLASTY KNEE Left 1/7/2015    Procedure: ARTHROPLASTY KNEE;  Surgeon: Agustin Saenz MD;  Location:  OR     ARTHROSCOPY KNEE RT/LT  6-20-11     COLONOSCOPY  12/09     CV CORONARY ANGIOGRAM N/A 3/9/2023    Procedure: Coronary Angiogram;  Surgeon: Dalia Chew MD;  Location:  HEART CARDI[facility] CATH LAB     CV FRACTIONAL FLOW RATIO WIRE N/A 7/16/2019    Procedure: Fractional Flow Reserve;  Surgeon: Mick Thrasher MD;  Location:  HEART CARDIAC CATH LAB     CV HEART CATHETERIZATION WITH POSSIBLE INTERVENTION N/A 7/16/2019    successful PCI with MICAH placement to mid LAD      CV PCI N/A 3/9/2023    Procedure: Percutaneous Coronary Intervention;  Surgeon: Dalia Chew MD;  Location:  HEART CARDIAC CATH LAB     CYSTOSCOPY       EXTRACORPOREAL SHOCK WAVE LITHOTRIPSY (ESWL)  1/20/2012    Procedure:EXTRACORPOREAL SHOCK WAVE LITHOTRIPSY (ESWL); LEFT EXTRACORPOREAL SHOCK WAVE LITHOTRIPSY ; Surgeon:ANJUM BUNDY; Location: OR     EXTRACORPOREAL SHOCK WAVE LITHOTRIPSY, CYSTOSCOPY, INSERT STENT URETER(S), COMBINED  2/3/2012    Procedure:COMBINED EXTRACORPOREAL SHOCK WAVE LITHOTRIPSY, CYSTOSCOPY, INSERT STENT URETER(S); CYSTOSCOPY, RIGHT STENT; Surgeon:ANJUM BUNDY; Location: OR     FUSION LUMBAR ANTERIOR, FUSION LUMBAR POSTERIOR ONE LEVEL, COMBINED  11-1-10    L4-5     LASER HOLMIUM LITHOTRIPSY URETER(S), INSERT STENT, COMBINED  2/7/2012    Procedure:COMBINED CYSTOSCOPY, URETEROSCOPY, LASER HOLMIUM LITHOTRIPSY URETER(S), INSERT STENT; CYSTOSCOPY, RIGHT URETEROSCOPY, RIGHT RETROGRADES,  STONE  EXTRACTION WITH HOLMIUM LASER AND RIGHT URETERAL STENT EXCHANGE ; Surgeon:DEEPTI MELVIN; Location:[facility] OR     LITHOTRIPSY      Lithotrypsy     SEPTOPLASTY       VASECTOMY            Social History:     Social History     Tobacco Use     Smoking status: Former     Types: Cigars     Smokeless tobacco: Never     Tobacco comments:     has cigars when it is warm out   Substance Use Topics     Alcohol use: Yes     Alcohol/week: 11.7 standard drinks of alcohol     Types: 14 Standard drinks or equivalent per week     Comment: 1-2 drinks day/beer and scotch          Family History:     Family History   Problem Relation Age of Onset     Melanoma Father      Prostate Cancer Father      Cancer Father         neck cancer from radiation at work (urology)     Breast Cancer Mother      C.A.D. Paternal Grandfather 72          Allergies:     Allergies   Allergen Reactions     Chlorthalidone GI Disturbance     Compazine Other (See Comments)     PSYCHOTIC     Erythromycin Nausea and Vomiting     Ethanol Nausea and Vomiting     Flomax [Tamsulosin Hcl]      FLUSHING, NASAl CONGESTION     Prochlorperazine Other (See Comments)     Other reaction(s): extreme anxiety     Seasonal Allergies           Medications:     Current Outpatient Medications   Medication Sig Dispense Refill     amLODIPine (NORVASC) 5 MG tablet Take 1 tablet (5 mg) by mouth daily 90 tablet 3     aspirin (ASA) 81 MG EC tablet Take 1 tablet (81 mg) by mouth daily       atorvastatin (LIPITOR) 40 MG tablet Take 1 tablet (40 mg) by mouth daily 90 tablet 3     fluticasone (FLONASE) 50 MCG/ACT nasal spray Spray 1 spray into both nostrils daily (Patient taking differently: Spray 1 spray into both nostrils as needed.) 16 g 0     lisinopril (ZESTRIL) 5 MG tablet Take 1 tablet (5 mg) by mouth daily 90 tablet 3     metFORMIN (GLUCOPHAGE XR) 500 MG 24 hr tablet Take 2 tablets (1,000 mg) by mouth daily (with dinner) for 90 days 180 tablet 1     metoprolol tartrate (LOPRESSOR) 25  "MG tablet Take 1 tablet (25 mg) by mouth 2 times daily 180 tablet 3     Multiple Vitamins-Minerals (PRESERVISION AREDS 2 PO) Take by mouth daily OTC       nitroGLYcerin (NITROSTAT) 0.4 MG sublingual tablet As needed for chest pain place 1 tablet under the tongue every 5 minutes for 3 doses. If symptoms persist 5 minutes after 1st dose call 911. 25 tablet 0     ondansetron (ZOFRAN ODT) 4 MG ODT tab Take 1 tablet (4 mg) by mouth every 8 hours as needed for nausea 15 tablet 0     order for DME Equipment being ordered: CPAP       pantoprazole (PROTONIX) 40 MG EC tablet Take 1 tablet (40 mg) by mouth daily 90 tablet 1     No current facility-administered medications for this visit.          Review of Systems:    ROS: See HPI for pertinent details.  Remainder of 10-point ROS negative.         Physical Exam:   VS:  T: Data Unavailable    HR: 65    BP: 122/72    RR: Data Unavailable     GEN:  Alert.  NAD.   HEENT:  Sclerae anicteric.    CV:  No obvious jugular venous distension.  LUNGS: No respiratory distress, breathing comfortably wo accessory muscle use.  ABD:  ND.     SKIN:  Dry. No visible rashes.   NEURO:  CN grossly intact.          Laboratory Data:   No results found for: \"PSA\"  Lab Results   Component Value Date    CR 1.03 02/10/2024    CR 0.98 03/11/2023    CR 0.93 03/09/2023    CR 0.95 09/17/2022    CR 1.15 12/29/2021    CR 1.05 12/07/2020    CR 1.08 10/19/2020    CR 0.98 07/17/2019    CR 0.99 07/15/2019    CR 1.09 12/17/2018    CR 1.01 01/08/2015    CR 1.15 06/30/2014    CR 1.00 11/28/2012     Lab Results   Component Value Date    GFRESTIMATED 76 02/10/2024    GFRESTIMATED 81 03/11/2023    GFRESTIMATED 86 03/09/2023    GFRESTIMATED 84 09/17/2022    GFRESTIMATED 77 07/17/2019    GFRESTIMATED 76 07/15/2019    GFRESTIMATED 67 12/17/2018    GFRESTIMATED 74 01/08/2015            Again, thank you for allowing me to participate in the care of your patient.      Sincerely,    Darlene Wyatt, DEMETRI CNP    "

## 2024-11-08 NOTE — PATIENT INSTRUCTIONS
Treating large kidney stones is important due to several significant reasons:  1. Prevention of Complications: Large kidney stones can cause severe complications, including urinary tract obstruction, recurrent infections, and renal damage. Obstruction can lead to hydronephrosis, which, if untreated, can result in permanent kidney damage.  2. Low Likelihood of Spontaneous Passage: Stones larger than 10 mm are unlikely to pass spontaneously, necessitating intervention to prevent prolonged symptoms and complications.  3. Symptom Relief: Large stones often cause severe pain, hematuria, and recurrent infections, significantly impacting the patient's quality of life. Effective treatment can alleviate these symptoms and improve overall well-being.  4. High Stone-Free Rates: Interventions such as percutaneous nephrolithotomy (PCNL) and ureteroscopy (URS) are highly effective in achieving stone-free status, reducing the risk of recurrence and further complications.  5. Risk of Chronic Kidney Disease (CKD): Untreated large stones can increase the risk of CKD and end-stage renal disease (ESRD). Patients with large stone burdens are at higher risk for renal impairment, making timely treatment essential.  6. Prevention of Disease Progression: Residual stone fragments, especially those larger than 4 mm, are more likely to require further intervention and are associated with higher rates of disease progression.    In summary, treating large kidney stones is essential to prevent complications, alleviate symptoms, achieve high stone-free rates, and reduce the risk of CKD and disease progression. The American Urological Association recommends timely intervention for stones larger than 10 mm to prevent these adverse outcomes    What is Ureteroscopy?  Ureteroscopy involves the use of a thin, flexible or semirigid scope (ureteroscope) that is inserted through the urethra and bladder into the ureter and kidney. This allows the urologist  to directly visualize and treat abnormalities within the urinary tract.  Indications for Ureteroscopy:   Kidney Stones: URS is commonly used to remove stones that are too large to pass on their own or are causing symptoms such as pain, infection, or obstruction.   Ureteral Strictures: URS can diagnose and treat narrowing of the ureter.   Urothelial Tumors: URS allows for the visualization and biopsy of tumors within the urinary tract.  Procedure:   Preparation: Patients may undergo preoperative imaging and evaluation to determine the size and location of stones or other abnormalities. Preoperative stenting may be performed in some cases to facilitate the procedure.   During the Procedure: The patient is usually under general or spinal anesthesia. The ureteroscope is inserted through the urethra and bladder into the ureter. Laser lithotripsy may be used to fragment stones, and basket extraction can remove stone fragments. A ureteral stent may be placed to ensure proper drainage and healing.   Postoperative Care: Patients may experience mild discomfort, hematuria, or urinary frequency. A ureteral stent, if placed, is typically removed within a few days to weeks. Pain management and antibiotics may be prescribed to prevent infection and manage symptoms.  Risks and Complications:   Infection: Postoperative urinary tract infections are a potential risk, with a reported incidence of around 6.8%.   Ureteral Injury: There is a risk of ureteral wall injury or stricture formation, which may require further intervention.   Pain and Discomfort: Pain is a common postoperative complaint, often related to the presence of a ureteral stent.  Benefits:   Minimally Invasive: URS is less invasive than open surgery, with a shorter recovery time.   High Success Rate: URS is effective in treating stones and other abnormalities, with high stone-free rates and low complication rates.          Percutaneous Nephrolithotomy (PCNL) is a  minimally invasive surgical procedure used to remove large kidney stones, typically those greater than 2 cm in size. It is considered the gold standard for treating large or complex renal stones.  PCNL involves creating a small incision in the patient's back to access the kidney directly. A nephroscope is then inserted through this incision to visualize and remove the stones. The procedure is performed under general anesthesia.  Indications for PCNL:   Large kidney stones (>2 cm)   Staghorn calculi   Stones resistant to other treatments like shock wave lithotripsy (SWL) or ureteroscopy (URS)   Stones causing significant symptoms or complications such as infection or obstruction  Procedure Steps:  1. Preoperative Preparation: Patients undergo imaging studies to determine the size, location, and number of stones. Preoperative antibiotics may be administered to reduce the risk of infection.  2. Patient Positioning: The patient is positioned prone or supine, depending on the surgeon's preference and the specific case requirements.  3. Access and Dilation: A small incision is made in the back, and a needle is used to access the kidney. The tract is then dilated to allow the insertion of the nephroscope.  4. Stone Removal: Stones are fragmented using ultrasonic, pneumatic, or laser lithotripsy and removed through the nephroscope. Multiple tracts may be used for complete stone clearance if necessary.  5. Postoperative Care: A nephrostomy tube or ureteral stent may be placed to ensure proper drainage. Patients are monitored for complications such as bleeding, infection, or injury to surrounding structures.  Benefits:   High stone-free rates   Effective for large and complex stones   Minimally invasive with a shorter recovery time compared to open surgery  Risks and Complications:   Bleeding requiring transfusion   Infection   Injury to the kidney or surrounding organs   Postoperative pain and discomfort  According to the  American Urological Association and the  Association of Urology, PCNL is the preferred treatment for large renal stones due to its high efficacy and safety profile    OXALATE DIET    Beverages to avoid:  Draft beer  Ovaltine  Cocoa    Recommended beverages:  Coffee  Beer (bottle)  Carbonated soda  Distilled alcohol  Lemonade  Wine: red, dick, white  Buttermilk, Whole, lowfat or skim milk  Yogurt with allowed  fruits  Soy, almond and rice  Milk    Vegetables to avoid:  Beets**  greens  Collards  Kale  Leeks  Mustard greens  Okra  Parsley**  Sweet potato**  Rutabagas  Spinach**  Swiss chard**  Watercress**    Recommended vegetables:  Asparagus  Broccoli  Carrots  Corn: sweet, white  Cucumber, peeled  Green peas, canned  Lettuce  Lima beans  Parsnips  Tomato, 1 small, juice  Turnips  Avocado  [place] sprouts  Cauliflower  Cabbage  Mushrooms  Onions  Peas, green  White potato  Radish    Recommended Meat / Meat Substitutes:  Eggs  Cheese  Beef, lamb or pork  Poultry  Fish and shellfish  Sardines    Miscellaneous foods to avoid:  Nuts**  Peanuts, almonds,  pecans, cashews  Chocolate**,  Cocoa,**  Vegetable soup,  Marmalade  Peanut butter    Miscellaneous recommend foods:  Luu  Mayonnaise  Salad dressing  Vegetable oils  Butter, margarine  Coconut  Jelly or preserves (made with allowed  fruits)  Lemon, lime juice  Salt, pepper  Cornbread  Sponge cake  Spaghetti, canned in tomato sauce  Rice  Quinoa  All bread    Fruits to avoid:  Currants, red  Dewberries  Grapes, purple  Gooseberries  Lemon peel**  Lime peel**  Orange peel**  Rhubarb**    Recommended fruits:  Apple  Apricots  Cherries, red, sour  Cranberry juice  Grape juice  Orange, fruit and juice  Peaches  Pears  Pineapple, plum, purple  Prunes  Apple juice  Banana  Cherries, melyssa  Mangos  Melons, cantaloupe, cassava honeydew,  watermelon  Nectarines  Peaches  Pineapple juice  Plums, green or yellow    ** = very high in oxalate         A diet high in salt  (sodium) causes calcium to build in your urine. Too much calcium in your urine can lead to new stones. Avoiding highly processed foods like fast food is the best way to reduce sodium intake. You can use as much table salt as you like.    Calcium is a nutrient that is found in dairy products, such as yogurt, milk and cheese. You need to eat calcium so that it can bind with oxalate in the stomach and intestines before it moves to the kidneys. Eating foods with calcium is a good way for oxalates to leave the body and not form stones. The best way to get calcium into your body is through the foods you eat.     Avoid vitamin C supplements.    Drink enough fluids. The number one thing you can do is to drink enough fluids, like water. Your urine should ideally be a light straw color. Drinking enough fluids will dilute your urine and make it harder for chemicals to form stones.

## 2024-11-09 DIAGNOSIS — E11.42 TYPE 2 DIABETES MELLITUS WITH DIABETIC POLYNEUROPATHY, WITHOUT LONG-TERM CURRENT USE OF INSULIN (H): ICD-10-CM

## 2024-11-11 RX ORDER — METFORMIN HYDROCHLORIDE 500 MG/1
1000 TABLET, EXTENDED RELEASE ORAL
Qty: 180 TABLET | Refills: 0 | Status: SHIPPED | OUTPATIENT
Start: 2024-11-11

## 2024-11-11 NOTE — TELEPHONE ENCOUNTER
Med: Metformin    LOV (related): 8/30/24    Last Lab: 8/30/24      Due for F/U around: 2/30/25    Next Appt: 2/28/25        Lab Results   Component Value Date    A1C 6.4 08/30/2024    A1C 6.6 05/31/2024    A1C 6.9 02/12/2024    A1C 6.5 08/08/2023    A1C 6.1 12/06/2022

## 2024-11-18 ENCOUNTER — TELEPHONE (OUTPATIENT)
Dept: UROLOGY | Facility: CLINIC | Age: 74
End: 2024-11-18
Payer: MEDICARE

## 2024-11-18 DIAGNOSIS — N20.0 CALCULUS OF KIDNEY: ICD-10-CM

## 2024-11-18 DIAGNOSIS — N20.0 NEPHROLITHIASIS: Primary | ICD-10-CM

## 2024-11-18 NOTE — TELEPHONE ENCOUNTER
----- Message from Mily S sent at 11/18/2024  9:26 AM CST -----  Regarding: put in orders  Can you put in orders    Mily  ----- Message -----  From: Ariel Ochoa MD  Sent: 11/15/2024   4:31 PM CST  To: DEMETRI Valverde CNP; #    Hi Darlene,    Looks like he hasn't had a CT in a while and we should get that before finalizing our surgical plan.     Team - please scheduled a CT Abd/Pel without contrast and virtual visit follow up with me to review and discuss surgery options.     Thanks,   Ariel Ochoa M.D.  ----- Message -----  From: Darlene Wyatt APRN CNP  Sent: 11/13/2024   1:57 PM CST  To: Ariel Ochoa MD    Pt I was talking to you about, desires stone procedure, I think he is good candidate for URS w steerable suction

## 2024-11-26 ENCOUNTER — HOSPITAL ENCOUNTER (OUTPATIENT)
Dept: CT IMAGING | Facility: CLINIC | Age: 74
Discharge: HOME | End: 2024-11-26
Attending: UROLOGY
Payer: MEDICARE

## 2024-11-26 DIAGNOSIS — N20.0 CALCULUS OF KIDNEY: ICD-10-CM

## 2024-11-26 DIAGNOSIS — N20.0 NEPHROLITHIASIS: ICD-10-CM

## 2024-11-26 PROCEDURE — 74176 CT ABD & PELVIS W/O CONTRAST: CPT | Mod: MG

## 2024-11-26 PROCEDURE — G1010 CDSM STANSON: HCPCS

## 2024-12-02 ENCOUNTER — VIRTUAL VISIT (OUTPATIENT)
Dept: UROLOGY | Facility: CLINIC | Age: 74
End: 2024-12-02
Payer: MEDICARE

## 2024-12-02 VITALS — HEIGHT: 70 IN | BODY MASS INDEX: 40.66 KG/M2 | WEIGHT: 284 LBS

## 2024-12-02 DIAGNOSIS — I25.10 CORONARY ARTERY DISEASE INVOLVING NATIVE CORONARY ARTERY OF NATIVE HEART WITHOUT ANGINA PECTORIS: ICD-10-CM

## 2024-12-02 DIAGNOSIS — E11.42 TYPE 2 DIABETES MELLITUS WITH DIABETIC POLYNEUROPATHY, WITHOUT LONG-TERM CURRENT USE OF INSULIN (H): ICD-10-CM

## 2024-12-02 DIAGNOSIS — N20.0 KIDNEY STONE ON LEFT SIDE: Primary | ICD-10-CM

## 2024-12-02 PROCEDURE — 99214 OFFICE O/P EST MOD 30 MIN: CPT | Mod: 95 | Performed by: UROLOGY

## 2024-12-02 ASSESSMENT — PAIN SCALES - GENERAL: PAINLEVEL_OUTOF10: NO PAIN (0)

## 2024-12-02 NOTE — LETTER
"12/2/2024       RE: Hugo Prince  [address]  [facility] [address]     Dear Colleague,    Thank you for referring your patient, Hugo Prince, to the [facility] at [facility]. Please see a copy of my visit note below.    [place]   CHIEF COMPLAINT   It was my pleasure to see Hugo Prince who is a 74 year old male for follow-up of bilateral nephrolithiasis.      HPI   Hugo Prince is a very pleasant 74 year old male     Initially seen by Darlene Wyatt - 11/8/2024:  Hugo Prince is a 74 year old male with T2DM, morbid obesity, RAY w CPAP use, CAD status post stenting of the LAD in 2019 & 3/2023, dyslipidemia, ED, former tobacco use, here for follow-up on bilateral nephrolithiasis. Slightly increased stone burden compared to 1 yr ago, see XR impression below.     TODAY 12/2/2024:  No pain or issues  Follow-up today to review his CT scan and discuss surgical treatment options  He notes that his son is coming for a tentative visit over [place] who lives in [place].    PHYSICAL EXAM  Patient is a 74 year old  male   Vitals: Height 1.778 m (5' 10\"), weight 128.8 kg (284 lb).  Body mass index is 40.75 kg/m .  General Appearance Adult:   Alert, no acute distress, oriented  Neuro: Alert, oriented, speech and mentation normal  Psych: affect and mood normal       UA RESULTS:  Recent Labs   Lab Test 11/06/23  0945 07/22/19  1351 07/15/19  0540   COLOR Yellow   < > Yellow   APPEARANCE Clear   < > Clear   URINEGLC Negative   < > Negative   URINEBILI Negative   < > Negative   URINEKETONE Negative   < > Negative   SG 1.020   < > 1.015   UBLD Small*   < > Moderate*   URINEPH 5.5   < > 5.5   PROTEIN Negative   < > 10*   UROBILINOGEN 0.2   < >  --    NITRITE Negative   < > Negative   LEUKEST Trace*   < > Negative   RBCU  --   --  8*   WBCU  --   --  1    < > = values in this interval not displayed.    "   Creatinine   Date Value Ref Range Status   02/10/2024 1.03 0.67 - 1.17 mg/dL Final   12/29/2021 1.15 0.76 - 1.27 mg/dL Final      IMAGING:  All pertinent imaging reviewed:    All imaging studies reviewed by me.  I personally reviewed these imaging films.  A formal report from radiology will follow.    CT ABD/PEL 11/26/2024:  FINDINGS:   LOWER CHEST: Calcified granuloma right lower lobe.     HEPATOBILIARY: Normal.     PANCREAS: Normal.     SPLEEN: Normal.     ADRENAL GLANDS: Normal.     KIDNEYS/BLADDER: 1.2 cm nonobstructing stone in the left renal pelvis.  Nonobstructing left kidney calyceal stones measuring 0.8 cm in the  interpolar region and 0.8 cm and 1.2 cm at the lower pole. 5  nonobstructing calyceal tip stones in the right kidney, the largest of  which measures 8 mm. No ureteral stones or hydroureter. Normal  bladder.     BOWEL: Normal, including the appendix.     LYMPH NODES: Normal.     VASCULATURE: Moderate calcified atherosclerosis.     PELVIC ORGANS: Mildly enlarged prostate.     OTHER: None.     MUSCULOSKELETAL: Small fat-containing bilateral inguinal hernias.  Posterior and interbody fusion L3-S1. Moderate degenerative change in  the spine.                                                          IMPRESSION:   Nonobstructing bilateral kidney stones. Stone burden is similar to  7/15/2019. No stones in the ureters or bladder.    ASSESSMENT and PLAN  74-year-old man with complex medical history including CAD with cardiac stenting,.  Diabetes, obesity, RAY on CPAP.  With bilateral nephrolithiasis    Left kidney stones  - I reviewed his updated CT scan and reviewed these images personally.  I shared the images with the patient via our video connection.  We discussed that the stones have slowly enlarged over the last several years now with a 1.2 cm renal pelvis stone with additional 1.2 cm, 8 mm, and additional stones in the lower pole.  - We discussed that I would recommend and ureteroscopy with laser  lithotripsy, basketing of stones, and steerable vacuum suction.  We discussed the risks and benefits of surgery as well as the 10 to 20% risk of requiring a staged approach  - We discussed that he may remain on his aspirin 81 mg daily throughout the perioperative course  - Order for surgery placed      Time spent: 25 minutes spent on the date of the encounter doing chart review, history and exam, documentation and further activities as noted above.    Ariel Ochoa MD   Urology  [facility]  [facility] Phone: [phone]  [facility] Phone: [phone]        Virtual Visit Details    Type of service:  Video Visit   Video Start Time:  11:44 AM  Video End Time: 12:05 PM    Originating Location (pt. Location): Home    Distant Location (provider location):  On-site  Platform used for Video Visit: Elenita      Again, thank you for allowing me to participate in the care of your patient.      Sincerely,    Ariel Ochoa MD

## 2024-12-02 NOTE — NURSING NOTE
Current patient location: [address] [facility] [address]    Is the patient currently in the state of [place]? YES    Visit mode:VIDEO    If the visit is dropped, the patient can be reconnected by:VIDEO VISIT: Send to e-mail at: [email or web address]    Will anyone else be joining the visit? NO  (If patient encounters technical issues they should call [phone]    Are changes needed to the allergy or medication list? No    Are refills needed on medications prescribed by this physician? NO    Rooming Documentation:  Questionnaire(s) completed    Reason for visit: RECHECK    Brianne ROBLESF

## 2024-12-02 NOTE — PROGRESS NOTES
"[facility]   CHIEF COMPLAINT   It was my pleasure to see Hugo Prince who is a 74 year old male for follow-up of bilateral nephrolithiasis.      HPI   Hugo Prince is a very pleasant 74 year old male     Initially seen by Darlene Wyatt - 11/8/2024:  Hugo Prince is a 74 year old male with T2DM, morbid obesity, RYA w CPAP use, CAD status post stenting of the LAD in 2019 & 3/2023, dyslipidemia, ED, former tobacco use, here for follow-up on bilateral nephrolithiasis. Slightly increased stone burden compared to 1 yr ago, see XR impression below.     TODAY 12/2/2024:  No pain or issues  Follow-up today to review his CT scan and discuss surgical treatment options  He notes that his son is coming for a tentative visit over [place] who lives in [place].    PHYSICAL EXAM  Patient is a 74 year old  male   Vitals: Height 1.778 m (5' 10\"), weight 128.8 kg (284 lb).  Body mass index is 40.75 kg/m .  General Appearance Adult:   Alert, no acute distress, oriented  Neuro: Alert, oriented, speech and mentation normal  Psych: affect and mood normal       UA RESULTS:  Recent Labs   Lab Test 11/06/23  0945 07/22/19  1351 07/15/19  0540   COLOR Yellow   < > Yellow   APPEARANCE Clear   < > Clear   URINEGLC Negative   < > Negative   URINEBILI Negative   < > Negative   URINEKETONE Negative   < > Negative   SG 1.020   < > 1.015   UBLD Small*   < > Moderate*   URINEPH 5.5   < > 5.5   PROTEIN Negative   < > 10*   UROBILINOGEN 0.2   < >  --    NITRITE Negative   < > Negative   LEUKEST Trace*   < > Negative   RBCU  --   --  8*   WBCU  --   --  1    < > = values in this interval not displayed.      Creatinine   Date Value Ref Range Status   02/10/2024 1.03 0.67 - 1.17 mg/dL Final   12/29/2021 1.15 0.76 - 1.27 mg/dL Final      IMAGING:  All pertinent imaging reviewed:    All imaging studies reviewed by me.  I personally reviewed these imaging films.  A formal report from radiology will follow.    CT ABD/PEL " 11/26/2024:  FINDINGS:   LOWER CHEST: Calcified granuloma right lower lobe.     HEPATOBILIARY: Normal.     PANCREAS: Normal.     SPLEEN: Normal.     ADRENAL GLANDS: Normal.     KIDNEYS/BLADDER: 1.2 cm nonobstructing stone in the left renal pelvis.  Nonobstructing left kidney calyceal stones measuring 0.8 cm in the  interpolar region and 0.8 cm and 1.2 cm at the lower pole. 5  nonobstructing calyceal tip stones in the right kidney, the largest of  which measures 8 mm. No ureteral stones or hydroureter. Normal  bladder.     BOWEL: Normal, including the appendix.     LYMPH NODES: Normal.     VASCULATURE: Moderate calcified atherosclerosis.     PELVIC ORGANS: Mildly enlarged prostate.     OTHER: None.     MUSCULOSKELETAL: Small fat-containing bilateral inguinal hernias.  Posterior and interbody fusion L3-S1. Moderate degenerative change in  the spine.                                                          IMPRESSION:   Nonobstructing bilateral kidney stones. Stone burden is similar to  7/15/2019. No stones in the ureters or bladder.    ASSESSMENT and PLAN  74-year-old man with complex medical history including CAD with cardiac stenting,.  Diabetes, obesity, RAY on CPAP.  With bilateral nephrolithiasis    Left kidney stones  - I reviewed his updated CT scan and reviewed these images personally.  I shared the images with the patient via our video connection.  We discussed that the stones have slowly enlarged over the last several years now with a 1.2 cm renal pelvis stone with additional 1.2 cm, 8 mm, and additional stones in the lower pole.  - We discussed that I would recommend and ureteroscopy with laser lithotripsy, basketing of stones, and steerable vacuum suction.  We discussed the risks and benefits of surgery as well as the 10 to 20% risk of requiring a staged approach  - We discussed that he may remain on his aspirin 81 mg daily throughout the perioperative course  - Order for surgery placed      Time spent: 25  minutes spent on the date of the encounter doing chart review, history and exam, documentation and further activities as noted above.    Ariel Ochoa MD   Urology  [facility]  [facility] Phone: [phone]  [facility] Phone: [phone]        Virtual Visit Details    Type of service:  Video Visit   Video Start Time:  11:44 AM  Video End Time: 12:05 PM    Originating Location (pt. Location): Home    Distant Location (provider location):  On-site  Platform used for Video Visit: RositaWell

## 2024-12-02 NOTE — Clinical Note
Altaf Luis,  I met with Hugo today to review his updated CT scan and discuss surgery options for his left kidney stone burden.  Will plan for ureteroscopy in January.  I discussed with him that he may continue his aspirin 81 mg daily throughout the perioperative course.  Thanks,  Ariel Ochoa M.D. Cell: [phone]

## 2024-12-06 ENCOUNTER — MYC MEDICAL ADVICE (OUTPATIENT)
Dept: UROLOGY | Facility: CLINIC | Age: 74
End: 2024-12-06

## 2024-12-11 PROCEDURE — 82043 UR ALBUMIN QUANTITATIVE: CPT | Mod: ORL | Performed by: FAMILY MEDICINE

## 2024-12-11 PROCEDURE — 82570 ASSAY OF URINE CREATININE: CPT | Mod: ORL | Performed by: FAMILY MEDICINE

## 2024-12-11 PROCEDURE — 80048 BASIC METABOLIC PNL TOTAL CA: CPT | Mod: ORL | Performed by: FAMILY MEDICINE

## 2025-01-04 ENCOUNTER — OFFICE VISIT (OUTPATIENT)
Dept: URGENT CARE | Facility: URGENT CARE | Age: 75
End: 2025-01-04
Payer: MEDICARE

## 2025-01-04 VITALS
TEMPERATURE: 99.4 F | WEIGHT: 287 LBS | HEART RATE: 66 BPM | DIASTOLIC BLOOD PRESSURE: 83 MMHG | SYSTOLIC BLOOD PRESSURE: 144 MMHG | RESPIRATION RATE: 16 BRPM | BODY MASS INDEX: 41.18 KG/M2 | OXYGEN SATURATION: 100 %

## 2025-01-04 DIAGNOSIS — R05.1 ACUTE COUGH: ICD-10-CM

## 2025-01-04 DIAGNOSIS — R07.0 THROAT PAIN: Primary | ICD-10-CM

## 2025-01-04 DIAGNOSIS — J06.9 UPPER RESPIRATORY TRACT INFECTION, UNSPECIFIED TYPE: ICD-10-CM

## 2025-01-04 LAB
DEPRECATED S PYO AG THROAT QL EIA: NEGATIVE
FLUAV AG SPEC QL IA: NEGATIVE
FLUBV AG SPEC QL IA: NEGATIVE
S PYO DNA THROAT QL NAA+PROBE: NOT DETECTED

## 2025-01-04 PROCEDURE — 87651 STREP A DNA AMP PROBE: CPT | Performed by: FAMILY MEDICINE

## 2025-01-04 PROCEDURE — 99213 OFFICE O/P EST LOW 20 MIN: CPT | Performed by: FAMILY MEDICINE

## 2025-01-04 PROCEDURE — 87635 SARS-COV-2 COVID-19 AMP PRB: CPT | Performed by: FAMILY MEDICINE

## 2025-01-04 PROCEDURE — 87804 INFLUENZA ASSAY W/OPTIC: CPT | Performed by: FAMILY MEDICINE

## 2025-01-04 RX ORDER — BENZONATATE 200 MG/1
200 CAPSULE ORAL 3 TIMES DAILY PRN
Qty: 15 CAPSULE | Refills: 0 | Status: SHIPPED | OUTPATIENT
Start: 2025-01-04

## 2025-01-04 NOTE — PROGRESS NOTES
Assessment & Plan     Throat pain  neg  - Streptococcus A Rapid Screen w/Reflex to PCR - Clinic Collect  - Group A Streptococcus PCR Throat Swab    Acute cough  neg  - Influenza A & B Antigen - Clinic Collect    Upper respiratory tract infection, unspecified type  Will do COVID testing  - COVID-19 Virus (Coronavirus) by PCR             No follow-ups on file.    Mitch Carr MD  [facility] Arias is a 74 year old male who presents to clinic today for the following health issues:  Chief Complaint   Patient presents with    Cough     Congestion, runny nose Headaches x 2 days     Pharyngitis       HPI    Cough and runny nose  Headache  X2 day  ST        Review of Systems        Objective    BP (!) 144/83 (BP Location: Right arm, Patient Position: Chair, Cuff Size: Adult Large)   Pulse 66   Temp 99.4  F (37.4  C) (Tympanic)   Resp 16   Wt 130.2 kg (287 lb)   SpO2 100%   BMI 41.18 kg/m    Physical Exam  Vitals and nursing note reviewed.   Constitutional:       Appearance: Normal appearance.   HENT:      Right Ear: Tympanic membrane and ear canal normal.      Left Ear: Tympanic membrane and ear canal normal.      Mouth/Throat:      Mouth: Mucous membranes are moist.   Eyes:      Pupils: Pupils are equal, round, and reactive to light.   Cardiovascular:      Rate and Rhythm: Normal rate and regular rhythm.      Pulses: Normal pulses.      Heart sounds: Normal heart sounds.   Pulmonary:      Effort: Pulmonary effort is normal.      Breath sounds: Normal breath sounds.   Musculoskeletal:      Cervical back: Neck supple.   Neurological:      Mental Status: He is alert.

## 2025-01-05 LAB — SARS-COV-2 RNA RESP QL NAA+PROBE: NEGATIVE

## 2025-01-08 ENCOUNTER — ANESTHESIA (OUTPATIENT)
Dept: SURGERY | Facility: CLINIC | Age: 75
End: 2025-01-08
Payer: MEDICARE

## 2025-01-08 ENCOUNTER — ANESTHESIA EVENT (OUTPATIENT)
Dept: SURGERY | Facility: CLINIC | Age: 75
End: 2025-01-08
Payer: MEDICARE

## 2025-01-08 ENCOUNTER — HOSPITAL ENCOUNTER (OUTPATIENT)
Facility: CLINIC | Age: 75
Discharge: HOME | End: 2025-01-08
Attending: UROLOGY | Admitting: UROLOGY
Payer: MEDICARE

## 2025-01-08 VITALS
RESPIRATION RATE: 18 BRPM | OXYGEN SATURATION: 96 % | SYSTOLIC BLOOD PRESSURE: 146 MMHG | TEMPERATURE: 97.8 F | HEART RATE: 74 BPM | HEIGHT: 70 IN | BODY MASS INDEX: 40.94 KG/M2 | WEIGHT: 286 LBS | DIASTOLIC BLOOD PRESSURE: 82 MMHG

## 2025-01-08 DIAGNOSIS — N20.0 KIDNEY STONE ON LEFT SIDE: Primary | ICD-10-CM

## 2025-01-08 LAB — GLUCOSE BLDC GLUCOMTR-MCNC: 131 MG/DL (ref 70–99)

## 2025-01-08 PROCEDURE — 272N000001 HC OR GENERAL SUPPLY STERILE: Performed by: UROLOGY

## 2025-01-08 PROCEDURE — 82962 GLUCOSE BLOOD TEST: CPT

## 2025-01-08 PROCEDURE — 250N000011 HC RX IP 250 OP 636: Performed by: NURSE ANESTHETIST, CERTIFIED REGISTERED

## 2025-01-08 PROCEDURE — 82365 CALCULUS SPECTROSCOPY: CPT | Performed by: UROLOGY

## 2025-01-08 PROCEDURE — 250N000025 HC SEVOFLURANE, PER MIN: Performed by: UROLOGY

## 2025-01-08 PROCEDURE — C1894 INTRO/SHEATH, NON-LASER: HCPCS | Performed by: UROLOGY

## 2025-01-08 PROCEDURE — 360N000077 HC SURGERY LEVEL 4, PER MIN: Performed by: UROLOGY

## 2025-01-08 PROCEDURE — C1769 GUIDE WIRE: HCPCS | Performed by: UROLOGY

## 2025-01-08 PROCEDURE — 999N000141 HC STATISTIC PRE-PROCEDURE NURSING ASSESSMENT: Performed by: UROLOGY

## 2025-01-08 PROCEDURE — 710N000009 HC RECOVERY PHASE 1, LEVEL 1, PER MIN: Performed by: UROLOGY

## 2025-01-08 PROCEDURE — 250N000011 HC RX IP 250 OP 636: Performed by: UROLOGY

## 2025-01-08 PROCEDURE — 258N000003 HC RX IP 258 OP 636: Performed by: ANESTHESIOLOGY

## 2025-01-08 PROCEDURE — 258N000001 HC RX 258: Performed by: UROLOGY

## 2025-01-08 PROCEDURE — 250N000009 HC RX 250: Performed by: UROLOGY

## 2025-01-08 PROCEDURE — 250N000013 HC RX MED GY IP 250 OP 250 PS 637: Performed by: UROLOGY

## 2025-01-08 PROCEDURE — 710N000012 HC RECOVERY PHASE 2, PER MINUTE: Performed by: UROLOGY

## 2025-01-08 PROCEDURE — 370N000017 HC ANESTHESIA TECHNICAL FEE, PER MIN: Performed by: UROLOGY

## 2025-01-08 PROCEDURE — 258N000003 HC RX IP 258 OP 636: Performed by: NURSE ANESTHETIST, CERTIFIED REGISTERED

## 2025-01-08 PROCEDURE — 250N000009 HC RX 250: Performed by: NURSE ANESTHETIST, CERTIFIED REGISTERED

## 2025-01-08 PROCEDURE — C2617 STENT, NON-COR, TEM W/O DEL: HCPCS | Performed by: UROLOGY

## 2025-01-08 DEVICE — URETERAL STENT
Type: IMPLANTABLE DEVICE | Site: URETHRA | Status: FUNCTIONAL
Brand: POLARIS™ ULTRA

## 2025-01-08 RX ORDER — MAGNESIUM HYDROXIDE 1200 MG/15ML
LIQUID ORAL PRN
Status: DISCONTINUED | OUTPATIENT
Start: 2025-01-08 | End: 2025-01-08 | Stop reason: HOSPADM

## 2025-01-08 RX ORDER — ACETAMINOPHEN 650 MG/1
650 SUPPOSITORY RECTAL ONCE
Status: COMPLETED | OUTPATIENT
Start: 2025-01-08 | End: 2025-01-08

## 2025-01-08 RX ORDER — OXYCODONE HYDROCHLORIDE 5 MG/1
5 TABLET ORAL EVERY 6 HOURS PRN
Qty: 9 TABLET | Refills: 0 | Status: SHIPPED | OUTPATIENT
Start: 2025-01-08 | End: 2025-01-11

## 2025-01-08 RX ORDER — PROPOFOL 10 MG/ML
INJECTION, EMULSION INTRAVENOUS PRN
Status: DISCONTINUED | OUTPATIENT
Start: 2025-01-08 | End: 2025-01-08

## 2025-01-08 RX ORDER — CEFAZOLIN SODIUM/WATER 3 G/30 ML
3 SYRINGE (ML) INTRAVENOUS
Status: COMPLETED | OUTPATIENT
Start: 2025-01-08 | End: 2025-01-08

## 2025-01-08 RX ORDER — LIDOCAINE 40 MG/G
CREAM TOPICAL
Status: DISCONTINUED | OUTPATIENT
Start: 2025-01-08 | End: 2025-01-08 | Stop reason: HOSPADM

## 2025-01-08 RX ORDER — ONDANSETRON 2 MG/ML
INJECTION INTRAMUSCULAR; INTRAVENOUS PRN
Status: DISCONTINUED | OUTPATIENT
Start: 2025-01-08 | End: 2025-01-08

## 2025-01-08 RX ORDER — ACETAMINOPHEN 500 MG
500-1000 TABLET ORAL 2 TIMES DAILY
COMMUNITY

## 2025-01-08 RX ORDER — KETOROLAC TROMETHAMINE 30 MG/ML
INJECTION, SOLUTION INTRAMUSCULAR; INTRAVENOUS PRN
Status: DISCONTINUED | OUTPATIENT
Start: 2025-01-08 | End: 2025-01-08

## 2025-01-08 RX ORDER — FENTANYL CITRATE 50 UG/ML
INJECTION, SOLUTION INTRAMUSCULAR; INTRAVENOUS PRN
Status: DISCONTINUED | OUTPATIENT
Start: 2025-01-08 | End: 2025-01-08

## 2025-01-08 RX ORDER — DEXAMETHASONE SODIUM PHOSPHATE 4 MG/ML
INJECTION, SOLUTION INTRA-ARTICULAR; INTRALESIONAL; INTRAMUSCULAR; INTRAVENOUS; SOFT TISSUE PRN
Status: DISCONTINUED | OUTPATIENT
Start: 2025-01-08 | End: 2025-01-08

## 2025-01-08 RX ORDER — SODIUM CHLORIDE, SODIUM LACTATE, POTASSIUM CHLORIDE, CALCIUM CHLORIDE 600; 310; 30; 20 MG/100ML; MG/100ML; MG/100ML; MG/100ML
INJECTION, SOLUTION INTRAVENOUS CONTINUOUS
Status: DISCONTINUED | OUTPATIENT
Start: 2025-01-08 | End: 2025-01-08 | Stop reason: HOSPADM

## 2025-01-08 RX ORDER — ALFUZOSIN HYDROCHLORIDE 10 MG/1
10 TABLET, EXTENDED RELEASE ORAL DAILY
Qty: 30 TABLET | Refills: 1 | Status: SHIPPED | OUTPATIENT
Start: 2025-01-08

## 2025-01-08 RX ORDER — CEFAZOLIN SODIUM/WATER 3 G/30 ML
3 SYRINGE (ML) INTRAVENOUS SEE ADMIN INSTRUCTIONS
Status: DISCONTINUED | OUTPATIENT
Start: 2025-01-08 | End: 2025-01-08 | Stop reason: HOSPADM

## 2025-01-08 RX ORDER — DEXMEDETOMIDINE HYDROCHLORIDE 4 UG/ML
INJECTION, SOLUTION INTRAVENOUS PRN
Status: DISCONTINUED | OUTPATIENT
Start: 2025-01-08 | End: 2025-01-08

## 2025-01-08 RX ORDER — ASPIRIN 81 MG
100 TABLET, DELAYED RELEASE (ENTERIC COATED) ORAL DAILY
Qty: 60 TABLET | Refills: 1 | Status: SHIPPED | OUTPATIENT
Start: 2025-01-08

## 2025-01-08 RX ORDER — FUROSEMIDE 10 MG/ML
INJECTION INTRAMUSCULAR; INTRAVENOUS PRN
Status: DISCONTINUED | OUTPATIENT
Start: 2025-01-08 | End: 2025-01-08

## 2025-01-08 RX ORDER — ACETAMINOPHEN 325 MG/1
975 TABLET ORAL ONCE
Status: COMPLETED | OUTPATIENT
Start: 2025-01-08 | End: 2025-01-08

## 2025-01-08 RX ORDER — LIDOCAINE HYDROCHLORIDE 20 MG/ML
INJECTION, SOLUTION INFILTRATION; PERINEURAL PRN
Status: DISCONTINUED | OUTPATIENT
Start: 2025-01-08 | End: 2025-01-08

## 2025-01-08 RX ORDER — OXYBUTYNIN CHLORIDE 5 MG/1
5 TABLET, EXTENDED RELEASE ORAL DAILY
Qty: 30 TABLET | Refills: 1 | Status: SHIPPED | OUTPATIENT
Start: 2025-01-08

## 2025-01-08 RX ADMIN — DEXAMETHASONE SODIUM PHOSPHATE 4 MG: 4 INJECTION, SOLUTION INTRA-ARTICULAR; INTRALESIONAL; INTRAMUSCULAR; INTRAVENOUS; SOFT TISSUE at 11:06

## 2025-01-08 RX ADMIN — ACETAMINOPHEN 975 MG: 325 TABLET, FILM COATED ORAL at 10:40

## 2025-01-08 RX ADMIN — PROPOFOL 2250 MG: 10 INJECTION, EMULSION INTRAVENOUS at 11:01

## 2025-01-08 RX ADMIN — FENTANYL CITRATE 50 MCG: 50 INJECTION INTRAMUSCULAR; INTRAVENOUS at 10:54

## 2025-01-08 RX ADMIN — ONDANSETRON 4 MG: 2 INJECTION INTRAMUSCULAR; INTRAVENOUS at 11:23

## 2025-01-08 RX ADMIN — PHENYLEPHRINE HYDROCHLORIDE 150 MCG: 10 INJECTION INTRAVENOUS at 11:12

## 2025-01-08 RX ADMIN — HYDROMORPHONE HYDROCHLORIDE 0.5 MG: 1 INJECTION, SOLUTION INTRAMUSCULAR; INTRAVENOUS; SUBCUTANEOUS at 11:18

## 2025-01-08 RX ADMIN — KETOROLAC TROMETHAMINE 15 MG: 30 INJECTION, SOLUTION INTRAMUSCULAR at 13:19

## 2025-01-08 RX ADMIN — PHENYLEPHRINE HYDROCHLORIDE 0.5 MCG/KG/MIN: 10 INJECTION INTRAVENOUS at 11:07

## 2025-01-08 RX ADMIN — PHENYLEPHRINE HYDROCHLORIDE 100 MCG: 10 INJECTION INTRAVENOUS at 11:07

## 2025-01-08 RX ADMIN — Medication 3 G: at 10:51

## 2025-01-08 RX ADMIN — FENTANYL CITRATE 50 MCG: 50 INJECTION INTRAMUSCULAR; INTRAVENOUS at 11:01

## 2025-01-08 RX ADMIN — SODIUM CHLORIDE, POTASSIUM CHLORIDE, SODIUM LACTATE AND CALCIUM CHLORIDE: 600; 310; 30; 20 INJECTION, SOLUTION INTRAVENOUS at 10:39

## 2025-01-08 RX ADMIN — LIDOCAINE HYDROCHLORIDE 60 MG: 20 INJECTION, SOLUTION INFILTRATION; PERINEURAL at 11:01

## 2025-01-08 RX ADMIN — FUROSEMIDE 10 MG: 10 INJECTION, SOLUTION INTRAVENOUS at 13:19

## 2025-01-08 RX ADMIN — DEXMEDETOMIDINE HYDROCHLORIDE 8 MCG: 200 INJECTION INTRAVENOUS at 13:15

## 2025-01-08 ASSESSMENT — ACTIVITIES OF DAILY LIVING (ADL)
ADLS_ACUITY_SCORE: 52
ADLS_ACUITY_SCORE: 54
ADLS_ACUITY_SCORE: 52
ADLS_ACUITY_SCORE: 54

## 2025-01-08 ASSESSMENT — COPD QUESTIONNAIRES: COPD: 0

## 2025-01-08 ASSESSMENT — ENCOUNTER SYMPTOMS: SEIZURES: 0

## 2025-01-08 NOTE — ANESTHESIA PROCEDURE NOTES
Airway       Patient location during procedure: OR  Staff -        CRNA: Sharonda Ibrahim APRN CRNA       Performed By: CRNA  Consent for Airway        Urgency: elective  Indications and Patient Condition       Indications for airway management: hallie-procedural       Induction type:intravenous       Mask difficulty assessment: 1 - vent by mask    Final Airway Details       Final airway type: supraglottic airway    Supraglottic Airway Details        Type: LMA       Brand: I-Gel       LMA size: 5    Post intubation assessment        Placement verified by: capnometry, equal breath sounds and chest rise        Number of attempts at approach: 1       Number of other approaches attempted: 0       Ease of procedure: easy       Dentition: Intact and Unchanged

## 2025-01-08 NOTE — OR NURSING
Meets criteria for discharge.  Discharge instructions reviewed with pt and pt's designated responsible party.  Pt label on prescription bag from pharmacy matched to pt's wristband. Pharmacy bag opened with 4 prescriptions inside. Medications were reviewed to match pt wristband while pt and significant other agreed with identification. Prescriptions placed back in pharmacy bag resealed with tape and placed in belonging bag per pt request.

## 2025-01-08 NOTE — ANESTHESIA POSTPROCEDURE EVALUATION
Patient: Hugo Prince    Procedure: Procedure(s):  CYSTOSCOPY, LEFT RETROGRADE PYELOGRAM, LEFT URETEROSCOPY WITH LASER LITHOTRIOPSY AND BASKET REMOVAL OF STONES, LEFT STEERABLE VACUUM SUCTION, LEFT URETERAL STENT PLACEMENT       Anesthesia Type:  General    Note:  Disposition: Outpatient   Postop Pain Control: Uneventful            Sign Out: Well controlled pain   PONV: No   Neuro/Psych: Uneventful            Sign Out: Acceptable/Baseline neuro status   Airway/Respiratory: Uneventful            Sign Out: Acceptable/Baseline resp. status   CV/Hemodynamics: Uneventful            Sign Out: Acceptable CV status   Other NRE:    DID A NON-ROUTINE EVENT OCCUR? No           Last vitals:  Vitals Value Taken Time   /80 01/08/25 1400   Temp 36.6  C (97.8  F) 01/08/25 1400   Pulse 75 01/08/25 1410   Resp 20 01/08/25 1411   SpO2 92 % 01/08/25 1411   Vitals shown include unfiled device data.    Electronically Signed By: Twyla Rahman  January 8, 2025  4:58 PM

## 2025-01-08 NOTE — OR NURSING
Contacted Dr Ochoa re: homero. May be d/c'd in PACU. Pt dressed, up in recliner and transported to Phase 2.

## 2025-01-08 NOTE — DISCHARGE INSTRUCTIONS
POSTOPERATIVE INSTRUCTIONS    Diagnosis-------------------------------   Large left kidney stones    Procedure-------------------------------  Procedure(s) (LRB):  CYSTOSCOPY, LEFT RETROGRADE PYELOGRAM, LEFT URETEROSCOPY WITH LASER LITHOTRIOPSY AND BASKET REMOVAL OF STONES, LEFT STEERABLE VACUUM SUCTION, LEFT URETERAL STENT PLACEMENT (Left)      Findings--------------------------------  Numerous large left kidney stones.  Majority stones dusted and removed, however several large stones remaining in the lower pole of the kidney which will require repeat ureteroscopy    Home-going instructions-----------------         Activity Limitation:     - No driving or operating heavy machinery while on narcotic pain medication.     FOLLOW THESE INSTRUCTIONS AS INDICATED BELOW:  - Observe operative area for signs of excessive bleeding.  - You may shower.  - Increase fluid intake to promote clear urine.  - Resume usual diet as tolerated    What to expect while recovering-----------  - You may experience some intermittent bleeding that makes your urine pink or cherry colored. This is normal.  - However, if you are unable to urinate, passing large amount of clots, have randal blood in your urine, or have a temperature >101 degrees, call the urology nurse on call, or present to your nearest emergency department.  - You are encouraged to walk daily, and have no activity restrictions.   - A URETERAL STENT has been placed that allows urine to flow unobstructed from your kidney into your bladder.  The stent has a curl in the kidney and a curl in the bladder.  The curl in the bladder can cause some urgency and frequency of urination as well as some mild blood in the urine.  The curl in the kidney can cause some mild flank discomfort.  This may be more noticeable when you urinate.  A URETERAL STENT is meant to be left in temporarily.  It must be removed or changed no later than 3 months after it's insertion.  If it's not removed it can  result in stone overgrowth on the stent that can cause pain, infection, and can be very difficult to remove.      Discharge Medications/instructions:   - Alfuzosin to be taken daily until stent is removed  - Oxybutynin 5mg XL (Ditropan XL) to be taken daily until ureteral stent is removed  - Take Tylenol 1000mg every 6 hours for pain  - Take Ibuprofen 600mg every 6 hours as needed for additional pain control  - Take Oxycodone 5mg every 4-6 hours only for break through pain  - Take Colace while taking Oxycodone to prevent constipation      Questions/concerns------------------------  [facility]: [phone]    Future appointments  You will be contacted to schedule return for repeat Ureteroscopy.      Ariel Ochoa MD       Today you were given 975 mg of Tylenol at 10:45 am. The recommended daily maximum dose is 4000 mg.      **Because you had anesthesia today and your history of sleep apnea, it is extremely important that you use your CPAP machine for the next 24 hours while napping or sleeping.**     Same Day Surgery Discharge Instructions for  Sedation and General Anesthesia     It's not unusual to feel dizzy, light-headed or faint for up to 24 hours after surgery or while taking pain medication.  If you have these symptoms: sit for a few minutes before standing and have someone assist you when you get up to walk or use the bathroom.    You should rest and relax for the next 24 hours. We recommend you make arrangements to have an adult stay with you for at least 24 hours after your discharge.  Avoid hazardous and strenuous activity.    DO NOT DRIVE any vehicle or operate mechanical equipment for 24 hours following the end of your surgery.  Even though you may feel normal, your reactions may be affected by the medication you have received.    Do not drink alcoholic beverages for 24 hours following surgery.     Slowly progress to your regular diet as you feel able. It's not unusual to feel  nauseated and/or vomit after receiving anesthesia.  If you develop these symptoms, drink clear liquids (apple juice, ginger ale, broth, 7-up, etc. ) until you feel better.  If your nausea and vomiting persists for 24 hours, please notify your surgeon.      All narcotic pain medications, along with inactivity and anesthesia, can cause constipation. Drinking plenty of liquids and increasing fiber intake will help.    For any questions of a medical nature, call your surgeon.    Do not make important decisions for 24 hours.    If you had general anesthesia, you may have a sore throat for a couple of days related to the breathing tube used during surgery.  You may use Cepacol lozenges to help with this discomfort.  If it worsens or if you develop a fever, contact your surgeon.     If you feel your pain is not well managed with the pain medications prescribed by your surgeon, please contact your surgeon's office to let them know so they can address your concerns.      **If you have questions or concerns about your procedure,   call Dr. Ochoa at [phone]**    Today you received Toradol, an antiinflammatory medication similar to Ibuprofen.  You should not take other antiinflammatory medication, such as Ibuprofen, Motrin, Advil, Aleve, Naprosyn, etc until 7:15pm.

## 2025-01-08 NOTE — OP NOTE
OPERATIVE REPORT  DATE OF SURGERY: 01/08/25  LOCATION OF SURGERY: [facility] OR  PREOPERATIVE DIAGNOSIS:  (N20.0) Kidney stone on left side  (primary encounter diagnosis)  POSTOPERATIVE DIAGNOSIS: (N20.0) Kidney stone on left side  (primary encounter diagnosis)     START TIME: 11:14 AM  END TIME: 1:18 PM    PROCEDURE PERFORMED:   1. Cystoscopy  2. LEFT retrograde pyelogram  3. LEFT ureteroscopy with laser lithotripsy  4. LEFT ureteroscopy with basketing of stones - MODIFIER 22  5. LEFT Ureteroscopy with steerable vacuum suction - 24187 - MODIFIER 22  6. LEFT JJ stent placement  7. >1hr physician fluoroscopy time      STAFF SURGEON: Ariel Ochoa MD  ANESTHESIA: General.   ESTIMATED BLOOD LOSS: 5 mL.   DRAINS AND TUBES: LEFT 6fr x 26cm ureteral stent, 18fr coude catheter  COMPLICATIONS: None.   DISPOSITION: PACU.   SPECIMENS OBTAINED:   ID Type Source Tests Collected by Time Destination   A : LEFT KIDNEY STONES Calculus/Stone Kidney, Left STONE ANALYSIS Ariel Ochoa MD 1/8/2025  1:17 PM       SIGNIFICANT FINDINGS: Cystoscopy with no evidence of stones in the bladder.  Left ureteroscopy with no evidence of stones in the ureter.  Flexible ureteroscopy with laser lithotripsy, steerable vacuum suction, and basket removal of the numerous stone fragments with numerous additional stone fragments remaining with few embedded stones in the lower pole which were difficult to access.    MODIFIER 22 JUSTIFICATION: This is a very challenging case given the large stone burden and required steerable vacuum suction and greater than 50 basket retrieval's which required significant additional surgeon time and effort and will ultimately require a staged approach for complete stone removal.     HISTORY OF PRESENT ILLNESS: Hugo Prince is a 74 year old with several large left kidney stones.  He was counseled on treatment options and elected to proceed with surgery.  We discussed the 10 to 20% risk of requiring a staged  approach.    OPERATION PERFORMED:   Informed consent was obtained and the patient was brought to the operating room where general anesthesia was induced. The patient was given appropriate preoperative antibiotics and positioned supine. The patient was then repositioned in dorsal lithotomy with all pressure points padded. We then performed a timeout, verifying the correct patient's site and procedure to be performed.    A 22 Salvadorean cystoscope was inserted atraumatically into the bladder.  Cystoscopy was performed with no evidence of stones in the bladder.  The left ureteral orifice was identified and cannulated with a 0.035 sensor wire which was advanced up to the renal pelvis under fluoroscopic guidance and the cystoscope was removed.  A semirigid ureteroscope was assembled and inserted atraumatically into the bladder.  Using a Super Stiff wire the ureteral orifice was cannulated and the ureteroscope was advanced up to the proximal ureter/UPJ with no evidence of stones in the ureter.  Gentle retrograde pyelogram was performed outlining the collecting system.  The Super Stiff wire was advanced to the renal pelvis and the ureteroscope was removed.  A 12-14 Salvadorean by 46 cm ureteral access sheath was advanced over the wire to the proximal ureter under fluoroscopic guidance and the inner stylette and wire were removed.  The flexible steerable vacuum suction ureteroscope was advanced into the renal pelvis and a large renal pelvis stone was encountered.  Numerous other mid and lower pole stones were also noted.  The stones were dusted and fragmented with laser lithotripsy and stone dust with this removed with steerable vacuum suction, modifier 22, this required significant additional surgeon time and effort beyond manage standard case.  Numerous stone fragments were then basketed with greater than 50 basket retrieval's, modifier 22.  There were 2 stones embedded within the lower pole calyces which were difficult to access  and given the significant operative time and remaining stone fragments decision made for staged approach.  Ureteroscope and access sheath were removed with no evidence of ureteral injury.  The cystoscope was replaced in the bladder and a 6 Ugandan by 26 cm JJ ureteral stent was advanced over the wire with good curl noted in the renal pelvis fluoroscopically and in the bladder on direct vision.  An 18 Ugandan coudé catheter was placed with 10 cc in the balloon.  He received 10 mg IV Lasix and 15 mg IV Toradol.  He was emerged from anesthesia and taken to the recovery room in stable condition.    Ariel Ochoa MD   Urology  [facility]  Clinic Phone [phone]

## 2025-01-08 NOTE — ANESTHESIA PREPROCEDURE EVALUATION
Anesthesia Pre-Procedure Evaluation    Patient: Hugo Prince   MRN: [id number] : 1950        Procedure : Procedure(s):  CYSTOSCOPY, LEFT RETROGRADE PYELOGRAM, LEFT URETEROSCOPY WITH LASER LITHOTRIOPSY AND BASKET REMOVAL OF STONES, LEFT STEERABLE VACUUM SUCTION, LEFT URETERAL STENT PLACEMENT          Past Medical History:   Diagnosis Date    Anemia due to blood loss, acute 2015    Atopic dermatitis     CAD (coronary artery disease)     Dermatitis 2015    Diabetes mellitus, type 2 (H) 2021    DJD (degenerative joint disease) of knee 2015    Dyslipidemia     ED (erectile dysfunction) 2012    Esophageal reflux     Gastroesophageal reflux    NSTEMI (non-ST elevated myocardial infarction) (H) 7/15/2019    RAY (obstructive sleep apnea)     Other and unspecified disc disorder of unspecified region     Intervertebral disc disorders    Personal history of colonic polyps     Colon polyps    S/P lateral meniscectomy of left knee     S/P total knee replacement 2015    Tick bite 4/20/15    amoxicillin treated    Unspecified sleep apnea     Sleep apnea    Urinary calculus, unspecified     Renal stones    Vitamin D deficiency 09      Past Surgical History:   Procedure Laterality Date    ARTHROPLASTY KNEE Left 2015    Procedure: ARTHROPLASTY KNEE;  Surgeon: Agustin Saenz MD;  Location:  OR    ARTHROSCOPY KNEE RT/LT  11    COLONOSCOPY      CV CORONARY ANGIOGRAM N/A 3/9/2023    Procedure: Coronary Angiogram;  Surgeon: Dalia Chew MD;  Location:  HEART CARDIAC CATH LAB    CV FRACTIONAL FLOW RATIO WIRE N/A 2019    Procedure: Fractional Flow Reserve;  Surgeon: Mick Thrasher MD;  Location:  HEART CARDIAC CATH LAB    CV HEART CATHETERIZATION WITH POSSIBLE INTERVENTION N/A 2019    successful PCI with MICAH placement to mid LAD     CV PCI N/A 3/9/2023    Procedure: Percutaneous Coronary Intervention;  Surgeon: Dalia Chew MD;   Location:  HEART CARDIAC CATH LAB    CYSTOSCOPY      EXTRACORPOREAL SHOCK WAVE LITHOTRIPSY (ESWL)  1/20/2012    Procedure:EXTRACORPOREAL SHOCK WAVE LITHOTRIPSY (ESWL); LEFT EXTRACORPOREAL SHOCK WAVE LITHOTRIPSY ; Surgeon:ANJUM BUNDY; Location: OR    EXTRACORPOREAL SHOCK WAVE LITHOTRIPSY, CYSTOSCOPY, INSERT STENT URETER(S), COMBINED  2/3/2012    Procedure:COMBINED EXTRACORPOREAL SHOCK WAVE LITHOTRIPSY, CYSTOSCOPY, INSERT STENT URETER(S); CYSTOSCOPY, RIGHT STENT; Surgeon:ANJUM BUNDY; Location: OR    FUSION LUMBAR ANTERIOR, FUSION LUMBAR POSTERIOR ONE LEVEL, COMBINED  11-1-10    L4-5    LASER HOLMIUM LITHOTRIPSY URETER(S), INSERT STENT, COMBINED  2/7/2012    Procedure:COMBINED CYSTOSCOPY, URETEROSCOPY, LASER HOLMIUM LITHOTRIPSY URETER(S), INSERT STENT; CYSTOSCOPY, RIGHT URETEROSCOPY, RIGHT RETROGRADES,  STONE EXTRACTION WITH HOLMIUM LASER AND RIGHT URETERAL STENT EXCHANGE ; Surgeon:DEEPTI MELVIN; Location: OR    LITHOTRIPSY      Lithotrypsy    SEPTOPLASTY      VASECTOMY        Allergies   Allergen Reactions    Chlorthalidone GI Disturbance    Compazine Other (See Comments)     PSYCHOTIC    Erythromycin Nausea and Vomiting    Ethanol Nausea and Vomiting    Flomax [Tamsulosin Hcl]      FLUSHING, NASAl CONGESTION    Prochlorperazine Other (See Comments)     Other reaction(s): extreme anxiety    Seasonal Allergies       Social History     Tobacco Use    Smoking status: Former     Types: Cigars    Smokeless tobacco: Never    Tobacco comments:     has cigars when it is warm out   Substance Use Topics    Alcohol use: Yes     Alcohol/week: 11.7 standard drinks of alcohol     Types: 14 Standard drinks or equivalent per week     Comment: 1-2 drinks day/beer and scotch      Wt Readings from Last 1 Encounters:   01/08/25 129.7 kg (286 lb)        Anesthesia Evaluation            ROS/MED HX  ENT/Pulmonary:     (+) sleep apnea, uses CPAP,                                   (-) asthma and COPD  "  Neurologic:    (-) no seizures and no CVA   Cardiovascular:     (+)  - -  CAD -  - stent- 2                                     METS/Exercise Tolerance:     Hematologic:       Musculoskeletal:       GI/Hepatic:     (+) GERD,                (-) liver disease   Renal/Genitourinary:     (+)       Nephrolithiasis ,    (-) renal disease   Endo:     (+)  type II DM,             Obesity,       Psychiatric/Substance Use:       Infectious Disease:       Malignancy:       Other:            Physical Exam    Airway        Mallampati: II   TM distance: > 3 FB   Neck ROM: full     Respiratory Devices and Support         Dental     Comment: Fake front tooth        Cardiovascular   cardiovascular exam normal          Pulmonary   pulmonary exam normal                OUTSIDE LABS:  CBC:   Lab Results   Component Value Date    WBC 7.1 02/10/2024    WBC 7.9 03/09/2023    HGB 14.0 02/10/2024    HGB 15.5 03/09/2023    HCT 41.5 02/10/2024    HCT 46.8 03/09/2023     02/10/2024     03/09/2023     BMP:   Lab Results   Component Value Date     12/11/2024     02/10/2024    POTASSIUM 4.4 12/11/2024    POTASSIUM 4.4 02/10/2024    CHLORIDE 100 12/11/2024    CHLORIDE 98 02/10/2024    CO2 25 12/11/2024    CO2 26 02/10/2024    BUN 16.9 12/11/2024    BUN 14.6 02/10/2024    CR 0.93 12/11/2024    CR 1.03 02/10/2024     (H) 12/11/2024     (H) 02/10/2024     COAGS:   Lab Results   Component Value Date    PTT 25 03/09/2023    INR 1.03 03/09/2023     POC: No results found for: \"BGM\", \"HCG\", \"HCGS\"  HEPATIC:   Lab Results   Component Value Date    ALBUMIN 4.2 02/10/2024    PROTTOTAL 7.3 02/10/2024    ALT 36 02/10/2024    AST 43 02/10/2024    ALKPHOS 116 02/10/2024    BILITOTAL 0.5 02/10/2024     OTHER:   Lab Results   Component Value Date    A1C 6.4 (A) 12/11/2024    NATHALIA 9.3 12/11/2024    SED 15 07/18/2011       Anesthesia Plan    ASA Status:  3       Anesthesia Type: General.     - Airway: LMA          " "    Consents    Anesthesia Plan(s) and associated risks, benefits, and realistic alternatives discussed. Questions answered and patient/representative(s) expressed understanding.     - Discussed:     - Discussed with:  Patient            Postoperative Care       PONV prophylaxis: Ondansetron (or other 5HT-3), Dexamethasone or Solumedrol, Background Propofol Infusion     Comments:               Twyla Rahman    I have reviewed the pertinent notes and labs in the chart from the past 30 days and (re)examined the patient.  Any updates or changes from those notes are reflected in this note.             # Drug Induced Platelet Defect: home medication list includes an antiplatelet medication   # Hypertension: Home medication list includes antihypertensive(s)           # Severe Obesity: Estimated body mass index is 41.04 kg/m  as calculated from the following:    Height as of this encounter: 1.778 m (5' 10\").    Weight as of this encounter: 129.7 kg (286 lb).             "

## 2025-01-08 NOTE — ANESTHESIA CARE TRANSFER NOTE
Patient: Hugo Prince    Procedure: Procedure(s):  CYSTOSCOPY, LEFT RETROGRADE PYELOGRAM, LEFT URETEROSCOPY WITH LASER LITHOTRIOPSY AND BASKET REMOVAL OF STONES, LEFT STEERABLE VACUUM SUCTION, LEFT URETERAL STENT PLACEMENT       Diagnosis: Kidney stone on left side [N20.0]  Diagnosis Additional Information: No value filed.    Anesthesia Type:   General     Note:    Oropharynx: oropharynx clear of all foreign objects and spontaneously breathing  Level of Consciousness: drowsy  Oxygen Supplementation: nasal cannula  Level of Supplemental Oxygen (L/min / FiO2): 4  Independent Airway: airway patency satisfactory and stable  Dentition: dentition unchanged  Vital Signs Stable: post-procedure vital signs reviewed and stable  Report to RN Given: handoff report given  Patient transferred to: PACU  Comments: At end of procedure, spontaneous respirations, adequate tidal volumes, followed commands to voice, LMA removed atraumatically, airway patent after LMA removal. Oxygen via nasal cannula at 4 liters per minute to PACU. Oxygen tubing connected to wall O2 in PACU, SpO2, NiBP, and EKG monitors and alarms on and functioning, Will Hugger warmer connected to patient gown, report on patient's clinical status given to PACU RN, RN questions answered.      Handoff Report: Identifed the Patient, Identified the Reponsible Provider, Reviewed the pertinent medical history, Discussed the surgical course, Reviewed Intra-OP anesthesia mangement and issues during anesthesia, Set expectations for post-procedure period and Allowed opportunity for questions and acknowledgement of understanding  Vitals:  Vitals Value Taken Time   /73 01/08/25 1333   Temp     Pulse 76 01/08/25 1336   Resp 20 01/08/25 1336   SpO2 94 % 01/08/25 1336   Vitals shown include unfiled device data.    Electronically Signed By: DEMETRI Thorpe CRNA  January 8, 2025  1:37 PM

## 2025-01-09 NOTE — PROGRESS NOTES
Patient reports he has had bloody urine and frequency since surgery yesterday.   Blood level described by patient is within normal limits from a patient in the first 24 hours post this surgery.   Discussed signs/symptoms that would be abnormal/ reason to go to call back or go to ER.     Patient asking if all of his meds are okay to take together. Advised patient to call his pharmacist.

## 2025-01-11 LAB
APPEARANCE STONE: NORMAL
COMPN STONE: NORMAL
SPECIMEN WT: 318 MG

## 2025-01-18 ENCOUNTER — APPOINTMENT (OUTPATIENT)
Dept: CT IMAGING | Facility: CLINIC | Age: 75
End: 2025-01-18
Attending: EMERGENCY MEDICINE
Payer: MEDICARE

## 2025-01-18 ENCOUNTER — HOSPITAL ENCOUNTER (EMERGENCY)
Facility: CLINIC | Age: 75
Discharge: HOME | End: 2025-01-18
Attending: EMERGENCY MEDICINE | Admitting: EMERGENCY MEDICINE
Payer: MEDICARE

## 2025-01-18 VITALS
DIASTOLIC BLOOD PRESSURE: 77 MMHG | OXYGEN SATURATION: 96 % | RESPIRATION RATE: 18 BRPM | HEART RATE: 65 BPM | SYSTOLIC BLOOD PRESSURE: 140 MMHG | TEMPERATURE: 97.9 F

## 2025-01-18 DIAGNOSIS — R31.9 URINARY TRACT INFECTION WITH HEMATURIA, SITE UNSPECIFIED: ICD-10-CM

## 2025-01-18 DIAGNOSIS — N39.0 URINARY TRACT INFECTION WITH HEMATURIA, SITE UNSPECIFIED: ICD-10-CM

## 2025-01-18 LAB
ALBUMIN UR-MCNC: ABNORMAL G/DL
ANION GAP SERPL CALCULATED.3IONS-SCNC: 13 MMOL/L (ref 7–15)
APPEARANCE UR: ABNORMAL
BASOPHILS # BLD AUTO: 0 10E3/UL (ref 0–0.2)
BASOPHILS NFR BLD AUTO: 0 %
BILIRUB UR QL STRIP: ABNORMAL
BUN SERPL-MCNC: 13.9 MG/DL (ref 8–23)
CALCIUM SERPL-MCNC: 9.1 MG/DL (ref 8.8–10.4)
CHLORIDE SERPL-SCNC: 101 MMOL/L (ref 98–107)
COLOR UR AUTO: ABNORMAL
CREAT SERPL-MCNC: 1.07 MG/DL (ref 0.67–1.17)
EGFRCR SERPLBLD CKD-EPI 2021: 72 ML/MIN/1.73M2
EOSINOPHIL # BLD AUTO: 0.4 10E3/UL (ref 0–0.7)
EOSINOPHIL NFR BLD AUTO: 4 %
ERYTHROCYTE [DISTWIDTH] IN BLOOD BY AUTOMATED COUNT: 13 % (ref 10–15)
GLUCOSE SERPL-MCNC: 147 MG/DL (ref 70–99)
GLUCOSE UR STRIP-MCNC: ABNORMAL MG/DL
HCO3 SERPL-SCNC: 24 MMOL/L (ref 22–29)
HCT VFR BLD AUTO: 37.9 % (ref 40–53)
HGB BLD-MCNC: 13.1 G/DL (ref 13.3–17.7)
HGB UR QL STRIP: ABNORMAL
IMM GRANULOCYTES # BLD: 0.2 10E3/UL
IMM GRANULOCYTES NFR BLD: 2 %
KETONES UR STRIP-MCNC: ABNORMAL MG/DL
LEUKOCYTE ESTERASE UR QL STRIP: ABNORMAL
LYMPHOCYTES # BLD AUTO: 1.8 10E3/UL (ref 0.8–5.3)
LYMPHOCYTES NFR BLD AUTO: 21 %
MCH RBC QN AUTO: 35.2 PG (ref 26.5–33)
MCHC RBC AUTO-ENTMCNC: 34.6 G/DL (ref 31.5–36.5)
MCV RBC AUTO: 102 FL (ref 78–100)
MONOCYTES # BLD AUTO: 0.9 10E3/UL (ref 0–1.3)
MONOCYTES NFR BLD AUTO: 10 %
MUCOUS THREADS #/AREA URNS LPF: PRESENT /LPF
NEUTROPHILS # BLD AUTO: 5.6 10E3/UL (ref 1.6–8.3)
NEUTROPHILS NFR BLD AUTO: 62 %
NITRATE UR QL: ABNORMAL
NRBC # BLD AUTO: 0 10E3/UL
NRBC BLD AUTO-RTO: 0 /100
PH UR STRIP: ABNORMAL [PH]
PLATELET # BLD AUTO: 198 10E3/UL (ref 150–450)
POTASSIUM SERPL-SCNC: 4.8 MMOL/L (ref 3.4–5.3)
RBC # BLD AUTO: 3.72 10E6/UL (ref 4.4–5.9)
RBC URINE: >182 /HPF
SODIUM SERPL-SCNC: 138 MMOL/L (ref 135–145)
SP GR UR STRIP: ABNORMAL
UROBILINOGEN UR STRIP-MCNC: ABNORMAL MG/DL
WBC # BLD AUTO: 9 10E3/UL (ref 4–11)
WBC URINE: >182 /HPF

## 2025-01-18 PROCEDURE — 96365 THER/PROPH/DIAG IV INF INIT: CPT

## 2025-01-18 PROCEDURE — 80048 BASIC METABOLIC PNL TOTAL CA: CPT | Performed by: EMERGENCY MEDICINE

## 2025-01-18 PROCEDURE — 74176 CT ABD & PELVIS W/O CONTRAST: CPT

## 2025-01-18 PROCEDURE — 99285 EMERGENCY DEPT VISIT HI MDM: CPT | Mod: 25

## 2025-01-18 PROCEDURE — 82565 ASSAY OF CREATININE: CPT | Performed by: EMERGENCY MEDICINE

## 2025-01-18 PROCEDURE — 250N000011 HC RX IP 250 OP 636: Performed by: EMERGENCY MEDICINE

## 2025-01-18 PROCEDURE — 36415 COLL VENOUS BLD VENIPUNCTURE: CPT | Performed by: EMERGENCY MEDICINE

## 2025-01-18 PROCEDURE — 81001 URINALYSIS AUTO W/SCOPE: CPT | Performed by: EMERGENCY MEDICINE

## 2025-01-18 PROCEDURE — 87086 URINE CULTURE/COLONY COUNT: CPT | Performed by: EMERGENCY MEDICINE

## 2025-01-18 PROCEDURE — 85025 COMPLETE CBC W/AUTO DIFF WBC: CPT | Performed by: EMERGENCY MEDICINE

## 2025-01-18 RX ORDER — CEFTRIAXONE 2 G/1
2 INJECTION, POWDER, FOR SOLUTION INTRAMUSCULAR; INTRAVENOUS ONCE
Status: COMPLETED | OUTPATIENT
Start: 2025-01-18 | End: 2025-01-18

## 2025-01-18 RX ORDER — CEPHALEXIN 500 MG/1
500 CAPSULE ORAL 4 TIMES DAILY
Qty: 28 CAPSULE | Refills: 0 | Status: SHIPPED | OUTPATIENT
Start: 2025-01-18 | End: 2025-01-25

## 2025-01-18 RX ADMIN — CEFTRIAXONE SODIUM 2 G: 2 INJECTION, POWDER, FOR SOLUTION INTRAMUSCULAR; INTRAVENOUS at 05:37

## 2025-01-18 ASSESSMENT — ACTIVITIES OF DAILY LIVING (ADL)
ADLS_ACUITY_SCORE: 52

## 2025-01-18 ASSESSMENT — COLUMBIA-SUICIDE SEVERITY RATING SCALE - C-SSRS
6. HAVE YOU EVER DONE ANYTHING, STARTED TO DO ANYTHING, OR PREPARED TO DO ANYTHING TO END YOUR LIFE?: NO
1. IN THE PAST MONTH, HAVE YOU WISHED YOU WERE DEAD OR WISHED YOU COULD GO TO SLEEP AND NOT WAKE UP?: NO
2. HAVE YOU ACTUALLY HAD ANY THOUGHTS OF KILLING YOURSELF IN THE PAST MONTH?: NO

## 2025-01-18 NOTE — ED TRIAGE NOTES
Pt c/o penile pain and urinary frequency with blood in urine. Pt reports lithotripsy and stent placement last week for stones on L side. Pt is concerned that stent is dislodged.      Triage Assessment (Adult)       Row Name 01/18/25 0119          Triage Assessment    Airway WDL WDL        Respiratory WDL    Respiratory WDL WDL        Cardiac WDL    Cardiac WDL WDL        Peripheral/Neurovascular WDL    Peripheral Neurovascular WDL WDL        Cognitive/Neuro/Behavioral WDL    Cognitive/Neuro/Behavioral WDL WDL

## 2025-01-18 NOTE — DISCHARGE INSTRUCTIONS
Make sure you talk to your urologist office on Monday.    Return to the emergency department for fevers or any worsening of your symptoms.    Make sure to take your antibiotics even if you are feeling better.    If you feel your condition has changed or worsened, please call your doctor or return to the emergency department right away.

## 2025-01-18 NOTE — ED PROVIDER NOTES
"  Emergency Department Note      History of Present Illness     Chief Complaint   Urinary Frequency      HPI   Hugo Prince is a 74 year old male with a history of recurrent nephrolithiasis, NSTEMI, CAD, and DM2 who presents to the ED for urinary frequency. The patient reports that he had lithotripsy and stent placement 10 days ago for stones on the left side. He then had 5 days of hematuria, which he states got better, but has returned. He further reports experiencing dysuria and increased urinary urgency with little output when he does urinate. He adds that he still has urgency even after urinating. He states that, overall, he feels as though \"something is not right\" in his bladder, and he is concerned his stent became dislodged. Patient notes that yesterday, he was able to temporarily resolve his issues by drinking a large amount of water, but woke today because his symptoms had returned. He denies any abdominal pain, fever, or new back pain, though he does have chronic issues with back pain.    Independent Historian   None    Review of External Notes   None    Past Medical History     Medical History and Problem List   CAD  Chronic rhinitis  Dyslipidemia  Erectile dysfunction  GERD  NSTEMI  Morbid obesity  RAY  Recurrent nephrolithiasis  DM2  Anemia  Atopic dermatitis  Degenerative joint disease of knee  Colonic polyps  Tick bite  Vitamin D deficiency    Medications   Uroxatral  Norvasc  ASA  Lipitor  Tessalon  Colace  Flonase  Zestril  Glucophage  Lopressor  Nitrostat  Ditropan  Tylenol    Surgical History   Past Surgical History:   Procedure Laterality Date    ARTHROPLASTY KNEE Left 1/7/2015    Procedure: ARTHROPLASTY KNEE;  Surgeon: Agustin Seanz MD;  Location: [facility] OR    ARTHROSCOPY KNEE RT/LT  6-20-11    COLONOSCOPY  12/09    COMBINED CYSTOSCOPY, RETROGRADES, URETEROSCOPY, LASER HOLMIUM LITHOTRIPSY URETER(S), INSERT STENT Left 1/8/2025    Procedure: CYSTOSCOPY, LEFT RETROGRADE PYELOGRAM, LEFT " URETEROSCOPY WITH LASER LITHOTRIOPSY AND BASKET REMOVAL OF STONES, LEFT STEERABLE VACUUM SUCTION, LEFT URETERAL STENT PLACEMENT;  Surgeon: Ariel Ochoa MD;  Location:  OR    CV CORONARY ANGIOGRAM N/A 3/9/2023    Procedure: Coronary Angiogram;  Surgeon: Dalia Chew MD;  Location:  [facility]    CV FRACTIONAL FLOW RATIO WIRE N/A 7/16/2019    Procedure: Fractional Flow Reserve;  Surgeon: Mick Thrasher MD;  Location:  [facility] CATH LAB    CV HEART CATHETERIZATION WITH POSSIBLE INTERVENTION N/A 7/16/2019    successful PCI with MICAH placement to mid LAD     CV PCI N/A 3/9/2023    Procedure: Percutaneous Coronary Intervention;  Surgeon: Dalia Chew MD;  Location:  [facility]ARD[facility] CATH LAB    CYSTOSCOPY      EXTRACORPOREAL SHOCK WAVE LITHOTRIPSY (ESWL)  1/20/2012    Procedure:EXTRACORPOREAL SHOCK WAVE LITHOTRIPSY (ESWL); LEFT EXTRACORPOREAL SHOCK WAVE LITHOTRIPSY ; Surgeon:ANJUM BUNDY; Location: OR    EXTRACORPOREAL SHOCK WAVE LITHOTRIPSY, CYSTOSCOPY, INSERT STENT URETER(S), COMBINED  2/3/2012    Procedure:COMBINED EXTRACORPOREAL SHOCK WAVE LITHOTRIPSY, CYSTOSCOPY, INSERT STENT URETER(S); CYSTOSCOPY, RIGHT STENT; Surgeon:ANJUM BUNDY; Location: OR    FUSION LUMBAR ANTERIOR, FUSION LUMBAR POSTERIOR ONE LEVEL, COMBINED  11-1-10    L4-5    LASER HOLMIUM LITHOTRIPSY URETER(S), INSERT STENT, COMBINED  2/7/2012    Procedure:COMBINED CYSTOSCOPY, URETEROSCOPY, LASER HOLMIUM LITHOTRIPSY URETER(S), INSERT STENT; CYSTOSCOPY, RIGHT URETEROSCOPY, RIGHT RETROGRADES,  STONE EXTRACTION WITH HOLMIUM LASER AND RIGHT URETERAL STENT EXCHANGE ; Surgeon:DEEPTI MELVIN; Location: OR    LITHOTRIPSY      Lithotrypsy    SEPTOPLASTY      VASECTOMY         Physical Exam     Patient Vitals for the past 24 hrs:   BP Temp Temp src Pulse Resp SpO2   01/18/25 0430 -- -- -- -- -- 96 %   01/18/25 0350 (!) 140/77 97.9  F (36.6  C) Oral 65 18 95 %   01/18/25 0119 (!)  147/79 98.3  F (36.8  C) Temporal 74 18 97 %     Physical Exam  General: Resting on the gurney, appears uncomfortable  Head:  The scalp, face, and head appear normal  Mouth/Throat: Mucus membranes are moist  CV:  Regular rate    Normal S1 and S2  No pathological murmur   Resp:  Breath sounds clear and equal bilaterally    Non-labored, no retractions or accessory muscle use    No coarseness    No wheezing   GI:  Abdomen is soft, no rigidity    No tenderness to palpation  MS:  Normal motor assessment of all extremities.    Good capillary refill noted.  Skin:  No rash or lesions noted.  Neuro:   Speech is normal and fluent. No apparent deficit.  Psych: Awake. Alert.  Normal affect.      Appropriate interactions.    Diagnostics     Lab Results   Labs Ordered and Resulted from Time of ED Arrival to Time of ED Departure   ROUTINE UA WITH MICROSCOPIC REFLEX TO CULTURE - Abnormal       Result Value    Color Urine Dark Brown (*)     Appearance Urine Cloudy (*)     Glucose Urine        Bilirubin Urine        Ketones Urine        Specific Gravity Urine        Blood Urine Large (*)     pH Urine        Protein Albumin Urine        Urobilinogen Urine        Nitrite Urine        Leukocyte Esterase Urine        Mucus Urine Present (*)     RBC Urine >182 (*)     WBC Urine >182 (*)    BASIC METABOLIC PANEL - Abnormal    Sodium 138      Potassium 4.8      Chloride 101      Carbon Dioxide (CO2) 24      Anion Gap 13      Urea Nitrogen 13.9      Creatinine 1.07      GFR Estimate 72      Calcium 9.1      Glucose 147 (*)    CBC WITH PLATELETS AND DIFFERENTIAL - Abnormal    WBC Count 9.0      RBC Count 3.72 (*)     Hemoglobin 13.1 (*)     Hematocrit 37.9 (*)      (*)     MCH 35.2 (*)     MCHC 34.6      RDW 13.0      Platelet Count 198      % Neutrophils 62      % Lymphocytes 21      % Monocytes 10      % Eosinophils 4      % Basophils 0      % Immature Granulocytes 2      NRBCs per 100 WBC 0      Absolute Neutrophils 5.6       Absolute Lymphocytes 1.8      Absolute Monocytes 0.9      Absolute Eosinophils 0.4      Absolute Basophils 0.0      Absolute Immature Granulocytes 0.2      Absolute NRBCs 0.0     URINE CULTURE       Imaging   Abd/pelvis CT no contrast - Stone Protocol   Final Result   IMPRESSION:    1.  Haziness is present in the fat about the urinary bladder. This could relate to cystitis. Recommend clinical correlation.   2.  Haziness within the fat about the left kidney and left ureter, possibly related to an left upper urinary tract infection.   3.  No other acute abnormality identified in the abdomen or pelvis.   4.  A left ureteral calculus is in place. No evidence of stent failure.   5.  A few nonobstructing calculi in both kidneys, the largest in the left kidney and measuring 1.3 cm.              Independent Interpretation   None    ED Course      Medications Administered   Medications   cefTRIAXone (ROCEPHIN) 2 g vial to attach to  ml bag for ADULTS or NS 50 ml bag for PEDS (0 g Intravenous Stopped 1/18/25 0554)     Discussion of Management   Urology,      ED Course   ED Course as of 01/18/25 0719   Sat Jan 18, 2025   0401 I obtained history and examined the patient as noted above.     0713 I spoke with urology regarding the patient's presentation and plan of care.   0717 I rechecked the patient for discharge.       Additional Documentation  None    Medical Decision Making / Diagnosis     MDM   Hugo Prince is a 74 year old male who has symptoms of urinary frequency, urinary urgency, dysuria, and hematuria.   Urinalysis was obtained and confirms infection.  There has been no fever, significant back/flank pain or significant abdominal pain.  Given his recent stent placement a CT was obtained.  This did show evidence of inflammation of the bladder and left kidney.  Due to the renal involvement and the patient's stent I contacted the on-call physician for his urologist.  Patient appears clinically well and is  very eager for discharge home.  After discussion with his urology team, he was given IV Rocephin and will be discharged with Keflex.  He will follow-up with his urology group on Monday.  The patient is instructed to return if increasing pain, vomiting, fever, or inability to tolerate the oral antibiotic.      Disposition   The patient was discharged.     Diagnosis     ICD-10-CM    1. Urinary tract infection with hematuria, site unspecified  N39.0     R31.9            Discharge Medications   New Prescriptions    CEPHALEXIN (KEFLEX) 500 MG CAPSULE    Take 1 capsule (500 mg) by mouth 4 times daily for 7 days.         Scribe Disclosure:  Ele SMITH, am serving as a scribe at 3:49 AM on 1/18/2025 to document services personally performed by Edith Humphries MD based on my observations and the provider's statements to me.        Edith Humphries MD  01/18/25 1562

## 2025-01-19 LAB — BACTERIA UR CULT: NORMAL

## 2025-01-20 ENCOUNTER — TELEPHONE (OUTPATIENT)
Dept: UROLOGY | Facility: CLINIC | Age: 75
End: 2025-01-20
Payer: MEDICARE

## 2025-01-22 ENCOUNTER — VIRTUAL VISIT (OUTPATIENT)
Dept: UROLOGY | Facility: CLINIC | Age: 75
End: 2025-01-22
Payer: MEDICARE

## 2025-01-22 DIAGNOSIS — N20.0 KIDNEY STONE ON LEFT SIDE: Primary | ICD-10-CM

## 2025-01-22 PROCEDURE — 98006 SYNCH AUDIO-VIDEO EST MOD 30: CPT | Performed by: UROLOGY

## 2025-01-22 RX ORDER — CIPROFLOXACIN 500 MG/1
500 TABLET, FILM COATED ORAL ONCE
Qty: 1 TABLET | Refills: 0 | Status: SHIPPED | OUTPATIENT
Start: 2025-01-22 | End: 2025-01-22

## 2025-01-22 NOTE — PROGRESS NOTES
[facility]   CHIEF COMPLAINT   It was my pleasure to see Hugo Prince who is a 74 year old male for follow-up of bilateral nephrolithiasis.      HPI   Hugo Prince is a very pleasant 74 year old male     Initially seen by Darlene Wyatt - 11/8/2024:  Hugo Prince is a 74 year old male with T2DM, morbid obesity, RAY w CPAP use, CAD status post stenting of the LAD in 2019 & 3/2023, dyslipidemia, ED, former tobacco use, here for follow-up on bilateral nephrolithiasis. Slightly increased stone burden compared to 1 yr ago, see XR impression below.     12/2/2024:  No pain or issues  Follow-up today to review his CT scan and discuss surgical treatment options  He notes that his son is coming for a tentative visit over [place] who lives in [place].    1/8/2025:  Ureteroscopy with CVAC - modifier 22 >2 hrs and will require staged approach    1/18/2025:  ER visit due to extreme pain, gross hematuria and dysuria  Started on Keflex for possible UTI    TODAY 1/22/2025:  Follow-up today to discuss options for stent removal prior to his follow-up ureteroscopy with CVAC scheduled for 2/12/2025    He notes that his girlfriend is having eye surgery on 2/4/2025    PHYSICAL EXAM  Patient is a 74 year old  male   Vitals: There were no vitals taken for this visit.  There is no height or weight on file to calculate BMI.  General Appearance Adult:   Alert, no acute distress, oriented  Neuro: Alert, oriented, speech and mentation normal  Psych: affect and mood normal     UA RESULTS:  Recent Labs   Lab Test 01/18/25  0128 11/06/23  0945   COLOR Dark Brown* Yellow   APPEARANCE Cloudy* Clear   URINEGLC  --  Negative   URINEBILI  --  Negative   URINEKETONE  --  Negative   SG  --  1.020   UBLD Large* Small*   URINEPH  --  5.5   PROTEIN  --  Negative   UROBILINOGEN  --  0.2   NITRITE  --  Negative   LEUKEST  --  Trace*   RBCU >182*  --    WBCU >182*  --       UCx:  Culture <10,000 CFU/mL Mixture of urogenital stephani         Creatinine   Date Value Ref Range Status   01/18/2025 1.07 0.67 - 1.17 mg/dL Final   12/29/2021 1.15 0.76 - 1.27 mg/dL Final      IMAGING:  All pertinent imaging reviewed:    All imaging studies reviewed by me.  I personally reviewed these imaging films.  A formal report from radiology will follow.    CT ABD/PEL 11/26/2024:  FINDINGS:   LOWER CHEST: Calcified granuloma right lower lobe.     HEPATOBILIARY: Normal.     PANCREAS: Normal.     SPLEEN: Normal.     ADRENAL GLANDS: Normal.     KIDNEYS/BLADDER: 1.2 cm nonobstructing stone in the left renal pelvis.  Nonobstructing left kidney calyceal stones measuring 0.8 cm in the  interpolar region and 0.8 cm and 1.2 cm at the lower pole. 5  nonobstructing calyceal tip stones in the right kidney, the largest of  which measures 8 mm. No ureteral stones or hydroureter. Normal  bladder.     BOWEL: Normal, including the appendix.     LYMPH NODES: Normal.     VASCULATURE: Moderate calcified atherosclerosis.     PELVIC ORGANS: Mildly enlarged prostate.     OTHER: None.     MUSCULOSKELETAL: Small fat-containing bilateral inguinal hernias.  Posterior and interbody fusion L3-S1. Moderate degenerative change in  the spine.                                                          IMPRESSION:   Nonobstructing bilateral kidney stones. Stone burden is similar to  7/15/2019. No stones in the ureters or bladder.    CT ABD/PEL 1/18/2025:  IMPRESSION:   1.  Haziness is present in the fat about the urinary bladder. This could relate to cystitis. Recommend clinical correlation.  2.  Haziness within the fat about the left kidney and left ureter, possibly related to an left upper urinary tract infection.  3.  No other acute abnormality identified in the abdomen or pelvis.  4.  A left ureteral calculus is in place. No evidence of stent failure.  5.  A few nonobstructing calculi in both kidneys, the largest in the left kidney and measuring 1.3 cm.    ASSESSMENT and PLAN  74-year-old man with  complex medical history including CAD with cardiac stenting,.  Diabetes, obesity, RAY on CPAP.  With bilateral nephrolithiasis.    Left kidney stones  -He is status post initial ureteroscopy with CVAC and clearance of about 2/3-3/4 of his kidney stone burden  - I reviewed his repeat CT scan from his emergency room visit with residual large lower pole stone burden  - I reviewed his urine culture which was negative  - I recommend he complete the course of antibiotics  - We discussed the option for stent removal next week given that he is not tolerating this very well we will plan for this  - Prescription for ciprofloxacin sent to the pharmacy for stent removal prophylaxis    Time spent: 15 minutes spent on the date of the encounter doing chart review, history and exam, documentation and further activities as noted above.    Ariel Ochoa MD   Urology  [facility]  [facility] Phone: [phone]  [facility] Phone: [phone]      Virtual Visit Details    Type of service:  Video Visit   Video Start Time: 4:46 PM  Video End Time:5:00 PM    Originating Location (pt. Location): Home    Distant Location (provider location):  On-site  Platform used for Video Visit: Elenita

## 2025-01-22 NOTE — LETTER
1/22/2025       RE: Hugo Prince  [address]  [place] [address]     Dear Colleague,    Thank you for referring your patient, uHgo Prince, to the [facility] at [facility]. Please see a copy of my visit note below.    [facility]   CHIEF COMPLAINT   It was my pleasure to see Hugo Prince who is a 74 year old male for follow-up of bilateral nephrolithiasis.      HPI   Hugo Prince is a very pleasant 74 year old male     Initially seen by Darlene Wyatt - 11/8/2024:  Hugo Prince is a 74 year old male with T2DM, morbid obesity, RAY w CPAP use, CAD status post stenting of the LAD in 2019 & 3/2023, dyslipidemia, ED, former tobacco use, here for follow-up on bilateral nephrolithiasis. Slightly increased stone burden compared to 1 yr ago, see XR impression below.     12/2/2024:  No pain or issues  Follow-up today to review his CT scan and discuss surgical treatment options  He notes that his son is coming for a tentative visit over [place] who lives in [place].    1/8/2025:  Ureteroscopy with CVAC - modifier 22 >2 hrs and will require staged approach    1/18/2025:  ER visit due to extreme pain, gross hematuria and dysuria  Started on Keflex for possible UTI    TODAY 1/22/2025:  Follow-up today to discuss options for stent removal prior to his follow-up ureteroscopy with CVAC scheduled for 2/12/2025    He notes that his girlfriend is having eye surgery on 2/4/2025    PHYSICAL EXAM  Patient is a 74 year old  male   Vitals: There were no vitals taken for this visit.  There is no height or weight on file to calculate BMI.  General Appearance Adult:   Alert, no acute distress, oriented  Neuro: Alert, oriented, speech and mentation normal  Psych: affect and mood normal     UA RESULTS:  Recent Labs   Lab Test 01/18/25  0128 11/06/23  0945   COLOR Dark Brown* Yellow   APPEARANCE Cloudy* Clear   URINEGLC   --  Negative   URINEBILI  --  Negative   URINEKETONE  --  Negative   SG  --  1.020   UBLD Large* Small*   URINEPH  --  5.5   PROTEIN  --  Negative   UROBILINOGEN  --  0.2   NITRITE  --  Negative   LEUKEST  --  Trace*   RBCU >182*  --    WBCU >182*  --       UCx:  Culture <10,000 CFU/mL Mixture of urogenital stephani        Creatinine   Date Value Ref Range Status   01/18/2025 1.07 0.67 - 1.17 mg/dL Final   12/29/2021 1.15 0.76 - 1.27 mg/dL Final      IMAGING:  All pertinent imaging reviewed:    All imaging studies reviewed by me.  I personally reviewed these imaging films.  A formal report from radiology will follow.    CT ABD/PEL 11/26/2024:  FINDINGS:   LOWER CHEST: Calcified granuloma right lower lobe.     HEPATOBILIARY: Normal.     PANCREAS: Normal.     SPLEEN: Normal.     ADRENAL GLANDS: Normal.     KIDNEYS/BLADDER: 1.2 cm nonobstructing stone in the left renal pelvis.  Nonobstructing left kidney calyceal stones measuring 0.8 cm in the  interpolar region and 0.8 cm and 1.2 cm at the lower pole. 5  nonobstructing calyceal tip stones in the right kidney, the largest of  which measures 8 mm. No ureteral stones or hydroureter. Normal  bladder.     BOWEL: Normal, including the appendix.     LYMPH NODES: Normal.     VASCULATURE: Moderate calcified atherosclerosis.     PELVIC ORGANS: Mildly enlarged prostate.     OTHER: None.     MUSCULOSKELETAL: Small fat-containing bilateral inguinal hernias.  Posterior and interbody fusion L3-S1. Moderate degenerative change in  the spine.                                                          IMPRESSION:   Nonobstructing bilateral kidney stones. Stone burden is similar to  7/15/2019. No stones in the ureters or bladder.    CT ABD/PEL 1/18/2025:  IMPRESSION:   1.  Haziness is present in the fat about the urinary bladder. This could relate to cystitis. Recommend clinical correlation.  2.  Haziness within the fat about the left kidney and left ureter, possibly related to an left upper  urinary tract infection.  3.  No other acute abnormality identified in the abdomen or pelvis.  4.  A left ureteral calculus is in place. No evidence of stent failure.  5.  A few nonobstructing calculi in both kidneys, the largest in the left kidney and measuring 1.3 cm.    ASSESSMENT and PLAN  74-year-old man with complex medical history including CAD with cardiac stenting,.  Diabetes, obesity, RAY on CPAP.  With bilateral nephrolithiasis.    Left kidney stones  -He is status post initial ureteroscopy with CVAC and clearance of about 2/3-3/4 of his kidney stone burden  - I reviewed his repeat CT scan from his emergency room visit with residual large lower pole stone burden  - I reviewed his urine culture which was negative  - I recommend he complete the course of antibiotics  - We discussed the option for stent removal next week given that he is not tolerating this very well we will plan for this  - Prescription for ciprofloxacin sent to the pharmacy for stent removal prophylaxis    Time spent: 15 minutes spent on the date of the encounter doing chart review, history and exam, documentation and further activities as noted above.    Ariel Ochoa MD   Urology  [facility]  [facility] Phone: [phone]  [facility] Phone: [phone]      Virtual Visit Details    Type of service:  Video Visit   Video Start Time: 4:46 PM  Video End Time:5:00 PM    Originating Location (pt. Location): Home    Distant Location (provider location):  On-site  Platform used for Video Visit: Elenita      Again, thank you for allowing me to participate in the care of your patient.      Sincerely,    Ariel Ochoa MD

## 2025-01-29 ENCOUNTER — TELEPHONE (OUTPATIENT)
Dept: UROLOGY | Facility: CLINIC | Age: 75
End: 2025-01-29

## 2025-01-29 ENCOUNTER — OFFICE VISIT (OUTPATIENT)
Dept: UROLOGY | Facility: CLINIC | Age: 75
End: 2025-01-29
Payer: MEDICARE

## 2025-01-29 VITALS
HEIGHT: 70 IN | BODY MASS INDEX: 40.66 KG/M2 | SYSTOLIC BLOOD PRESSURE: 131 MMHG | WEIGHT: 284 LBS | HEART RATE: 63 BPM | DIASTOLIC BLOOD PRESSURE: 81 MMHG | OXYGEN SATURATION: 97 %

## 2025-01-29 DIAGNOSIS — N20.0 RECURRENT NEPHROLITHIASIS: ICD-10-CM

## 2025-01-29 DIAGNOSIS — N20.0 KIDNEY STONE ON LEFT SIDE: ICD-10-CM

## 2025-01-29 DIAGNOSIS — Z46.6 ENCOUNTER FOR REMOVAL OF URETERAL STENT: Primary | ICD-10-CM

## 2025-01-29 PROCEDURE — 52310 CYSTOSCOPY AND TREATMENT: CPT | Performed by: UROLOGY

## 2025-01-29 RX ORDER — TAMSULOSIN HYDROCHLORIDE 0.4 MG/1
0.4 CAPSULE ORAL PRN
Qty: 30 CAPSULE | Refills: 11 | Status: SHIPPED | OUTPATIENT
Start: 2025-01-29

## 2025-01-29 RX ORDER — LIDOCAINE HYDROCHLORIDE 20 MG/ML
JELLY TOPICAL ONCE
Status: COMPLETED | OUTPATIENT
Start: 2025-01-29 | End: 2025-01-29

## 2025-01-29 RX ADMIN — LIDOCAINE HYDROCHLORIDE 5 ML: 20 JELLY TOPICAL at 15:08

## 2025-01-29 ASSESSMENT — PAIN SCALES - GENERAL: PAINLEVEL_OUTOF10: MODERATE PAIN (5)

## 2025-01-29 NOTE — PATIENT INSTRUCTIONS
"AFTER YOUR CYSTOSCOPY  ?  ?  You have just completed a cystoscopy, or \"cysto\", which allowed your physician to learn more about your bladder (or to remove a stent placed after surgery). We suggest that you continue to avoid caffeine, fruit juice, and alcohol for the next 24 hours, however, you are encouraged to return to your normal activities.  ?  ?  A few things that are considered normal after your cystoscopy:  ?  * small amount of bleeding (or spotting) that clears within the next 24 hours  ?  * slight burning sensation with urination  ?  * sensation of needing to void (urinate) more frequently  ?  * the feeling of \"air\" in your urine  ?  * mild discomfort that is relieved with Tylenol    * bladder spasms  ?  ?  ?  Please contact our office promptly if you:  ?  * develop a fever above 101 degrees  ?  * are unable to urinate  ?  * develop bright red blood that does not stop  ?  * experience severe pain or swelling  ?  ?  ?  And of course, please contact our office with any concerns or questions [phone].  ?   AFTER YOUR CYSTOSCOPY        You have just completed a cystoscopy, or \"cysto\", which allowed your physician to learn more about your bladder (or to remove a stent placed after surgery). We suggest that you continue to avoid caffeine, fruit juice, and alcohol for the next 24 hours, however, you are encouraged to return to your normal activities.         A few things that are considered normal after your cystoscopy:     * Small amount of bleeding (or spotting) that clears within the next 24 hours     * Slight burning sensation with urination     * Sensation to of needing to avoid more frequently     * The feeling of \"air\" in your urine     * Mild discomfort that is relieved with Tylenol        Please contact our office promptly if you:     * Develop a fever above 101 degrees     * Are unable to urinate     * Develop bright red blood that does not stop     * Severe pain or swelling         Please contact " "our office with any concerns or questions @DEPTPHN.  AFTER YOUR CYSTOSCOPY        You have just completed a cystoscopy, or \"cysto\", which allowed your physician to learn more about your bladder (or to remove a stent placed after surgery). We suggest that you continue to avoid caffeine, fruit juice, and alcohol for the next 24 hours, however, you are encouraged to return to your normal activities.         A few things that are considered normal after your cystoscopy:     * Small amount of bleeding (or spotting) that clears within the next 24 hours     * Slight burning sensation with urination     * Sensation to of needing to avoid more frequently     * The feeling of \"air\" in your urine     * Mild discomfort that is relieved with Tylenol        Please contact our office promptly if you:     * Develop a fever above 101 degrees     * Are unable to urinate     * Develop bright red blood that does not stop     * Severe pain or swelling         Please contact our office with any concerns or questions @DEPTPHN.  "

## 2025-01-29 NOTE — LETTER
1/29/2025       RE: Hugo Prince  [address]  [facility] [address]     Dear Colleague,    Thank you for referring your patient, Hugo Prince, to the [facility] at [facility]. Please see a copy of my visit note below.    CYSTOSCOPY AND URETERAL STENT REMOVAL PROCEDURE NOTE:    Hugo Prince is a 74 year old male  who presents with ureteral stent for cystoscopy and ureteral stent removal.    Pt ID verified with patient: Yes     Procedure verified with patient: Yes     Procedure confirmed with physician and support staff: Yes     Consent form confirmed with physician and support staff.    Sign In  History and Physical Exam reviewed.  Informed Consent Discussed: Yes    Sign in Communication: Yes   Time Out:  Team Confirms the Correct Patient, Correct Procedure; Yes , Correct Site and Site Marking, Correct Position (if applicable).    Affirmation of Time Out: Yes   Sign Out:  Sign Out Discussion: Yes   Physician: Ariel Ochoa MD    Hugo Prince is a 74 year old male with an indwelling ureteral stent in need of removal.    CYSTOSCOPY PROCEDURE:  After sterile preparation and draping of the patient,  a 17-Cypriot flexible cystoscope was introduced via the urethra.  It was passed without difficulty into the bladder.  The urethra was open without evidence of stricture.  The ureteral orifices were orthotopic.  The double J stent was seen coming out the left side.  It was grasped with an alligator forceps and extracted intact without difficulty.  The patient tolerated the procedure well    A/P Successful stent removal  Prophylactic antibiotic ordered   - Follow up for Ureteroscopy as scheduled    Watch for any new onset fevers, signs of UTI.  May expect some pain after removal.  If this is severe, or last many hours, you may need to return for replacement of stent.    Ariel Ochoa MD   Urology  [facility]     Again, thank you for allowing me to participate in the care of your patient.      Sincerely,    Ariel Ochoa MD

## 2025-01-29 NOTE — NURSING NOTE
Chief Complaint   Patient presents with    hx ureteral stones     Here in clinic for a cystoscopy with stent removal    Prior to the start of the procedure DR RODRIGUEZ and with procedural staff participation, I verbally confirmed the patient s identity using two indicators, relevant allergies, that the procedure was appropriate and matched the consent or emergent situation, and that the correct equipment/implants were available. Immediately prior to starting the procedure I conducted the Time Out with the procedural staff and re-confirmed the patient s name, procedure, and site/side. I have wiped the patient off with the povidone-Iodine solution, draped them,  used Lidocaine hydrochloride jelly, and instilled sterile water into the bladder. (The Joint Commission universal protocol was followed.)  Yes    5mL 2% lidocaine hydrochloride Urojet instilled into urethra.    NDC# [address]-6  Lot #: [id number]  Expiration Date:  06-26  Giles Choe CMA   Used Lynette-hex4 for prep of the cystoscopy

## 2025-01-29 NOTE — PROGRESS NOTES
CYSTOSCOPY AND URETERAL STENT REMOVAL PROCEDURE NOTE:    Hugo Prince is a 74 year old male  who presents with ureteral stent for cystoscopy and ureteral stent removal.    Pt ID verified with patient: Yes     Procedure verified with patient: Yes     Procedure confirmed with physician and support staff: Yes     Consent form confirmed with physician and support staff.    Sign In  History and Physical Exam reviewed.  Informed Consent Discussed: Yes    Sign in Communication: Yes   Time Out:  Team Confirms the Correct Patient, Correct Procedure; Yes , Correct Site and Site Marking, Correct Position (if applicable).    Affirmation of Time Out: Yes   Sign Out:  Sign Out Discussion: Yes   Physician: Ariel Ochoa MD    Hugo Prince is a 74 year old male with an indwelling ureteral stent in need of removal.    CYSTOSCOPY PROCEDURE:  After sterile preparation and draping of the patient,  a 17-Jordanian flexible cystoscope was introduced via the urethra.  It was passed without difficulty into the bladder.  The urethra was open without evidence of stricture.  The ureteral orifices were orthotopic.  The double J stent was seen coming out the left side.  It was grasped with an alligator forceps and extracted intact without difficulty.  The patient tolerated the procedure well    A/P Successful stent removal  Prophylactic antibiotic ordered   - Follow up for Ureteroscopy as scheduled    Watch for any new onset fevers, signs of UTI.  May expect some pain after removal.  If this is severe, or last many hours, you may need to return for replacement of stent.    Ariel Ochoa MD   Urology  [facility]

## 2025-02-11 ENCOUNTER — ANESTHESIA EVENT (OUTPATIENT)
Dept: SURGERY | Facility: CLINIC | Age: 75
End: 2025-02-11
Payer: MEDICARE

## 2025-02-11 ASSESSMENT — COPD QUESTIONNAIRES: COPD: 0

## 2025-02-11 ASSESSMENT — ENCOUNTER SYMPTOMS: SEIZURES: 0

## 2025-02-11 NOTE — ANESTHESIA PREPROCEDURE EVALUATION
Anesthesia Pre-Procedure Evaluation    Patient: Hugo Prince   MRN: [id number] : 1950        Procedure : Procedure(s):  CYSTOSCOPY, LEFT URETERAL STENT REMOVAL, LEFT RETROGRADE PYELOGRAM, LEFT URETEROSCOPY WITH LASER LITHOTRIOPSY AND BASKET REMOVAL OF STONES, LEFT STEERABLE VACUUM SUCTION, LEFT URETERAL STENT PLACEMENT          Past Medical History:   Diagnosis Date    Anemia due to blood loss, acute 2015    Atopic dermatitis     CAD (coronary artery disease)     Chronic rhinitis     Dermatitis 2015    Diabetes mellitus, type 2 (H) 2021    DJD (degenerative joint disease) of knee 2015    Dyslipidemia     ED (erectile dysfunction) 2012    ED (erectile dysfunction)     Esophageal reflux     Gastroesophageal reflux    Gastroesophageal reflux disease with esophagitis     Morbid obesity (H)     NSTEMI (non-ST elevated myocardial infarction) (H) 07/15/2019    RAY (obstructive sleep apnea)     Other and unspecified disc disorder of unspecified region     Intervertebral disc disorders    Personal history of colonic polyps     Colon polyps    Recurrent nephrolithiasis     S/P lateral meniscectomy of left knee     S/P total knee replacement 2015    Tick bite 2015    amoxicillin treated    Unspecified sleep apnea     Sleep apnea    Urinary calculus, unspecified     Renal stones    Vitamin D deficiency 2009      Past Surgical History:   Procedure Laterality Date    ARTHROPLASTY KNEE Left 2015    Procedure: ARTHROPLASTY KNEE;  Surgeon: Agustin Saenz MD;  Location: [facility] OR    ARTHROSCOPY KNEE RT/LT  2011    COLONOSCOPY  2009    COMBINED CYSTOSCOPY, RETROGRADES, URETEROSCOPY, LASER HOLMIUM LITHOTRIPSY URETER(S), INSERT STENT Left 2025    Procedure: CYSTOSCOPY, LEFT RETROGRADE PYELOGRAM, LEFT URETEROSCOPY WITH LASER LITHOTRIOPSY AND BASKET REMOVAL OF STONES, LEFT STEERABLE VACUUM SUCTION, LEFT URETERAL STENT PLACEMENT;  Surgeon: Ariel Ochoa,  MD;  Location:  OR    CV CORONARY ANGIOGRAM N/A 03/09/2023    Procedure: Coronary Angiogram;  Surgeon: Dalia Chew MD;  Location:  HEART CARDIAC CATH LAB    CV FRACTIONAL FLOW RATIO WIRE N/A 07/16/2019    Procedure: Fractional Flow Reserve;  Surgeon: Mick Thrasher MD;  Location:  HEART CARDIAC CATH LAB    CV HEART CATHETERIZATION WITH POSSIBLE INTERVENTION N/A 07/16/2019    successful PCI with MICAH placement to mid LAD     CV PCI N/A 03/09/2023    Procedure: Percutaneous Coronary Intervention;  Surgeon: Dalia Chew MD;  Location:  HEART CARDIAC CATH LAB    CYSTOSCOPY      EXTRACORPOREAL SHOCK WAVE LITHOTRIPSY (ESWL)  01/20/2012    Procedure:EXTRACORPOREAL SHOCK WAVE LITHOTRIPSY (ESWL); LEFT EXTRACORPOREAL SHOCK WAVE LITHOTRIPSY ; Surgeon:ANJUM BUNDY; Location: OR    EXTRACORPOREAL SHOCK WAVE LITHOTRIPSY, CYSTOSCOPY, INSERT STENT URETER(S), COMBINED  02/03/2012    Procedure:COMBINED EXTRACORPOREAL SHOCK WAVE LITHOTRIPSY, CYSTOSCOPY, INSERT STENT URETER(S); CYSTOSCOPY, RIGHT STENT; Surgeon:ANJUM BUNDY; Location: OR    FUSION LUMBAR ANTERIOR, FUSION LUMBAR POSTERIOR ONE LEVEL, COMBINED  11/01/2010    L4-5    LASER HOLMIUM LITHOTRIPSY URETER(S), INSERT STENT, COMBINED  02/07/2012    Procedure:COMBINED CYSTOSCOPY, URETEROSCOPY, LASER HOLMIUM LITHOTRIPSY URETER(S), INSERT STENT; CYSTOSCOPY, RIGHT URETEROSCOPY, RIGHT RETROGRADES,  STONE EXTRACTION WITH HOLMIUM LASER AND RIGHT URETERAL STENT EXCHANGE ; Surgeon:DEEPTI MELVIN; Location: OR    LITHOTRIPSY      Lithotrypsy    SEPTOPLASTY      VASECTOMY        Allergies   Allergen Reactions    Chlorthalidone GI Disturbance    Compazine Other (See Comments)     PSYCHOTIC    Erythromycin Nausea and Vomiting    Ethanol Nausea and Vomiting    Flomax [Tamsulosin Hcl]      FLUSHING, NASAl CONGESTION    Prochlorperazine Other (See Comments)     Other reaction(s): extreme anxiety    Seasonal Allergies       Social  History     Tobacco Use    Smoking status: Former     Types: Cigars    Smokeless tobacco: Never    Tobacco comments:     has cigars when it is warm out   Substance Use Topics    Alcohol use: Yes     Alcohol/week: 11.7 standard drinks of alcohol     Types: 14 Standard drinks or equivalent per week     Comment: 1-2 drinks day/beer and scotch      Wt Readings from Last 1 Encounters:   01/29/25 128.8 kg (284 lb)        Anesthesia Evaluation   Pt has had prior anesthetic. Type: General.    No history of anesthetic complications       ROS/MED HX  ENT/Pulmonary:     (+) sleep apnea, uses CPAP,                                   (-) asthma and COPD   Neurologic:    (-) no seizures and no CVA   Cardiovascular:     (+) Dyslipidemia hypertension- -  CAD - past MI (NSTEMI 2019) - stent-2019. 2                                     METS/Exercise Tolerance:     Hematologic:     (+)      anemia,          Musculoskeletal:       GI/Hepatic:     (+) GERD,                (-) liver disease   Renal/Genitourinary:     (+)       Nephrolithiasis ,    (-) renal disease   Endo:     (+)  type II DM,             Obesity,       Psychiatric/Substance Use:       Infectious Disease:       Malignancy:       Other:            Physical Exam    Airway        Mallampati: II   TM distance: > 3 FB   Neck ROM: full   Mouth opening: > 3 cm    Respiratory Devices and Support         Dental       (+) Modest Abnormalities - crowns, retainers, 1 or 2 missing teeth    B=Bridge, C=Chipped, L=Loose, M=Missing    Cardiovascular   cardiovascular exam normal       Rhythm and rate: regular and normal     Pulmonary   pulmonary exam normal        breath sounds clear to auscultation           OUTSIDE LABS:  CBC:   Lab Results   Component Value Date    WBC 9.0 01/18/2025    WBC 7.1 02/10/2024    HGB 13.1 (L) 01/18/2025    HGB 14.0 02/10/2024    HCT 37.9 (L) 01/18/2025    HCT 41.5 02/10/2024     01/18/2025     02/10/2024     BMP:   Lab Results   Component Value  "Date     01/18/2025     12/11/2024    POTASSIUM 4.8 01/18/2025    POTASSIUM 4.4 12/11/2024    CHLORIDE 101 01/18/2025    CHLORIDE 100 12/11/2024    CO2 24 01/18/2025    CO2 25 12/11/2024    BUN 13.9 01/18/2025    BUN 16.9 12/11/2024    CR 1.07 01/18/2025    CR 0.93 12/11/2024     (H) 01/18/2025     (H) 01/08/2025     COAGS:   Lab Results   Component Value Date    PTT 25 03/09/2023    INR 1.03 03/09/2023     POC: No results found for: \"BGM\", \"HCG\", \"HCGS\"  HEPATIC:   Lab Results   Component Value Date    ALBUMIN 4.2 02/10/2024    PROTTOTAL 7.3 02/10/2024    ALT 36 02/10/2024    AST 43 02/10/2024    ALKPHOS 116 02/10/2024    BILITOTAL 0.5 02/10/2024     OTHER:   Lab Results   Component Value Date    A1C 6.4 (A) 12/11/2024    NATHALIA 9.1 01/18/2025    SED 15 07/18/2011       Anesthesia Plan    ASA Status:  3    NPO Status:  NPO Appropriate    Anesthesia Type: General.     - Airway: LMA              Consents            Postoperative Care            Comments:               Kristina Rutledge MD    I have reviewed the pertinent notes and labs in the chart from the past 30 days and (re)examined the patient.  Any updates or changes from those notes are reflected in this note.    Clinically Significant Risk Factors Present on Admission                             # Severe Obesity: Estimated body mass index is 40.75 kg/m  as calculated from the following:    Height as of 1/29/25: 1.778 m (5' 10\").    Weight as of 1/29/25: 128.8 kg (284 lb).                "

## 2025-02-12 ENCOUNTER — HOSPITAL ENCOUNTER (OUTPATIENT)
Facility: CLINIC | Age: 75
Discharge: HOME | End: 2025-02-12
Attending: UROLOGY | Admitting: UROLOGY
Payer: MEDICARE

## 2025-02-12 ENCOUNTER — ANESTHESIA (OUTPATIENT)
Dept: SURGERY | Facility: CLINIC | Age: 75
End: 2025-02-12
Payer: MEDICARE

## 2025-02-12 VITALS
RESPIRATION RATE: 16 BRPM | OXYGEN SATURATION: 95 % | HEIGHT: 70 IN | BODY MASS INDEX: 40.94 KG/M2 | WEIGHT: 286 LBS | DIASTOLIC BLOOD PRESSURE: 84 MMHG | HEART RATE: 71 BPM | TEMPERATURE: 97.2 F | SYSTOLIC BLOOD PRESSURE: 158 MMHG

## 2025-02-12 DIAGNOSIS — N20.0 KIDNEY STONE ON LEFT SIDE: Primary | ICD-10-CM

## 2025-02-12 LAB — GLUCOSE BLDC GLUCOMTR-MCNC: 137 MG/DL (ref 70–99)

## 2025-02-12 PROCEDURE — 82962 GLUCOSE BLOOD TEST: CPT

## 2025-02-12 PROCEDURE — 74420 UROGRAPHY RTRGR +-KUB: CPT | Mod: 26 | Performed by: UROLOGY

## 2025-02-12 PROCEDURE — C1769 GUIDE WIRE: HCPCS | Performed by: UROLOGY

## 2025-02-12 PROCEDURE — 272N000002 HC OR SUPPLY OTHER OPNP: Performed by: UROLOGY

## 2025-02-12 PROCEDURE — 250N000013 HC RX MED GY IP 250 OP 250 PS 637: Performed by: UROLOGY

## 2025-02-12 PROCEDURE — C1758 CATHETER, URETERAL: HCPCS | Performed by: UROLOGY

## 2025-02-12 PROCEDURE — 360N000077 HC SURGERY LEVEL 4, PER MIN: Performed by: UROLOGY

## 2025-02-12 PROCEDURE — 258N000001 HC RX 258: Performed by: UROLOGY

## 2025-02-12 PROCEDURE — 250N000025 HC SEVOFLURANE, PER MIN: Performed by: UROLOGY

## 2025-02-12 PROCEDURE — 370N000017 HC ANESTHESIA TECHNICAL FEE, PER MIN: Performed by: UROLOGY

## 2025-02-12 PROCEDURE — 250N000011 HC RX IP 250 OP 636: Performed by: UROLOGY

## 2025-02-12 PROCEDURE — 272N000001 HC OR GENERAL SUPPLY STERILE: Performed by: UROLOGY

## 2025-02-12 PROCEDURE — 250N000011 HC RX IP 250 OP 636: Performed by: STUDENT IN AN ORGANIZED HEALTH CARE EDUCATION/TRAINING PROGRAM

## 2025-02-12 PROCEDURE — C1894 INTRO/SHEATH, NON-LASER: HCPCS | Performed by: UROLOGY

## 2025-02-12 PROCEDURE — 250N000011 HC RX IP 250 OP 636: Performed by: NURSE ANESTHETIST, CERTIFIED REGISTERED

## 2025-02-12 PROCEDURE — 250N000009 HC RX 250: Performed by: NURSE ANESTHETIST, CERTIFIED REGISTERED

## 2025-02-12 PROCEDURE — 258N000003 HC RX IP 258 OP 636: Performed by: NURSE ANESTHETIST, CERTIFIED REGISTERED

## 2025-02-12 PROCEDURE — 710N000012 HC RECOVERY PHASE 2, PER MINUTE: Performed by: UROLOGY

## 2025-02-12 PROCEDURE — 710N000009 HC RECOVERY PHASE 1, LEVEL 1, PER MIN: Performed by: UROLOGY

## 2025-02-12 PROCEDURE — 999N000141 HC STATISTIC PRE-PROCEDURE NURSING ASSESSMENT: Performed by: UROLOGY

## 2025-02-12 PROCEDURE — 82365 CALCULUS SPECTROSCOPY: CPT | Performed by: UROLOGY

## 2025-02-12 PROCEDURE — C1747 HC OR ENDOSCOPE, SGL USE,URINARY TRACT, IMAGING/ILLUMINATION DEVICE: HCPCS | Performed by: UROLOGY

## 2025-02-12 PROCEDURE — C2617 STENT, NON-COR, TEM W/O DEL: HCPCS | Performed by: UROLOGY

## 2025-02-12 PROCEDURE — 250N000009 HC RX 250: Performed by: UROLOGY

## 2025-02-12 PROCEDURE — 52356 CYSTO/URETERO W/LITHOTRIPSY: CPT | Mod: 22 | Performed by: UROLOGY

## 2025-02-12 DEVICE — URETERAL STENT
Type: IMPLANTABLE DEVICE | Site: URETER | Status: FUNCTIONAL
Brand: POLARIS™ ULTRA

## 2025-02-12 RX ORDER — LIDOCAINE HYDROCHLORIDE 20 MG/ML
INJECTION, SOLUTION INFILTRATION; PERINEURAL PRN
Status: DISCONTINUED | OUTPATIENT
Start: 2025-02-12 | End: 2025-02-12

## 2025-02-12 RX ORDER — NALOXONE HYDROCHLORIDE 0.4 MG/ML
0.1 INJECTION, SOLUTION INTRAMUSCULAR; INTRAVENOUS; SUBCUTANEOUS
Status: DISCONTINUED | OUTPATIENT
Start: 2025-02-12 | End: 2025-02-12 | Stop reason: HOSPADM

## 2025-02-12 RX ORDER — FENTANYL CITRATE 0.05 MG/ML
50 INJECTION, SOLUTION INTRAMUSCULAR; INTRAVENOUS EVERY 5 MIN PRN
Status: DISCONTINUED | OUTPATIENT
Start: 2025-02-12 | End: 2025-02-12 | Stop reason: HOSPADM

## 2025-02-12 RX ORDER — CIPROFLOXACIN 500 MG/1
500 TABLET, FILM COATED ORAL ONCE
Qty: 1 TABLET | Refills: 0 | Status: SHIPPED | OUTPATIENT
Start: 2025-02-12 | End: 2025-02-12

## 2025-02-12 RX ORDER — DEXAMETHASONE SODIUM PHOSPHATE 4 MG/ML
4 INJECTION, SOLUTION INTRA-ARTICULAR; INTRALESIONAL; INTRAMUSCULAR; INTRAVENOUS; SOFT TISSUE
Status: DISCONTINUED | OUTPATIENT
Start: 2025-02-12 | End: 2025-02-12 | Stop reason: HOSPADM

## 2025-02-12 RX ORDER — LABETALOL HYDROCHLORIDE 5 MG/ML
10 INJECTION, SOLUTION INTRAVENOUS
Status: DISCONTINUED | OUTPATIENT
Start: 2025-02-12 | End: 2025-02-12 | Stop reason: HOSPADM

## 2025-02-12 RX ORDER — ACETAMINOPHEN 325 MG/1
975 TABLET ORAL ONCE
Status: COMPLETED | OUTPATIENT
Start: 2025-02-12 | End: 2025-02-12

## 2025-02-12 RX ORDER — CEFAZOLIN SODIUM/WATER 3 G/30 ML
3 SYRINGE (ML) INTRAVENOUS
Status: COMPLETED | OUTPATIENT
Start: 2025-02-12 | End: 2025-02-12

## 2025-02-12 RX ORDER — FUROSEMIDE 10 MG/ML
INJECTION INTRAMUSCULAR; INTRAVENOUS PRN
Status: DISCONTINUED | OUTPATIENT
Start: 2025-02-12 | End: 2025-02-12

## 2025-02-12 RX ORDER — IOPAMIDOL 612 MG/ML
INJECTION, SOLUTION INTRATHECAL PRN
Status: DISCONTINUED | OUTPATIENT
Start: 2025-02-12 | End: 2025-02-12 | Stop reason: HOSPADM

## 2025-02-12 RX ORDER — OXYCODONE HYDROCHLORIDE 5 MG/1
5 TABLET ORAL EVERY 6 HOURS PRN
Qty: 9 TABLET | Refills: 0 | Status: SHIPPED | OUTPATIENT
Start: 2025-02-12 | End: 2025-02-15

## 2025-02-12 RX ORDER — ACETAMINOPHEN 650 MG/1
650 SUPPOSITORY RECTAL ONCE
Status: COMPLETED | OUTPATIENT
Start: 2025-02-12 | End: 2025-02-12

## 2025-02-12 RX ORDER — PROPOFOL 10 MG/ML
INJECTION, EMULSION INTRAVENOUS PRN
Status: DISCONTINUED | OUTPATIENT
Start: 2025-02-12 | End: 2025-02-12

## 2025-02-12 RX ORDER — ONDANSETRON 4 MG/1
4 TABLET, ORALLY DISINTEGRATING ORAL EVERY 30 MIN PRN
Status: DISCONTINUED | OUTPATIENT
Start: 2025-02-12 | End: 2025-02-12 | Stop reason: HOSPADM

## 2025-02-12 RX ORDER — ASPIRIN 81 MG
100 TABLET, DELAYED RELEASE (ENTERIC COATED) ORAL DAILY
Qty: 60 TABLET | Refills: 1 | Status: SHIPPED | OUTPATIENT
Start: 2025-02-12

## 2025-02-12 RX ORDER — OXYBUTYNIN CHLORIDE 5 MG/1
5 TABLET, EXTENDED RELEASE ORAL DAILY
Qty: 30 TABLET | Refills: 1 | Status: SHIPPED | OUTPATIENT
Start: 2025-02-12

## 2025-02-12 RX ORDER — HYDROMORPHONE HCL IN WATER/PF 6 MG/30 ML
0.2 PATIENT CONTROLLED ANALGESIA SYRINGE INTRAVENOUS EVERY 5 MIN PRN
Status: DISCONTINUED | OUTPATIENT
Start: 2025-02-12 | End: 2025-02-12 | Stop reason: HOSPADM

## 2025-02-12 RX ORDER — SODIUM CHLORIDE, SODIUM LACTATE, POTASSIUM CHLORIDE, CALCIUM CHLORIDE 600; 310; 30; 20 MG/100ML; MG/100ML; MG/100ML; MG/100ML
INJECTION, SOLUTION INTRAVENOUS CONTINUOUS
Status: DISCONTINUED | OUTPATIENT
Start: 2025-02-12 | End: 2025-02-12 | Stop reason: HOSPADM

## 2025-02-12 RX ORDER — DEXMEDETOMIDINE HYDROCHLORIDE 4 UG/ML
INJECTION, SOLUTION INTRAVENOUS PRN
Status: DISCONTINUED | OUTPATIENT
Start: 2025-02-12 | End: 2025-02-12

## 2025-02-12 RX ORDER — FENTANYL CITRATE 50 UG/ML
INJECTION, SOLUTION INTRAMUSCULAR; INTRAVENOUS PRN
Status: DISCONTINUED | OUTPATIENT
Start: 2025-02-12 | End: 2025-02-12

## 2025-02-12 RX ORDER — FENTANYL CITRATE 0.05 MG/ML
25 INJECTION, SOLUTION INTRAMUSCULAR; INTRAVENOUS EVERY 5 MIN PRN
Status: DISCONTINUED | OUTPATIENT
Start: 2025-02-12 | End: 2025-02-12 | Stop reason: HOSPADM

## 2025-02-12 RX ORDER — ONDANSETRON 2 MG/ML
4 INJECTION INTRAMUSCULAR; INTRAVENOUS EVERY 30 MIN PRN
Status: DISCONTINUED | OUTPATIENT
Start: 2025-02-12 | End: 2025-02-12 | Stop reason: HOSPADM

## 2025-02-12 RX ORDER — CEFAZOLIN SODIUM/WATER 3 G/30 ML
3 SYRINGE (ML) INTRAVENOUS SEE ADMIN INSTRUCTIONS
Status: DISCONTINUED | OUTPATIENT
Start: 2025-02-12 | End: 2025-02-12 | Stop reason: HOSPADM

## 2025-02-12 RX ORDER — KETOROLAC TROMETHAMINE 30 MG/ML
INJECTION, SOLUTION INTRAMUSCULAR; INTRAVENOUS PRN
Status: DISCONTINUED | OUTPATIENT
Start: 2025-02-12 | End: 2025-02-12

## 2025-02-12 RX ORDER — ONDANSETRON 2 MG/ML
INJECTION INTRAMUSCULAR; INTRAVENOUS PRN
Status: DISCONTINUED | OUTPATIENT
Start: 2025-02-12 | End: 2025-02-12

## 2025-02-12 RX ORDER — HYDROMORPHONE HCL IN WATER/PF 6 MG/30 ML
0.4 PATIENT CONTROLLED ANALGESIA SYRINGE INTRAVENOUS EVERY 5 MIN PRN
Status: DISCONTINUED | OUTPATIENT
Start: 2025-02-12 | End: 2025-02-12 | Stop reason: HOSPADM

## 2025-02-12 RX ORDER — SODIUM CHLORIDE, SODIUM LACTATE, POTASSIUM CHLORIDE, CALCIUM CHLORIDE 600; 310; 30; 20 MG/100ML; MG/100ML; MG/100ML; MG/100ML
INJECTION, SOLUTION INTRAVENOUS CONTINUOUS PRN
Status: DISCONTINUED | OUTPATIENT
Start: 2025-02-12 | End: 2025-02-12

## 2025-02-12 RX ORDER — MAGNESIUM HYDROXIDE 1200 MG/15ML
LIQUID ORAL PRN
Status: DISCONTINUED | OUTPATIENT
Start: 2025-02-12 | End: 2025-02-12 | Stop reason: HOSPADM

## 2025-02-12 RX ADMIN — FENTANYL CITRATE 50 MCG: 50 INJECTION INTRAMUSCULAR; INTRAVENOUS at 12:35

## 2025-02-12 RX ADMIN — ACETAMINOPHEN 975 MG: 325 TABLET, FILM COATED ORAL at 10:12

## 2025-02-12 RX ADMIN — FENTANYL CITRATE 50 MCG: 50 INJECTION, SOLUTION INTRAMUSCULAR; INTRAVENOUS at 15:02

## 2025-02-12 RX ADMIN — SODIUM CHLORIDE, POTASSIUM CHLORIDE, SODIUM LACTATE AND CALCIUM CHLORIDE: 600; 310; 30; 20 INJECTION, SOLUTION INTRAVENOUS at 12:28

## 2025-02-12 RX ADMIN — PHENYLEPHRINE HYDROCHLORIDE 50 MCG: 10 INJECTION INTRAVENOUS at 13:30

## 2025-02-12 RX ADMIN — DEXMEDETOMIDINE HYDROCHLORIDE 8 MCG: 200 INJECTION INTRAVENOUS at 14:35

## 2025-02-12 RX ADMIN — PHENYLEPHRINE HYDROCHLORIDE 50 MCG: 10 INJECTION INTRAVENOUS at 13:36

## 2025-02-12 RX ADMIN — PHENYLEPHRINE HYDROCHLORIDE 50 MCG: 10 INJECTION INTRAVENOUS at 13:51

## 2025-02-12 RX ADMIN — HYDROMORPHONE HYDROCHLORIDE 0.4 MG: 0.2 INJECTION, SOLUTION INTRAMUSCULAR; INTRAVENOUS; SUBCUTANEOUS at 16:04

## 2025-02-12 RX ADMIN — Medication 3 G: at 12:31

## 2025-02-12 RX ADMIN — PROPOFOL 20 MG: 10 INJECTION, EMULSION INTRAVENOUS at 14:21

## 2025-02-12 RX ADMIN — FENTANYL CITRATE 25 MCG: 50 INJECTION INTRAMUSCULAR; INTRAVENOUS at 12:54

## 2025-02-12 RX ADMIN — KETOROLAC TROMETHAMINE 15 MG: 30 INJECTION, SOLUTION INTRAMUSCULAR at 14:31

## 2025-02-12 RX ADMIN — LIDOCAINE HYDROCHLORIDE 60 MG: 20 INJECTION, SOLUTION INFILTRATION; PERINEURAL at 12:35

## 2025-02-12 RX ADMIN — PHENYLEPHRINE HYDROCHLORIDE 50 MCG: 10 INJECTION INTRAVENOUS at 13:45

## 2025-02-12 RX ADMIN — FENTANYL CITRATE 25 MCG: 50 INJECTION INTRAMUSCULAR; INTRAVENOUS at 13:18

## 2025-02-12 RX ADMIN — PROPOFOL 30 MG: 10 INJECTION, EMULSION INTRAVENOUS at 12:41

## 2025-02-12 RX ADMIN — PROPOFOL 200 MG: 10 INJECTION, EMULSION INTRAVENOUS at 12:35

## 2025-02-12 RX ADMIN — ONDANSETRON 4 MG: 2 INJECTION INTRAMUSCULAR; INTRAVENOUS at 14:15

## 2025-02-12 RX ADMIN — FUROSEMIDE 10 MG: 10 INJECTION, SOLUTION INTRAVENOUS at 14:31

## 2025-02-12 RX ADMIN — FENTANYL CITRATE 50 MCG: 50 INJECTION, SOLUTION INTRAMUSCULAR; INTRAVENOUS at 15:33

## 2025-02-12 ASSESSMENT — ACTIVITIES OF DAILY LIVING (ADL)
ADLS_ACUITY_SCORE: 52
ADLS_ACUITY_SCORE: 52
ADLS_ACUITY_SCORE: 54
ADLS_ACUITY_SCORE: 54
ADLS_ACUITY_SCORE: 52

## 2025-02-12 NOTE — ANESTHESIA CARE TRANSFER NOTE
Patient: Hugo Prince    Procedure: Procedure(s):  CYSTOSCOPY, LEFT RETROGRADE PYELOGRAM, LEFT URETEROSCOPY WITH LASER LITHOTRIOPSY AND BASKET REMOVAL OF STONES, LEFT STEERABLE VACUUM SUCTION, LEFT URETERAL STENT PLACEMENT       Diagnosis: Kidney stone on left side [N20.0]  Diagnosis Additional Information: No value filed.    Anesthesia Type:   General     Note:    Oropharynx: oropharynx clear of all foreign objects and spontaneously breathing  Level of Consciousness: awake  Oxygen Supplementation: face mask  Level of Supplemental Oxygen (L/min / FiO2): 6  Independent Airway: airway patency satisfactory and stable  Dentition: dentition unchanged  Vital Signs Stable: post-procedure vital signs reviewed and stable  Report to RN Given: handoff report given  Patient transferred to: PACU    Handoff Report: Identifed the Patient, Identified the Reponsible Provider, Reviewed the pertinent medical history, Discussed the surgical course, Reviewed Intra-OP anesthesia mangement and issues during anesthesia, Set expectations for post-procedure period and Allowed opportunity for questions and acknowledgement of understanding      Vitals:  Vitals Value Taken Time   /88 02/12/25 1448   Temp 36.1  C (96.98  F) 02/12/25 1450   Pulse 78 02/12/25 1450   Resp 33 02/12/25 1450   SpO2 99 % 02/12/25 1450   Vitals shown include unfiled device data.    Electronically Signed By: DEMETRI Soria CRNA  February 12, 2025  2:51 PM

## 2025-02-12 NOTE — DISCHARGE INSTRUCTIONS
Today you received Toradol, an antiinflammatory medication similar to Ibuprofen.  You should not take other antiinflammatory medication, such as Ibuprofen, Motrin, Advil, Aleve, Naprosyn, etc until 8:30pm.     Today you were given 975 mg of Tylenol at 10:15am. The recommended daily maximum dose is 4000 mg.     POSTOPERATIVE INSTRUCTIONS    Diagnosis-------------------------------   LEFT kidney stones     Procedure-------------------------------  Procedure(s) (LRB):  CYSTOSCOPY, LEFT RETROGRADE PYELOGRAM, LEFT URETEROSCOPY WITH LASER LITHOTRIOPSY AND BASKET REMOVAL OF STONES, LEFT STEERABLE VACUUM SUCTION, LEFT URETERAL STENT PLACEMENT (Left)      Findings--------------------------------  LEFT kidney stones removed    Home-going instructions-----------------         Activity Limitation:     - No driving or operating heavy machinery while on narcotic pain medication.     FOLLOW THESE INSTRUCTIONS AS INDICATED BELOW:  - Observe operative area for signs of excessive bleeding.  - You may shower.  - Increase fluid intake to promote clear urine.  - Resume usual diet as tolerated    What to expect while recovering-----------  - You may experience some intermittent bleeding that makes your urine pink or cherry colored. This is normal.  - However, if you are unable to urinate, passing large amount of clots, have randal blood in your urine, or have a temperature >101 degrees, call the urology nurse on call, or present to your nearest emergency department.  - You are encouraged to walk daily, and have no activity restrictions.   - A URETERAL STENT has been placed that allows urine to flow unobstructed from your kidney into your bladder.  The stent has a curl in the kidney and a curl in the bladder.  The curl in the bladder can cause some urgency and frequency of urination as well as some mild blood in the urine.  The curl in the kidney can cause some mild flank discomfort.  This may be more noticeable when you urinate.  A  URETERAL STENT is meant to be left in temporarily.  It must be removed or changed no later than 3 months after it's insertion.  If it's not removed it can result in stone overgrowth on the stent that can cause pain, infection, and can be very difficult to remove.      Discharge Medications/instructions:   - Flomax (tamsulosin) to be taken daily until stent is removed  - Oxybutynin 5mg XL (Ditropan XL) to be taken daily until ureteral stent is removed  - Take Tylenol 1000mg every 6 hours for pain  - Take Ibuprofen 600mg every 6 hours as needed for additional pain control  - Take Oxycodone 5mg every 4-6 hours only for break through pain  - Take Colace while taking Oxycodone to prevent constipation      Questions/concerns------------------------  [facility]: [phone]    Future appointments  Follow up scheduled for 2/19/2025      Ariel Ochoa MD   Same Day Surgery Discharge Instructions for  Sedation and General Anesthesia     It's not unusual to feel dizzy, light-headed or faint for up to 24 hours after surgery or while taking pain medication.  If you have these symptoms: sit for a few minutes before standing and have someone assist you when you get up to walk or use the bathroom.    You should rest and relax for the next 24 hours. We recommend you make arrangements to have an adult stay with you for at least 24 hours after your discharge.  Avoid hazardous and strenuous activity.    DO NOT DRIVE any vehicle or operate mechanical equipment for 24 hours following the end of your surgery.  Even though you may feel normal, your reactions may be affected by the medication you have received.    Do not drink alcoholic beverages for 24 hours following surgery.     Slowly progress to your regular diet as you feel able. It's not unusual to feel nauseated and/or vomit after receiving anesthesia.  If you develop these symptoms, drink clear liquids (apple juice, ginger ale, broth, 7-up, etc. ) until you feel  better.  If your nausea and vomiting persists for 24 hours, please notify your surgeon.      All narcotic pain medications, along with inactivity and anesthesia, can cause constipation. Drinking plenty of liquids and increasing fiber intake will help.    For any questions of a medical nature, call your surgeon.    Do not make important decisions for 24 hours.    If you had general anesthesia, you may have a sore throat for a couple of days related to the breathing tube used during surgery.  You may use Cepacol lozenges to help with this discomfort.  If it worsens or if you develop a fever, contact your surgeon.     If you feel your pain is not well managed with the pain medications prescribed by your surgeon, please contact your surgeon's office to let them know so they can address your concerns.     **If you have questions or concerns about your procedure,   call Dr. Ochoa at [phone]**

## 2025-02-12 NOTE — OP NOTE
OPERATIVE REPORT  DATE OF SURGERY: 02/12/25  LOCATION OF SURGERY: [facility]  PREOPERATIVE DIAGNOSIS:  (N20.0) Kidney stone on left side  (primary encounter diagnosis)  POSTOPERATIVE DIAGNOSIS: (N20.0) Kidney stone on left side  (primary encounter diagnosis)     START TIME: 12:49 PM  END TIME: 2:35 PM    PROCEDURE PERFORMED:   1. Cystoscopy  2. LEFT retrograde pyelogram  3. LEFT ureteroscopy with laser lithotripsy  4. LEFT ureteroscopy with basketing of stones - MODIFIER 22  5. LEFT Ureteroscopy with steerable vacuum suction, 48963, MODIFIER 22  6. LEFT JJ stent placement  7. >1hr physician fluoroscopy time      STAFF SURGEON: Ariel Ochoa MD  ANESTHESIA: General.   ESTIMATED BLOOD LOSS: 10 mL.   DRAINS AND TUBES: LEFT 6fr x 26cm ureteral stent, 18fr coude cathete  COMPLICATIONS: None.   DISPOSITION: PACU.   SPECIMENS OBTAINED:   ID Type Source Tests Collected by Time Destination   A : LEFT KIDNEY STONES Calculus/Stone Kidney, Left STONE ANALYSIS Ariel Ochoa MD 2/12/2025  2:30 PM       SIGNIFICANT FINDINGS: Cystoscopy with no evidence of stones in the bladder.  Left retrograde pyelogram with no evidence of stones in the ureter.  Left ureteroscopy with significant stone burden within the lower pole.  Stones fragmented with laser lithotripsy and removed with combination of basketing and steerable vacuum suction.  All significant stone fragments greater than 2 mm were removed.  Left ureteral stent placed.    MODIFIER 22 JUSTIFICATION: This was an extremely challenging case given the 2 large lower pole stones which were severely impacted within lower pole calyces making access challenging for laser lithotripsy and subsequent basket removal of stones as well as large stone burden requiring steerable vacuum suction.  This all required significant additional surgeon time and effort beyond that of a standard case.     HISTORY OF PRESENT ILLNESS: Hugo Pricne is a 74 year old man with significant left-sided  kidney stone burden.  He underwent a first stage of left ureteroscopy with steerable vacuum suction on with clearance of about two thirds of his kidney stone burden on 1/8/2025.  He returns today for second stage ureteroscopy and steerable vacuum suction.    OPERATION PERFORMED:   Informed consent was obtained and the patient was brought to the operating room where general anesthesia was induced. The patient was given appropriate preoperative antibiotics and positioned supine. The patient was then repositioned in dorsal lithotomy with all pressure points padded. We then performed a timeout, verifying the correct patient's site and procedure to be performed.    A 22 Tajik cystoscope was inserted atraumatically into the bladder.  Cystoscopy was performed with no evidence of stones in the bladder.  The left ureteral orifice was identified and cannulated with a 0.035 sensor wire which was advanced up to the renal pelvis and the cystoscope was removed.  A 10 Tajik dual-lumen catheter was advanced over the wire to the mid ureter and a gentle retrograde pyelogram was performed with no evidence of stones in the ureter.  A Super Stiff wire was advanced to the renal pelvis and the dual-lumen catheter was removed.  A 12-14 Tajik by 46 cm ureteral access sheath was advanced over the wire to the proximal ureter under fluoroscopic guidance and the inner stylette and wire were removed.  Flexible ureteroscope was advanced into the renal pelvis and initial attempt at access of the stones with the steerable vacuum suction CVAC scope was unsuccessful in the lower pole and switched to a standard flexible ureteroscope.  I was unable to access the 2 large stones which were impacted in the lower pole of the kidney.  The stones were dusted and fragmented with laser lithotripsy and this was significantly challenging given the location of the stones, the hardness of the stones, and required significant additional surgeon time and effort  than that of a standard case, modifier 22.  Larger stone fragments were then basketed and removed with a halo basket and this was quite challenging given the large volume of stone burden, with greater than 40 basket retrieval's, modifier 22.  Additional stone fragments were removed with steerable vacuum suction, modifier 22.  Ultimately all stone fragments greater than 2 mm were basketed and removed.  The ureteroscope and access sheath were removed en bloc with no evidence of ureteral injury.  Cystoscope was replaced in the bladder and a 6 Indonesian by 26 cm JJ ureteral stent was advanced over the wire with good curl noted in the renal pelvis fluoroscopically and in the bladder on direct vision.  An 18 Indonesian coudé catheter was placed with 10 cc in the balloon.  He received 10 mg IV Lasix and 15 mg IV Toradol.  He was emerged from anesthesia and taken to the recovery room in stable condition.      Ariel Ochoa MD   Urology  [facility]  Clinic Phone [phone]

## 2025-02-12 NOTE — ANESTHESIA PROCEDURE NOTES
Airway       Patient location during procedure: OR  Staff -        CRNA: Dasha Kim APRN CRNA       Performed By: CRNAIndications and Patient Condition       Indications for airway management: hallie-procedural       Induction type:intravenous       Mask difficulty assessment: 0 - not attempted    Final Airway Details       Final airway type: supraglottic airway    Supraglottic Airway Details        Type: LMA       Brand: Ambu AuraGain       LMA size: 5    Post intubation assessment        Placement verified by: capnometry and equal breath sounds        Number of attempts at approach: 1       Number of other approaches attempted: 0       Secured with: tape       Ease of procedure: easy       Dentition: Intact and Unchanged

## 2025-02-12 NOTE — ANESTHESIA POSTPROCEDURE EVALUATION
Patient: Hugo Prince    Procedure: Procedure(s):  CYSTOSCOPY, LEFT RETROGRADE PYELOGRAM, LEFT URETEROSCOPY WITH LASER LITHOTRIOPSY AND BASKET REMOVAL OF STONES, LEFT STEERABLE VACUUM SUCTION, LEFT URETERAL STENT PLACEMENT       Anesthesia Type:  General    Note:  Disposition: Inpatient   Postop Pain Control: Uneventful            Sign Out: Well controlled pain   PONV: No   Neuro/Psych: Uneventful            Sign Out: Acceptable/Baseline neuro status   Airway/Respiratory: Uneventful            Sign Out: Acceptable/Baseline resp. status   CV/Hemodynamics: Uneventful            Sign Out: Acceptable CV status; No obvious hypovolemia; No obvious fluid overload   Other NRE: NONE   DID A NON-ROUTINE EVENT OCCUR? No           Last vitals:  Vitals Value Taken Time   /87 02/12/25 1600   Temp 35.8  C (96.44  F) 02/12/25 1604   Pulse 75 02/12/25 1604   Resp 13 02/12/25 1604   SpO2 96 % 02/12/25 1604   Vitals shown include unfiled device data.    Electronically Signed By: Nestor De La Torre DO  February 12, 2025  4:05 PM

## 2025-02-15 LAB
APPEARANCE STONE: NORMAL
COMPN STONE: NORMAL
SPECIMEN WT: 584 MG

## 2025-02-17 ENCOUNTER — TELEPHONE (OUTPATIENT)
Dept: UROLOGY | Facility: CLINIC | Age: 75
End: 2025-02-17
Payer: MEDICARE

## 2025-02-17 NOTE — TELEPHONE ENCOUNTER
Patient called nurse line and spoke with this nurse. He reports that he is seeing some blood clots in his urine. Informed patient that this is normal with a stent in place. He confirmed that he doesn't have a catheter in. Explained to him to make sure he is urinating just fine, since he is seeing some clots. Discussed how sometimes that urine may be blocked by a clot. Patient is aware that if difficulty peeing, he should call our office or go to ER. Discussed pushing fluids to help thin things out. Patient expressed understanding and is aware not to strain to have a bowel movement as this can increase clots. Patient is aware that urine will be visible until after stent is removed.     Patricia Zuleta LPN

## 2025-02-19 ENCOUNTER — OFFICE VISIT (OUTPATIENT)
Dept: UROLOGY | Facility: CLINIC | Age: 75
End: 2025-02-19
Payer: MEDICARE

## 2025-02-19 VITALS
WEIGHT: 286 LBS | HEART RATE: 67 BPM | SYSTOLIC BLOOD PRESSURE: 148 MMHG | DIASTOLIC BLOOD PRESSURE: 83 MMHG | BODY MASS INDEX: 40.94 KG/M2 | OXYGEN SATURATION: 97 % | HEIGHT: 70 IN

## 2025-02-19 DIAGNOSIS — Z46.6 ENCOUNTER FOR REMOVAL OF URETERAL STENT: ICD-10-CM

## 2025-02-19 DIAGNOSIS — N20.0 KIDNEY STONE ON LEFT SIDE: Primary | ICD-10-CM

## 2025-02-19 PROCEDURE — 52310 CYSTOSCOPY AND TREATMENT: CPT | Performed by: UROLOGY

## 2025-02-19 RX ORDER — LIDOCAINE HYDROCHLORIDE 20 MG/ML
JELLY TOPICAL ONCE
Status: COMPLETED | OUTPATIENT
Start: 2025-02-19 | End: 2025-02-19

## 2025-02-19 RX ADMIN — LIDOCAINE HYDROCHLORIDE 5 ML: 20 JELLY TOPICAL at 13:11

## 2025-02-19 NOTE — PATIENT INSTRUCTIONS
"AFTER YOUR CYSTOSCOPY  ?  ?  You have just completed a cystoscopy, or \"cysto\", which allowed your physician to learn more about your bladder (or to remove a stent placed after surgery). We suggest that you continue to avoid caffeine, fruit juice, and alcohol for the next 24 hours, however, you are encouraged to return to your normal activities.  ?  ?  A few things that are considered normal after your cystoscopy:  ?  * small amount of bleeding (or spotting) that clears within the next 24 hours  ?  * slight burning sensation with urination  ?  * sensation of needing to void (urinate) more frequently  ?  * the feeling of \"air\" in your urine  ?  * mild discomfort that is relieved with Tylenol    * bladder spasms  ?  ?  ?  Please contact our office promptly if you:  ?  * develop a fever above 101 degrees  ?  * are unable to urinate  ?  * develop bright red blood that does not stop  ?  * experience severe pain or swelling  ?  ?  ?  And of course, please contact our office with any concerns or questions [phone].  ?   AFTER YOUR CYSTOSCOPY        You have just completed a cystoscopy, or \"cysto\", which allowed your physician to learn more about your bladder (or to remove a stent placed after surgery). We suggest that you continue to avoid caffeine, fruit juice, and alcohol for the next 24 hours, however, you are encouraged to return to your normal activities.         A few things that are considered normal after your cystoscopy:     * Small amount of bleeding (or spotting) that clears within the next 24 hours     * Slight burning sensation with urination     * Sensation to of needing to avoid more frequently     * The feeling of \"air\" in your urine     * Mild discomfort that is relieved with Tylenol        Please contact our office promptly if you:     * Develop a fever above 101 degrees     * Are unable to urinate     * Develop bright red blood that does not stop     * Severe pain or swelling         Please contact " our office with any concerns or questions @[facility].

## 2025-02-19 NOTE — LETTER
2/19/2025       RE: Hugo Prince  [address]  [facility] [address]     Dear Colleague,    Thank you for referring your patient, Hugo Prince, to the [facility] at [facility]. Please see a copy of my visit note below.    CYSTOSCOPY AND STENT REMOVAL PROCEDURE NOTE:    Hugo Prince is a 74 year old male  who presents with ureteral stent for cystoscopy and ureteral stent remvoal.    Pt ID verified with patient: Yes     Procedure verified with patient: Yes     Procedure confirmed with physician and support staff: Yes     Consent form confirmed with physician and support staff.    Sign In  History and Physical Exam reviewed .  Informed Consent Discussed: Yes   Sign in Communication: Yes   Time Out:  Team Confirms the Correct Patient, Correct Procedure; Yes , Correct Site and Site Marking, Correct Position (if applicable).    Affirmation of Time Out: Yes   Sign Out:  Sign Out Discussion: Yes   Physician: Ariel Ochoa MD    Hugo Prince is a 74 year old male with an indwelling ureteral stent in need of removal.    CYSTOSCOPY PROCEDURE:  After sterile preparation and draping of the patient,  a 17-Nepali flexible cystoscope was introduced via the urethra.  It was passed without difficulty into the bladder.  The urethra was open without evidence of stricture.  The ureteral orifices were orthotopic.  The double J stent was seen coming out the left side.  It was grasped with an alligator forceps and extracted intact without difficulty.  The patient tolerated the procedure well    A/P Successful stent removal  Prophylactic antibiotic ordered   Stone prevention counseling provided today  - nephrology referral    Watch for any new onset fevers, signs of UTI.  May expect some pain after removal.  If this is severe, or last many hours, you may need to return for replacement of stent.    Ariel Ochoa MD    Urology  [facility]    Again, thank you for allowing me to participate in the care of your patient.      Sincerely,    Ariel Ochoa MD

## 2025-02-19 NOTE — PROGRESS NOTES
CYSTOSCOPY AND STENT REMOVAL PROCEDURE NOTE:    Hugo Prince is a 74 year old male  who presents with ureteral stent for cystoscopy and ureteral stent remvoal.    Pt ID verified with patient: Yes     Procedure verified with patient: Yes     Procedure confirmed with physician and support staff: Yes     Consent form confirmed with physician and support staff.    Sign In  History and Physical Exam reviewed .  Informed Consent Discussed: Yes   Sign in Communication: Yes   Time Out:  Team Confirms the Correct Patient, Correct Procedure; Yes , Correct Site and Site Marking, Correct Position (if applicable).    Affirmation of Time Out: Yes   Sign Out:  Sign Out Discussion: Yes   Physician: Ariel Ochoa MD    Hugo Prince is a 74 year old male with an indwelling ureteral stent in need of removal.    CYSTOSCOPY PROCEDURE:  After sterile preparation and draping of the patient,  a 17-Kazakh flexible cystoscope was introduced via the urethra.  It was passed without difficulty into the bladder.  The urethra was open without evidence of stricture.  The ureteral orifices were orthotopic.  The double J stent was seen coming out the left side.  It was grasped with an alligator forceps and extracted intact without difficulty.  The patient tolerated the procedure well    A/P Successful stent removal  Prophylactic antibiotic ordered   Stone prevention counseling provided today  - nephrology referral    Watch for any new onset fevers, signs of UTI.  May expect some pain after removal.  If this is severe, or last many hours, you may need to return for replacement of stent.    Ariel Ochoa MD   Urology  [facility]

## 2025-02-19 NOTE — NURSING NOTE
Chief Complaint   Patient presents with    hx kidney stone     Here in clinic for a cystoscopy with stent out    Prior to the start of the procedure DR RODRIGUEZ and with procedural staff participation, I verbally confirmed the patient s identity using two indicators, relevant allergies, that the procedure was appropriate and matched the consent or emergent situation, and that the correct equipment/implants were available. Immediately prior to starting the procedure I conducted the Time Out with the procedural staff and re-confirmed the patient s name, procedure, and site/side. I have wiped the patient off with the povidone-Iodine solution, draped them,  used Lidocaine hydrochloride jelly, and instilled sterile water into the bladder. (The Joint Commission universal protocol was followed.)  Yes    5mL 2% lidocaine hydrochloride Urojet instilled into urethra.    NDC# [address]-8  Lot #: [id number]  Expiration Date:  10-26  Giles Choe CMA  Used Lynette-Hex4 for prep of the cystoscopy with stent removal

## 2025-02-27 ENCOUNTER — TELEPHONE (OUTPATIENT)
Dept: UROLOGY | Facility: CLINIC | Age: 75
End: 2025-02-27
Payer: MEDICARE

## 2025-02-27 NOTE — TELEPHONE ENCOUNTER
No labs ordered as yet.  Once closer to appointment if labs needed, will send to preferred clinic. Lisa Khan RN

## 2025-02-27 NOTE — TELEPHONE ENCOUNTER
[facility] Call Center    Phone Message    May a detailed message be left on voicemail: yes     Reason for Call: Order(s): Other:   Reason for requested: New stone management appt on 8/21  Date needed: prior to August  Provider name: Elfering    Patient prefers to have labs done at PCP at [facility]. Please call patient when lab orders get placed. Thank you!    Action Taken: Message routed to:  [facility] Dominik WAYNE    Travel Screening: Not Applicable     Date of Service:

## 2025-03-18 SDOH — HEALTH STABILITY: PHYSICAL HEALTH: ON AVERAGE, HOW MANY DAYS PER WEEK DO YOU ENGAGE IN MODERATE TO STRENUOUS EXERCISE (LIKE A BRISK WALK)?: 1 DAY

## 2025-03-18 SDOH — HEALTH STABILITY: PHYSICAL HEALTH: ON AVERAGE, HOW MANY MINUTES DO YOU ENGAGE IN EXERCISE AT THIS LEVEL?: 30 MIN

## 2025-03-18 ASSESSMENT — SOCIAL DETERMINANTS OF HEALTH (SDOH): HOW OFTEN DO YOU GET TOGETHER WITH FRIENDS OR RELATIVES?: ONCE A WEEK

## 2025-03-19 ENCOUNTER — OFFICE VISIT (OUTPATIENT)
Dept: FAMILY MEDICINE | Facility: CLINIC | Age: 75
End: 2025-03-19

## 2025-03-19 VITALS
SYSTOLIC BLOOD PRESSURE: 146 MMHG | DIASTOLIC BLOOD PRESSURE: 77 MMHG | OXYGEN SATURATION: 97 % | BODY MASS INDEX: 41.04 KG/M2 | HEART RATE: 55 BPM | WEIGHT: 286 LBS

## 2025-03-19 DIAGNOSIS — E66.01 MORBID OBESITY (H): ICD-10-CM

## 2025-03-19 DIAGNOSIS — I10 PRIMARY HYPERTENSION: ICD-10-CM

## 2025-03-19 DIAGNOSIS — E11.42 TYPE 2 DIABETES MELLITUS WITH DIABETIC POLYNEUROPATHY, WITHOUT LONG-TERM CURRENT USE OF INSULIN (H): ICD-10-CM

## 2025-03-19 DIAGNOSIS — I25.10 CORONARY ARTERY DISEASE INVOLVING NATIVE CORONARY ARTERY OF NATIVE HEART WITHOUT ANGINA PECTORIS: ICD-10-CM

## 2025-03-19 DIAGNOSIS — E78.5 DYSLIPIDEMIA: ICD-10-CM

## 2025-03-19 DIAGNOSIS — Z00.00 ENCOUNTER FOR MEDICARE ANNUAL WELLNESS EXAM: Primary | ICD-10-CM

## 2025-03-19 DIAGNOSIS — G47.33 OSA (OBSTRUCTIVE SLEEP APNEA): ICD-10-CM

## 2025-03-19 LAB
CHOLESTEROL: 106 MG/DL (ref 100–199)
FASTING?: NO
HBA1C MFR BLD: 6.2 % (ref 4–6)
HDL (RMG): 39 MG/DL (ref 40–?)
LDL CALCULATED (RMG): 42 MG/DL (ref 0–130)
TRIGLYCERIDES (RMG): 126 MG/DL (ref 0–149)

## 2025-03-19 PROCEDURE — 80061 LIPID PANEL: CPT | Mod: QW | Performed by: FAMILY MEDICINE

## 2025-03-19 PROCEDURE — 99207 PR FOOT EXAM NO CHARGE: CPT | Performed by: FAMILY MEDICINE

## 2025-03-19 PROCEDURE — 83036 HEMOGLOBIN GLYCOSYLATED A1C: CPT | Performed by: FAMILY MEDICINE

## 2025-03-19 PROCEDURE — 3077F SYST BP >= 140 MM HG: CPT | Performed by: FAMILY MEDICINE

## 2025-03-19 PROCEDURE — G0439 PPPS, SUBSEQ VISIT: HCPCS | Performed by: FAMILY MEDICINE

## 2025-03-19 PROCEDURE — 36415 COLL VENOUS BLD VENIPUNCTURE: CPT | Performed by: FAMILY MEDICINE

## 2025-03-19 PROCEDURE — 3078F DIAST BP <80 MM HG: CPT | Performed by: FAMILY MEDICINE

## 2025-03-19 PROCEDURE — 99214 OFFICE O/P EST MOD 30 MIN: CPT | Mod: 25 | Performed by: FAMILY MEDICINE

## 2025-03-19 NOTE — PATIENT INSTRUCTIONS
.r    Patient Education   Preventive Care Advice   This is general advice given by our system to help you stay healthy. However, your care team may have specific advice just for you. Please talk to your care team about your preventive care needs.  Nutrition  Eat 5 or more servings of fruits and vegetables each day.  Try wheat bread, brown rice and whole grain pasta (instead of white bread, rice, and pasta).  Get enough calcium and vitamin D. Check the label on foods and aim for 100% of the RDA (recommended daily allowance).  Lifestyle  Exercise at least 150 minutes each week  (30 minutes a day, 5 days a week).  Do muscle strengthening activities 2 days a week. These help control your weight and prevent disease.  No smoking.  Wear sunscreen to prevent skin cancer.  Have a dental exam and cleaning every 6 months.  Yearly exams  See your health care team every year to talk about:  Any changes in your health.  Any medicines your care team has prescribed.  Preventive care, family planning, and ways to prevent chronic diseases.  Shots (vaccines)   HPV shots (up to age 26), if you've never had them before.  Hepatitis B shots (up to age 59), if you've never had them before.  COVID-19 shot: Get this shot when it's due.  Flu shot: Get a flu shot every year.  Tetanus shot: Get a tetanus shot every 10 years.  Pneumococcal, hepatitis A, and RSV shots: Ask your care team if you need these based on your risk.  Shingles shot (for age 50 and up)  General health tests  Diabetes screening:  Starting at age 35, Get screened for diabetes at least every 3 years.  If you are younger than age 35, ask your care team if you should be screened for diabetes.  Cholesterol test: At age 39, start having a cholesterol test every 5 years, or more often if advised.  Bone density scan (DEXA): At age 50, ask your care team if you should have this scan for osteoporosis (brittle bones).  Hepatitis C: Get tested at least once in your life.  STIs  (sexually transmitted infections)  Before age 24: Ask your care team if you should be screened for STIs.  After age 24: Get screened for STIs if you're at risk. You are at risk for STIs (including HIV) if:  You are sexually active with more than one person.  You don't use condoms every time.  You or a partner was diagnosed with a sexually transmitted infection.  If you are at risk for HIV, ask about PrEP medicine to prevent HIV.  Get tested for HIV at least once in your life, whether you are at risk for HIV or not.  Cancer screening tests  Cervical cancer screening: If you have a cervix, begin getting regular cervical cancer screening tests starting at age 21.  Breast cancer scan (mammogram): If you've ever had breasts, begin having regular mammograms starting at age 40. This is a scan to check for breast cancer.  Colon cancer screening: It is important to start screening for colon cancer at age 45.  Have a colonoscopy test every 10 years (or more often if you're at risk) Or, ask your provider about stool tests like a FIT test every year or Cologuard test every 3 years.  To learn more about your testing options, visit:   .  For help making a decision, visit:   https://bit.ly/st03985.  Prostate cancer screening test: If you have a prostate, ask your care team if a prostate cancer screening test (PSA) at age 55 is right for you.  Lung cancer screening: If you are a current or former smoker ages 50 to 80, ask your care team if ongoing lung cancer screenings are right for you.  For informational purposes only. Not to replace the advice of your health care provider. Copyright   2023 [facility]. All rights reserved. Clinically reviewed by the [facility]. [employer] 460400 - REV 01/24.  Preventing Falls: Care Instructions  Injuries and health problems such as trouble walking or poor eyesight can increase your risk of falling. So can some medicines. But there are things you can do to  "help prevent falls. You can exercise to get stronger. You can also arrange your home to make it safer.    Talk to your doctor about the medicines you take. Ask if any of them increase the risk of falls and whether they can be changed or stopped.   Try to exercise regularly. It can help improve your strength and balance. This can help lower your risk of falling.         Practice fall safety and prevention.   Wear low-heeled shoes that fit well and give your feet good support. Talk to your doctor if you have foot problems that make this hard.  Carry a cellphone or wear a medical alert device that you can use to call for help.  Use stepladders instead of chairs to reach high objects. Don't climb if you're at risk for falls. Ask for help, if needed.  Wear the correct eyeglasses, if you need them.        Make your home safer.   Remove rugs, cords, clutter, and furniture from walkways.  Keep your house well lit. Use night-lights in hallways and bathrooms.  Install and use sturdy handrails on stairways.  Wear nonskid footwear, even inside. Don't walk barefoot or in socks without shoes.        Be safe outside.   Use handrails, curb cuts, and ramps whenever possible.  Keep your hands free by using a shoulder bag or backpack.  Try to walk in well-lit areas. Watch out for uneven ground, changes in pavement, and debris.  Be careful in the winter. Walk on the grass or gravel when sidewalks are slippery. Use de-icer on steps and walkways. Add non-slip devices to shoes.    Put grab bars and nonskid mats in your shower or tub and near the toilet. Try to use a shower chair or bath bench when bathing.   Get into a tub or shower by putting in your weaker leg first. Get out with your strong side first. Have a phone or medical alert device in the bathroom with you.   Where can you learn more?  Go to https://www.[employer].net/patiented  Enter G117 in the search box to learn more about \"Preventing Falls: Care Instructions.\"  Current as " of: July 31, 2024  Content Version: 14.4    0660-7258 [employer].   Care instructions adapted under license by your healthcare professional. If you have questions about a medical condition or this instruction, always ask your healthcare professional. [employer] disclaims any warranty or liability for your use of this information.    Hearing Loss: Care Instructions  Overview     Hearing loss is a sudden or slow decrease in how well you hear. It can range from slight to profound. Permanent hearing loss can occur with aging. It also can happen when you are exposed long-term to loud noise. Examples include listening to loud music, riding motorcycles, or being around other loud machines.  Hearing loss can affect your work and home life. It can make you feel lonely or depressed. You may feel that you have lost your independence. But hearing aids and other devices can help you hear better and feel connected to others.  Follow-up care is a key part of your treatment and safety. Be sure to make and go to all appointments, and call your doctor if you are having problems. It's also a good idea to know your test results and keep a list of the medicines you take.  How can you care for yourself at home?  Avoid loud noises whenever possible. This helps keep your hearing from getting worse.  Always wear hearing protection around loud noises.  Wear a hearing aid as directed.  A professional can help you pick a hearing aid that will work best for you.  You can also get hearing aids over the counter for mild to moderate hearing loss.  Have hearing tests as your doctor suggests. They can show whether your hearing has changed. Your hearing aid may need to be adjusted.  Use other devices as needed. These may include:  Telephone amplifiers and hearing aids that can connect to a television, stereo, radio, or microphone.  Devices that use lights or vibrations. These alert you to the doorbell, a ringing telephone, or  "a baby monitor.  Television closed-captioning. This shows the words at the bottom of the screen. Most new TVs can do this.  TTY (text telephone). This lets you type messages back and forth on the telephone instead of talking or listening. These devices are also called TDD. When messages are typed on the keyboard, they are sent over the phone line to a receiving TTY. The message is shown on a monitor.  Use text messaging, social media, and email if it is hard for you to communicate by telephone.  Try to learn a listening technique called speechreading. It is not lipreading. You pay attention to people's gestures, expressions, posture, and tone of voice. These clues can help you understand what a person is saying. Face the person you are talking to, and have them face you. Make sure the lighting is good. You need to see the other person's face clearly.  Think about counseling if you need help to adjust to your hearing loss.  When should you call for help?  Watch closely for changes in your health, and be sure to contact your doctor if:    You think your hearing is getting worse.     You have new symptoms, such as dizziness or nausea.   Where can you learn more?  Go to https://www.[employer].net/patiented  Enter R798 in the search box to learn more about \"Hearing Loss: Care Instructions.\"  Current as of: October 27, 2024  Content Version: 14.4 2024-2025 [employer].   Care instructions adapted under license by your healthcare professional. If you have questions about a medical condition or this instruction, always ask your healthcare professional. [employer] disclaims any warranty or liability for your use of this information.    Bladder Training: Care Instructions  Your Care Instructions     Bladder training is used to treat urge incontinence and stress incontinence. Urge incontinence means that the need to urinate comes on so fast that you can't get to a toilet in time. Stress incontinence " means that you leak urine because of pressure on your bladder. For example, it may happen when you laugh, cough, or lift something heavy.  Bladder training can increase how long you can wait before you have to urinate. It can also help your bladder hold more urine. And it can give you better control over the urge to urinate.  It is important to remember that bladder training takes a few weeks to a few months to make a difference. You may not see results right away, but don't give up.  Follow-up care is a key part of your treatment and safety. Be sure to make and go to all appointments, and call your doctor if you are having problems. It's also a good idea to know your test results and keep a list of the medicines you take.  How can you care for yourself at home?  Work with your doctor to come up with a bladder training program that is right for you. You may use one or more of the following methods.  Delayed urination  In the beginning, try to keep from urinating for 5 minutes after you first feel the need to go.  While you wait, take deep, slow breaths to relax. Kegel exercises can also help you delay the need to go to the bathroom.  After some practice, when you can easily wait 5 minutes to urinate, try to wait 10 minutes before you urinate.  Slowly increase the waiting period until you are able to control when you have to urinate.  Scheduled urination  Empty your bladder when you first wake up in the morning.  Schedule times throughout the day when you will urinate.  Start by going to the bathroom every hour, even if you don't need to go.  Slowly increase the time between trips to the bathroom.  When you have found a schedule that works well for you, keep doing it.  If you wake up during the night and have to urinate, do it. Apply your schedule to waking hours only.  Kegel exercises  These tighten and strengthen pelvic muscles, which can help you control the flow of urine. (If doing these exercises causes pain,  "stop doing them and talk with your doctor.) To do Kegel exercises:  Squeeze your muscles as if you were trying not to pass gas. Or squeeze your muscles as if you were stopping the flow of urine. Your belly, legs, and buttocks shouldn't move.  Hold the squeeze for 3 seconds, then relax for 5 to 10 seconds.  Start with 3 seconds, then add 1 second each week until you are able to squeeze for 10 seconds.  Repeat the exercise 10 times a session. Do 3 to 8 sessions a day.  When should you call for help?  Watch closely for changes in your health, and be sure to contact your doctor if:    Your incontinence is getting worse.     You do not get better as expected.   Where can you learn more?  Go to https://www.[employer].net/patiented  Enter V684 in the search box to learn more about \"Bladder Training: Care Instructions.\"  Current as of: April 30, 2024  Content Version: 14.4    6019-4870 [employer].   Care instructions adapted under license by your healthcare professional. If you have questions about a medical condition or this instruction, always ask your healthcare professional. [employer] disclaims any warranty or liability for your use of this information.       "

## 2025-03-19 NOTE — PROGRESS NOTES
"Preventive Care Visit  [facility]  Clay Luis MD, Family Medicine  Mar 19, 2025      Assessment & Plan     Encounter for Medicare annual wellness exam  - discussed preventative guidelines, healthy diet, exercise and weight management    Type 2 diabetes mellitus with diabetic polyneuropathy, without long-term current use of insulin (H)  At goal  Cont to see if GLP1a could be affordable  Cont  all efforts at weight loss  - Hemoglobin A1C (RMG)  - VENOUS COLLECTION  - FOOT EXAM    Morbid obesity (H)  - Discussed importance of optimizing diet, exercise and healthy weight    RAY (obstructive sleep apnea)  Cont CPAP  Weight loss    Coronary artery disease involving native coronary artery of native heart without angina pectoris  Asymptomatic, cont secondary prevention  - Lipid Profile ([facility])    Dyslipidemia  stable/controlled. Cont current medication(s) and treatment    Primary hypertension  Not at goal  Focus on diet and exercise, recheck in 3 months    Patient has been advised of split billing requirements and indicates understanding: Yes        Nicotine/Tobacco Cessation  He reports that he has been smoking cigars. He has never used smokeless tobacco.  Nicotine/Tobacco Cessation Plan        BMI  Estimated body mass index is 41.04 kg/m  as calculated from the following:    Height as of 2/19/25: 1.778 m (5' 10\").    Weight as of this encounter: 129.7 kg (286 lb).   Weight management plan: Discussed healthy diet and exercise guidelines    Counseling  Appropriate preventive services were addressed with this patient via screening, questionnaire, or discussion as appropriate for fall prevention, nutrition, physical activity, Tobacco-use cessation, social engagement, weight loss and cognition.  Checklist reviewing preventive services available has been given to the patient.  Reviewed patient's diet, addressing concerns and/or questions.   He is at risk for lack of exercise and has been provided with " information to increase physical activity for the benefit of his well-being.   The patient was provided with written information regarding signs of hearing loss.   Information on urinary incontinence and treatment options given to patient.       See Patient Instructions    No follow-ups on file.    Jatinder Arias is a 74 year old, presenting for the following:  Physical (Not fasting. ) and Health Maintenance (Colonoscopy: DUE, last 2017/Immunizations: flu and zoster/A1c due )            HPI     DM2: on Metformin 1000, tolerating.  not able to afford ozempic.  Trying to stay active but difficult with back.  Weight stable     CAD: Follows with Dr Moe.  He was found to have a mid LAD lesion in July 2019 which was treated with a drug-eluting stent.  He had a moderate proximal LAD lesion which was negative by IFR.  He also had moderate mid RCA disease.  He was started on dual antiplatelet therapy.  He had some shortness of breath with Brilinta thus was switched to a prasugrel which was then discontinued. In March of 2023 he had new symptoms and an angiogram was performed showing 60% ostial to proximal LAD lesion with IFR of 0.86 indicating obstructive disease.  A single MICAH was placed.  He was again started on Brilinta but caused SOB again and was on Clopidogrel and ASA but DAPT stopped after 1 year.  He is also on metoprolol and lisinopril.  No recurrence of chest discomfort.     HLD: on statin    HTN: on amlodipine and lisinopril, doing well     BMI 41: exercise is difficult to his back.  Not following a diabetic or cardiac diet.       GERD: on omeprazole daily     RAY: uses CPAP      Advance Care Planning  Patient has a Health Care Directive on file  Advance care planning document is on file and is current.      3/18/2025   General Health   How would you rate your overall physical health? (!) FAIR   Feel stress (tense, anxious, or unable to sleep) Only a little   (!) STRESS CONCERN      3/18/2025   Nutrition    Diet: Regular (no restrictions)         3/18/2025   Exercise   Days per week of moderate/strenous exercise 1 day   Average minutes spent exercising at this level 30 min   (!) EXERCISE CONCERN      3/18/2025   Social Factors   Frequency of gathering with friends or relatives Once a week   Worry food won't last until get money to buy more No   Food not last or not have enough money for food? No   Do you have housing? (Housing is defined as stable permanent housing and does not include staying ouside in a car, in a tent, in an abandoned building, in an overnight shelter, or couch-surfing.) Yes   Are you worried about losing your housing? No   Lack of transportation? No   Unable to get utilities (heat,electricity)? No         3/18/2025   Fall Risk   Fallen 2 or more times in the past year? No   Trouble with walking or balance? Yes           3/18/2025   Activities of Daily Living- Home Safety   Needs help with the following daily activites None of the above   Safety concerns in the home None of the above         3/18/2025   Dental   Dentist two times every year? Yes         3/18/2025   Hearing Screening   Hearing concerns? (!) IT'S HARD TO FOLLOW A CONVERSATION IN A NOISY RESTAURANT OR CROWDED ROOM.     Hearing Screen  Left ear:  500Hz - Fail  1000Hz - Pass  2000Hz - Fail  4000Hz - Fail    Right ear:  500Hz - Fail  1000Hz - Pass  2000Hz - Fail  4000Hz - Fail          3/18/2025   Driving Risk Screening   Patient/family members have concerns about driving No         3/18/2025   General Alertness/Fatigue Screening   Have you been more tired than usual lately? No         3/18/2025   Urinary Incontinence Screening   Bothered by leaking urine in past 6 months Yes           2/5/2024   TB Screening   Were you born outside of the [place]? No           Today's PHQ-2 Score:       3/18/2025     9:30 AM   PHQ-2 ( 1999 [employer])   Q1: Little interest or pleasure in doing things 0   Q2: Feeling down, depressed or hopeless 0   PHQ-2 Score 0     Q1: Little interest or pleasure in doing things Not at all   Q2: Feeling down, depressed or hopeless Not at all   PHQ-2 Score 0       Patient-reported           3/18/2025   Substance Use   If I could quit smoking, I would Somewhat disagree   I want to quit somking, worry about health affects Neutral   Willing to make a plan to quit smoking Neutral   Willing to cut down before quitting Neutral   Alcohol more than 3/day or more than 7/wk No   Do you have a current opioid prescription? No   How severe/bad is pain from 1 to 10? 4/10   Do you use any other substances recreationally? No     Social History     Tobacco Use    Smoking status: Some Days     Types: Cigars    Smokeless tobacco: Never    Tobacco comments:     has cigars when it is warm out   Vaping Use    Vaping status: Never Used   Substance Use Topics    Alcohol use: Yes     Alcohol/week: 11.7 standard drinks of alcohol     Types: 14 Standard drinks or equivalent per week     Comment: 1-2 drinks day/beer and scotch    Drug use: No       ASCVD Risk   The ASCVD Risk score (Wili KEY, et al., 2019) failed to calculate for the following reasons:    Risk score cannot be calculated because patient has a medical history suggesting prior/existing ASCVD            Reviewed and updated as needed this visit by Provider                    Lab work is in process  Current providers sharing in care for this patient include:  Patient Care Team:  Clay Luis MD as PCP - General (Family Medicine)  Clay Luis MD as Assigned PCP  Leisa Farias RD as Diabetes Educator (Dietitian, Registered)  Juan Moe MD as MD (Cardiovascular Disease)  Juan Moe MD as Assigned Heart and Vascular Provider  Ariel Ochoa MD as Assigned Surgical Provider  Missy Deluna MD as MD (Nephrology)    The following health maintenance items are reviewed in Epic and correct as of today:  Health Maintenance   Topic Date Due    ZOSTER IMMUNIZATION (1 of 2)  "Never done    NICOTINE/TOBACCO CESSATION COUNSELING Q 1 YR  05/01/2013    COLORECTAL CANCER SCREENING  03/03/2022    RSV VACCINE (1 - 1-dose 75+ series) Never done    INFLUENZA VACCINE (1) 09/01/2024    COVID-19 Vaccine (8 - 2024-25 season) 04/29/2025    A1C  06/19/2025    EYE EXAM  08/18/2025    MICROALBUMIN  12/11/2025    BMP  01/18/2026    MEDICARE ANNUAL WELLNESS VISIT  03/19/2026    LIPID  03/19/2026    DIABETIC FOOT EXAM  03/19/2026    FALL RISK ASSESSMENT  03/19/2026    ADVANCE CARE PLANNING  02/12/2029    DTAP/TDAP/TD IMMUNIZATION (3 - Td or Tdap) 09/12/2033    HEPATITIS C SCREENING  Completed    PHQ-2 (once per calendar year)  Completed    Pneumococcal Vaccine: 50+ Years  Completed    HPV IMMUNIZATION  Aged Out    MENINGITIS IMMUNIZATION  Aged Out    LUNG CANCER SCREENING  Discontinued       A 10 system review was completed and is as noted in HPI and otherwise negative.       Objective    Exam  BP (!) 146/77 (BP Location: Left arm)   Pulse 55   Wt 129.7 kg (286 lb)   SpO2 97%   BMI 41.04 kg/m     Estimated body mass index is 41.04 kg/m  as calculated from the following:    Height as of 2/19/25: 1.778 m (5' 10\").    Weight as of this encounter: 129.7 kg (286 lb).    Physical Exam  GENERAL: alert and no distress  EYES: Eyes grossly normal to inspection, PERRL and conjunctivae and sclerae normal  HENT: ear canals and TM's normal, nose and mouth without ulcers or lesions  NECK: no adenopathy, no asymmetry, masses, or scars  RESP: lungs clear to auscultation - no rales, rhonchi or wheezes  CV: regular rate and rhythm, normal S1 S2, no S3 or S4, no murmur, click or rub, no peripheral edema  ABDOMEN: soft, nontender, no hepatosplenomegaly, no masses and bowel sounds normal  MS: no gross musculoskeletal defects noted, no edema  SKIN: no suspicious lesions or rashes  NEURO: Normal strength and tone, mentation intact and speech normal  PSYCH: mentation appears normal, affect normal/bright  Diabetic foot exam: " normal DP and PT pulses, no trophic changes or ulcerative lesions, and normal monofilament exam, except for findings noted on diabetic foot graphical image.  Absent at 1 bilaterally                 3/19/2025   Mini Cog   Clock Draw Score 2 Normal   3 Item Recall 3 objects recalled   Mini Cog Total Score 5              Signed Electronically by: Clay Luis MD

## 2025-03-31 ENCOUNTER — OFFICE VISIT (OUTPATIENT)
Dept: FAMILY MEDICINE | Facility: CLINIC | Age: 75
End: 2025-03-31

## 2025-03-31 VITALS
SYSTOLIC BLOOD PRESSURE: 137 MMHG | WEIGHT: 286 LBS | DIASTOLIC BLOOD PRESSURE: 68 MMHG | OXYGEN SATURATION: 97 % | BODY MASS INDEX: 41.04 KG/M2 | HEART RATE: 83 BPM

## 2025-03-31 DIAGNOSIS — I21.4 NSTEMI (NON-ST ELEVATED MYOCARDIAL INFARCTION) (H): ICD-10-CM

## 2025-03-31 DIAGNOSIS — J01.10 ACUTE FRONTAL SINUSITIS, RECURRENCE NOT SPECIFIED: Primary | ICD-10-CM

## 2025-03-31 PROCEDURE — 99213 OFFICE O/P EST LOW 20 MIN: CPT | Performed by: STUDENT IN AN ORGANIZED HEALTH CARE EDUCATION/TRAINING PROGRAM

## 2025-03-31 PROCEDURE — 3075F SYST BP GE 130 - 139MM HG: CPT | Performed by: STUDENT IN AN ORGANIZED HEALTH CARE EDUCATION/TRAINING PROGRAM

## 2025-03-31 PROCEDURE — 3078F DIAST BP <80 MM HG: CPT | Performed by: STUDENT IN AN ORGANIZED HEALTH CARE EDUCATION/TRAINING PROGRAM

## 2025-03-31 RX ORDER — GUAIFENESIN 600 MG/1
1200 TABLET, EXTENDED RELEASE ORAL 2 TIMES DAILY
Qty: 28 TABLET | Refills: 0 | Status: SHIPPED | OUTPATIENT
Start: 2025-03-31 | End: 2025-04-07

## 2025-03-31 RX ORDER — ATORVASTATIN CALCIUM 40 MG/1
40 TABLET, FILM COATED ORAL DAILY
Qty: 90 TABLET | Refills: 0 | Status: SHIPPED | OUTPATIENT
Start: 2025-03-31

## 2025-03-31 RX ORDER — AMLODIPINE BESYLATE 5 MG/1
5 TABLET ORAL DAILY
Qty: 90 TABLET | Refills: 0 | Status: SHIPPED | OUTPATIENT
Start: 2025-03-31

## 2025-03-31 RX ORDER — DOXYCYCLINE 100 MG/1
100 CAPSULE ORAL 2 TIMES DAILY
Qty: 10 CAPSULE | Refills: 0 | Status: SHIPPED | OUTPATIENT
Start: 2025-03-31 | End: 2025-04-05

## 2025-03-31 RX ORDER — LISINOPRIL 5 MG/1
5 TABLET ORAL DAILY
Qty: 90 TABLET | Refills: 0 | Status: SHIPPED | OUTPATIENT
Start: 2025-03-31

## 2025-03-31 RX ORDER — METOPROLOL TARTRATE 25 MG/1
25 TABLET, FILM COATED ORAL 2 TIMES DAILY
Qty: 180 TABLET | Refills: 0 | Status: SHIPPED | OUTPATIENT
Start: 2025-03-31

## 2025-03-31 ASSESSMENT — ENCOUNTER SYMPTOMS
MYALGIAS: 0
COUGH: 1
HEADACHES: 1
FEVER: 0

## 2025-03-31 NOTE — PROGRESS NOTES
Assessment & Plan   Assessment & Plan  Acute frontal sinusitis, recurrence not specified  -physical exam and symptoms consistent with viral infection followed by bacterial infection  -TMs had no erythema or bulging  -pharynx and tonsils have no exudates, no cervical lymphadenopathy, cough present  -clear to auscultation bilaterally in all lung fields  -pain with frontal sinus palpation   -discussed symptomatic and abx treatment   -will prescribe Mucinex ER and doxycycline  -patient to let us know if his symptoms worsen or do not improve  Orders:    doxycycline hyclate (VIBRAMYCIN) 100 MG capsule; Take 1 capsule (100 mg) by mouth 2 times daily for 5 days.    guaiFENesin (MUCINEX) 600 MG 12 hr tablet; Take 2 tablets (1,200 mg) by mouth 2 times daily for 7 days.       Return for Routine preventive.    Jatinder Arias is a 75 year old, presenting for the following health issues:  URI (Dry cough for 10 days, runny nose, head congestion, some chest pains/Denies body aches, fever/Negative for covid and flu on 03/24)    History of Present Illness       Reason for visit:  Continued cough & headache  Symptom onset:  3-7 days ago  Symptoms include:  Coughing  and runny nose  Symptom intensity:  Moderate  Symptom progression:  Staying the same  Had these symptoms before:  No  What makes it worse:  No  What makes it better:  DayQuil   He is taking medications regularly.       Patient states that he has had a dry cough for 11 days. Patient states that he was initially sick as a dog with runny nose, stuffy head, aching ribs and chest pain. Patient states that his chest pains seems to be related to his cough, denies pain with exertion and jaw pain. Patient woke up last night with a coughing fit. Patient had previously been sleeping through the night. He feels like his symptoms have gotten worse again. Patient states that 4 hour mucinex has helped a little. Patient endorses negative at home COVID and flu test on  3/24/25.    Review of Systems   Constitutional:  Negative for fever.   HENT:  Positive for congestion.    Respiratory:  Positive for cough.    Cardiovascular:  Positive for chest pain (corresponds to cough).   Musculoskeletal:  Negative for myalgias.   Neurological:  Positive for headaches.          Objective    /68   Pulse 83   Wt 129.7 kg (286 lb)   SpO2 97%   BMI 41.04 kg/m    Body mass index is 41.04 kg/m .  Physical Exam  Constitutional:       Appearance: Normal appearance.   HENT:      Head: Normocephalic and atraumatic.      Right Ear: External ear normal. There is impacted cerumen.      Left Ear: Tympanic membrane, ear canal and external ear normal. There is no impacted cerumen.      Nose:      Right Sinus: Frontal sinus tenderness present. No maxillary sinus tenderness.      Left Sinus: Frontal sinus tenderness present. No maxillary sinus tenderness.   Eyes:      Conjunctiva/sclera: Conjunctivae normal.   Cardiovascular:      Rate and Rhythm: Normal rate and regular rhythm.      Heart sounds: Normal heart sounds. No murmur heard.  Pulmonary:      Effort: Pulmonary effort is normal. No respiratory distress.      Breath sounds: Normal breath sounds.   Lymphadenopathy:      Cervical: No cervical adenopathy.   Neurological:      Mental Status: He is alert.   Psychiatric:         Thought Content: Thought content normal.        Signed Electronically by: Sadaf Luna DO

## 2025-06-12 ENCOUNTER — TELEPHONE (OUTPATIENT)
Dept: FAMILY MEDICINE | Facility: CLINIC | Age: 75
End: 2025-06-12

## 2025-06-12 DIAGNOSIS — G62.9 PERIPHERAL NEUROPATHY: ICD-10-CM

## 2025-06-12 DIAGNOSIS — M54.50 LOW BACK PAIN: Primary | ICD-10-CM

## 2025-06-12 DIAGNOSIS — I21.4 NSTEMI (NON-ST ELEVATED MYOCARDIAL INFARCTION) (H): ICD-10-CM

## 2025-06-12 RX ORDER — NITROGLYCERIN 0.4 MG/1
TABLET SUBLINGUAL
Qty: 25 TABLET | Refills: 1 | Status: SHIPPED | OUTPATIENT
Start: 2025-06-12

## 2025-06-12 NOTE — TELEPHONE ENCOUNTER
Patient calls requesting referral to [facility] PT at [facility] in [place].   Reports long history of low back issues - PT, injections, fusion. Has worked with Kelly for 15 years for his back problems that are flaring and would like to revisit on this issue and his feet neuropathy. States he has spoken to her recently and she has some strategies to help him.     Per chart review does have spine history with [facility].    Plan: okay per Dr. Luis to send PT referral to [facility] phone: [phone] fax:[phone]  Referral faxed. Patient informed and will follow up to schedule.  Sherry Case RN

## 2025-06-18 ENCOUNTER — TRANSFERRED RECORDS (OUTPATIENT)
Dept: FAMILY MEDICINE | Facility: CLINIC | Age: 75
End: 2025-06-18

## 2025-06-23 ENCOUNTER — OFFICE VISIT (OUTPATIENT)
Dept: FAMILY MEDICINE | Facility: CLINIC | Age: 75
End: 2025-06-23

## 2025-06-23 VITALS
SYSTOLIC BLOOD PRESSURE: 132 MMHG | WEIGHT: 287 LBS | OXYGEN SATURATION: 97 % | BODY MASS INDEX: 41.18 KG/M2 | DIASTOLIC BLOOD PRESSURE: 74 MMHG | HEART RATE: 65 BPM

## 2025-06-23 DIAGNOSIS — E11.42 TYPE 2 DIABETES MELLITUS WITH DIABETIC POLYNEUROPATHY, WITHOUT LONG-TERM CURRENT USE OF INSULIN (H): Primary | ICD-10-CM

## 2025-06-23 DIAGNOSIS — I25.10 CORONARY ARTERY DISEASE INVOLVING NATIVE CORONARY ARTERY OF NATIVE HEART WITHOUT ANGINA PECTORIS: ICD-10-CM

## 2025-06-23 DIAGNOSIS — I10 PRIMARY HYPERTENSION: ICD-10-CM

## 2025-06-23 DIAGNOSIS — G47.33 OSA (OBSTRUCTIVE SLEEP APNEA): ICD-10-CM

## 2025-06-23 DIAGNOSIS — E66.01 MORBID OBESITY (H): ICD-10-CM

## 2025-06-23 LAB — HBA1C MFR BLD: 6.3 % (ref 4–6)

## 2025-06-23 PROCEDURE — 3075F SYST BP GE 130 - 139MM HG: CPT | Performed by: FAMILY MEDICINE

## 2025-06-23 PROCEDURE — 99214 OFFICE O/P EST MOD 30 MIN: CPT | Performed by: FAMILY MEDICINE

## 2025-06-23 PROCEDURE — 36415 COLL VENOUS BLD VENIPUNCTURE: CPT | Performed by: FAMILY MEDICINE

## 2025-06-23 PROCEDURE — 3078F DIAST BP <80 MM HG: CPT | Performed by: FAMILY MEDICINE

## 2025-06-23 PROCEDURE — G2211 COMPLEX E/M VISIT ADD ON: HCPCS | Performed by: FAMILY MEDICINE

## 2025-06-23 PROCEDURE — 83036 HEMOGLOBIN GLYCOSYLATED A1C: CPT | Performed by: FAMILY MEDICINE

## 2025-06-23 PROCEDURE — 3044F HG A1C LEVEL LT 7.0%: CPT | Performed by: FAMILY MEDICINE

## 2025-06-23 NOTE — PROGRESS NOTES
Answers submitted by the patient for this visit:  Diabetes Visit (Submitted on 6/16/2025)  Chief Complaint: Chronic problems general questions HPI Form  Frequency of checking blood sugars:: not at all  Diabetic concerns:: none  Paraesthesia present:: numbness in feet  General Questionnaire (Submitted on 6/16/2025)  Chief Complaint: Chronic problems general questions HPI Form  How many servings of fruits and vegetables do you eat daily?: 2-3  On average, how many sweetened beverages do you drink each day (Examples: soda, juice, sweet tea, etc.  Do NOT count diet or artificially sweetened beverages)?: 0  How many minutes a day do you exercise enough to make your heart beat faster?: 9 or less  How many days a week do you exercise enough to make your heart beat faster?: 3 or less  How many days per week do you miss taking your medication?: 0  Questionnaire about: Chronic problems general questions HPI Form (Submitted on 6/16/2025)  Chief Complaint: Chronic problems general questions HPI Form      Subjective     Hugo is a 75 year old patient who presents to clinic for follow up.    DM2: on Metformin 1000, tolerating.  not able to afford ozempic.  Difficult to do physical activity due to back.  Weight stable     CAD: Follows with Dr Moe.  He was found to have a mid LAD lesion in July 2019 which was treated with a drug-eluting stent.  He had a moderate proximal LAD lesion which was negative by IFR.  He also had moderate mid RCA disease.  He was started on dual antiplatelet therapy.  He had some shortness of breath with Brilinta thus was switched to a prasugrel which was then discontinued. In March of 2023 he had new symptoms and an angiogram was performed showing 60% ostial to proximal LAD lesion with IFR of 0.86 indicating obstructive disease.  A single MIACH was placed.  He was again started on Brilinta but caused SOB again and was on Clopidogrel and ASA but DAPT stopped after 1 year.  He is also on metoprolol and  lisinopril.  No recurrence of chest discomfort.     HLD: on statin     HTN: on amlodipine and lisinopril, doing well     BMI 41: exercise is difficult to his back.  Not following a diabetic or cardiac diet.       GERD: on omeprazole daily     RAY: uses CPAP    Review of Systems   Constitutional, HEENT, cardiovascular, pulmonary, GI, , musculoskeletal, neuro, skin, endocrine and psych systems are negative, except as otherwise noted.      Objective    /74 (BP Location: Left arm)   Pulse 65   Wt 130.2 kg (287 lb)   SpO2 97%   BMI 41.18 kg/m      General: Well appearing, NAD  Ext: no edema  Psych: normal mood and affect        Results for orders placed or performed in visit on 06/23/25 (from the past 24 hours)   Hemoglobin A1C (R[facility])   Result Value Ref Range    Hemoglobin A1C 6.3 (A) 4.0 - 6.0 %       Type 2 diabetes mellitus with diabetic polyneuropathy, without long-term current use of insulin (H)  A1c at goal  Feel he would strongly benefit from GLP1a if affordable  Discussed in detail  - Hemoglobin A1C (RMG)  - VENOUS COLLECTION  - Med Therapy Management Referral    Coronary artery disease involving native coronary artery of native heart without angina pectoris  Asymptomatic, cont secondary prevention    Primary hypertension  stable/controlled. Cont current medication(s) and treatment    Morbid obesity (H)  See above    RAY (obstructive sleep apnea)  Cont CPAP  Weight loss

## 2025-06-30 DIAGNOSIS — I21.4 NSTEMI (NON-ST ELEVATED MYOCARDIAL INFARCTION) (H): ICD-10-CM

## 2025-06-30 RX ORDER — ATORVASTATIN CALCIUM 40 MG/1
40 TABLET, FILM COATED ORAL DAILY
Qty: 90 TABLET | Refills: 1 | Status: SHIPPED | OUTPATIENT
Start: 2025-06-30

## 2025-06-30 RX ORDER — LISINOPRIL 5 MG/1
5 TABLET ORAL DAILY
Qty: 90 TABLET | Refills: 1 | Status: SHIPPED | OUTPATIENT
Start: 2025-06-30

## 2025-06-30 RX ORDER — METOPROLOL TARTRATE 25 MG/1
25 TABLET, FILM COATED ORAL 2 TIMES DAILY
Qty: 180 TABLET | Refills: 1 | Status: SHIPPED | OUTPATIENT
Start: 2025-06-30

## 2025-06-30 RX ORDER — AMLODIPINE BESYLATE 5 MG/1
5 TABLET ORAL DAILY
Qty: 90 TABLET | Refills: 1 | Status: SHIPPED | OUTPATIENT
Start: 2025-06-30

## 2025-07-02 ENCOUNTER — MYC MEDICAL ADVICE (OUTPATIENT)
Dept: CARDIOLOGY | Facility: CLINIC | Age: 75
End: 2025-07-02
Payer: MEDICARE

## 2025-07-14 DIAGNOSIS — N20.0 RECURRENT NEPHROLITHIASIS: Primary | ICD-10-CM

## 2025-07-22 ENCOUNTER — OFFICE VISIT (OUTPATIENT)
Dept: PHARMACY | Facility: PHYSICIAN GROUP | Age: 75
End: 2025-07-22
Attending: FAMILY MEDICINE

## 2025-07-22 VITALS
OXYGEN SATURATION: 96 % | BODY MASS INDEX: 42.36 KG/M2 | DIASTOLIC BLOOD PRESSURE: 67 MMHG | SYSTOLIC BLOOD PRESSURE: 114 MMHG | WEIGHT: 286 LBS | HEART RATE: 64 BPM | HEIGHT: 69 IN

## 2025-07-22 DIAGNOSIS — I10 PRIMARY HYPERTENSION: ICD-10-CM

## 2025-07-22 DIAGNOSIS — I25.2 HISTORY OF NON-ST ELEVATION MYOCARDIAL INFARCTION (NSTEMI): ICD-10-CM

## 2025-07-22 DIAGNOSIS — Z78.9 TAKES DIETARY SUPPLEMENTS: ICD-10-CM

## 2025-07-22 DIAGNOSIS — E78.5 DYSLIPIDEMIA: ICD-10-CM

## 2025-07-22 DIAGNOSIS — R11.0 NAUSEA: ICD-10-CM

## 2025-07-22 DIAGNOSIS — E11.42 TYPE 2 DIABETES MELLITUS WITH DIABETIC POLYNEUROPATHY, WITHOUT LONG-TERM CURRENT USE OF INSULIN (H): Primary | ICD-10-CM

## 2025-07-22 DIAGNOSIS — N20.0 RECURRENT NEPHROLITHIASIS: ICD-10-CM

## 2025-07-22 PROCEDURE — 99207 PR NO CHARGE LOS: CPT | Performed by: PHARMACIST

## 2025-07-22 RX ORDER — ANTIOX #8/OM3/DHA/EPA/LUT/ZEAX 250-2.5 MG
2 CAPSULE ORAL DAILY
COMMUNITY

## 2025-07-22 NOTE — PROGRESS NOTES
Medication Therapy Management (MTM) Encounter    ASSESSMENT:                            Medication Adherence/Access: No issues identified.    Diabetes  Patient is meeting A1c goal of < 7%.  Patient would benefit from switching metformin to Ozempic, however cost prohibitive at this time. Called Benito garsia with patient today and determined that the cost of the medication is $511 , then would be $ 186 per month.  (Part D deductible is set at $590, 19% co-insurance for Tier 3 medications). We discussed options related to medicare plans (open enrollment in Oct) or payment plans as alternatives since does not appear he will qualify for patient assistance.     Hypertension /CAD  Blood pressure at goal <130/80mmHg.     Hyperlipidemia   Stable.     Genitourinary Symptoms   Kidney stones  Continue with follow up plan and urologist.      Supplements  Stable.     Nausea  Stable.     PLAN:                            1. Patient assistance application for Ozempic is under $62,600, take a look and let me know if you happen to be under this amount.     We would like to get you started on Ozempic or Mounjaro, so when you sign up for your insurance for 2026 you can ask about what these medications would cost.     Senior LinkAge Line -- Call [phone] for free help with Medicare in [place], Monday through Friday, 8:00 AM - 4:30 PM.    What are the differences between the Coverage Phases?    Deductible Phase  Until you meet your yearly Part D deductible, you will pay full price for your covered prescriptions. Once the annual deductible is met the plan will begin to cover the cost of your drugs based on the plans prescription drug benefits. While deductibles can vary from plan to plan, the plan deductible cannot be higher than $590 in 2025. Your plan can tell you the exact amount your deductible is on your specific plan.     Coverage Phase  In this phase, you will either pay a copay or coinsurance (a percentage of the drug s  cost) when you have a prescription filled. This phase lasts until you have spent $2000 on prescription medications in the current plan year. In 2025, the highest coinsurance a plan can set is 25%.  Different medications have different levels of coverage and costs.   You can call your plan to determine if you have copay or coinsurance for your medications.     Catastrophic Phase  After you have spent $2000 out-of-pocket on prescription drug expenses, you will no longer have a cost at the pharmacy for your medications end of the plan year.      Follow-up: Return in about 3 months (around 10/22/2025).    SUBJECTIVE/OBJECTIVE:                          Hugo Prince is a 75 year old male seen for an initial visit. He was referred to me from Dr. Luis.      Reason for visit: check on how to get Ozempic.    Allergies/ADRs: Reviewed in chart  Past Medical History: Reviewed in chart  Tobacco: He reports that he has been smoking cigars. He has never used smokeless tobacco.Nicotine/Tobacco Cessation Plan  Information offered: Patient not interested at this time  Alcohol: 10 or more beverages /week    Medication Adherence/Access: no issues reported.    Diabetes   Metformin ER 500mg - 2 tablets daily with food.    Aspirin 81mg daily  Patient is not experiencing side effects usually, but does have some loose stools.   Current diabetes symptoms: none  Diet/Exercise: struggles with weight although sugars are controlled.      Blood sugar monitoring: never    Hypertension /CAD  Asprin 81mg   Metoprolol tartrate 25mg twice daily   Lisinopril 5mg daily  Amlodipine 5mg daily  Nitroglycerin as needed     MICAH placed July 2019 and March 2023  Patient reports no current medication side effects  Patient does not self-monitor blood pressure.       Hyperlipidemia   atorvastatin 40mg daily  Patient reports no significant myalgias or other side effects.     Genitourinary Symptoms   Kidney stones  Had tamsulosin as needed for stones.  "  Alfuzosin was in his bag, but not taking anymore.  Patient reports no medication side effects.    Current symptoms include: All of the time  Seeing Urology for management and prevention considerations.      Supplements  AREDS 2 tablets as needed.   No reported issues at this time.     Nausea  Will wake up and have a sour stomach some mornings, so will use ondansetron as needed.   Denies other issues at this time.     Today's Vitals: /67   Pulse 64   Ht 5' 9.25\" (1.759 m)   Wt 286 lb (129.7 kg)   SpO2 96%   BMI 41.93 kg/m    ----------------    I spent 58 minutes with this patient today. All changes were made via collaborative practice agreement with Clay Luis MD.     A summary of these recommendations was given to the patient.    Jacy Gong, Pharm.D, [facility]  Medication Therapy Management Pharmacist  [phone]     Medication Therapy Recommendations  Type 2 diabetes mellitus with diabetic polyneuropathy, without long-term current use of insulin (H)   1 Rationale: Synergistic therapy - Needs additional medication therapy - Indication   Recommendation: Start Medication - Ozempic (0.25 or 0.5 MG/DOSE) 2 MG/1.5ML Sopn   Status: Declined per Patient   Identified Date: 7/22/2025 Completed Date: 7/22/2025            "

## 2025-07-22 NOTE — PATIENT INSTRUCTIONS
Recommendations from today's MTM visit:                                                       1. Patient assistance application for Ozempic is under $62,600, take a look and let me know if you happen to be under this amount.     We would like to get you started on Ozempic or Mounjaro, so when you sign up for your insurance for 2026 you can ask about what these medications would cost.     Senior [employer] Line -- Call [phone] for free help with Medicare in [place], Monday through Friday, 8:00 AM - 4:30 PM.    What are the differences between the Coverage Phases?    Deductible Phase  Until you meet your yearly Part D deductible, you will pay full price for your covered prescriptions. Once the annual deductible is met the plan will begin to cover the cost of your drugs based on the plans prescription drug benefits. While deductibles can vary from plan to plan, the plan deductible cannot be higher than $590 in 2025. Your plan can tell you the exact amount your deductible is on your specific plan.     Coverage Phase  In this phase, you will either pay a copay (fixed amount) or coinsurance (a percentage of the drug s cost) when you have a prescription filled. This phase lasts until you have spent $2000 on prescription medications in the current plan year. In 2025, the highest coinsurance a plan can set is 25%.  Different medications have different levels of coverage and costs.   You can call your plan to determine if you have copay or coinsurance for your medications.     Catastrophic Phase  After you have spent $2000 out-of-pocket on prescription drug expenses, you will no longer have a cost at the pharmacy for your medications end of the plan year.      Follow-up: Return in about 3 months (around 10/22/2025).    It was great speaking with you today.  I value your experience and would be very thankful for your time in providing feedback in our clinic survey. In the next few days, you may receive an email or text  "message from [facility] with a link to a survey related to your  clinical pharmacist.\"     My Clinical Pharmacist's contact information:                                                      Please feel free to contact me with any questions or concerns you have.      Jacy Gong, Pharm.D, [facility]  Medication Therapy Management Pharmacist  [phone]      "

## 2025-07-30 LAB
AMMONIA 24H UR-SRATE: 20 MMOL/24 HR
AMMONIA 24H UR-SRATE: 23 MMOL/24 HR
CA H2 PHOS DIHYD 24H SATFR UR: 0.25
CA H2 PHOS DIHYD 24H SATFR UR: 0.37
CALCIUM 24H UR-MRATE: 173 MG/24 HR
CALCIUM 24H UR-MRATE: 191 MG/24 HR
CALCIUM/CREAT 24H UR: 102 MG/G CREAT
CALCIUM/CREAT 24H UR: 105 MG/G CREAT
CAOX INDEX 24H UR-RTO: 6.54
CAOX INDEX 24H UR-RTO: 6.64
CHLORIDE 24H UR-SRATE: 165 MMOL/24 HR
CHLORIDE 24H UR-SRATE: 204 MMOL/24 HR
CITRATE 24H UR-MRATE: 348 MG/24 HR
CITRATE 24H UR-MRATE: 359 MG/24 HR
CREAT 24H UR-MRATE: 1686 MG/24 HR
CREAT 24H UR-MRATE: 1814 MG/24 HR
CREAT/BW 24H UR-RELMRAT: 13 MG/24 HR/KG
CREAT/BW 24H UR-RELMRAT: 14 MG/24 HR/KG
CYSTINE 24H UR QL: ABNORMAL
CYSTINE 24H UR QL: ABNORMAL
Lab: 0.5 G/KG/24 HR
Lab: 0.6 G/KG/24 HR
Lab: 1.3 MG/24 HR/KG
Lab: 1.5 MG/24 HR/KG
Lab: ABNORMAL
Lab: ABNORMAL
MAGNESIUM 24H UR-MRATE: 77 MG/24 HR
MAGNESIUM 24H UR-MRATE: 97 MG/24 HR
OXALATE 24H UR-MRATE: 37 MG/24 HR
OXALATE 24H UR-MRATE: 38 MG/24 HR
PH 24H UR: 5.47 [PH]
PH 24H UR: 5.62 [PH]
PHOSPHATE 24H UR-MRATE: 768 MG/24 HR
PHOSPHATE 24H UR-MRATE: 794 MG/24 HR
POTASSIUM 24H UR-SRATE: 76 MMOL/24 HR
POTASSIUM 24H UR-SRATE: 81 MMOL/24 HR
SODIUM 24H UR-SRATE: 143 MMOL/24 HR
SODIUM 24H UR-SRATE: 177 MMOL/24 HR
SPECIMEN VOL 24H UR: 1830 ML/24 HR
SPECIMEN VOL 24H UR: 2080 ML/24 HR
SULFATE 24H UR-SRATE: 13 MEQ/24 HR
SULFATE 24H UR-SRATE: 14 MEQ/24 HR
URATE 24H SATFR UR: 1.61
URATE 24H SATFR UR: 1.83
URATE 24H UR-MRATE: 758 MG/24 HR
URATE 24H UR-MRATE: 796 MG/24 HR
UUN 24H UR-MRATE: 7.3 G/24 HR
UUN 24H UR-MRATE: 7.77 G/24 HR

## 2025-08-21 ENCOUNTER — VIRTUAL VISIT (OUTPATIENT)
Dept: UROLOGY | Facility: CLINIC | Age: 75
End: 2025-08-21
Attending: UROLOGY
Payer: MEDICARE

## 2025-08-21 VITALS — HEIGHT: 70 IN | BODY MASS INDEX: 40.66 KG/M2 | WEIGHT: 284 LBS

## 2025-08-21 DIAGNOSIS — N20.0 KIDNEY STONE: Primary | ICD-10-CM

## 2025-08-21 DIAGNOSIS — N20.0 KIDNEY STONE ON LEFT SIDE: ICD-10-CM

## 2025-08-21 ASSESSMENT — PAIN SCALES - GENERAL: PAINLEVEL_OUTOF10: NO PAIN (0)

## 2025-08-23 ENCOUNTER — TELEPHONE (OUTPATIENT)
Dept: FAMILY MEDICINE | Facility: CLINIC | Age: 75
End: 2025-08-23

## 2025-08-23 ENCOUNTER — OFFICE VISIT (OUTPATIENT)
Dept: URGENT CARE | Facility: URGENT CARE | Age: 75
End: 2025-08-23
Payer: MEDICARE

## 2025-08-23 ENCOUNTER — ANCILLARY PROCEDURE (OUTPATIENT)
Dept: GENERAL RADIOLOGY | Facility: CLINIC | Age: 75
End: 2025-08-23
Attending: FAMILY MEDICINE
Payer: MEDICARE

## 2025-08-23 VITALS
RESPIRATION RATE: 18 BRPM | HEART RATE: 80 BPM | OXYGEN SATURATION: 96 % | HEIGHT: 69 IN | BODY MASS INDEX: 41.92 KG/M2 | SYSTOLIC BLOOD PRESSURE: 131 MMHG | DIASTOLIC BLOOD PRESSURE: 77 MMHG | TEMPERATURE: 99 F | WEIGHT: 283 LBS

## 2025-08-23 DIAGNOSIS — R68.83 CHILLS: ICD-10-CM

## 2025-08-23 DIAGNOSIS — R53.83 OTHER FATIGUE: ICD-10-CM

## 2025-08-23 DIAGNOSIS — R05.9 COUGH, UNSPECIFIED TYPE: ICD-10-CM

## 2025-08-23 DIAGNOSIS — R05.9 COUGH, UNSPECIFIED TYPE: Primary | ICD-10-CM

## 2025-08-23 DIAGNOSIS — N39.0 URINARY TRACT INFECTION WITHOUT HEMATURIA, SITE UNSPECIFIED: ICD-10-CM

## 2025-08-23 DIAGNOSIS — N17.9 AKI (ACUTE KIDNEY INJURY): ICD-10-CM

## 2025-08-23 DIAGNOSIS — R51.9 NONINTRACTABLE HEADACHE, UNSPECIFIED CHRONICITY PATTERN, UNSPECIFIED HEADACHE TYPE: ICD-10-CM

## 2025-08-23 LAB
ALBUMIN SERPL-MCNC: 3.6 G/DL (ref 3.4–5)
ALBUMIN UR-MCNC: 100 MG/DL
ALP SERPL-CCNC: 99 U/L (ref 40–150)
ALT SERPL W P-5'-P-CCNC: 38 U/L (ref 0–70)
ANION GAP SERPL CALCULATED.3IONS-SCNC: 10 MMOL/L (ref 3–14)
APPEARANCE UR: CLEAR
AST SERPL W P-5'-P-CCNC: 42 U/L (ref 0–45)
BACTERIA #/AREA URNS HPF: ABNORMAL /HPF
BASOPHILS # BLD AUTO: <0.04 10E3/UL (ref 0–0.2)
BASOPHILS NFR BLD AUTO: 0.2 %
BILIRUB SERPL-MCNC: 1.2 MG/DL (ref 0.2–1.3)
BILIRUB UR QL STRIP: ABNORMAL
BUN SERPL-MCNC: 20 MG/DL (ref 7–30)
CALCIUM SERPL-MCNC: 9.8 MG/DL (ref 8.5–10.1)
CHLORIDE BLD-SCNC: 102 MMOL/L (ref 94–109)
CO2 SERPL-SCNC: 24 MMOL/L (ref 20–32)
COLOR UR AUTO: YELLOW
CREAT SERPL-MCNC: 1.4 MG/DL (ref 0.66–1.25)
EGFRCR SERPLBLD CKD-EPI 2021: 52 ML/MIN/1.73M2
EOSINOPHIL # BLD AUTO: 0.08 10E3/UL (ref 0–0.7)
EOSINOPHIL NFR BLD AUTO: 0.7 %
ERYTHROCYTE [DISTWIDTH] IN BLOOD BY AUTOMATED COUNT: 13.1 % (ref 10–15)
GLUCOSE BLD-MCNC: 157 MG/DL (ref 70–99)
GLUCOSE UR STRIP-MCNC: NEGATIVE MG/DL
HCT VFR BLD AUTO: 37.6 % (ref 40–53)
HGB BLD-MCNC: 12.6 G/DL (ref 13.3–17.7)
HGB UR QL STRIP: ABNORMAL
IMM GRANULOCYTES # BLD: 0.1 10E3/UL
IMM GRANULOCYTES NFR BLD: 0.9 %
KETONES UR STRIP-MCNC: ABNORMAL MG/DL
LEUKOCYTE ESTERASE UR QL STRIP: ABNORMAL
LYMPHOCYTES # BLD AUTO: 1.04 10E3/UL (ref 0.8–5.3)
LYMPHOCYTES NFR BLD AUTO: 9.7 %
MCH RBC QN AUTO: 36.3 PG (ref 26.5–33)
MCHC RBC AUTO-ENTMCNC: 33.5 G/DL (ref 31.5–36.5)
MCV RBC AUTO: 108.4 FL (ref 78–100)
MONOCYTES # BLD AUTO: 1.5 10E3/UL (ref 0–1.3)
MONOCYTES NFR BLD AUTO: 14 %
MUCOUS THREADS #/AREA URNS LPF: PRESENT /LPF
NEUTROPHILS # BLD AUTO: 7.98 10E3/UL (ref 1.6–8.3)
NEUTROPHILS NFR BLD AUTO: 74.5 %
NITRATE UR QL: NEGATIVE
PH UR STRIP: 5.5 [PH] (ref 5–7)
PLATELET # BLD AUTO: 155 10E3/UL (ref 150–450)
POTASSIUM BLD-SCNC: 4.4 MMOL/L (ref 3.4–5.3)
PROT SERPL-MCNC: 7.6 G/DL (ref 6.8–8.8)
RBC # BLD AUTO: 3.47 10E6/UL (ref 4.4–5.9)
RBC #/AREA URNS AUTO: ABNORMAL /HPF
SODIUM SERPL-SCNC: 136 MMOL/L (ref 135–145)
SP GR UR STRIP: 1.02 (ref 1–1.03)
SQUAMOUS #/AREA URNS AUTO: ABNORMAL /LPF
UROBILINOGEN UR STRIP-ACNC: 2 E.U./DL
WBC # BLD AUTO: 10.72 10E3/UL (ref 4–11)
WBC #/AREA URNS AUTO: >100 /HPF

## 2025-08-23 PROCEDURE — 86618 LYME DISEASE ANTIBODY: CPT | Performed by: FAMILY MEDICINE

## 2025-08-23 PROCEDURE — 3078F DIAST BP <80 MM HG: CPT | Performed by: FAMILY MEDICINE

## 2025-08-23 PROCEDURE — 81001 URINALYSIS AUTO W/SCOPE: CPT | Performed by: FAMILY MEDICINE

## 2025-08-23 PROCEDURE — 80053 COMPREHEN METABOLIC PANEL: CPT | Performed by: FAMILY MEDICINE

## 2025-08-23 PROCEDURE — 36415 COLL VENOUS BLD VENIPUNCTURE: CPT | Performed by: FAMILY MEDICINE

## 2025-08-23 PROCEDURE — 87086 URINE CULTURE/COLONY COUNT: CPT | Performed by: FAMILY MEDICINE

## 2025-08-23 PROCEDURE — 85025 COMPLETE CBC W/AUTO DIFF WBC: CPT | Performed by: FAMILY MEDICINE

## 2025-08-23 PROCEDURE — 71046 X-RAY EXAM CHEST 2 VIEWS: CPT | Mod: TC | Performed by: RADIOLOGY

## 2025-08-23 PROCEDURE — 87635 SARS-COV-2 COVID-19 AMP PRB: CPT | Performed by: FAMILY MEDICINE

## 2025-08-23 PROCEDURE — 99214 OFFICE O/P EST MOD 30 MIN: CPT | Performed by: FAMILY MEDICINE

## 2025-08-23 PROCEDURE — 3075F SYST BP GE 130 - 139MM HG: CPT | Performed by: FAMILY MEDICINE

## 2025-08-23 RX ORDER — CEFDINIR 300 MG/1
300 CAPSULE ORAL 2 TIMES DAILY
Qty: 10 CAPSULE | Refills: 0 | Status: SHIPPED | OUTPATIENT
Start: 2025-08-23 | End: 2025-08-28

## 2025-08-24 LAB
B BURGDOR IGG+IGM SER QL: 0.03
BACTERIA UR CULT: NORMAL
SARS-COV-2 RNA RESP QL NAA+PROBE: NEGATIVE

## 2025-09-03 ENCOUNTER — LAB (OUTPATIENT)
Dept: LAB | Facility: CLINIC | Age: 75
End: 2025-09-03
Payer: MEDICARE

## 2025-09-03 DIAGNOSIS — N17.9 AKI (ACUTE KIDNEY INJURY): ICD-10-CM

## 2025-09-03 LAB
ALBUMIN SERPL BCG-MCNC: 4.2 G/DL (ref 3.5–5.2)
ANION GAP SERPL CALCULATED.3IONS-SCNC: 10 MMOL/L (ref 7–15)
BUN SERPL-MCNC: 19 MG/DL (ref 8–23)
CALCIUM SERPL-MCNC: 9.7 MG/DL (ref 8.8–10.4)
CHLORIDE SERPL-SCNC: 100 MMOL/L (ref 98–107)
CREAT SERPL-MCNC: 1.15 MG/DL (ref 0.67–1.17)
EGFRCR SERPLBLD CKD-EPI 2021: 66 ML/MIN/1.73M2
GLUCOSE SERPL-MCNC: 130 MG/DL (ref 70–99)
HCO3 SERPL-SCNC: 26 MMOL/L (ref 22–29)
PHOSPHATE SERPL-MCNC: 3 MG/DL (ref 2.5–4.5)
POTASSIUM SERPL-SCNC: 4.8 MMOL/L (ref 3.4–5.3)
SODIUM SERPL-SCNC: 136 MMOL/L (ref 135–145)

## 2025-09-03 PROCEDURE — 80069 RENAL FUNCTION PANEL: CPT

## 2025-09-03 PROCEDURE — 36415 COLL VENOUS BLD VENIPUNCTURE: CPT

## (undated) DEVICE — CATH DIAGNOSTIC RADIAL 5FR TIG 4.0

## (undated) DEVICE — PAD CHUX UNDERPAD 23X24" 7136

## (undated) DEVICE — CATH ANGIO JUDKINS JL4 6FRX100CM INFINITI 534620T

## (undated) DEVICE — GW VASC OMNIWIRE J L185CM PRESSURE 89185J

## (undated) DEVICE — GUIDEWIRE AMPLATZ SUPER STIFF .035 X 145CM M0066401080

## (undated) DEVICE — CATH BALLOON NC EMERGE 3.50X20MM H7493926720350

## (undated) DEVICE — SOL NACL 0.9% IRRIG 3000ML BAG 2B7477

## (undated) DEVICE — CATH BALLOON EMERGE 2.75X15MMH7493918915270

## (undated) DEVICE — KIT HAND CONTROL ANGIOTOUCH ACIST 65CM AT-P65

## (undated) DEVICE — RAD RX ISOVUE 300 (50ML) 61% IOPAMIDOL CHARGE PER ML

## (undated) DEVICE — SUCTION MANIFOLD NEPTUNE 2 SYS 4 PORT 0702-020-000

## (undated) DEVICE — SYSTEM IRR 70CM CVAC CATH CVAC70

## (undated) DEVICE — CVAC ASPIRATION SYSTEM CVC127020-1

## (undated) DEVICE — TUBING SUCTION 12"X1/4" N612

## (undated) DEVICE — Device

## (undated) DEVICE — CATH URETERAL DUAL LUMEN 10FRX54CM M0064051000

## (undated) DEVICE — SHEATH URETERAL ACCESS NAVIGATOR HD 12/14FRX46CM M0062502260

## (undated) DEVICE — MANIFOLD KIT ANGIO AUTOMATED 014613

## (undated) DEVICE — NDL PERC ENTRY 21GA 4CM G00280

## (undated) DEVICE — EVACUATOR BLADDER UROVAC LATEX M0067301250

## (undated) DEVICE — GUIDEWIRE SENSOR DUAL FLEX STR 0.035"X150CM M0066703080

## (undated) DEVICE — HOLMIUM LASER

## (undated) DEVICE — CATH ANGIO INFINITI 3DRC 6FRX100CM 534676T

## (undated) DEVICE — VALVE HEMOSTASIS .096" COPILOT MECH 1003331

## (undated) DEVICE — INFL DVC KIT W/10CC NITRO IN4530

## (undated) DEVICE — CATH FOLEY COUDE 18FR 5ML LATEX

## (undated) DEVICE — INTRO GLIDESHEATH SLENDER 6FR 10X45CM 60-1060

## (undated) DEVICE — LINEN TOWEL PACK X5 5464

## (undated) DEVICE — BAG DRAIN URO FOR SIEMENS 8MM ADAPTER NS CC164NS-A

## (undated) DEVICE — PACK TUR CUSTOM SBA15RUFSE

## (undated) DEVICE — SLEEVE TR BAND RADIAL COMPRESSION DEVICE 29CM XX-RF06L

## (undated) DEVICE — ENDO SEAL BX PORT BPS-A

## (undated) DEVICE — TUBING SUCTION MEDI-VAC SOFT 3/16"X20' N520A

## (undated) DEVICE — LINE MONITOR NASAL SMART CAPNOLINE ADULT LONG 12463

## (undated) DEVICE — INTRO SHEATH 6FRX10CM PINNACLE RSS602

## (undated) DEVICE — CATH BALLOON NC EMERGE 3.75X8MM H7493926708370

## (undated) DEVICE — GUIDEWIRE VERRATA PLUS PRESSURE 185CM JTIP 10185JP

## (undated) DEVICE — CATH URETERAL OPEN END 6FR AXXCESS

## (undated) DEVICE — WIRE GUIDE 0.035"X260CM SAFE-T-J EXCHANGE G00517

## (undated) DEVICE — DEFIB PRO-PADZ LVP LQD GEL ADULT 8900-2105-01

## (undated) DEVICE — KIT LG BORE TOUHY ACCESS PLUS MAP152

## (undated) DEVICE — INTRODUCER CATH VASC 5FRX10CM  MPIS-501-NT-U-SST

## (undated) DEVICE — CATH LAUNCHER 6FR EBU 3.5 LA6EBU35

## (undated) DEVICE — CATH GUIDING  6F MACH 1 GUIDING CLS3.5

## (undated) DEVICE — BASKET STONE RETRIEVAL NTNL ZERO TIP 1.9FRX90CM M0063901050

## (undated) DEVICE — WIRE GUIDE AMPLATZ SUPER STIFF 0.035"X145CM 46-524

## (undated) DEVICE — TOTE ANGIO CORP PC15AT SAN32CC83O

## (undated) DEVICE — CATH BALLOON EMERGE 3.0X12MM H7493918912300

## (undated) DEVICE — LASER FIBER HOLMIUM MOSES 200 D/F/L AC-10030100

## (undated) DEVICE — SYR 20ML LL W/O NDL 302830

## (undated) DEVICE — GUIDEWIRE VASC 0.014INX180CM RUNTHROUGH 25-1011

## (undated) DEVICE — RAD INFLATOR BASIC COMPAK  IN4130

## (undated) RX ORDER — HEPARIN SODIUM 1000 [USP'U]/ML
INJECTION, SOLUTION INTRAVENOUS; SUBCUTANEOUS
Status: DISPENSED
Start: 2019-07-16

## (undated) RX ORDER — HEPARIN SODIUM 200 [USP'U]/100ML
INJECTION, SOLUTION INTRAVENOUS
Status: DISPENSED
Start: 2023-03-09

## (undated) RX ORDER — FUROSEMIDE 10 MG/ML
INJECTION INTRAMUSCULAR; INTRAVENOUS
Status: DISPENSED
Start: 2025-02-12

## (undated) RX ORDER — LIDOCAINE HYDROCHLORIDE 10 MG/ML
INJECTION, SOLUTION EPIDURAL; INFILTRATION; INTRACAUDAL; PERINEURAL
Status: DISPENSED
Start: 2019-07-16

## (undated) RX ORDER — FENTANYL CITRATE 0.05 MG/ML
INJECTION, SOLUTION INTRAMUSCULAR; INTRAVENOUS
Status: DISPENSED
Start: 2025-02-12

## (undated) RX ORDER — ONDANSETRON 2 MG/ML
INJECTION INTRAMUSCULAR; INTRAVENOUS
Status: DISPENSED
Start: 2025-01-08

## (undated) RX ORDER — HYDROMORPHONE HCL IN WATER/PF 6 MG/30 ML
PATIENT CONTROLLED ANALGESIA SYRINGE INTRAVENOUS
Status: DISPENSED
Start: 2025-02-12

## (undated) RX ORDER — ACETAMINOPHEN 325 MG/1
TABLET ORAL
Status: DISPENSED
Start: 2025-01-08

## (undated) RX ORDER — NITROGLYCERIN 5 MG/ML
VIAL (ML) INTRAVENOUS
Status: DISPENSED
Start: 2023-03-09

## (undated) RX ORDER — FENTANYL CITRATE 50 UG/ML
INJECTION, SOLUTION INTRAMUSCULAR; INTRAVENOUS
Status: DISPENSED
Start: 2023-03-09

## (undated) RX ORDER — PROPOFOL 10 MG/ML
INJECTION, EMULSION INTRAVENOUS
Status: DISPENSED
Start: 2025-02-12

## (undated) RX ORDER — HEPARIN SODIUM 200 [USP'U]/100ML
INJECTION, SOLUTION INTRAVENOUS
Status: DISPENSED
Start: 2019-07-16

## (undated) RX ORDER — HEPARIN SODIUM 1000 [USP'U]/ML
INJECTION, SOLUTION INTRAVENOUS; SUBCUTANEOUS
Status: DISPENSED
Start: 2023-03-09

## (undated) RX ORDER — VERAPAMIL HYDROCHLORIDE 2.5 MG/ML
INJECTION, SOLUTION INTRAVENOUS
Status: DISPENSED
Start: 2019-07-16

## (undated) RX ORDER — HYDROMORPHONE HYDROCHLORIDE 1 MG/ML
INJECTION, SOLUTION INTRAMUSCULAR; INTRAVENOUS; SUBCUTANEOUS
Status: DISPENSED
Start: 2025-01-08

## (undated) RX ORDER — KETOROLAC TROMETHAMINE 30 MG/ML
INJECTION, SOLUTION INTRAMUSCULAR; INTRAVENOUS
Status: DISPENSED
Start: 2025-01-08

## (undated) RX ORDER — REGADENOSON 0.08 MG/ML
INJECTION, SOLUTION INTRAVENOUS
Status: DISPENSED
Start: 2019-06-04

## (undated) RX ORDER — ONDANSETRON 2 MG/ML
INJECTION INTRAMUSCULAR; INTRAVENOUS
Status: DISPENSED
Start: 2025-02-12

## (undated) RX ORDER — CEFAZOLIN SODIUM/WATER 3 G/30 ML
SYRINGE (ML) INTRAVENOUS
Status: DISPENSED
Start: 2025-01-08

## (undated) RX ORDER — ACETAMINOPHEN 325 MG/1
TABLET ORAL
Status: DISPENSED
Start: 2025-02-12

## (undated) RX ORDER — FUROSEMIDE 10 MG/ML
INJECTION INTRAMUSCULAR; INTRAVENOUS
Status: DISPENSED
Start: 2025-01-08

## (undated) RX ORDER — FENTANYL CITRATE 50 UG/ML
INJECTION, SOLUTION INTRAMUSCULAR; INTRAVENOUS
Status: DISPENSED
Start: 2019-07-16

## (undated) RX ORDER — KETOROLAC TROMETHAMINE 30 MG/ML
INJECTION, SOLUTION INTRAMUSCULAR; INTRAVENOUS
Status: DISPENSED
Start: 2025-02-12

## (undated) RX ORDER — FENTANYL CITRATE 50 UG/ML
INJECTION, SOLUTION INTRAMUSCULAR; INTRAVENOUS
Status: DISPENSED
Start: 2025-01-08

## (undated) RX ORDER — VERAPAMIL HYDROCHLORIDE 2.5 MG/ML
INJECTION, SOLUTION INTRAVENOUS
Status: DISPENSED
Start: 2023-03-09

## (undated) RX ORDER — DEXAMETHASONE SODIUM PHOSPHATE 4 MG/ML
INJECTION, SOLUTION INTRA-ARTICULAR; INTRALESIONAL; INTRAMUSCULAR; INTRAVENOUS; SOFT TISSUE
Status: DISPENSED
Start: 2025-01-08

## (undated) RX ORDER — PROPOFOL 10 MG/ML
INJECTION, EMULSION INTRAVENOUS
Status: DISPENSED
Start: 2025-01-08

## (undated) RX ORDER — FENTANYL CITRATE 50 UG/ML
INJECTION, SOLUTION INTRAMUSCULAR; INTRAVENOUS
Status: DISPENSED
Start: 2025-02-12

## (undated) RX ORDER — LIDOCAINE HYDROCHLORIDE 10 MG/ML
INJECTION, SOLUTION EPIDURAL; INFILTRATION; INTRACAUDAL; PERINEURAL
Status: DISPENSED
Start: 2023-03-09

## (undated) RX ORDER — CEFAZOLIN SODIUM 1 G/3ML
INJECTION, POWDER, FOR SOLUTION INTRAMUSCULAR; INTRAVENOUS
Status: DISPENSED
Start: 2025-02-12